# Patient Record
Sex: FEMALE | Race: WHITE | Employment: UNEMPLOYED | URBAN - METROPOLITAN AREA
[De-identification: names, ages, dates, MRNs, and addresses within clinical notes are randomized per-mention and may not be internally consistent; named-entity substitution may affect disease eponyms.]

---

## 2017-04-08 ENCOUNTER — HOSPITAL ENCOUNTER (OUTPATIENT)
Dept: OTHER | Age: 62
Discharge: HOME | End: 2017-04-08
Attending: INTERNAL MEDICINE | Admitting: INTERNAL MEDICINE

## 2017-04-08 LAB
A/G RATIO: 1.3 (ref 1.1–2.2)
ALBUMIN SERPL-MCNC: 3.8 G/DL (ref 3.4–5)
ALP BLD-CCNC: 137 U/L (ref 40–129)
ALT SERPL-CCNC: 24 U/L (ref 10–40)
ANION GAP SERPL CALCULATED.3IONS-SCNC: 14 MMOL/L (ref 3–16)
AST SERPL-CCNC: 19 U/L (ref 15–37)
BASOPHILS ABSOLUTE: 0 K/UL (ref 0–0.2)
BASOPHILS RELATIVE PERCENT: 0.5 %
BILIRUB SERPL-MCNC: 0.3 MG/DL (ref 0–1)
BUN BLDV-MCNC: 18 MG/DL (ref 7–20)
CALCIUM SERPL-MCNC: 9 MG/DL (ref 8.3–10.6)
CHLORIDE BLD-SCNC: 104 MMOL/L (ref 99–110)
CHOLESTEROL, TOTAL: 120 MG/DL (ref 0–199)
CO2: 24 MMOL/L (ref 21–32)
CREAT SERPL-MCNC: 0.6 MG/DL (ref 0.6–1.1)
EOSINOPHILS ABSOLUTE: 0.3 K/UL (ref 0–0.6)
EOSINOPHILS RELATIVE PERCENT: 3.3 %
GFR AFRICAN AMERICAN: >60
GFR NON-AFRICAN AMERICAN: >60
GLOBULIN: 3 G/DL
GLUCOSE BLD-MCNC: 161 MG/DL (ref 70–99)
HCT VFR BLD CALC: 38.1 % (ref 36–48)
HDLC SERPL-MCNC: 46 MG/DL (ref 40–60)
HEMOGLOBIN: 11.8 G/DL (ref 12–16)
LDL CHOLESTEROL CALCULATED: 58 MG/DL
LYMPHOCYTES ABSOLUTE: 1.5 K/UL (ref 1–5.1)
LYMPHOCYTES RELATIVE PERCENT: 19.5 %
MCH RBC QN AUTO: 24.5 PG (ref 26–34)
MCHC RBC AUTO-ENTMCNC: 31 G/DL (ref 31–36)
MCV RBC AUTO: 79.1 FL (ref 80–100)
MONOCYTES ABSOLUTE: 0.5 K/UL (ref 0–1.3)
MONOCYTES RELATIVE PERCENT: 6.7 %
NEUTROPHILS ABSOLUTE: 5.5 K/UL (ref 1.7–7.7)
NEUTROPHILS RELATIVE PERCENT: 70 %
PDW BLD-RTO: 15.2 % (ref 12.4–15.4)
PLATELET # BLD: 242 K/UL (ref 135–450)
PMV BLD AUTO: 8.8 FL (ref 5–10.5)
POTASSIUM SERPL-SCNC: 4.1 MMOL/L (ref 3.5–5.1)
RBC # BLD: 4.82 M/UL (ref 4–5.2)
SODIUM BLD-SCNC: 142 MMOL/L (ref 136–145)
TOTAL PROTEIN: 6.8 G/DL (ref 6.4–8.2)
TRIGL SERPL-MCNC: 80 MG/DL (ref 0–150)
TSH SERPL DL<=0.05 MIU/L-ACNC: 0.81 UIU/ML (ref 0.27–4.2)
VITAMIN D 25-HYDROXY: 40.7 NG/ML
VLDLC SERPL CALC-MCNC: 16 MG/DL
WBC # BLD: 7.9 K/UL (ref 4–11)

## 2017-04-09 LAB
ESTIMATED AVERAGE GLUCOSE: 182.9 MG/DL
HBA1C MFR BLD: 8 %

## 2017-07-26 ENCOUNTER — HOSPITAL ENCOUNTER (OUTPATIENT)
Dept: MAMMOGRAPHY | Age: 62
Discharge: HOME | End: 2017-07-26
Attending: INTERNAL MEDICINE | Admitting: INTERNAL MEDICINE

## 2017-07-26 DIAGNOSIS — Z12.31 VISIT FOR SCREENING MAMMOGRAM: ICD-10-CM

## 2018-07-23 PROBLEM — K56.609 SBO (SMALL BOWEL OBSTRUCTION) (HCC): Status: ACTIVE | Noted: 2018-07-23

## 2018-08-02 ENCOUNTER — OFFICE VISIT (OUTPATIENT)
Dept: SURGERY | Age: 63
End: 2018-08-02

## 2018-08-02 VITALS — BODY MASS INDEX: 44.55 KG/M2 | WEIGHT: 276 LBS | SYSTOLIC BLOOD PRESSURE: 126 MMHG | DIASTOLIC BLOOD PRESSURE: 72 MMHG

## 2018-08-02 DIAGNOSIS — K43.0 INCARCERATED INCISIONAL HERNIA: Primary | ICD-10-CM

## 2018-08-02 PROCEDURE — 99024 POSTOP FOLLOW-UP VISIT: CPT | Performed by: SURGERY

## 2018-08-02 RX ORDER — FLUCONAZOLE 100 MG/1
200 TABLET ORAL DAILY
Qty: 14 TABLET | Refills: 0 | Status: SHIPPED | OUTPATIENT
Start: 2018-08-02 | End: 2018-08-09

## 2018-08-23 ENCOUNTER — OFFICE VISIT (OUTPATIENT)
Dept: SURGERY | Age: 63
End: 2018-08-23

## 2018-08-23 VITALS — DIASTOLIC BLOOD PRESSURE: 88 MMHG | BODY MASS INDEX: 42.61 KG/M2 | WEIGHT: 264 LBS | SYSTOLIC BLOOD PRESSURE: 142 MMHG

## 2018-08-23 DIAGNOSIS — K43.0 INCARCERATED INCISIONAL HERNIA: Primary | ICD-10-CM

## 2018-08-23 PROCEDURE — 99024 POSTOP FOLLOW-UP VISIT: CPT | Performed by: SURGERY

## 2018-09-27 ENCOUNTER — INITIAL CONSULT (OUTPATIENT)
Dept: SURGERY | Age: 63
End: 2018-09-27
Payer: COMMERCIAL

## 2018-09-27 VITALS
BODY MASS INDEX: 43.71 KG/M2 | WEIGHT: 272 LBS | SYSTOLIC BLOOD PRESSURE: 132 MMHG | HEIGHT: 66 IN | DIASTOLIC BLOOD PRESSURE: 80 MMHG

## 2018-09-27 DIAGNOSIS — K57.32 SIGMOID DIVERTICULITIS: Primary | ICD-10-CM

## 2018-09-27 PROCEDURE — 3017F COLORECTAL CA SCREEN DOC REV: CPT | Performed by: SURGERY

## 2018-09-27 PROCEDURE — 1036F TOBACCO NON-USER: CPT | Performed by: SURGERY

## 2018-09-27 PROCEDURE — G8427 DOCREV CUR MEDS BY ELIG CLIN: HCPCS | Performed by: SURGERY

## 2018-09-27 PROCEDURE — 99214 OFFICE O/P EST MOD 30 MIN: CPT | Performed by: SURGERY

## 2018-09-27 PROCEDURE — G8417 CALC BMI ABV UP PARAM F/U: HCPCS | Performed by: SURGERY

## 2018-09-27 ASSESSMENT — ENCOUNTER SYMPTOMS
EYES NEGATIVE: 1
NAUSEA: 1
APNEA: 1
ABDOMINAL PAIN: 1

## 2018-09-27 NOTE — PROGRESS NOTES
ferrous sulfate 324 (65 Fe) MG EC tablet Take 324 mg by mouth daily (with breakfast)   Yes Historical Provider, MD   Cyanocobalamin (VITAMIN B 12 PO) Take by mouth daily   Yes Historical Provider, MD   diphenhydrAMINE (BENADRYL) 25 MG capsule Take 25 mg by mouth 2 times daily as needed for Itching   Yes Historical Provider, MD   vitamin D (CHOLECALCIFEROL) 1000 UNIT TABS tablet Take 1,000 Units by mouth daily   Yes Historical Provider, MD   baclofen (LIORESAL) 10 MG tablet Take 1 tablet by mouth 2 times daily. 10/11/12  Yes Ellis Bradley MD   Lansoprazole (PREVACID PO) Take 30 mg by mouth daily. Yes Historical Provider, MD   metformin (GLUCOPHAGE) 500 MG tablet Take 1,000 mg by mouth See Admin Instructions 2 TABS TWICE DAY    Yes Historical Provider, MD   tramadol (ULTRAM) 50 MG tablet Take 50 mg by mouth 2 times daily. Yes Historical Provider, MD   zonisamide (ZONEGRAN) 25 MG capsule Take 25 mg by mouth 2 times daily. Yes Historical Provider, MD        Allergies:  Amitriptyline hcl; Cephalexin; Ciprofloxacin; Levofloxacin; Penicillins; Sulfa antibiotics; Cephalexin hcl; Elavil [amitriptyline hcl]; Levofloxacin; Methocarbamol; Sulfur; and Tetracyclines & related    Social History:    reports that she has never smoked. She has never used smokeless tobacco. She reports that she does not drink alcohol. Family History:    Family History   Problem Relation Age of Onset    Diabetes Mother     Heart Disease Neg Hx        REVIEW OF SYSTEMS:  Review of Systems   Constitutional: Positive for fatigue and fever. HENT: Negative. Eyes: Negative. Respiratory: Positive for apnea. Cardiovascular: Negative. Gastrointestinal: Positive for abdominal pain and nausea. Endocrine: Negative. Genitourinary: Negative. Musculoskeletal: Negative. Skin: Negative. Allergic/Immunologic: Positive for environmental allergies. Neurological: Negative. Hematological: Negative. Psychiatric/Behavioral: Negative. PHYSICAL EXAM:  VITALS:  /80   Ht 5' 6\" (1.676 m)   Wt 272 lb (123.4 kg)   BMI 43.90 kg/m²   CONSTITUTIONAL:  alert, no apparent distress and morbidly obese  EYES:  sclera clear  ENT:  normocepalic, without obvious abnormality  NECK:  supple, symmetrical, trachea midline  LUNGS:  clear to auscultation  CARDIOVASCULAR:  regular rate and rhythm   ABDOMEN:  scars noted large midline scar - healed, normal bowel sounds, soft, non-distended, tenderness noted in the left lower quadrant, away from prior surgery site, voluntary guarding absent, no masses palpated and hernia absent - prior site healed  MUSCULOSKELETAL:  0+ pitting edema lower extremities  NEUROLOGIC:  Mental Status Exam:  Level of Alertness:   awake  Orientation:   person, place, time  SKIN:  no bruising or bleeding, normal skin color, texture, turgor and no redness, warmth, or swelling        Radiology Review:  CT scan    Interface, Rad Results In - 09/26/2018  6:16 PM EDT  HISTORY:   LLQ pain, suspect diverticulitis llq pain, has h/o recurrent diverticulitis s/p partial colectomy Diverticulitis of large intestine without perforation or abscess without bleeding  COMPARISON:  5/1/2018  TECHNIQUE: Post IV contrast multiplanar CT images of the abdomen and pelvis  NOTE:  If there are questions about the content of this report, please contact [facility] radiology by calling [phone]    FINDINGS:  LOWER CHEST:  Unremarkable  LIVER:  Nodular liver contour suggestive of cirrhosis  GALLBLADDER/BILE DUCTS:  Unremarkable. No opaque gallstones  PANCREAS:  Unremarkable. No mass or duct dilation  SPLEEN:  Unremarkable  ADRENALS:  Unremarkable  KIDNEYS/URETERS:  7 mm nonobstructing stone right renal collecting system. 2 mm nonobstructing stone left renal collecting system  GI TRACT:  Bowel normal in caliber and wall thickness. Diverticulosis left side of colon. Small hiatal hernia  VESSELS:  Unremarkable.   No

## 2018-10-18 ENCOUNTER — OFFICE VISIT (OUTPATIENT)
Dept: SURGERY | Age: 63
End: 2018-10-18
Payer: COMMERCIAL

## 2018-10-18 VITALS — BODY MASS INDEX: 44.22 KG/M2 | SYSTOLIC BLOOD PRESSURE: 146 MMHG | DIASTOLIC BLOOD PRESSURE: 92 MMHG | WEIGHT: 274 LBS

## 2018-10-18 DIAGNOSIS — K57.32 SIGMOID DIVERTICULITIS: Primary | ICD-10-CM

## 2018-10-18 PROCEDURE — 3017F COLORECTAL CA SCREEN DOC REV: CPT | Performed by: SURGERY

## 2018-10-18 PROCEDURE — G8417 CALC BMI ABV UP PARAM F/U: HCPCS | Performed by: SURGERY

## 2018-10-18 PROCEDURE — G8427 DOCREV CUR MEDS BY ELIG CLIN: HCPCS | Performed by: SURGERY

## 2018-10-18 PROCEDURE — 99212 OFFICE O/P EST SF 10 MIN: CPT | Performed by: SURGERY

## 2018-10-18 PROCEDURE — 1036F TOBACCO NON-USER: CPT | Performed by: SURGERY

## 2018-10-18 PROCEDURE — G8484 FLU IMMUNIZE NO ADMIN: HCPCS | Performed by: SURGERY

## 2020-03-08 ENCOUNTER — HOSPITAL ENCOUNTER (INPATIENT)
Age: 65
LOS: 5 days | Discharge: HOME | DRG: 872 | End: 2020-03-14
Attending: STUDENT IN AN ORGANIZED HEALTH CARE EDUCATION/TRAINING PROGRAM | Admitting: INTERNAL MEDICINE
Payer: COMMERCIAL

## 2020-03-08 ENCOUNTER — APPOINTMENT (OUTPATIENT)
Dept: CT IMAGING | Age: 65
DRG: 872 | End: 2020-03-08
Payer: COMMERCIAL

## 2020-03-08 ENCOUNTER — APPOINTMENT (OUTPATIENT)
Dept: GENERAL RADIOLOGY | Age: 65
DRG: 872 | End: 2020-03-08
Payer: COMMERCIAL

## 2020-03-08 PROBLEM — R68.89 FLU-LIKE SYMPTOMS: Status: ACTIVE | Noted: 2020-03-08

## 2020-03-08 LAB
A/G RATIO: 1 (ref 1.1–2.2)
ALBUMIN SERPL-MCNC: 3.6 G/DL (ref 3.4–5)
ALP BLD-CCNC: 123 U/L (ref 40–129)
ALT SERPL-CCNC: 25 U/L (ref 10–40)
ANION GAP SERPL CALCULATED.3IONS-SCNC: 17 MMOL/L (ref 3–16)
AST SERPL-CCNC: 32 U/L (ref 15–37)
BASOPHILS ABSOLUTE: 0 K/UL (ref 0–0.2)
BASOPHILS RELATIVE PERCENT: 0.3 %
BILIRUB SERPL-MCNC: 0.8 MG/DL (ref 0–1)
BILIRUBIN URINE: NEGATIVE
BLOOD, URINE: NEGATIVE
BUN BLDV-MCNC: 19 MG/DL (ref 7–20)
CALCIUM SERPL-MCNC: 9.2 MG/DL (ref 8.3–10.6)
CHLORIDE BLD-SCNC: 100 MMOL/L (ref 99–110)
CLARITY: CLEAR
CO2: 19 MMOL/L (ref 21–32)
COLOR: YELLOW
CREAT SERPL-MCNC: 0.9 MG/DL (ref 0.6–1.2)
D DIMER: 756 NG/ML DDU (ref 0–229)
EOSINOPHILS ABSOLUTE: 0 K/UL (ref 0–0.6)
EOSINOPHILS RELATIVE PERCENT: 0.1 %
EPITHELIAL CELLS, UA: 2 /HPF (ref 0–5)
GFR AFRICAN AMERICAN: >60
GFR NON-AFRICAN AMERICAN: >60
GLOBULIN: 3.6 G/DL
GLUCOSE BLD-MCNC: 175 MG/DL (ref 70–99)
GLUCOSE BLD-MCNC: 191 MG/DL (ref 70–99)
GLUCOSE URINE: NEGATIVE MG/DL
HCT VFR BLD CALC: 45 % (ref 36–48)
HEMOGLOBIN: 15.1 G/DL (ref 12–16)
HYALINE CASTS: 0 /LPF (ref 0–8)
INR BLD: 1.23 (ref 0.86–1.14)
KETONES, URINE: ABNORMAL MG/DL
LACTIC ACID, SEPSIS: 1.1 MMOL/L (ref 0.4–1.9)
LACTIC ACID, SEPSIS: 1.5 MMOL/L (ref 0.4–1.9)
LEUKOCYTE ESTERASE, URINE: ABNORMAL
LYMPHOCYTES ABSOLUTE: 0.8 K/UL (ref 1–5.1)
LYMPHOCYTES RELATIVE PERCENT: 10.6 %
MCH RBC QN AUTO: 28.6 PG (ref 26–34)
MCHC RBC AUTO-ENTMCNC: 33.5 G/DL (ref 31–36)
MCV RBC AUTO: 85.2 FL (ref 80–100)
MICROSCOPIC EXAMINATION: YES
MONOCYTES ABSOLUTE: 0.7 K/UL (ref 0–1.3)
MONOCYTES RELATIVE PERCENT: 9.3 %
NEUTROPHILS ABSOLUTE: 6.1 K/UL (ref 1.7–7.7)
NEUTROPHILS RELATIVE PERCENT: 79.7 %
NITRITE, URINE: POSITIVE
PDW BLD-RTO: 14.1 % (ref 12.4–15.4)
PERFORMED ON: ABNORMAL
PH UA: 6 (ref 5–8)
PLATELET # BLD: 137 K/UL (ref 135–450)
PMV BLD AUTO: 8.6 FL (ref 5–10.5)
POTASSIUM SERPL-SCNC: 3.9 MMOL/L (ref 3.5–5.1)
PROCALCITONIN: 1.07 NG/ML (ref 0–0.15)
PROTEIN UA: NEGATIVE MG/DL
PROTHROMBIN TIME: 14.3 SEC (ref 10–13.2)
RAPID INFLUENZA  B AGN: NEGATIVE
RAPID INFLUENZA A AGN: NEGATIVE
RBC # BLD: 5.28 M/UL (ref 4–5.2)
RBC UA: 1 /HPF (ref 0–4)
SODIUM BLD-SCNC: 136 MMOL/L (ref 136–145)
SPECIFIC GRAVITY UA: 1.02 (ref 1–1.03)
TOTAL PROTEIN: 7.2 G/DL (ref 6.4–8.2)
TROPONIN: <0.01 NG/ML
URINE TYPE: ABNORMAL
UROBILINOGEN, URINE: 0.2 E.U./DL
WBC # BLD: 7.7 K/UL (ref 4–11)
WBC UA: 15 /HPF (ref 0–5)

## 2020-03-08 PROCEDURE — 2580000003 HC RX 258: Performed by: STUDENT IN AN ORGANIZED HEALTH CARE EDUCATION/TRAINING PROGRAM

## 2020-03-08 PROCEDURE — 85610 PROTHROMBIN TIME: CPT

## 2020-03-08 PROCEDURE — 84484 ASSAY OF TROPONIN QUANT: CPT

## 2020-03-08 PROCEDURE — 87150 DNA/RNA AMPLIFIED PROBE: CPT

## 2020-03-08 PROCEDURE — 87804 INFLUENZA ASSAY W/OPTIC: CPT

## 2020-03-08 PROCEDURE — 96365 THER/PROPH/DIAG IV INF INIT: CPT

## 2020-03-08 PROCEDURE — 71275 CT ANGIOGRAPHY CHEST: CPT

## 2020-03-08 PROCEDURE — 85025 COMPLETE CBC W/AUTO DIFF WBC: CPT

## 2020-03-08 PROCEDURE — 87186 SC STD MICRODIL/AGAR DIL: CPT

## 2020-03-08 PROCEDURE — 6360000004 HC RX CONTRAST MEDICATION: Performed by: STUDENT IN AN ORGANIZED HEALTH CARE EDUCATION/TRAINING PROGRAM

## 2020-03-08 PROCEDURE — 84145 PROCALCITONIN (PCT): CPT

## 2020-03-08 PROCEDURE — 83605 ASSAY OF LACTIC ACID: CPT

## 2020-03-08 PROCEDURE — 81001 URINALYSIS AUTO W/SCOPE: CPT

## 2020-03-08 PROCEDURE — 96375 TX/PRO/DX INJ NEW DRUG ADDON: CPT

## 2020-03-08 PROCEDURE — 6370000000 HC RX 637 (ALT 250 FOR IP): Performed by: STUDENT IN AN ORGANIZED HEALTH CARE EDUCATION/TRAINING PROGRAM

## 2020-03-08 PROCEDURE — 71045 X-RAY EXAM CHEST 1 VIEW: CPT

## 2020-03-08 PROCEDURE — 36415 COLL VENOUS BLD VENIPUNCTURE: CPT

## 2020-03-08 PROCEDURE — 96368 THER/DIAG CONCURRENT INF: CPT

## 2020-03-08 PROCEDURE — 80053 COMPREHEN METABOLIC PANEL: CPT

## 2020-03-08 PROCEDURE — 6370000000 HC RX 637 (ALT 250 FOR IP): Performed by: INTERNAL MEDICINE

## 2020-03-08 PROCEDURE — G0378 HOSPITAL OBSERVATION PER HR: HCPCS

## 2020-03-08 PROCEDURE — 93005 ELECTROCARDIOGRAM TRACING: CPT | Performed by: STUDENT IN AN ORGANIZED HEALTH CARE EDUCATION/TRAINING PROGRAM

## 2020-03-08 PROCEDURE — 6360000002 HC RX W HCPCS: Performed by: STUDENT IN AN ORGANIZED HEALTH CARE EDUCATION/TRAINING PROGRAM

## 2020-03-08 PROCEDURE — 87040 BLOOD CULTURE FOR BACTERIA: CPT

## 2020-03-08 PROCEDURE — 96366 THER/PROPH/DIAG IV INF ADDON: CPT

## 2020-03-08 PROCEDURE — 99291 CRITICAL CARE FIRST HOUR: CPT

## 2020-03-08 PROCEDURE — 85379 FIBRIN DEGRADATION QUANT: CPT

## 2020-03-08 RX ORDER — BACLOFEN 10 MG/1
10 TABLET ORAL 2 TIMES DAILY
Status: DISCONTINUED | OUTPATIENT
Start: 2020-03-08 | End: 2020-03-09

## 2020-03-08 RX ORDER — SODIUM CHLORIDE 0.9 % (FLUSH) 0.9 %
10 SYRINGE (ML) INJECTION EVERY 12 HOURS SCHEDULED
Status: DISCONTINUED | OUTPATIENT
Start: 2020-03-08 | End: 2020-03-12

## 2020-03-08 RX ORDER — ACETAMINOPHEN 325 MG/1
650 TABLET ORAL EVERY 6 HOURS PRN
Status: DISCONTINUED | OUTPATIENT
Start: 2020-03-08 | End: 2020-03-14 | Stop reason: HOSPADM

## 2020-03-08 RX ORDER — 0.9 % SODIUM CHLORIDE 0.9 %
30 INTRAVENOUS SOLUTION INTRAVENOUS ONCE
Status: COMPLETED | OUTPATIENT
Start: 2020-03-08 | End: 2020-03-08

## 2020-03-08 RX ORDER — VITAMIN B COMPLEX
1000 TABLET ORAL DAILY
Status: DISCONTINUED | OUTPATIENT
Start: 2020-03-09 | End: 2020-03-14 | Stop reason: HOSPADM

## 2020-03-08 RX ORDER — FERROUS SULFATE TAB EC 324 MG (65 MG FE EQUIVALENT) 324 (65 FE) MG
324 TABLET DELAYED RESPONSE ORAL
Status: DISCONTINUED | OUTPATIENT
Start: 2020-03-09 | End: 2020-03-14 | Stop reason: HOSPADM

## 2020-03-08 RX ORDER — METOPROLOL SUCCINATE 50 MG/1
100 TABLET, EXTENDED RELEASE ORAL DAILY
Status: DISCONTINUED | OUTPATIENT
Start: 2020-03-09 | End: 2020-03-14 | Stop reason: HOSPADM

## 2020-03-08 RX ORDER — ATORVASTATIN CALCIUM 10 MG/1
10 TABLET, FILM COATED ORAL DAILY
Status: DISCONTINUED | OUTPATIENT
Start: 2020-03-09 | End: 2020-03-14 | Stop reason: HOSPADM

## 2020-03-08 RX ORDER — ZONISAMIDE 25 MG/1
25 CAPSULE ORAL 2 TIMES DAILY
Status: DISCONTINUED | OUTPATIENT
Start: 2020-03-09 | End: 2020-03-14 | Stop reason: HOSPADM

## 2020-03-08 RX ORDER — DIPHENHYDRAMINE HCL 25 MG
25 TABLET ORAL 2 TIMES DAILY PRN
Status: DISCONTINUED | OUTPATIENT
Start: 2020-03-08 | End: 2020-03-14 | Stop reason: HOSPADM

## 2020-03-08 RX ORDER — TRAMADOL HYDROCHLORIDE 50 MG/1
50 TABLET ORAL 2 TIMES DAILY
Status: DISCONTINUED | OUTPATIENT
Start: 2020-03-09 | End: 2020-03-14 | Stop reason: HOSPADM

## 2020-03-08 RX ORDER — LISINOPRIL 10 MG/1
10 TABLET ORAL DAILY
Status: DISCONTINUED | OUTPATIENT
Start: 2020-03-09 | End: 2020-03-09

## 2020-03-08 RX ORDER — PROMETHAZINE HYDROCHLORIDE 25 MG/1
12.5 TABLET ORAL EVERY 6 HOURS PRN
Status: DISCONTINUED | OUTPATIENT
Start: 2020-03-08 | End: 2020-03-14 | Stop reason: HOSPADM

## 2020-03-08 RX ORDER — CIPROFLOXACIN 2 MG/ML
400 INJECTION, SOLUTION INTRAVENOUS ONCE
Status: COMPLETED | OUTPATIENT
Start: 2020-03-08 | End: 2020-03-08

## 2020-03-08 RX ORDER — ACETAMINOPHEN 650 MG/1
650 SUPPOSITORY RECTAL EVERY 6 HOURS PRN
Status: DISCONTINUED | OUTPATIENT
Start: 2020-03-08 | End: 2020-03-14 | Stop reason: HOSPADM

## 2020-03-08 RX ORDER — NICOTINE POLACRILEX 4 MG
15 LOZENGE BUCCAL PRN
Status: DISCONTINUED | OUTPATIENT
Start: 2020-03-08 | End: 2020-03-14 | Stop reason: HOSPADM

## 2020-03-08 RX ORDER — ASPIRIN 81 MG/1
324 TABLET, CHEWABLE ORAL ONCE
Status: COMPLETED | OUTPATIENT
Start: 2020-03-08 | End: 2020-03-08

## 2020-03-08 RX ORDER — NITROGLYCERIN 0.4 MG/1
0.4 TABLET SUBLINGUAL ONCE
Status: COMPLETED | OUTPATIENT
Start: 2020-03-08 | End: 2020-03-08

## 2020-03-08 RX ORDER — POLYETHYLENE GLYCOL 3350 17 G/17G
17 POWDER, FOR SOLUTION ORAL DAILY PRN
Status: DISCONTINUED | OUTPATIENT
Start: 2020-03-08 | End: 2020-03-14 | Stop reason: HOSPADM

## 2020-03-08 RX ORDER — DEXTROSE MONOHYDRATE 50 MG/ML
100 INJECTION, SOLUTION INTRAVENOUS PRN
Status: DISCONTINUED | OUTPATIENT
Start: 2020-03-08 | End: 2020-03-14 | Stop reason: HOSPADM

## 2020-03-08 RX ORDER — DEXTROSE MONOHYDRATE 25 G/50ML
12.5 INJECTION, SOLUTION INTRAVENOUS PRN
Status: DISCONTINUED | OUTPATIENT
Start: 2020-03-08 | End: 2020-03-14 | Stop reason: HOSPADM

## 2020-03-08 RX ORDER — ONDANSETRON 2 MG/ML
4 INJECTION INTRAMUSCULAR; INTRAVENOUS EVERY 6 HOURS PRN
Status: DISCONTINUED | OUTPATIENT
Start: 2020-03-08 | End: 2020-03-14 | Stop reason: HOSPADM

## 2020-03-08 RX ORDER — DIPHENHYDRAMINE HYDROCHLORIDE 50 MG/ML
25 INJECTION INTRAMUSCULAR; INTRAVENOUS ONCE
Status: COMPLETED | OUTPATIENT
Start: 2020-03-08 | End: 2020-03-08

## 2020-03-08 RX ORDER — SODIUM CHLORIDE 0.9 % (FLUSH) 0.9 %
10 SYRINGE (ML) INJECTION PRN
Status: DISCONTINUED | OUTPATIENT
Start: 2020-03-08 | End: 2020-03-12

## 2020-03-08 RX ORDER — LANSOPRAZOLE 30 MG/1
30 CAPSULE, DELAYED RELEASE ORAL
Status: DISCONTINUED | OUTPATIENT
Start: 2020-03-09 | End: 2020-03-09

## 2020-03-08 RX ADMIN — VANCOMYCIN HYDROCHLORIDE 1500 MG: 10 INJECTION, POWDER, LYOPHILIZED, FOR SOLUTION INTRAVENOUS at 18:26

## 2020-03-08 RX ADMIN — SODIUM CHLORIDE 3585 ML: 9 INJECTION, SOLUTION INTRAVENOUS at 16:34

## 2020-03-08 RX ADMIN — DIPHENHYDRAMINE HYDROCHLORIDE 25 MG: 50 INJECTION, SOLUTION INTRAMUSCULAR; INTRAVENOUS at 16:40

## 2020-03-08 RX ADMIN — CIPROFLOXACIN 400 MG: 2 INJECTION, SOLUTION INTRAVENOUS at 16:40

## 2020-03-08 RX ADMIN — NITROGLYCERIN 0.4 MG: 0.4 TABLET, ORALLY DISINTEGRATING SUBLINGUAL at 14:31

## 2020-03-08 RX ADMIN — IOPAMIDOL 75 ML: 755 INJECTION, SOLUTION INTRAVENOUS at 15:03

## 2020-03-08 RX ADMIN — ASPIRIN 81 MG 324 MG: 81 TABLET ORAL at 14:30

## 2020-03-08 RX ADMIN — INSULIN LISPRO 1 UNITS: 100 INJECTION, SOLUTION INTRAVENOUS; SUBCUTANEOUS at 22:16

## 2020-03-08 ASSESSMENT — PAIN SCALES - GENERAL
PAINLEVEL_OUTOF10: 0
PAINLEVEL_OUTOF10: 0
PAINLEVEL_OUTOF10: 5
PAINLEVEL_OUTOF10: 0

## 2020-03-08 ASSESSMENT — PAIN DESCRIPTION - ORIENTATION: ORIENTATION: MID

## 2020-03-08 ASSESSMENT — PAIN DESCRIPTION - LOCATION: LOCATION: CHEST

## 2020-03-08 ASSESSMENT — PAIN DESCRIPTION - DESCRIPTORS: DESCRIPTORS: SHARP

## 2020-03-08 ASSESSMENT — PAIN - FUNCTIONAL ASSESSMENT: PAIN_FUNCTIONAL_ASSESSMENT: PREVENTS OR INTERFERES WITH MANY ACTIVE NOT PASSIVE ACTIVITIES

## 2020-03-08 ASSESSMENT — PAIN DESCRIPTION - PAIN TYPE: TYPE: ACUTE PAIN

## 2020-03-08 ASSESSMENT — PAIN DESCRIPTION - PROGRESSION: CLINICAL_PROGRESSION: GRADUALLY WORSENING

## 2020-03-08 ASSESSMENT — PAIN DESCRIPTION - FREQUENCY: FREQUENCY: INTERMITTENT

## 2020-03-08 NOTE — ED NOTES
Pt returning from CT. Pt continues to have chest discomfort-no change. Will continue to monitor awaiting Ct-chest results. Call light in reach.  Will continue to monitor     Troy Berrios RN  80/65/51 [id number]

## 2020-03-08 NOTE — ED PROVIDER NOTES
Physical activity     Days per week: Not on file     Minutes per session: Not on file    Stress: Not on file   Relationships    Social connections     Talks on phone: Not on file     Gets together: Not on file     Attends L[employer] service: Not on file     Active member of club or organization: Not on file     Attends meetings of clubs or organizations: Not on file     Relationship status: Not on file    Intimate partner violence     Fear of current or ex partner: Not on file     Emotionally abused: Not on file     Physically abused: Not on file     Forced sexual activity: Not on file   Other Topics Concern    Not on file   Social History Narrative    Not on file        Review of Systems   10 total systems reviewed and found to be negative unless otherwise noted in HPI     Physical Exam   BP (!) 147/70   Pulse 116   Temp 98.4 °F (36.9 °C) (Oral)   Resp 24   Ht 5' 5\" (1.651 m)   Wt 263 lb 7.2 oz (119.5 kg)   SpO2 97%   BMI 43.84 kg/m²      CONSTITUTIONAL: Ill looking, in distress  HEAD: atraumatic, normocephalic   EYES: PERRL, No injection, discharge or scleral icterus. ENT: Mucous membranes pink and moist.   NECK: Normal ROM, NO LAD   CARDIOVASCULAR: Sinus tach with heart rate in 113  PULMONARY/CHEST: Airway patent. No retractions. Breath sounds clear with good air entry bilaterally. ABDOMEN: Soft, Non-distended and non-tender, without guarding or rebound. SKIN: Acyanotic, warm, dry   MUSCULOSKELETAL: No swelling, tenderness or deformity   NEUROLOGICAL: Awake and oriented x 3. 5/5 strength upper and lower. Pulses intact. Grossly nonfocal   Nursing note and vitals reviewed.      ED Course & Medical Decision Making   Medications   0.9 % sodium chloride IV bolus 3,585 mL (3,585 mLs Intravenous New Bag 3/8/20 1634)   ciprofloxacin (CIPRO) IVPB 400 mg (400 mg Intravenous New Bag 3/8/20 1640)   vancomycin (VANCOCIN) 1500 mg in dextrose 5 % 250 mL IVPB (has no administration in time range)   aspirin chewable tablet 324 mg (324 mg Oral Given 3/8/20 1430)   nitroGLYCERIN (NITROSTAT) SL tablet 0.4 mg (0.4 mg Sublingual Given 3/8/20 1431)   iopamidol (ISOVUE-370) 76 % injection 75 mL (75 mLs Intravenous Given 3/8/20 1503)   diphenhydrAMINE (BENADRYL) injection 25 mg (25 mg Intravenous Given 3/8/20 1640)      Labs Reviewed   CBC WITH AUTO DIFFERENTIAL - Abnormal; Notable for the following components:       Result Value    RBC 5.28 (*)     Lymphocytes Absolute 0.8 (*)     All other components within normal limits    Narrative:     Performed at:  [address] [address]   Phone [phone]   COMPREHENSIVE METABOLIC PANEL - Abnormal; Notable for the following components:    CO2 19 (*)     Anion Gap 17 (*)     Glucose 191 (*)     Albumin/Globulin Ratio 1.0 (*)     All other components within normal limits    Narrative:     Performed at:  [address] [address]   Phone [phone]   PROTIME-INR - Abnormal; Notable for the following components:    Protime 14.3 (*)     INR 1.23 (*)     All other components within normal limits    Narrative:     Performed at:  [address] [address]   Phone [phone]   D-DIMER, QUANTITATIVE - Abnormal; Notable for the following components:    D-Dimer, Quant 756 (*)     All other components within normal limits    Narrative:     Performed at:  [address] [address]   Phone [phone]   RAPID INFLUENZA A/B ANTIGENS    Narrative:     Performed at:  [address] [address]   Phone [phone]   CULTURE, URINE   CULTURE, BLOOD 1   CULTURE, BLOOD 2   TROPONIN    Narrative:     Performed at:  [facility]  Kayleen Savage, was negative. She will be admitted for sepsis pending further evaluation. Dispo:    Hospitalist admit    -Findings and recommendations explained to patient, and . They expressed understanding and agreed with the plan. Clinical Impression:  1. Septicemia ([address]    2. Chest pain, unspecified type         Deangelo Hauser MD   3/8/2020     ___________________________________________________________________________________________________    Amount and/or Complexity of Data Reviewed:  Clinical lab tests: ordered and reviewed   Tests in the radiology section of CPT®: ordered and reviewed   Tests in the medicine section of CPT®: ordered and reviewed   Decide to obtain previous medical records or to obtain history from someone other than the patient: Yes, because patient was in acute pain and toxic  Obtain history from someone other than the patient: Yes  Review and summarize past medical records:Yes  I looked up the patient in our electronic medical record:Yes  Discuss the patient with other providers:Yes  Independent visualization of images, tracings, or specimens:Yes  Risk of Complications, Morbidity, and/or Mortality:Moderate  Presenting problems: moderate Management options: moderate   __________________________________________________________________________________________________    Critical Care Attestation   Total critical care time: 40 minutes minimum   Critical care time does not include separately billable procedures and treating other patients. Critical care was necessary to treat or prevent imminent or life-threatening deterioration of the following conditions: Sepsis of unknown cause. Patient placed on cardiac monitoring, EKG obtained x2 and treated urgently in the ED with antibiotics. Case discussed with consultants.   ________________________________________________________________________________________________  This record is transcribed utilizing voice recognition technology.  There are inherent limitations in this technology. In addition, there may be limitations in editing of this report. If there are any discrepancies, please contact me directly.         Kandi Henderson MD  03/08/20 3509

## 2020-03-09 PROBLEM — N39.0 COMPLICATED UTI (URINARY TRACT INFECTION): Status: ACTIVE | Noted: 2020-03-09

## 2020-03-09 PROBLEM — A41.9 SEPSIS (HCC): Status: ACTIVE | Noted: 2020-03-09

## 2020-03-09 LAB
A/G RATIO: 0.9 (ref 1.1–2.2)
ALBUMIN SERPL-MCNC: 2.9 G/DL (ref 3.4–5)
ALP BLD-CCNC: 96 U/L (ref 40–129)
ALT SERPL-CCNC: 28 U/L (ref 10–40)
ANION GAP SERPL CALCULATED.3IONS-SCNC: 12 MMOL/L (ref 3–16)
AST SERPL-CCNC: 40 U/L (ref 15–37)
BASOPHILS ABSOLUTE: 0 K/UL (ref 0–0.2)
BASOPHILS RELATIVE PERCENT: 0.3 %
BILIRUB SERPL-MCNC: 0.5 MG/DL (ref 0–1)
BUN BLDV-MCNC: 13 MG/DL (ref 7–20)
CALCIUM SERPL-MCNC: 8.2 MG/DL (ref 8.3–10.6)
CHLORIDE BLD-SCNC: 106 MMOL/L (ref 99–110)
CO2: 20 MMOL/L (ref 21–32)
CREAT SERPL-MCNC: 0.7 MG/DL (ref 0.6–1.2)
EKG ATRIAL RATE: 113 BPM
EKG ATRIAL RATE: 121 BPM
EKG DIAGNOSIS: NORMAL
EKG DIAGNOSIS: NORMAL
EKG P AXIS: 16 DEGREES
EKG P AXIS: 30 DEGREES
EKG P-R INTERVAL: 138 MS
EKG P-R INTERVAL: 152 MS
EKG Q-T INTERVAL: 338 MS
EKG Q-T INTERVAL: 344 MS
EKG QRS DURATION: 80 MS
EKG QRS DURATION: 84 MS
EKG QTC CALCULATION (BAZETT): 471 MS
EKG QTC CALCULATION (BAZETT): 479 MS
EKG R AXIS: -31 DEGREES
EKG R AXIS: -33 DEGREES
EKG T AXIS: 55 DEGREES
EKG T AXIS: 58 DEGREES
EKG VENTRICULAR RATE: 113 BPM
EKG VENTRICULAR RATE: 121 BPM
EOSINOPHILS ABSOLUTE: 0 K/UL (ref 0–0.6)
EOSINOPHILS RELATIVE PERCENT: 0.1 %
GFR AFRICAN AMERICAN: >60
GFR NON-AFRICAN AMERICAN: >60
GLOBULIN: 3.1 G/DL
GLUCOSE BLD-MCNC: 161 MG/DL (ref 70–99)
GLUCOSE BLD-MCNC: 164 MG/DL (ref 70–99)
GLUCOSE BLD-MCNC: 188 MG/DL (ref 70–99)
GLUCOSE BLD-MCNC: 205 MG/DL (ref 70–99)
GLUCOSE BLD-MCNC: 222 MG/DL (ref 70–99)
HCT VFR BLD CALC: 39.3 % (ref 36–48)
HEMOGLOBIN: 13 G/DL (ref 12–16)
HIV AG/AB: NORMAL
HIV ANTIGEN: NORMAL
HIV-1 ANTIBODY: NORMAL
HIV-2 AB: NORMAL
LYMPHOCYTES ABSOLUTE: 0.5 K/UL (ref 1–5.1)
LYMPHOCYTES RELATIVE PERCENT: 13.3 %
MCH RBC QN AUTO: 28.2 PG (ref 26–34)
MCHC RBC AUTO-ENTMCNC: 33 G/DL (ref 31–36)
MCV RBC AUTO: 85.4 FL (ref 80–100)
MONOCYTES ABSOLUTE: 0.4 K/UL (ref 0–1.3)
MONOCYTES RELATIVE PERCENT: 10.2 %
NEUTROPHILS ABSOLUTE: 3 K/UL (ref 1.7–7.7)
NEUTROPHILS RELATIVE PERCENT: 76.1 %
PDW BLD-RTO: 14.1 % (ref 12.4–15.4)
PERFORMED ON: ABNORMAL
PLATELET # BLD: 96 K/UL (ref 135–450)
PLATELET SLIDE REVIEW: ABNORMAL
PMV BLD AUTO: 8.9 FL (ref 5–10.5)
POTASSIUM REFLEX MAGNESIUM: 3.6 MMOL/L (ref 3.5–5.1)
RBC # BLD: 4.6 M/UL (ref 4–5.2)
RBC # BLD: NORMAL 10*6/UL
REPORT: NORMAL
SLIDE REVIEW: ABNORMAL
SODIUM BLD-SCNC: 138 MMOL/L (ref 136–145)
TOTAL PROTEIN: 6 G/DL (ref 6.4–8.2)
WBC # BLD: 3.9 K/UL (ref 4–11)

## 2020-03-09 PROCEDURE — 87086 URINE CULTURE/COLONY COUNT: CPT

## 2020-03-09 PROCEDURE — 85025 COMPLETE CBC W/AUTO DIFF WBC: CPT

## 2020-03-09 PROCEDURE — 86701 HIV-1ANTIBODY: CPT

## 2020-03-09 PROCEDURE — 96372 THER/PROPH/DIAG INJ SC/IM: CPT

## 2020-03-09 PROCEDURE — 36415 COLL VENOUS BLD VENIPUNCTURE: CPT

## 2020-03-09 PROCEDURE — 80053 COMPREHEN METABOLIC PANEL: CPT

## 2020-03-09 PROCEDURE — 93010 ELECTROCARDIOGRAM REPORT: CPT | Performed by: INTERNAL MEDICINE

## 2020-03-09 PROCEDURE — 6360000002 HC RX W HCPCS: Performed by: INTERNAL MEDICINE

## 2020-03-09 PROCEDURE — 96366 THER/PROPH/DIAG IV INF ADDON: CPT

## 2020-03-09 PROCEDURE — 86702 HIV-2 ANTIBODY: CPT

## 2020-03-09 PROCEDURE — 99223 1ST HOSP IP/OBS HIGH 75: CPT | Performed by: INTERNAL MEDICINE

## 2020-03-09 PROCEDURE — 6370000000 HC RX 637 (ALT 250 FOR IP): Performed by: INTERNAL MEDICINE

## 2020-03-09 PROCEDURE — 2580000003 HC RX 258: Performed by: INTERNAL MEDICINE

## 2020-03-09 PROCEDURE — 1200000000 HC SEMI PRIVATE

## 2020-03-09 PROCEDURE — 0100U HC RESPIRPTHGN MULT REV TRANS & AMP PRB TECH 21 TRGT: CPT

## 2020-03-09 PROCEDURE — 94761 N-INVAS EAR/PLS OXIMETRY MLT: CPT

## 2020-03-09 PROCEDURE — G0378 HOSPITAL OBSERVATION PER HR: HCPCS

## 2020-03-09 PROCEDURE — 87390 HIV-1 AG IA: CPT

## 2020-03-09 PROCEDURE — 94660 CPAP INITIATION&MGMT: CPT

## 2020-03-09 RX ORDER — SODIUM CHLORIDE 9 MG/ML
INJECTION, SOLUTION INTRAVENOUS CONTINUOUS
Status: DISCONTINUED | OUTPATIENT
Start: 2020-03-09 | End: 2020-03-14 | Stop reason: HOSPADM

## 2020-03-09 RX ORDER — HYDROCODONE BITARTRATE AND ACETAMINOPHEN 5; 325 MG/1; MG/1
2 TABLET ORAL EVERY 8 HOURS PRN
Status: ON HOLD | COMMUNITY
End: 2020-03-14 | Stop reason: HOSPADM

## 2020-03-09 RX ORDER — CIPROFLOXACIN 2 MG/ML
400 INJECTION, SOLUTION INTRAVENOUS EVERY 12 HOURS
Status: DISCONTINUED | OUTPATIENT
Start: 2020-03-09 | End: 2020-03-09

## 2020-03-09 RX ORDER — LANSOPRAZOLE 30 MG/1
30 CAPSULE, DELAYED RELEASE ORAL
Status: DISCONTINUED | OUTPATIENT
Start: 2020-03-09 | End: 2020-03-09

## 2020-03-09 RX ORDER — LISINOPRIL 40 MG/1
40 TABLET ORAL DAILY
Status: DISCONTINUED | OUTPATIENT
Start: 2020-03-09 | End: 2020-03-14 | Stop reason: HOSPADM

## 2020-03-09 RX ORDER — MELOXICAM 15 MG/1
15 TABLET ORAL DAILY
Status: ON HOLD | COMMUNITY
End: 2020-03-14 | Stop reason: HOSPADM

## 2020-03-09 RX ADMIN — SODIUM CHLORIDE: 9 INJECTION, SOLUTION INTRAVENOUS at 05:16

## 2020-03-09 RX ADMIN — INSULIN LISPRO 1 UNITS: 100 INJECTION, SOLUTION INTRAVENOUS; SUBCUTANEOUS at 16:51

## 2020-03-09 RX ADMIN — TRAMADOL HYDROCHLORIDE 50 MG: 50 TABLET, FILM COATED ORAL at 21:07

## 2020-03-09 RX ADMIN — ENOXAPARIN SODIUM 40 MG: 40 INJECTION SUBCUTANEOUS at 08:36

## 2020-03-09 RX ADMIN — FERROUS SULFATE TAB EC 324 MG (65 MG FE EQUIVALENT) 324 MG: 324 (65 FE) TABLET DELAYED RESPONSE at 08:36

## 2020-03-09 RX ADMIN — MEROPENEM 500 MG: 500 INJECTION, POWDER, FOR SOLUTION INTRAVENOUS at 16:51

## 2020-03-09 RX ADMIN — TRAMADOL HYDROCHLORIDE 50 MG: 50 TABLET, FILM COATED ORAL at 08:35

## 2020-03-09 RX ADMIN — LISINOPRIL 40 MG: 40 TABLET ORAL at 08:35

## 2020-03-09 RX ADMIN — CIPROFLOXACIN 400 MG: 2 INJECTION, SOLUTION INTRAVENOUS at 05:16

## 2020-03-09 RX ADMIN — ACETAMINOPHEN 650 MG: 325 TABLET ORAL at 15:36

## 2020-03-09 RX ADMIN — VITAMIN D, TAB 1000IU (100/BT) 1000 UNITS: 25 TAB at 08:35

## 2020-03-09 RX ADMIN — INSULIN LISPRO 1 UNITS: 100 INJECTION, SOLUTION INTRAVENOUS; SUBCUTANEOUS at 08:15

## 2020-03-09 RX ADMIN — INSULIN LISPRO 2 UNITS: 100 INJECTION, SOLUTION INTRAVENOUS; SUBCUTANEOUS at 11:57

## 2020-03-09 RX ADMIN — MEROPENEM 500 MG: 500 INJECTION, POWDER, FOR SOLUTION INTRAVENOUS at 23:42

## 2020-03-09 RX ADMIN — METOPROLOL SUCCINATE 100 MG: 50 TABLET, EXTENDED RELEASE ORAL at 08:36

## 2020-03-09 RX ADMIN — INSULIN LISPRO 1 UNITS: 100 INJECTION, SOLUTION INTRAVENOUS; SUBCUTANEOUS at 21:15

## 2020-03-09 RX ADMIN — DIPHENHYDRAMINE HCL 25 MG: 25 TABLET ORAL at 02:23

## 2020-03-09 RX ADMIN — ACETAMINOPHEN 650 MG: 325 TABLET ORAL at 01:15

## 2020-03-09 RX ADMIN — MEROPENEM 500 MG: 500 INJECTION, POWDER, FOR SOLUTION INTRAVENOUS at 10:19

## 2020-03-09 RX ADMIN — ATORVASTATIN CALCIUM 10 MG: 10 TABLET, FILM COATED ORAL at 08:36

## 2020-03-09 ASSESSMENT — PAIN SCALES - GENERAL
PAINLEVEL_OUTOF10: 3
PAINLEVEL_OUTOF10: 0
PAINLEVEL_OUTOF10: 0
PAINLEVEL_OUTOF10: 2
PAINLEVEL_OUTOF10: 0
PAINLEVEL_OUTOF10: 5

## 2020-03-09 ASSESSMENT — PAIN DESCRIPTION - PROGRESSION
CLINICAL_PROGRESSION: GRADUALLY WORSENING
CLINICAL_PROGRESSION: GRADUALLY WORSENING

## 2020-03-09 ASSESSMENT — PAIN - FUNCTIONAL ASSESSMENT
PAIN_FUNCTIONAL_ASSESSMENT: ACTIVITIES ARE NOT PREVENTED
PAIN_FUNCTIONAL_ASSESSMENT: ACTIVITIES ARE NOT PREVENTED

## 2020-03-09 ASSESSMENT — PAIN DESCRIPTION - ONSET
ONSET: GRADUAL
ONSET: GRADUAL

## 2020-03-09 ASSESSMENT — PAIN DESCRIPTION - LOCATION
LOCATION: HEAD
LOCATION: HEAD

## 2020-03-09 ASSESSMENT — PAIN DESCRIPTION - FREQUENCY
FREQUENCY: INTERMITTENT
FREQUENCY: INTERMITTENT

## 2020-03-09 ASSESSMENT — PAIN DESCRIPTION - DESCRIPTORS
DESCRIPTORS: HEADACHE
DESCRIPTORS: HEADACHE

## 2020-03-09 ASSESSMENT — PAIN DESCRIPTION - ORIENTATION
ORIENTATION: ANTERIOR
ORIENTATION: ANTERIOR

## 2020-03-09 ASSESSMENT — PAIN DESCRIPTION - PAIN TYPE
TYPE: ACUTE PAIN
TYPE: ACUTE PAIN

## 2020-03-09 NOTE — PROGRESS NOTES
Pt states her \"home cpap is leaking\" resp on floor evaluated machine.  Recommends trying our machine orders obtained from [facility] NP.

## 2020-03-09 NOTE — H&P
Hospital Medicine History & Physical      PCP: Brandi Mireles    Date of Admission: 3/8/2020    Date of Service: Pt seen/examined on 3/8/2020 and Placed in Observation. Chief Complaint:  Weakness, fatigue      History Of Present Illness: The patient is a 60 y.o. female with hx anxiety, depression, cirrhosis, HTN, HLD, type 2 DM and JUANITA on CPAP who presents to [facility] with weakness, fatigue, and flu-like symptoms. Patient states that she had fever and malaise for the past 2-3 days and some pleuritic chest pain. Also has some associated shortness of breath. Denies sick contacts, recent travel, abdominal pain, nausea, vomiting, constipation, diarrhea, and dysuria. In the ED, labs were remarkable for a bicarb of 19, D-dimer of 756, rapid flu was negative, U/A showed pyuria. CXR negative. CTA Pulm negative. Past Medical History:        Diagnosis Date    Anxiety     Depression     Diabetes mellitus (HCC)     Diverticulosis, colon     Hyperlipidemia     Hypertension     Liver cirrhosis ([facility]     Obesity     Sleep apnea        Past Surgical History:        Procedure Laterality Date    HYSTERECTOMY      INCISIONAL HERNIA REPAIR  07/25/2018    open, with mesh        Medications Prior to Admission:    Prior to Admission medications    Medication Sig Start Date End Date Taking?  Authorizing Provider   lisinopril (PRINIVIL;ZESTRIL) 10 MG tablet Take 1 tablet by mouth daily 7/31/18   Donato Hernandez MD   ondansetron (ZOFRAN) 4 MG tablet Take 1 tablet by mouth every 8 hours as needed for Nausea or Vomiting 7/30/18   Donato Hernandez MD   atorvastatin (LIPITOR) 10 MG tablet Take 10 mg by mouth daily    Historical Provider, MD   metoprolol succinate (TOPROL XL) 100 MG extended release tablet Take 100 mg by mouth daily    Historical Provider, MD   glipiZIDE (GLUCOTROL) 5 MG tablet Take 5 mg by mouth 2 times daily (before meals)    Historical Provider, MD   SITagliptin (JANUVIA) 100 MG tablet Take 100 mg by mouth daily    Historical Provider, MD   estradiol (ESTRACE) 0.1 MG/GM vaginal cream Place 0.5 g vaginally daily 2weeks then 2-3 times a week    Historical Provider, MD   ferrous sulfate 324 (65 Fe) MG EC tablet Take 324 mg by mouth daily (with breakfast)    Historical Provider, MD   Cyanocobalamin (VITAMIN B 12 PO) Take by mouth daily    Historical Provider, MD   diphenhydrAMINE (BENADRYL) 25 MG capsule Take 25 mg by mouth 2 times daily as needed for Itching    Historical Provider, MD   vitamin D (CHOLECALCIFEROL) 1000 UNIT TABS tablet Take 1,000 Units by mouth daily    Historical Provider, MD   baclofen (LIORESAL) 10 MG tablet Take 1 tablet by mouth 2 times daily. 10/11/12   Malena Evans MD   Lansoprazole (PREVACID PO) Take 30 mg by mouth daily. Historical Provider, MD   metformin (GLUCOPHAGE) 500 MG tablet Take 1,000 mg by mouth See Admin Instructions 2 TABS TWICE DAY     Historical Provider, MD   tramadol (ULTRAM) 50 MG tablet Take 50 mg by mouth 2 times daily. Historical Provider, MD   zonisamide (ZONEGRAN) 25 MG capsule Take 25 mg by mouth 2 times daily. Historical Provider, MD       Allergies:  Amitriptyline hcl; Cephalexin; Ciprofloxacin; Levofloxacin; Penicillins; Sulfa antibiotics; Cephalexin hcl; Elavil [amitriptyline hcl]; Levofloxacin; Methocarbamol; Sulfur; and Tetracyclines & related    Social History:  The patient currently lives at home    TOBACCO:   reports that she has never smoked. She has never used smokeless tobacco.  ETOH:   reports no history of alcohol use. Family History:  Reviewed in detail and negative for DM, Early CAD, Cancer, CVA. Positive as follows:        Problem Relation Age of Onset    Diabetes Mother     Heart Disease Neg Hx        REVIEW OF SYSTEMS:   Positive for and as noted in the HPI.  All other

## 2020-03-09 NOTE — PROGRESS NOTES
Temp 102.2 medicated with Tylenol as ordered. Cool cloth to head ice pack to neck. Will continue to monitor.

## 2020-03-09 NOTE — CONSULTS
pain/diarrhea. Denies dysuria or change in urinary function. Denies joint swelling or pain. No myalgia, arthralgia. No rashes, skin lesions   Denies focal weakness, sensory change or other neurologic symptoms  No lymph node swelling or tenderness.     Fevers, chills, sob+    PHYSICAL EXAM:      Vitals:  T max  102.2     /72   Pulse 92   Temp 97.4 °F (36.3 °C) (Oral)   Resp 18   Ht 5' 5\" (1.651 m)   Wt 263 lb 10.7 oz (119.6 kg)   SpO2 93%   BMI 43.88 kg/m²     General Appearance: alert,in some acute distress, +  pallor, no icterus morbid obesity +  Skin: warm and dry, no rash or erythema  Head: normocephalic and atraumatic  Eyes: pupils equal, round, and reactive to light, conjunctivae normal  ENT: tympanic membrane, external ear and ear canal normal bilaterally, nose without deformity, nasal mucosa and turbinates normal without polyps  Neck: supple and non-tender without mass, no thyromegaly  no cervical lymphadenopathy  Pulmonary/Chest: clear to auscultation bilaterally- no wheezes, rales or rhonchi, normal air movement, no respiratory distress  Cardiovascular: normal rate, regular rhythm, S1 and S2, Systolic Regurgitation Murmur+  murmurs, rubs, clicks, or gallops, no carotid bruits  Abdomen: soft, non-tender, non-distended, normal bowel sounds, no masses or organomegaly  Large abd pannus  Extremities: no cyanosis, clubbing or + edema  Musculoskeletal: normal range of motion, no joint swelling, deformity or tenderness  Neurologic: reflexes normal and symmetric, no cranial nerve deficit  Psych:  Orientation, sensorium, mood normal  Lines:  IV         DATA:    Lab Results   Component Value Date    WBC 3.9 (L) 03/09/2020    HGB 13.0 03/09/2020    HCT 39.3 03/09/2020    MCV 85.4 03/09/2020    PLT 96 (L) 03/09/2020     Lab Results   Component Value Date    CREATININE 0.7 03/09/2020    BUN 13 03/09/2020     03/09/2020    K 3.6 03/09/2020     03/09/2020    CO2 20 (L) 03/09/2020       Hepatic Function Panel:   Lab Results   Component Value Date    ALKPHOS 96 03/09/2020    ALT 28 03/09/2020    AST 40 03/09/2020    PROT 6.0 03/09/2020    PROT 7.7 09/27/2011    BILITOT 0.5 03/09/2020    BILIDIR 0.10 09/27/2011    IBILI 0.2 09/27/2011    LABALBU 2.9 03/09/2020     UA:  Lab Results   Component Value Date    COLORU YELLOW 03/08/2020    CLARITYU Clear 03/08/2020    GLUCOSEU Negative 03/08/2020    GLUCOSEU NEGATIVE 09/27/2011    BILIRUBINUR Negative 03/08/2020    BILIRUBINUR NEGATIVE 09/27/2011    KETUA TRACE 03/08/2020    SPECGRAV 1.025 03/08/2020    BLOODU Negative 03/08/2020    PHUR 6.0 03/08/2020    PROTEINU Negative 03/08/2020    UROBILINOGEN 0.2 03/08/2020    NITRU POSITIVE 03/08/2020    LEUKOCYTESUR SMALL 03/08/2020    LABMICR YES 03/08/2020    URINETYPE NotGiven 03/08/2020      Urine Microscopic:   Lab Results   Component Value Date    BACTERIA 4+ 09/26/2015    HYALCAST 0 03/08/2020    WBCUA 15 03/08/2020    RBCUA 1 03/08/2020    EPIU 2 03/08/2020         Scheduled Meds:   lisinopril  40 mg Oral Daily    meropenem  500 mg Intravenous Q6H    atorvastatin  10 mg Oral Daily    ferrous sulfate  324 mg Oral Daily with breakfast    metoprolol succinate  100 mg Oral Daily    traMADol  50 mg Oral BID    Vitamin D  1,000 Units Oral Daily    zonisamide  25 mg Oral BID    sodium chloride flush  10 mL Intravenous 2 times per day    enoxaparin  40 mg Subcutaneous Daily    insulin lispro  0-6 Units Subcutaneous TID     insulin lispro  0-3 Units Subcutaneous Nightly       Continuous Infusions:   sodium chloride 75 mL/hr at 03/09/20 0516    dextrose         PRN Meds:  diphenhydrAMINE, sodium chloride flush, acetaminophen **OR** acetaminophen, polyethylene glycol, promethazine **OR** ondansetron, glucose, dextrose, glucagon (rDNA), dextrose    MICRO: cultures reviewed and updated by me          Culture, Blood, PCR ID Panel Results Report [[id number]] Collected: 03/08/20 1624   Order Status: Completed Updated: 03/09/20 0851    Report SEE IMAGE   Narrative:     CALL  Tee  [id number] Lakeisha Siegel, [address] [place] [address]  Microbiology results called to and read back by pharmacist Nannette Lennox,  03/09/2020 08:49, by [facility]  Microbiology results called to and read back by Perlita Reagan RN, 03/09/2020  08:48, by [facility]  Referred out by:  [address] [place] [facility] [employer] [facility] [address]   Phone [phone]   Culture, Blood 2 [[id number]] (Abnormal) Collected: 03/08/20 1624   Order Status: Completed Specimen: Blood Updated: 03/09/20 0849    Culture, Blood 2 --Abnormal     Gram stain Aerobic bottle:   Gram negative rods   Information to follow   Gram stain Anaerobic bottle:   Gram negative rods   Information to follow   Abnormal     Organism Escherichia coli DNA DetectedAbnormal     Culture, Blood 2 See additional report for complete BCID panel. Narrative:     ORDER#: [id number]                          ORDERED BY:  SOURCE: Blood                              COLLECTED:  03/08/20 16:24  ANTIBIOTICS AT [facility]. :                      RECEIVED :  03/08/20 16:32  CALL  Tee  [id number] tel. [phone], [phone]  Microbiology results called to and read back by pharmacist Nannette Lennox,  03/09/2020 08:49, by [facility]  Microbiology results called to and read back by Perlita Reagan RN, 03/09/2020  08:48, by [facility]  If child <=2 yrs old please draw pediatric bottle. ~Blood Culture #2  Performed at:  [address] [employer] [address]   Phone [phone]   Culture, Urine [[id number]] Collected: 03/09/20 0224   Order Status: Sent Specimen: Urine, clean catch Updated: 03/09/20 0251   Respiratory Panel, Molecular [[id number]] Collected: 03/09/20 0013   Order Status: Sent Specimen: Nasal from Nasopharyngeal Updated: 03/09/20 0022   Culture, Blood 1 [[id number]] Collected: 03/08/20 1624   Order Status: Sent Specimen: Blood Updated: 03/08/20 1922   Rapid influenza A/B antigens [[id number]] check CT abd/pelvis   5,Repeat blood cx in AM    Discussed with patient/Family and Nursing   Risk of Complications/Morbidity: High      · Illness(es)/ Infection present that pose threat to bodily function. · There is potential for severe exacerbation of infection/side effects of treatment. · Therapy requires intensive monitoring for antimicrobial agent toxicity. Thanks for allowing me to participate in your patient's care please call me with any questions or concerns.     Dr. Tiara Barksdale MD  [address] [place] [address] Physician  Phone: [phone]   Fax : [phone]

## 2020-03-09 NOTE — ED NOTES
RN at bedside pt was able to ambulated to bedside commode without difficulty. No concerns voiced at this time.       Kristi Pickering RN  03/08/20 2020

## 2020-03-09 NOTE — PLAN OF CARE
Problem: Falls - Risk of:  Goal: Will remain free from falls  Description: Will remain free from falls  3/9/2020 1042 by Dann Mcgraw RN  Outcome: Ongoing    Goal: Absence of physical injury  Description: Absence of physical injury  3/9/2020 1042 by Dann Mcgraw RN  Outcome: Ongoing       Problem: Pain:  Goal: Pain level will decrease  Description: Pain level will decrease  3/9/2020 1042 by Dann Mcgraw RN  Outcome: Ongoing    Goal: Control of acute pain  Description: Control of acute pain  3/9/2020 1042 by Dann Mcgraw RN  Outcome: Ongoing    Goal: Control of chronic pain  Description: Control of chronic pain  3/9/2020 1042 by Dann Mcgraw RN  Outcome: Ongoing       Problem: Discharge Planning:  Goal: Discharged to appropriate level of care  Description: Discharged to appropriate level of care  Outcome: Ongoing

## 2020-03-09 NOTE — PLAN OF CARE
Pt unable to identify home medications, will notify pharmacy.  Electronically signed by Teresa Chanel RN on 3/8/2020 at 10:52 PM

## 2020-03-10 ENCOUNTER — APPOINTMENT (OUTPATIENT)
Dept: CT IMAGING | Age: 65
DRG: 872 | End: 2020-03-10
Payer: COMMERCIAL

## 2020-03-10 LAB
A/G RATIO: 0.8 (ref 1.1–2.2)
ALBUMIN SERPL-MCNC: 2.6 G/DL (ref 3.4–5)
ALP BLD-CCNC: 94 U/L (ref 40–129)
ALT SERPL-CCNC: 40 U/L (ref 10–40)
ANION GAP SERPL CALCULATED.3IONS-SCNC: 13 MMOL/L (ref 3–16)
AST SERPL-CCNC: 55 U/L (ref 15–37)
BASOPHILS ABSOLUTE: 0 K/UL (ref 0–0.2)
BASOPHILS RELATIVE PERCENT: 0.5 %
BILIRUB SERPL-MCNC: 0.4 MG/DL (ref 0–1)
BUN BLDV-MCNC: 15 MG/DL (ref 7–20)
CALCIUM SERPL-MCNC: 8.2 MG/DL (ref 8.3–10.6)
CHLORIDE BLD-SCNC: 105 MMOL/L (ref 99–110)
CO2: 19 MMOL/L (ref 21–32)
CREAT SERPL-MCNC: 0.7 MG/DL (ref 0.6–1.2)
EOSINOPHILS ABSOLUTE: 0.1 K/UL (ref 0–0.6)
EOSINOPHILS RELATIVE PERCENT: 2.7 %
GFR AFRICAN AMERICAN: >60
GFR NON-AFRICAN AMERICAN: >60
GLOBULIN: 3.2 G/DL
GLUCOSE BLD-MCNC: 112 MG/DL (ref 70–99)
GLUCOSE BLD-MCNC: 122 MG/DL (ref 70–99)
GLUCOSE BLD-MCNC: 141 MG/DL (ref 70–99)
GLUCOSE BLD-MCNC: 169 MG/DL (ref 70–99)
GLUCOSE BLD-MCNC: 170 MG/DL (ref 70–99)
HCT VFR BLD CALC: 38.3 % (ref 36–48)
HEMOGLOBIN: 12.5 G/DL (ref 12–16)
LYMPHOCYTES ABSOLUTE: 0.8 K/UL (ref 1–5.1)
LYMPHOCYTES RELATIVE PERCENT: 26.2 %
MCH RBC QN AUTO: 27.9 PG (ref 26–34)
MCHC RBC AUTO-ENTMCNC: 32.6 G/DL (ref 31–36)
MCV RBC AUTO: 85.8 FL (ref 80–100)
MONOCYTES ABSOLUTE: 0.5 K/UL (ref 0–1.3)
MONOCYTES RELATIVE PERCENT: 16.5 %
NEUTROPHILS ABSOLUTE: 1.6 K/UL (ref 1.7–7.7)
NEUTROPHILS RELATIVE PERCENT: 54.1 %
PDW BLD-RTO: 14.5 % (ref 12.4–15.4)
PERFORMED ON: ABNORMAL
PLATELET # BLD: 100 K/UL (ref 135–450)
PMV BLD AUTO: 9.3 FL (ref 5–10.5)
POTASSIUM REFLEX MAGNESIUM: 3.7 MMOL/L (ref 3.5–5.1)
PROCALCITONIN: 0.96 NG/ML (ref 0–0.15)
RBC # BLD: 4.46 M/UL (ref 4–5.2)
SODIUM BLD-SCNC: 137 MMOL/L (ref 136–145)
TOTAL PROTEIN: 5.8 G/DL (ref 6.4–8.2)
URINE CULTURE, ROUTINE: NORMAL
WBC # BLD: 2.9 K/UL (ref 4–11)

## 2020-03-10 PROCEDURE — 6370000000 HC RX 637 (ALT 250 FOR IP): Performed by: INTERNAL MEDICINE

## 2020-03-10 PROCEDURE — 99233 SBSQ HOSP IP/OBS HIGH 50: CPT | Performed by: INTERNAL MEDICINE

## 2020-03-10 PROCEDURE — 2580000003 HC RX 258: Performed by: INTERNAL MEDICINE

## 2020-03-10 PROCEDURE — 87040 BLOOD CULTURE FOR BACTERIA: CPT

## 2020-03-10 PROCEDURE — 6360000002 HC RX W HCPCS: Performed by: INTERNAL MEDICINE

## 2020-03-10 PROCEDURE — 85025 COMPLETE CBC W/AUTO DIFF WBC: CPT

## 2020-03-10 PROCEDURE — 1200000000 HC SEMI PRIVATE

## 2020-03-10 PROCEDURE — 74176 CT ABD & PELVIS W/O CONTRAST: CPT

## 2020-03-10 PROCEDURE — 80053 COMPREHEN METABOLIC PANEL: CPT

## 2020-03-10 PROCEDURE — 36415 COLL VENOUS BLD VENIPUNCTURE: CPT

## 2020-03-10 PROCEDURE — 84145 PROCALCITONIN (PCT): CPT

## 2020-03-10 RX ADMIN — METOPROLOL SUCCINATE 100 MG: 50 TABLET, EXTENDED RELEASE ORAL at 08:10

## 2020-03-10 RX ADMIN — ZONISAMIDE 25 MG: 25 CAPSULE ORAL at 03:43

## 2020-03-10 RX ADMIN — MEROPENEM 500 MG: 500 INJECTION, POWDER, FOR SOLUTION INTRAVENOUS at 23:21

## 2020-03-10 RX ADMIN — TRAMADOL HYDROCHLORIDE 50 MG: 50 TABLET, FILM COATED ORAL at 08:11

## 2020-03-10 RX ADMIN — LISINOPRIL 40 MG: 40 TABLET ORAL at 08:10

## 2020-03-10 RX ADMIN — MEROPENEM 500 MG: 500 INJECTION, POWDER, FOR SOLUTION INTRAVENOUS at 10:38

## 2020-03-10 RX ADMIN — FERROUS SULFATE TAB EC 324 MG (65 MG FE EQUIVALENT) 324 MG: 324 (65 FE) TABLET DELAYED RESPONSE at 08:11

## 2020-03-10 RX ADMIN — ATORVASTATIN CALCIUM 10 MG: 10 TABLET, FILM COATED ORAL at 08:11

## 2020-03-10 RX ADMIN — SODIUM CHLORIDE: 9 INJECTION, SOLUTION INTRAVENOUS at 04:00

## 2020-03-10 RX ADMIN — ZONISAMIDE 25 MG: 25 CAPSULE ORAL at 08:10

## 2020-03-10 RX ADMIN — TRAMADOL HYDROCHLORIDE 50 MG: 50 TABLET, FILM COATED ORAL at 20:33

## 2020-03-10 RX ADMIN — ZONISAMIDE 25 MG: 25 CAPSULE ORAL at 21:46

## 2020-03-10 RX ADMIN — INSULIN LISPRO 1 UNITS: 100 INJECTION, SOLUTION INTRAVENOUS; SUBCUTANEOUS at 17:06

## 2020-03-10 RX ADMIN — VITAMIN D, TAB 1000IU (100/BT) 1000 UNITS: 25 TAB at 08:10

## 2020-03-10 RX ADMIN — MEROPENEM 500 MG: 500 INJECTION, POWDER, FOR SOLUTION INTRAVENOUS at 17:06

## 2020-03-10 RX ADMIN — INSULIN LISPRO 1 UNITS: 100 INJECTION, SOLUTION INTRAVENOUS; SUBCUTANEOUS at 20:33

## 2020-03-10 RX ADMIN — ENOXAPARIN SODIUM 40 MG: 40 INJECTION SUBCUTANEOUS at 08:11

## 2020-03-10 RX ADMIN — INSULIN LISPRO 1 UNITS: 100 INJECTION, SOLUTION INTRAVENOUS; SUBCUTANEOUS at 12:11

## 2020-03-10 RX ADMIN — MEROPENEM 500 MG: 500 INJECTION, POWDER, FOR SOLUTION INTRAVENOUS at 04:00

## 2020-03-10 ASSESSMENT — PAIN SCALES - GENERAL
PAINLEVEL_OUTOF10: 0
PAINLEVEL_OUTOF10: 0

## 2020-03-10 NOTE — PROGRESS NOTES
Hospitalist Progress Note      PCP: Jenny Bowie    Date of Admission: 3/8/2020      Subjective: feels better, no fever, chills, nausea or vomiting. daughter at bedside. Nurse at bedside. Medications:  Reviewed    Infusion Medications    sodium chloride 75 mL/hr at 03/10/20 0400    dextrose       Scheduled Medications    lisinopril  40 mg Oral Daily    meropenem  500 mg Intravenous Q6H    atorvastatin  10 mg Oral Daily    ferrous sulfate  324 mg Oral Daily with breakfast    metoprolol succinate  100 mg Oral Daily    traMADol  50 mg Oral BID    Vitamin D  1,000 Units Oral Daily    zonisamide  25 mg Oral BID    sodium chloride flush  10 mL Intravenous 2 times per day    enoxaparin  40 mg Subcutaneous Daily    insulin lispro  0-6 Units Subcutaneous TID WC    insulin lispro  0-3 Units Subcutaneous Nightly     PRN Meds: diphenhydrAMINE, sodium chloride flush, acetaminophen **OR** acetaminophen, polyethylene glycol, promethazine **OR** ondansetron, glucose, dextrose, glucagon (rDNA), dextrose      Intake/Output Summary (Last 24 hours) at 3/10/2020 1214  Last data filed at 3/10/2020 0457  Gross per 24 hour   Intake 840 ml   Output 750 ml   Net 90 ml       Physical Exam Performed:    BP (!) 150/76   Pulse 83   Temp 98 °F (36.7 °C) (Oral)   Resp 14   Ht 5' 5\" (1.651 m)   Wt 266 lb 12.1 oz (121 kg)   SpO2 96%   BMI 44.39 kg/m²     General appearance: No apparent distress  Neck: Supple  Respiratory:  Normal respiratory effort. Clear to auscultation, bilaterally without Rales/Wheezes/Rhonchi. Cardiovascular: Regular rate and rhythm with normal S1/S2 without murmurs, rubs or gallops. Abdomen: Soft, non-tender, non-distended . Musculoskeletal: No clubbing, cyanosis   Skin: Skin color, texture, turgor normal.  No rashes or lesions.   Neurologic:  No focal weakness   Psychiatric: Alert and oriented  Capillary Refill: Brisk,< 3 seconds   Peripheral Pulses: +2 palpable, equal bilaterally flat.       Diet: DIET CARB CONTROL;  Code Status: Full Code        Dispo - ongoing management     Moe Webster MD

## 2020-03-10 NOTE — PLAN OF CARE
Problem: Falls - Risk of:  Goal: Will remain free from falls  Description: Will remain free from falls  Outcome: Ongoing  Goal: Absence of physical injury  Description: Absence of physical injury  Outcome: Ongoing     Problem: Pain:  Goal: Pain level will decrease  Description: Pain level will decrease  Outcome: Ongoing  Goal: Control of acute pain  Description: Control of acute pain  Outcome: Ongoing  Goal: Control of chronic pain  Description: Control of chronic pain  Outcome: Ongoing     Problem: Discharge Planning:  Goal: Discharged to appropriate level of care  Description: Discharged to appropriate level of care  Outcome: Ongoing

## 2020-03-10 NOTE — PROGRESS NOTES
Infectious Disease Follow up Notes  Admit Date: 3/8/2020  Hospital Day: 3    Antibiotics :   IV Meropenem    CHIEF COMPLAINT:     Fevers  E coli bacteremia  Cirrhosis of liver     Subjective interval History :  60 y.o. woman with morbid obesity, poor mobility, cirrhosis of liver , Kidney stones admitted with fatigue and not feeling well and sob+ and was having fevers x 2 days on admit T max 102.6 and UA very abnormal and Blood cx now with E coli and she uses CPAP at night. Urine cx in process and given on going fevers and sepsis we are consulted for IV abx recommendations. History reviewed again  Not much urinary symptoms and she has h/o Cirrhosis suspected from [facility] and had Liver biopsy and she has a h/o ventral hernia repair in 2018 AND she  Has h/o Aortic regurgitation murmur as well. Blood cx in follow up pending. She has h/o colectomy in the past for Diverticulitis as well. Several abx allergies d/w pt she has significant reaction with them/         Past Medical History:    Past Medical History:   Diagnosis Date    Anxiety     Depression     Diabetes mellitus ([address]     Diverticulosis, colon     Hyperlipidemia     Hypertension     Liver cirrhosis [address] [address] [facility]     Obesity     Sleep apnea        Past Surgical History:    Past Surgical History:   Procedure Laterality Date    HYSTERECTOMY      INCISIONAL HERNIA REPAIR  07/25/2018    open, with mesh        Current Medications:    Outpatient Medications Marked as Taking for the 3/8/20 encounter [facility] Encounter)   Medication Sig Dispense Refill    sertraline (ZOLOFT) 50 MG tablet Take 50 mg by mouth daily      HYDROcodone-acetaminophen (NORCO) 5-325 MG per tablet Take 2 tablets by mouth every 8 hours as needed for Pain.       meloxicam (MOBIC) 15 MG tablet Take 15 mg by mouth daily      atorvastatin (LIPITOR) 10 MG tablet Take 10 mg by mouth daily      metoprolol succinate Oral Daily    zonisamide  25 mg Oral BID    sodium chloride flush  10 mL Intravenous 2 times per day    enoxaparin  40 mg Subcutaneous Daily    insulin lispro  0-6 Units Subcutaneous TID WC    insulin lispro  0-3 Units Subcutaneous Nightly       Continuous Infusions:   sodium chloride 75 mL/hr at 03/10/20 0400    dextrose         PRN Meds:  diphenhydrAMINE, sodium chloride flush, acetaminophen **OR** acetaminophen, polyethylene glycol, promethazine **OR** ondansetron, glucose, dextrose, glucagon (rDNA), dextrose      Assessment:     Patient Active Problem List   Diagnosis    Diverticulitis of colon    Acute renal failure (HCC)    Type II or unspecified type diabetes mellitus without mention of complication, not stated as uncontrolled    Hypertension    Obesity    Anxiety    Sleep apnea    Fatty liver    SOB (shortness of breath)    DM (diabetes mellitus) ([address]    [facility]BO [facility] obstruction) [facility] [address]    Incarcerated incisional hernia    Flu-like symptoms    Sepsis (HCC)    Complicated UTI (urinary tract infection)     Sepsis  Fevers, chills  Sob likely from increased work of breathing from sepsis  Cirrhosis of liver  E coli bacteremia  Urine cx in process  H/o Kidney stones  Obesity   Multiple antibiotic allergies complicating medical care    h/o Colectomy for Diverticulitis   H/o Aortic murmur  H/o Ventral Hernia Repair in 2018 at [facility] -     E coli bacteremia seems to be from  in origin await urine cx but urine cx now negative this makes it less likely and repeat Blood cx in process  She has CIrrhosis of liver and puts her at risk for GI infections and will arrange for CT abd/pelvis    Per her  she gets low grade fevers intermittently     Out side records reviewed in detail    Labs, Microbiology, Radiology and all the pertinent results from current hospitalization and  care every where were reviewed  by me as a part of the evaluation   Plan:   1.  Cont IV Meropenem x 500 mg Q 6

## 2020-03-11 LAB
A/G RATIO: 0.9 (ref 1.1–2.2)
ALBUMIN SERPL-MCNC: 2.7 G/DL (ref 3.4–5)
ALP BLD-CCNC: 103 U/L (ref 40–129)
ALT SERPL-CCNC: 42 U/L (ref 10–40)
ANION GAP SERPL CALCULATED.3IONS-SCNC: 11 MMOL/L (ref 3–16)
AST SERPL-CCNC: 48 U/L (ref 15–37)
BASOPHILS ABSOLUTE: 0 K/UL (ref 0–0.2)
BASOPHILS RELATIVE PERCENT: 0.6 %
BILIRUB SERPL-MCNC: 0.4 MG/DL (ref 0–1)
BUN BLDV-MCNC: 16 MG/DL (ref 7–20)
CALCIUM SERPL-MCNC: 8.6 MG/DL (ref 8.3–10.6)
CHLORIDE BLD-SCNC: 107 MMOL/L (ref 99–110)
CO2: 22 MMOL/L (ref 21–32)
CREAT SERPL-MCNC: 0.6 MG/DL (ref 0.6–1.2)
CULTURE, BLOOD 2: ABNORMAL
CULTURE, BLOOD 2: ABNORMAL
EOSINOPHILS ABSOLUTE: 0.2 K/UL (ref 0–0.6)
EOSINOPHILS RELATIVE PERCENT: 4.8 %
GFR AFRICAN AMERICAN: >60
GFR NON-AFRICAN AMERICAN: >60
GLOBULIN: 3 G/DL
GLUCOSE BLD-MCNC: 126 MG/DL (ref 70–99)
GLUCOSE BLD-MCNC: 127 MG/DL (ref 70–99)
GLUCOSE BLD-MCNC: 135 MG/DL (ref 70–99)
GLUCOSE BLD-MCNC: 219 MG/DL (ref 70–99)
HCT VFR BLD CALC: 37.7 % (ref 36–48)
HEMOGLOBIN: 12.5 G/DL (ref 12–16)
LYMPHOCYTES ABSOLUTE: 1.1 K/UL (ref 1–5.1)
LYMPHOCYTES RELATIVE PERCENT: 29.4 %
MCH RBC QN AUTO: 28.6 PG (ref 26–34)
MCHC RBC AUTO-ENTMCNC: 33.3 G/DL (ref 31–36)
MCV RBC AUTO: 85.9 FL (ref 80–100)
MONOCYTES ABSOLUTE: 0.6 K/UL (ref 0–1.3)
MONOCYTES RELATIVE PERCENT: 15 %
NEUTROPHILS ABSOLUTE: 1.9 K/UL (ref 1.7–7.7)
NEUTROPHILS RELATIVE PERCENT: 50.2 %
ORGANISM: ABNORMAL
ORGANISM: ABNORMAL
PDW BLD-RTO: 14.1 % (ref 12.4–15.4)
PERFORMED ON: ABNORMAL
PLATELET # BLD: 121 K/UL (ref 135–450)
PMV BLD AUTO: 9.8 FL (ref 5–10.5)
POTASSIUM REFLEX MAGNESIUM: 4 MMOL/L (ref 3.5–5.1)
RBC # BLD: 4.39 M/UL (ref 4–5.2)
SODIUM BLD-SCNC: 140 MMOL/L (ref 136–145)
TOTAL PROTEIN: 5.7 G/DL (ref 6.4–8.2)
WBC # BLD: 3.9 K/UL (ref 4–11)

## 2020-03-11 PROCEDURE — 6360000002 HC RX W HCPCS: Performed by: INTERNAL MEDICINE

## 2020-03-11 PROCEDURE — 1200000000 HC SEMI PRIVATE

## 2020-03-11 PROCEDURE — 2580000003 HC RX 258: Performed by: INTERNAL MEDICINE

## 2020-03-11 PROCEDURE — 99233 SBSQ HOSP IP/OBS HIGH 50: CPT | Performed by: INTERNAL MEDICINE

## 2020-03-11 PROCEDURE — 6370000000 HC RX 637 (ALT 250 FOR IP): Performed by: INTERNAL MEDICINE

## 2020-03-11 PROCEDURE — 80053 COMPREHEN METABOLIC PANEL: CPT

## 2020-03-11 PROCEDURE — 94660 CPAP INITIATION&MGMT: CPT

## 2020-03-11 PROCEDURE — 94760 N-INVAS EAR/PLS OXIMETRY 1: CPT

## 2020-03-11 PROCEDURE — 36415 COLL VENOUS BLD VENIPUNCTURE: CPT

## 2020-03-11 PROCEDURE — 85025 COMPLETE CBC W/AUTO DIFF WBC: CPT

## 2020-03-11 RX ADMIN — TRAMADOL HYDROCHLORIDE 50 MG: 50 TABLET, FILM COATED ORAL at 21:43

## 2020-03-11 RX ADMIN — ZONISAMIDE 25 MG: 25 CAPSULE ORAL at 08:02

## 2020-03-11 RX ADMIN — INSULIN LISPRO 1 UNITS: 100 INJECTION, SOLUTION INTRAVENOUS; SUBCUTANEOUS at 21:44

## 2020-03-11 RX ADMIN — MEROPENEM 500 MG: 500 INJECTION, POWDER, FOR SOLUTION INTRAVENOUS at 10:40

## 2020-03-11 RX ADMIN — FERROUS SULFATE TAB EC 324 MG (65 MG FE EQUIVALENT) 324 MG: 324 (65 FE) TABLET DELAYED RESPONSE at 08:02

## 2020-03-11 RX ADMIN — ENOXAPARIN SODIUM 40 MG: 40 INJECTION SUBCUTANEOUS at 08:02

## 2020-03-11 RX ADMIN — ENOXAPARIN SODIUM 40 MG: 40 INJECTION SUBCUTANEOUS at 21:44

## 2020-03-11 RX ADMIN — MEROPENEM 500 MG: 500 INJECTION, POWDER, FOR SOLUTION INTRAVENOUS at 04:26

## 2020-03-11 RX ADMIN — MEROPENEM 500 MG: 500 INJECTION, POWDER, FOR SOLUTION INTRAVENOUS at 17:07

## 2020-03-11 RX ADMIN — TRAMADOL HYDROCHLORIDE 50 MG: 50 TABLET, FILM COATED ORAL at 08:02

## 2020-03-11 RX ADMIN — ATORVASTATIN CALCIUM 10 MG: 10 TABLET, FILM COATED ORAL at 08:02

## 2020-03-11 RX ADMIN — MEROPENEM 500 MG: 500 INJECTION, POWDER, FOR SOLUTION INTRAVENOUS at 23:13

## 2020-03-11 RX ADMIN — LISINOPRIL 40 MG: 40 TABLET ORAL at 08:02

## 2020-03-11 RX ADMIN — SODIUM CHLORIDE, PRESERVATIVE FREE 10 ML: 5 INJECTION INTRAVENOUS at 08:03

## 2020-03-11 RX ADMIN — METOPROLOL SUCCINATE 100 MG: 50 TABLET, EXTENDED RELEASE ORAL at 08:02

## 2020-03-11 RX ADMIN — INSULIN LISPRO 2 UNITS: 100 INJECTION, SOLUTION INTRAVENOUS; SUBCUTANEOUS at 12:35

## 2020-03-11 RX ADMIN — VITAMIN D, TAB 1000IU (100/BT) 1000 UNITS: 25 TAB at 08:02

## 2020-03-11 RX ADMIN — ZONISAMIDE 25 MG: 25 CAPSULE ORAL at 21:44

## 2020-03-11 ASSESSMENT — PAIN DESCRIPTION - DESCRIPTORS: DESCRIPTORS: ACHING

## 2020-03-11 ASSESSMENT — PAIN DESCRIPTION - LOCATION: LOCATION: GENERALIZED

## 2020-03-11 ASSESSMENT — PAIN SCALES - GENERAL
PAINLEVEL_OUTOF10: 2
PAINLEVEL_OUTOF10: 2
PAINLEVEL_OUTOF10: 0

## 2020-03-11 ASSESSMENT — PAIN DESCRIPTION - PAIN TYPE: TYPE: ACUTE PAIN

## 2020-03-11 NOTE — PROGRESS NOTES
Hospitalist Progress Note      PCP: Yue Munguia    Date of Admission: 3/8/2020        Subjective: up to chair, feels ok, no fever ro chills, no nausea or vomiting, no abdominal pain. Medications:  Reviewed    Infusion Medications    sodium chloride 30 mL/hr at 03/10/20 1307    dextrose       Scheduled Medications    enoxaparin  40 mg Subcutaneous BID    lisinopril  40 mg Oral Daily    meropenem  500 mg Intravenous Q6H    atorvastatin  10 mg Oral Daily    ferrous sulfate  324 mg Oral Daily with breakfast    metoprolol succinate  100 mg Oral Daily    traMADol  50 mg Oral BID    Vitamin D  1,000 Units Oral Daily    zonisamide  25 mg Oral BID    sodium chloride flush  10 mL Intravenous 2 times per day    insulin lispro  0-6 Units Subcutaneous TID WC    insulin lispro  0-3 Units Subcutaneous Nightly     PRN Meds: diphenhydrAMINE, sodium chloride flush, acetaminophen **OR** acetaminophen, polyethylene glycol, promethazine **OR** ondansetron, glucose, dextrose, glucagon (rDNA), dextrose      Intake/Output Summary (Last 24 hours) at 3/11/2020 1205  Last data filed at 3/11/2020 1000  Gross per 24 hour   Intake 1200 ml   Output 200 ml   Net 1000 ml       Physical Exam Performed:    /79   Pulse 85   Temp 97.9 °F (36.6 °C) (Oral)   Resp 16   Ht 5' 5\" (1.651 m)   Wt 266 lb 12.1 oz (121 kg)   SpO2 94%   BMI 44.39 kg/m²     General appearance: No apparent distress  Neck: Supple  Respiratory:  Normal respiratory effort. Clear to auscultation, bilaterally without Rales/Wheezes/Rhonchi. Cardiovascular: Regular rate and rhythm with normal S1/S2 without murmurs, rubs or gallops. Abdomen: Soft, non-tender, non-distended, obese  Musculoskeletal: No clubbing  Skin: Skin color, texture, turgor normal.  No rashes or lesions.   Neurologic:  No focal weakness   Psychiatric: Alert and oriented  Capillary Refill: Brisk,< 3 seconds   Peripheral Pulses: +2 palpable, equal bilaterally       Labs: meropenem, sensitivity noted, blood culture repeated and negative so far. 2. UTI, complicated, e coli with bacteremia, but urine culture negative. 3. Abdominal wall collection, cold be infected, iv antibiotics, consulted GS. 4. Essential hypertension, home meds. 5. Hyperlipidemia, continue home statin  6. Cirrhosis, appears to be compensated  7. Type 2 DM, -SSI  8. Anxiety/depression, continue home meds  9. Obstructive sleep apnea on CPAP, continue nightly CPAP  10. Thrombocytopenia, likely sepsis related, improved. 11. Dyspnea on admission, improved.   12. Morbid obesity, counseled for weight loss       Diet: DIET CARB CONTROL;  Code Status: Full Code      Waldemar Sevilla MD

## 2020-03-11 NOTE — PROGRESS NOTES
tablets by mouth every 8 hours as needed for Pain.  meloxicam (MOBIC) 15 MG tablet Take 15 mg by mouth daily      atorvastatin (LIPITOR) 10 MG tablet Take 10 mg by mouth daily      metoprolol succinate (TOPROL XL) 100 MG extended release tablet Take 100 mg by mouth daily      glipiZIDE (GLUCOTROL) 5 MG tablet Take 5 mg by mouth 2 times daily (before meals)      ferrous sulfate 324 (65 Fe) MG EC tablet Take 324 mg by mouth daily (with breakfast)      Cyanocobalamin (VITAMIN B 12 PO) Take by mouth daily      diphenhydrAMINE (BENADRYL) 25 MG capsule Take 25 mg by mouth 2 times daily as needed for Itching      vitamin D (CHOLECALCIFEROL) 1000 UNIT TABS tablet Take 1,000 Units by mouth daily      Lansoprazole (PREVACID PO) Take 30 mg by mouth daily.  metformin (GLUCOPHAGE) 500 MG tablet Take 1,000 mg by mouth See Admin Instructions 2 TABS TWICE DAY       tramadol (ULTRAM) 50 MG tablet Take 50 mg by mouth 2 times daily.  zonisamide (ZONEGRAN) 25 MG capsule Take 25 mg by mouth 2 times daily. Allergies:  Amitriptyline hcl; Cephalexin; Ciprofloxacin; Levofloxacin; Penicillins; Sulfa antibiotics; Cephalexin hcl; Elavil [amitriptyline hcl];  Levofloxacin; Methocarbamol; Sulfur; and Tetracyclines & related    Immunizations :   Immunization History   Administered Date(s) Administered    Influenza Vaccine, unspecified formulation 08/29/2011, 09/18/2012, 09/10/2013, 09/08/2014, 10/07/2015, 10/17/2016, 11/27/2017    Influenza Virus Vaccine 09/26/2018, 09/16/2019    Pneumococcal Polysaccharide ([id number]) 06/13/2011    Tdap (Boostrix, Adacel) 06/13/2011       Social History:    Social History     Tobacco Use    Smoking status: Never Smoker    Smokeless tobacco: Never Used   Substance Use Topics    Alcohol use: No    Drug use: Not on file     Social History     Tobacco Use   Smoking Status Never Smoker   Smokeless Tobacco Never Used      Family History   Problem Relation Age of Onset    pulmonic valvular regurgitation      MICRO: cultures reviewed and updated by me     [id number]       Order Status: Completed Specimen: Blood Updated: 03/10/20 0953    Culture, Blood 2 --Abnormal     Gram stain Aerobic bottle:   Gram negative rods   Information to follow   Gram stain Anaerobic bottle:   Gram negative rods   Information to follow   Abnormal     Organism Escherichia coli DNA DetectedAbnormal     Culture, Blood 2 See additional report for complete BCID panel. Organism Escherichia coliAbnormal     Culture, Blood 2 --    POSITIVE for   Sensitivity to follow    Narrative:     ORDER#: [id number]                          ORDERED BY: Roderick Amaya  SOURCE: Blood                              COLLECTED:  03/08/20 16:24  ANTIBIOTICS AT [facility]. :                      RECEIVED :  03/08/20 16:32  CALL  Tee  [id number] tel. [phone], [phone]  Microbiology results called to and read back by shalom Stewart,  03/09/2020 08:49, by [facility]  Microbiology results called to and read back by Delmy Morris RN, 03/09/2020  08:48, by [facility]  If child <=2 yrs old please draw pediatric bottle. ~Blood Culture #2  Performed at:  [facility]  [address]   Phone [phone]   Culture, Urine [[id number]] Collected: 03/09/20 0224   Order Status: Completed Specimen: Urine, clean catch Updated: 03/10/20 0843    Urine Culture, Routine No growth at 18-36 hours   Narrative:     ORDER#: [id number]                          ORDERED BY: Roderick Amaya  SOURCE: Urine Clean Catch                  COLLECTED:  03/09/20 02:24  ANTIBIOTICS AT [facility]. :                      RECEIVED :  03/09/20 02:51  Performed at:  [facility]  [address]   Phone [phone]   Culture, Blood 1 [[id number]]    Order Status: Sent Specimen: Blood    Culture, Blood 1 [[id number]] Collected: 03/08/20 1624   Order Status: Completed Specimen: Blood Updated: 03/09/20 1915    Blood Culture, Routine No Growth to date.  Any change in status will be called. Narrative:     ORDER#: [id number]                          ORDERED BY:  SOURCE: Blood                              COLLECTED:  03/08/20 16:24  ANTIBIOTICS AT [facility]. :                      RECEIVED :  03/08/20 16:32  If child <=2 yrs old please draw pediatric bottle. ~Blood Culture #1  Performed at:  [facility]  [address]   Phone [phone]   Culture, Blood, PCR ID Panel Results Report [[id number]] Collected: 03/08/20 1624   Order Status: Completed Updated: 03/09/20 0851    Report SEE IMAGE   Narrative:     [place] [facility]  [id number] tel. [phone], [address]  Microbiology results called to and read back by shalom Summers,  03/09/2020 08:49, by [facility]  Microbiology results called to and read back by Roseann Rojas RN, 03/09/2020  08:48, by [facility]  Referred out by:  [facility]  [address]   Phone [phone]   Respiratory Panel, Molecular [[id number]] Collected: 03/09/20 0013   Order Status: Sent Specimen: Nasal from Nasopharyngeal Updated: 03/09/20 0022   Rapid influenza A/B antigens [[id number]] Collected: 03/08/20 1553   Order Status: Completed Specimen: Nasopharyngeal Updated: 03/08/20 1619    Rapid Influenza A Ag Negative    Rapid Influenza B Ag Negative   Narrative:     Performed at:  [place] [address]   Phone [phone]     Escherichia coli (2)     Antibiotic Interpretation ALTAF Status    ampicillin Sensitive <=2 mcg/mL     ceFAZolin Sensitive <=4 mcg/mL     cefepime Sensitive <=0.12 mcg/mL     cefTRIAXone Sensitive <=0.25 mcg/mL     ciprofloxacin Sensitive <=0.25 mcg/mL     ertapenem Sensitive <=0.12 mcg/mL     gentamicin Sensitive <=1 mcg/mL     levofloxacin Sensitive <=0.12 mcg/mL     piperacillin-tazobactam Sensitive <=4 mcg/mL     trimethoprim-sulfamethoxazole Sensitive <=20 mcg/mL           IMAGING:    CT ABDOMEN PELVIS WO CONTRAST Additional Contrast? None   Final Result   1. Cirrhosis with splenomegaly. 2. Status post anterior abdominal wall hernia repair. In the subcutaneous   fat, superficial to the lower anterior abdominal wall, there is a 10.4 x 6.2   x 2.9 cm fluid collection. Differential includes abscess and postoperative   fluid. By imaging, it is not known whether the fluid is infected. 3. Sigmoid diverticulosis. No evidence of diverticulitis. 4. Bilateral nonobstructing nephrolithiasis. CTA PULMONARY W CONTRAST   Final Result   No evidence of pulmonary embolism, aortic dissection or acute pulmonary   abnormality. XR CHEST PORTABLE   Final Result   No acute pulmonary disease.          CT ABSCESS DRAINAGE SOFT TISSUE    (Results Pending)         All the pertinent images and reports for the current Hospitalization were reviewed by me     Scheduled Meds:   enoxaparin  40 mg Subcutaneous BID    lisinopril  40 mg Oral Daily    meropenem  500 mg Intravenous Q6H    atorvastatin  10 mg Oral Daily    ferrous sulfate  324 mg Oral Daily with breakfast    metoprolol succinate  100 mg Oral Daily    traMADol  50 mg Oral BID    Vitamin D  1,000 Units Oral Daily    zonisamide  25 mg Oral BID    sodium chloride flush  10 mL Intravenous 2 times per day    insulin lispro  0-6 Units Subcutaneous TID WC    insulin lispro  0-3 Units Subcutaneous Nightly       Continuous Infusions:   sodium chloride 30 mL/hr at 03/10/20 1307    dextrose         PRN Meds:  diphenhydrAMINE, sodium chloride flush, acetaminophen **OR** acetaminophen, polyethylene glycol, promethazine **OR** ondansetron, glucose, dextrose, glucagon (rDNA), dextrose      Assessment:     Patient Active Problem List   Diagnosis    Diverticulitis of colon    Acute renal failure (HCC)    Type II or unspecified type diabetes mellitus without mention of complication, not stated as uncontrolled    Hypertension    Obesity    Anxiety    Sleep apnea    Fatty liver    SOB (shortness of breath)    DM (diabetes mellitus) ([address]    SBO (small bowel obstruction) ([address]    Incarcerated incisional hernia    Flu-like symptoms    Sepsis ([address]    Complicated UTI (urinary tract infection)     Sepsis  Fevers, chills  Sob likely from increased work of breathing from sepsis  Cirrhosis of liver  E coli bacteremia  Urine cx in process  H/o Kidney stones  Obesity   Multiple antibiotic allergies complicating medical care    h/o Colectomy for Diverticulitis   H/o Aortic murmur  H/o Ventral Hernia Repair in 2018 at [facility] -     E coli bacteremia seems to be from  in origin await urine cx but urine cx now negative this makes it less likely and repeat Blood cx in process  She has CIrrhosis of liver and puts her at risk for GI infections and will arrange for CT abd/pelvis    Per her  she gets low grade fevers intermittently     Out side records reviewed in detail    Labs, Microbiology, Radiology and all the pertinent results from current hospitalization and  care every where were reviewed  by me as a part of the evaluation   Plan:   1. Cont IV Meropenem x 500 mg Q 6 HRS  2. Multiple abx allergies noted and limits choices for oral antibiotics and may have to complete therapy with IV abx d/w pt   3. Urine cx negative and up on further review this seems to be less likely the source for bacteremia   4. Check CT abd/pelvis non contrast fluid noted at the surgery site Mesh repair of Hernia and going for IR aspiration to send fluid for cx   5. TTE from out side facility reviewed  6. Leukpenia and thrombocytopenia from Cirrhosis of liver       Discussed with patient/Family and Nursing staff       Risk of Complications/Morbidity: High      · Illness(es)/ Infection present that pose threat to bodily function.    · There is potential for severe

## 2020-03-12 ENCOUNTER — APPOINTMENT (OUTPATIENT)
Dept: CT IMAGING | Age: 65
DRG: 872 | End: 2020-03-12
Payer: COMMERCIAL

## 2020-03-12 LAB
A/G RATIO: 0.9 (ref 1.1–2.2)
ALBUMIN SERPL-MCNC: 2.9 G/DL (ref 3.4–5)
ALP BLD-CCNC: 124 U/L (ref 40–129)
ALT SERPL-CCNC: 44 U/L (ref 10–40)
ANION GAP SERPL CALCULATED.3IONS-SCNC: 13 MMOL/L (ref 3–16)
AST SERPL-CCNC: 40 U/L (ref 15–37)
BASOPHILS ABSOLUTE: 0 K/UL (ref 0–0.2)
BASOPHILS RELATIVE PERCENT: 0.6 %
BILIRUB SERPL-MCNC: 0.5 MG/DL (ref 0–1)
BLOOD CULTURE, ROUTINE: NORMAL
BUN BLDV-MCNC: 13 MG/DL (ref 7–20)
CALCIUM SERPL-MCNC: 8.8 MG/DL (ref 8.3–10.6)
CHLORIDE BLD-SCNC: 107 MMOL/L (ref 99–110)
CO2: 22 MMOL/L (ref 21–32)
CREAT SERPL-MCNC: 0.6 MG/DL (ref 0.6–1.2)
EOSINOPHILS ABSOLUTE: 0.2 K/UL (ref 0–0.6)
EOSINOPHILS RELATIVE PERCENT: 4.4 %
GFR AFRICAN AMERICAN: >60
GFR NON-AFRICAN AMERICAN: >60
GLOBULIN: 3.3 G/DL
GLUCOSE BLD-MCNC: 111 MG/DL (ref 70–99)
GLUCOSE BLD-MCNC: 117 MG/DL (ref 70–99)
GLUCOSE BLD-MCNC: 128 MG/DL (ref 70–99)
GLUCOSE BLD-MCNC: 142 MG/DL (ref 70–99)
GLUCOSE BLD-MCNC: 144 MG/DL (ref 70–99)
GLUCOSE BLD-MCNC: 153 MG/DL (ref 70–99)
HCT VFR BLD CALC: 39.3 % (ref 36–48)
HEMOGLOBIN: 13 G/DL (ref 12–16)
INR BLD: 1.1 (ref 0.86–1.14)
LYMPHOCYTES ABSOLUTE: 1.3 K/UL (ref 1–5.1)
LYMPHOCYTES RELATIVE PERCENT: 28.1 %
MCH RBC QN AUTO: 28.1 PG (ref 26–34)
MCHC RBC AUTO-ENTMCNC: 33 G/DL (ref 31–36)
MCV RBC AUTO: 85.1 FL (ref 80–100)
MONOCYTES ABSOLUTE: 0.4 K/UL (ref 0–1.3)
MONOCYTES RELATIVE PERCENT: 9.8 %
NEUTROPHILS ABSOLUTE: 2.6 K/UL (ref 1.7–7.7)
NEUTROPHILS RELATIVE PERCENT: 57.1 %
PDW BLD-RTO: 14.3 % (ref 12.4–15.4)
PERFORMED ON: ABNORMAL
PLATELET # BLD: 140 K/UL (ref 135–450)
PMV BLD AUTO: 9 FL (ref 5–10.5)
POTASSIUM REFLEX MAGNESIUM: 3.7 MMOL/L (ref 3.5–5.1)
PROTHROMBIN TIME: 12.8 SEC (ref 10–13.2)
RBC # BLD: 4.61 M/UL (ref 4–5.2)
SODIUM BLD-SCNC: 142 MMOL/L (ref 136–145)
TOTAL PROTEIN: 6.2 G/DL (ref 6.4–8.2)
WBC # BLD: 4.5 K/UL (ref 4–11)

## 2020-03-12 PROCEDURE — 87075 CULTR BACTERIA EXCEPT BLOOD: CPT

## 2020-03-12 PROCEDURE — 2580000003 HC RX 258: Performed by: INTERNAL MEDICINE

## 2020-03-12 PROCEDURE — 85610 PROTHROMBIN TIME: CPT

## 2020-03-12 PROCEDURE — 10030 IMG GID FLU COLL DRG SFT TIS: CPT

## 2020-03-12 PROCEDURE — 80053 COMPREHEN METABOLIC PANEL: CPT

## 2020-03-12 PROCEDURE — 0W9F3ZZ DRAINAGE OF ABDOMINAL WALL, PERCUTANEOUS APPROACH: ICD-10-PCS | Performed by: RADIOLOGY

## 2020-03-12 PROCEDURE — 6360000002 HC RX W HCPCS: Performed by: INTERNAL MEDICINE

## 2020-03-12 PROCEDURE — APPNB30 APP NON BILLABLE TIME 0-30 MINS: Performed by: NURSE PRACTITIONER

## 2020-03-12 PROCEDURE — 6370000000 HC RX 637 (ALT 250 FOR IP): Performed by: INTERNAL MEDICINE

## 2020-03-12 PROCEDURE — 94760 N-INVAS EAR/PLS OXIMETRY 1: CPT

## 2020-03-12 PROCEDURE — 1200000000 HC SEMI PRIVATE

## 2020-03-12 PROCEDURE — 6360000002 HC RX W HCPCS: Performed by: RADIOLOGY

## 2020-03-12 PROCEDURE — 87205 SMEAR GRAM STAIN: CPT

## 2020-03-12 PROCEDURE — 87102 FUNGUS ISOLATION CULTURE: CPT

## 2020-03-12 PROCEDURE — 36415 COLL VENOUS BLD VENIPUNCTURE: CPT

## 2020-03-12 PROCEDURE — 85025 COMPLETE CBC W/AUTO DIFF WBC: CPT

## 2020-03-12 PROCEDURE — 77012 CT SCAN FOR NEEDLE BIOPSY: CPT

## 2020-03-12 PROCEDURE — 94660 CPAP INITIATION&MGMT: CPT

## 2020-03-12 PROCEDURE — 99233 SBSQ HOSP IP/OBS HIGH 50: CPT | Performed by: INTERNAL MEDICINE

## 2020-03-12 PROCEDURE — 87070 CULTURE OTHR SPECIMN AEROBIC: CPT

## 2020-03-12 RX ORDER — MIDAZOLAM HYDROCHLORIDE 1 MG/ML
INJECTION INTRAMUSCULAR; INTRAVENOUS DAILY PRN
Status: COMPLETED | OUTPATIENT
Start: 2020-03-12 | End: 2020-03-12

## 2020-03-12 RX ORDER — SODIUM CHLORIDE 0.9 % (FLUSH) 0.9 %
10 SYRINGE (ML) INJECTION EVERY 12 HOURS SCHEDULED
Status: DISCONTINUED | OUTPATIENT
Start: 2020-03-12 | End: 2020-03-14 | Stop reason: HOSPADM

## 2020-03-12 RX ORDER — FENTANYL CITRATE 50 UG/ML
INJECTION, SOLUTION INTRAMUSCULAR; INTRAVENOUS DAILY PRN
Status: COMPLETED | OUTPATIENT
Start: 2020-03-12 | End: 2020-03-12

## 2020-03-12 RX ORDER — LIDOCAINE HYDROCHLORIDE 10 MG/ML
5 INJECTION, SOLUTION EPIDURAL; INFILTRATION; INTRACAUDAL; PERINEURAL ONCE
Status: DISCONTINUED | OUTPATIENT
Start: 2020-03-12 | End: 2020-03-14 | Stop reason: HOSPADM

## 2020-03-12 RX ORDER — SODIUM CHLORIDE 0.9 % (FLUSH) 0.9 %
10 SYRINGE (ML) INJECTION PRN
Status: DISCONTINUED | OUTPATIENT
Start: 2020-03-12 | End: 2020-03-14 | Stop reason: HOSPADM

## 2020-03-12 RX ADMIN — TRAMADOL HYDROCHLORIDE 50 MG: 50 TABLET, FILM COATED ORAL at 21:44

## 2020-03-12 RX ADMIN — MEROPENEM 500 MG: 500 INJECTION, POWDER, FOR SOLUTION INTRAVENOUS at 23:32

## 2020-03-12 RX ADMIN — MIDAZOLAM 1 MG: 1 INJECTION INTRAMUSCULAR; INTRAVENOUS at 14:50

## 2020-03-12 RX ADMIN — METOPROLOL SUCCINATE 100 MG: 50 TABLET, EXTENDED RELEASE ORAL at 08:47

## 2020-03-12 RX ADMIN — SODIUM CHLORIDE: 9 INJECTION, SOLUTION INTRAVENOUS at 05:23

## 2020-03-12 RX ADMIN — MEROPENEM 500 MG: 500 INJECTION, POWDER, FOR SOLUTION INTRAVENOUS at 18:05

## 2020-03-12 RX ADMIN — FERROUS SULFATE TAB EC 324 MG (65 MG FE EQUIVALENT) 324 MG: 324 (65 FE) TABLET DELAYED RESPONSE at 08:47

## 2020-03-12 RX ADMIN — VITAMIN D, TAB 1000IU (100/BT) 1000 UNITS: 25 TAB at 08:47

## 2020-03-12 RX ADMIN — TRAMADOL HYDROCHLORIDE 50 MG: 50 TABLET, FILM COATED ORAL at 08:47

## 2020-03-12 RX ADMIN — ATORVASTATIN CALCIUM 10 MG: 10 TABLET, FILM COATED ORAL at 08:47

## 2020-03-12 RX ADMIN — FENTANYL CITRATE 50 MCG: 50 INJECTION INTRAMUSCULAR; INTRAVENOUS at 14:50

## 2020-03-12 RX ADMIN — MIDAZOLAM 1 MG: 1 INJECTION INTRAMUSCULAR; INTRAVENOUS at 14:55

## 2020-03-12 RX ADMIN — MEROPENEM 500 MG: 500 INJECTION, POWDER, FOR SOLUTION INTRAVENOUS at 13:05

## 2020-03-12 RX ADMIN — FENTANYL CITRATE 50 MCG: 50 INJECTION INTRAMUSCULAR; INTRAVENOUS at 14:55

## 2020-03-12 RX ADMIN — MEROPENEM 500 MG: 500 INJECTION, POWDER, FOR SOLUTION INTRAVENOUS at 05:22

## 2020-03-12 RX ADMIN — ENOXAPARIN SODIUM 40 MG: 40 INJECTION SUBCUTANEOUS at 21:44

## 2020-03-12 RX ADMIN — LISINOPRIL 40 MG: 40 TABLET ORAL at 08:47

## 2020-03-12 RX ADMIN — ZONISAMIDE 25 MG: 25 CAPSULE ORAL at 21:44

## 2020-03-12 RX ADMIN — ZONISAMIDE 25 MG: 25 CAPSULE ORAL at 08:47

## 2020-03-12 RX ADMIN — INSULIN LISPRO 1 UNITS: 100 INJECTION, SOLUTION INTRAVENOUS; SUBCUTANEOUS at 21:49

## 2020-03-12 ASSESSMENT — PAIN SCALES - GENERAL
PAINLEVEL_OUTOF10: 0

## 2020-03-12 NOTE — PROGRESS NOTES
Infectious Disease Follow up Notes  Admit Date: 3/8/2020  Hospital Day: 5    Antibiotics :   IV Meropenem    CHIEF COMPLAINT:     Fevers  E coli bacteremia  Cirrhosis of liver     Subjective interval History :  60 y.o. woman with morbid obesity, poor mobility, cirrhosis of liver , Kidney stones admitted with fatigue and not feeling well and sob+ and was having fevers x 2 days on admit T max 102.6 and UA very abnormal and Blood cx now with E coli and she uses CPAP at night. Urine cx in process and given on going fevers and sepsis we are consulted for IV abx recommendations. History reviewed again  Not much urinary symptoms and she has h/o Cirrhosis suspected from CARRERA and had Liver biopsy and she has a h/o ventral hernia repair in 2018 AND she  Has h/o Aortic regurgitation murmur as well. Blood cx in follow up pending. She has h/o colectomy in the past for Diverticulitis as well. Several abx allergies d/w pt she has significant reaction with them/     S/p IR aspiration of fluid around the previous hernia repair site and OPAT d/w  And PICC line placement, Home IV abx /w pt     Past Medical History:    Past Medical History:   Diagnosis Date    Anxiety     Depression     Diabetes mellitus ([facility] [address]     Diverticulosis, colon     Hyperlipidemia     Hypertension     Liver cirrhosis ([facility] [address]     Obesity     Sleep apnea        Past Surgical History:    Past Surgical History:   Procedure Laterality Date    HYSTERECTOMY      INCISIONAL HERNIA REPAIR  07/25/2018    open, with mesh        Current Medications:    Outpatient Medications Marked as Taking for the 3/8/20 encounter [facility] Encounter)   Medication Sig Dispense Refill    sertraline (ZOLOFT) 50 MG tablet Take 50 mg by mouth daily      HYDROcodone-acetaminophen (NORCO) 5-325 MG per tablet Take 2 tablets by mouth every 8 hours as needed for Pain.       meloxicam (MOBIC) 15 MG tablet Take 15 mg by mouth daily      atorvastatin (LIPITOR) 10 MG tablet Take 10 mg by mouth daily      metoprolol succinate (TOPROL XL) 100 MG extended release tablet Take 100 mg by mouth daily      glipiZIDE (GLUCOTROL) 5 MG tablet Take 5 mg by mouth 2 times daily (before meals)      ferrous sulfate 324 (65 Fe) MG EC tablet Take 324 mg by mouth daily (with breakfast)      Cyanocobalamin (VITAMIN B 12 PO) Take by mouth daily      diphenhydrAMINE (BENADRYL) 25 MG capsule Take 25 mg by mouth 2 times daily as needed for Itching      vitamin D (CHOLECALCIFEROL) 1000 UNIT TABS tablet Take 1,000 Units by mouth daily      Lansoprazole (PREVACID PO) Take 30 mg by mouth daily.  metformin (GLUCOPHAGE) 500 MG tablet Take 1,000 mg by mouth See Admin Instructions 2 TABS TWICE DAY       tramadol (ULTRAM) 50 MG tablet Take 50 mg by mouth 2 times daily.  zonisamide (ZONEGRAN) 25 MG capsule Take 25 mg by mouth 2 times daily. Allergies:  Amitriptyline hcl; Cephalexin; Ciprofloxacin; Levofloxacin; Penicillins; Sulfa antibiotics; Cephalexin hcl; Elavil [amitriptyline hcl];  Levofloxacin; Methocarbamol; Sulfur; and Tetracyclines & related    Immunizations :   Immunization History   Administered Date(s) Administered    Influenza Vaccine, unspecified formulation 08/29/2011, 09/18/2012, 09/10/2013, 09/08/2014, 10/07/2015, 10/17/2016, 11/27/2017    Influenza Virus Vaccine 09/26/2018, 09/16/2019    Pneumococcal Polysaccharide ([id number]) 06/13/2011    Tdap (Boostrix, Adacel) 06/13/2011       Social History:    Social History     Tobacco Use    Smoking status: Never Smoker    Smokeless tobacco: Never Used   Substance Use Topics    Alcohol use: No    Drug use: Not on file     Social History     Tobacco Use   Smoking Status Never Smoker   Smokeless Tobacco Never Used      Family History   Problem Relation Age of Onset    Diabetes Mother     Heart Disease Neg Hx          REVIEW OF SYSTEMS:    No fever / Order Status: Completed Specimen: Blood Updated: 03/10/20 0953    Culture, Blood 2 --Abnormal     Gram stain Aerobic bottle:   Gram negative rods   Information to follow   Gram stain Anaerobic bottle:   Gram negative rods   Information to follow   Abnormal     Organism Escherichia coli DNA DetectedAbnormal     Culture, Blood 2 See additional report for complete BCID panel. Organism Escherichia coliAbnormal     Culture, Blood 2 --    POSITIVE for   Sensitivity to follow    Narrative:     ORDER#: [id number]                          ORDERED BY: Cordelia Stevens  SOURCE: Blood                              COLLECTED:  03/08/20 16:24  ANTIBIOTICS AT [facility]. :                      RECEIVED :  03/08/20 16:32  CALL  Tee  [id number] tel. [phone], [phone]  Microbiology results called to and read back by pharmacist Audrey Huertas,  03/09/2020 08:49, by [facility]  Microbiology results called to and read back by Robi Barfield RN, 03/09/2020  08:48, by [facility]  If child <=2 yrs old please draw pediatric bottle. ~Blood Culture #2  Performed at:  [facility]  [address]   Phone [phone]   Culture, Urine [[id number]] Collected: 03/09/20 0224   Order Status: Completed Specimen: Urine, clean catch Updated: 03/10/20 0843    Urine Culture, Routine No growth at 18-36 hours   Narrative:     ORDER#: [id number]                          ORDERED BY: Cordelia Stevens  SOURCE: Urine Clean Catch                  COLLECTED:  03/09/20 02:24  ANTIBIOTICS AT [facility]. :                      RECEIVED :  03/09/20 02:51  Performed at:  [facility]  [address] [facility] [employer]   Phone [phone]   Culture, Blood 1 [[id number]]    Order Status: Sent Specimen: Blood    Culture, Blood 1 [[id number]] Collected: 03/08/20 1624   Order Status: Completed Specimen: Blood Updated: 03/09/20 1915    Blood Culture, Routine No Growth to date.  Any change in status will be called. Narrative:     ORDER#: [id number]                          ORDERED BY:  SOURCE: Blood                              COLLECTED:  03/08/20 16:24  ANTIBIOTICS AT [facility]. :                      RECEIVED :  03/08/20 16:32  If child <=2 yrs old please draw pediatric bottle. ~Blood Culture #1  Performed at:  [facility]  [address]   Phone [phone]   Culture, Blood, PCR ID Panel Results Report [[id number]] Collected: 03/08/20 1624   Order Status: Completed Updated: 03/09/20 0851    Report SEE IMAGE   Narrative:     Feliberto Luevano  [id number] tel. [phone], [address]  Microbiology results called to and read back by pharmacist Jelly Aguilar,  03/09/2020 08:49, by [facility]  Microbiology results called to and read back by Jes Yap RN, 03/09/2020  08:48, by [facility]  Referred out by:  [facility]  [address] [facility] [employer] [address]   Phone [phone]   Respiratory Panel, Molecular [[id number]] Collected: 03/09/20 0013   Order Status: Sent Specimen: Nasal from Nasopharyngeal Updated: 03/09/20 0022   Rapid influenza A/B antigens [[id number]] Collected: 03/08/20 1553   Order Status: Completed Specimen: Nasopharyngeal Updated: 03/08/20 1619    Rapid Influenza A Ag Negative    Rapid Influenza B Ag Negative   Narrative:     Performed at:  [address] [place] [address]   Phone [phone]     Escherichia coli (2)     Antibiotic Interpretation ALTAF Status    ampicillin Sensitive <=2 mcg/mL     ceFAZolin Sensitive <=4 mcg/mL     cefepime Sensitive <=0.12 mcg/mL     cefTRIAXone Sensitive <=0.25 mcg/mL     ciprofloxacin Sensitive <=0.25 mcg/mL     ertapenem Sensitive <=0.12 mcg/mL     gentamicin Sensitive <=1 mcg/mL     levofloxacin Sensitive <=0.12 mcg/mL     piperacillin-tazobactam Sensitive <=4 mcg/mL     trimethoprim-sulfamethoxazole Sensitive <=20 mcg/mL           IMAGING:    CT ABDOMEN PELVIS WO CONTRAST Additional Contrast? None   Final Result   1. Cirrhosis with splenomegaly. 2. Status post anterior abdominal wall hernia repair. In the subcutaneous   fat, superficial to the lower anterior abdominal wall, there is a 10.4 x 6.2   x 2.9 cm fluid collection. Differential includes abscess and postoperative   fluid. By imaging, it is not known whether the fluid is infected. 3. Sigmoid diverticulosis. No evidence of diverticulitis. 4. Bilateral nonobstructing nephrolithiasis. CTA PULMONARY W CONTRAST   Final Result   No evidence of pulmonary embolism, aortic dissection or acute pulmonary   abnormality. XR CHEST PORTABLE   Final Result   No acute pulmonary disease.          CT ABSCESS DRAINAGE SOFT TISSUE    (Results Pending)         All the pertinent images and reports for the current Hospitalization were reviewed by me     Scheduled Meds:   meropenem  500 mg Intravenous Q6H    enoxaparin  40 mg Subcutaneous BID    lisinopril  40 mg Oral Daily    atorvastatin  10 mg Oral Daily    ferrous sulfate  324 mg Oral Daily with breakfast    metoprolol succinate  100 mg Oral Daily    traMADol  50 mg Oral BID    Vitamin D  1,000 Units Oral Daily    zonisamide  25 mg Oral BID    sodium chloride flush  10 mL Intravenous 2 times per day    insulin lispro  0-6 Units Subcutaneous TID WC    insulin lispro  0-3 Units Subcutaneous Nightly       Continuous Infusions:   sodium chloride 30 mL/hr at 03/12/20 0523    dextrose         PRN Meds:  diphenhydrAMINE, sodium chloride flush, acetaminophen **OR** acetaminophen, polyethylene glycol, promethazine **OR** ondansetron, glucose, dextrose, glucagon (rDNA), dextrose      Assessment:     Patient Active Problem List   Diagnosis    Diverticulitis of colon    Acute renal failure (HCC)    Type II or unspecified type diabetes mellitus without mention of complication, not stated as uncontrolled    Hypertension    Obesity    Anxiety    Sleep apnea    Fatty liver    SOB (shortness of breath)    DM (diabetes mellitus) (HCC)    SBO (small bowel obstruction) ([address]    Incarcerated incisional hernia    Flu-like symptoms    Sepsis (HCC)    Complicated UTI (urinary tract infection)     Sepsis  Fevers, chills  Sob likely from increased work of breathing from sepsis  Cirrhosis of liver  E coli bacteremia  Urine cx in process  H/o Kidney stones  Obesity   Multiple antibiotic allergies complicating medical care    h/o Colectomy for Diverticulitis   H/o Aortic murmur  H/o Ventral Hernia Repair in 2018 at [facility] -     E coli bacteremia seems to be from  in origin await urine cx but urine cx now negative this makes it less likely and repeat Blood cx in process  She has CIrrhosis of liver and puts her at risk for GI infections and will arrange for CT abd/pelvis    Per her  she gets low grade fevers intermittently     Out side records reviewed in detail    Labs, Microbiology, Radiology and all the pertinent results from current hospitalization and  care every where were reviewed  by me as a part of the evaluation   Plan:   1. Cont IV Meropenem x 500 mg Q 6 HRS will change to IV Ertapenem x 1 gm X q24 hr  AT D/C stop 4/1  2. Multiple abx allergies noted and limits choices for oral antibiotics and may have to complete therapy with IV abx d/w pt   3. Urine cx negative and up on further review this seems to be less likely the source for bacteremia   4. Check CT abd/pelvis non contrast fluid noted at the surgery site Mesh repair of Hernia and going for IR aspiration to send fluid for cx   5. S/P IR aspiration of fluid and cx in process  6. PICC line and EMELY COMPLETED        Discussed with patient/Family and Nursing staff       Risk of Complications/Morbidity: High      · Illness(es)/ Infection present that pose threat to bodily function. · There is potential for severe exacerbation of infection/side effects of treatment.   · Therapy

## 2020-03-12 NOTE — DISCHARGE INSTR - COC
F41.9    Sleep apnea G47.30    Fatty liver K76.0    SOB (shortness of breath) R06.02    DM (diabetes mellitus) (HCC) E11.9    SBO (small bowel obstruction) ([facility] [address] K56.609    Incarcerated incisional hernia K43.0    Flu-like symptoms R68.89    Septicemia (HCC) L98.9    Complicated UTI (urinary tract infection) N39.0       Isolation/Infection:   Isolation          No Isolation        Patient Infection Status     None to display          Nurse Assessment:  Last Vital Signs: BP (!) 150/72   Pulse 73   Temp 98.2 °F (36.8 °C) (Oral)   Resp 18   Ht 5' 5\" (1.651 m)   Wt 266 lb 12.1 oz (121 kg)   SpO2 98%   BMI 44.39 kg/m²     Last documented pain score (0-10 scale): Pain Level: 0  Last Weight:   Wt Readings from Last 1 Encounters:   03/11/20 266 lb 12.1 oz (121 kg)     Mental Status:  oriented, alert, thought processes intact and able to concentrate and follow conversation    IV Access:  - None  - PICC - site  R Basilic, insertion date: 03/13/2020    Nursing Mobility/ADLs:  Walking   Independent  Transfer  Independent  Bathing  Independent  Dressing  Independent  Toileting  Independent  Feeding  Independent  Med Admin  Independent  Med Delivery   whole    Wound Care Documentation and Therapy:  Incision 07/25/18 Abdomen (Active)   Number of days: 595        Elimination:  Continence:   · Bowel: Yes  · Bladder: Yes  Urinary Catheter: None   Colostomy/Ileostomy/Ileal Conduit: No       Date of Last BM: 03/13/2020    Intake/Output Summary (Last 24 hours) at 3/12/2020 1531  Last data filed at 3/12/2020 0557  Gross per 24 hour   Intake 914.5 ml   Output --   Net 914.5 ml     I/O last 3 completed shifts:   In: 914.5 [P.O.:480; I.V.:234.5; IV Piggyback:200]  Out: -     Safety Concerns:     None    Impairments/Disabilities:      None    Nutrition Therapy:  Current Nutrition Therapy:   - Oral Diet:  Carb Control 5 carbs/meal (2000kcals/day)    Routes of Feeding: Oral  Liquids: No Liquids  Daily Fluid Restriction: no  Last Modified Barium Swallow with Video (Video Swallowing Test): not done    Treatments at the Time of Hospital Discharge:   Respiratory Treatments: uses CPAP at night   Oxygen Therapy:  is not on home oxygen therapy. Ventilator:    - No ventilator support    Rehab Therapies: {THERAPEUTIC INTERVENTION:[id number]}  Weight Bearing Status/Restrictions: No weight bearing restirctions  Other Medical Equipment (for information only, NOT a DME order):  walker  Other Treatments: ***    Patient's personal belongings (please select all that are sent with patient):  Glasses    RN SIGNATURE:  Electronically signed by Brain Snellen, RN on 3/14/20 at 3:36 PM EDT    CASE MANAGEMENT/SOCIAL WORK SECTION    Inpatient Status Date: 3/9/2020    Readmission Risk Assessment Score:  Readmission Risk              Risk of Unplanned Readmission:        9         Discharging to Facility/ Agency   · Name:  [facility]    · Address: [address] [place] [address], [address] [place] [address]., [place] [address]  · Phone: [phone]  · Fax: [phone] INFUSION PHARMACY:  · Name:     [facility]  · Address: [facility].  [facility]  · Phone:    [phone]  · Fax:        [phone]    / signature: Electronically signed by ZENY Dixon on 3/14/20 at 12:45 PM EDT          PHYSICIAN SECTION    Prognosis: Good    Condition at Discharge: Stable    Rehab Potential (if transferring to Rehab): Good    Recommended Labs or Other Treatments After Discharge:       FOLLOW UP PCP IN 1 WEEK  FOLLOW UP WITH CARDIOLOGY IN 2 WEEKS  FOLLOW UP WITH GENERAL SURGERY IN 1 WEEK    IV Ertapenem x 1 gm x Q 24 X STOP Date  4/1  CBC with diff, CMP,ESR, CRP weekly  Fax results to [phone]  No Follow up necessary     [address] [facility]  Phone: [phone]   Fax : [phone]      Physician Certification: I certify the above information and transfer of Ricardo Briceno is necessary for the continuing treatment of the diagnosis listed and that she requires Home Care for greater 30 days.      Update Admission H&P: No change in H&P    PHYSICIAN SIGNATURE:  Electronically signed by Elizabeth Simmons MD on 3/14/20 at 12:03 PM EDT

## 2020-03-12 NOTE — PRE SEDATION
Sedation Pre-Procedure Note    Patient Name: Enrique Nickerson   YOB: 1955  Room/Bed: Y4L-0186/4123-01  Medical Record Number: [id number]  Date: 3/12/2020   Time: 2:45 PM       Indication:  Fluid aspiration/drainage    Consent: I have discussed with the patient and/or the patient representative the indication, alternatives, and the possible risks and/or complications of the planned procedure and the anesthesia methods. The patient and/or patient representative appear to understand and agree to proceed. Vital Signs:   Vitals:    03/12/20 0845   BP: (!) 156/85   Pulse: 78   Resp: 18   Temp: 98.2 °F (36.8 °C)   SpO2: 95%       Past Medical History:   has a past medical history of Anxiety, Depression, Diabetes mellitus ([address] Diverticulosis, colon, Hyperlipidemia, Hypertension, Liver cirrhosis ([facility] [address] Obesity, and Sleep apnea. Past Surgical History:   has a past surgical history that includes Hysterectomy and Incisional hernia repair (07/25/2018). Medications:   Scheduled Meds:    meropenem  500 mg Intravenous Q6H    enoxaparin  40 mg Subcutaneous BID    lisinopril  40 mg Oral Daily    atorvastatin  10 mg Oral Daily    ferrous sulfate  324 mg Oral Daily with breakfast    metoprolol succinate  100 mg Oral Daily    traMADol  50 mg Oral BID    Vitamin D  1,000 Units Oral Daily    zonisamide  25 mg Oral BID    sodium chloride flush  10 mL Intravenous 2 times per day    insulin lispro  0-6 Units Subcutaneous TID WC    insulin lispro  0-3 Units Subcutaneous Nightly     Continuous Infusions:    sodium chloride 30 mL/hr at 03/12/20 0523    dextrose       PRN Meds: diphenhydrAMINE, sodium chloride flush, acetaminophen **OR** acetaminophen, polyethylene glycol, promethazine **OR** ondansetron, glucose, dextrose, glucagon (rDNA), dextrose  Home Meds:   Prior to Admission medications    Medication Sig Start Date End Date Taking?  Authorizing Provider   sertraline (ZOLOFT) 50 MG tablet Take 50 mg by mouth daily   Yes Historical Provider, MD   HYDROcodone-acetaminophen (NORCO) 5-325 MG per tablet Take 2 tablets by mouth every 8 hours as needed for Pain. Yes Historical Provider, MD   meloxicam (MOBIC) 15 MG tablet Take 15 mg by mouth daily   Yes Historical Provider, MD   atorvastatin (LIPITOR) 10 MG tablet Take 10 mg by mouth daily   Yes Historical Provider, MD   metoprolol succinate (TOPROL XL) 100 MG extended release tablet Take 100 mg by mouth daily   Yes Historical Provider, MD   glipiZIDE (GLUCOTROL) 5 MG tablet Take 5 mg by mouth 2 times daily (before meals)   Yes Historical Provider, MD   ferrous sulfate 324 (65 Fe) MG EC tablet Take 324 mg by mouth daily (with breakfast)   Yes Historical Provider, MD   Cyanocobalamin (VITAMIN B 12 PO) Take by mouth daily   Yes Historical Provider, MD   diphenhydrAMINE (BENADRYL) 25 MG capsule Take 25 mg by mouth 2 times daily as needed for Itching   Yes Historical Provider, MD   vitamin D (CHOLECALCIFEROL) 1000 UNIT TABS tablet Take 1,000 Units by mouth daily   Yes Historical Provider, MD   Lansoprazole (PREVACID PO) Take 30 mg by mouth daily. Yes Historical Provider, MD   metformin (GLUCOPHAGE) 500 MG tablet Take 1,000 mg by mouth See Admin Instructions 2 TABS TWICE DAY    Yes Historical Provider, MD   tramadol (ULTRAM) 50 MG tablet Take 50 mg by mouth 2 times daily. Yes Historical Provider, MD   zonisamide (ZONEGRAN) 25 MG capsule Take 25 mg by mouth 2 times daily. Yes Historical Provider, MD   lisinopril (PRINIVIL;ZESTRIL) 10 MG tablet Take 1 tablet by mouth daily  Patient taking differently: Take 40 mg by mouth daily  7/31/18   Robbin Velasquez MD     Coumadin Use Last 7 Days:  no  Antiplatelet drug therapy use last 7 days: no  Other anticoagulant use last 7 days: no  Additional Medication Information:        Pre-Sedation Documentation and Exam:   I have reviewed the patient's history and review of systems.     Mallampati Airway Assessment:  Mallampati Class II - (soft palate, fauces & uvula are visible)    Prior History of Anesthesia Complications:   none    ASA Classification:  Class 2 - A normal healthy patient with mild systemic disease    Sedation/ Anesthesia Plan:   intravenous sedation    Medications Planned:   midazolam (Versed) intravenously and fentanyl intravenously    Patient is an appropriate candidate for plan of sedation: yes    Electronically signed by Jillian Eckert MD on 3/12/2020 at 2:45 PM

## 2020-03-12 NOTE — PLAN OF CARE
Problem: Falls - Risk of:  Goal: Will remain free from falls  Description: Will remain free from falls  Outcome: Ongoing   Patient uses call light appropriately to express needs. Bed to lowest position with door open and call light in reach. All fall precautions implemented at this time. Siderails up x2. Non skid footwear in place. Patient has had no falls this shift. Will continue to monitor. Problem: Pain:  Goal: Pain level will decrease  Description: Pain level will decrease  Outcome: Ongoing   Pain/discomfort being managed with PRN analgesics per MD orders. Pt able to express presence and absence of pain and rate pain appropriately using numerical scale. Problem: Discharge Planning:  Goal: Discharged to appropriate level of care  Description: Discharged to appropriate level of care  Outcome: Ongoing     Problem: Cardiovascular  Goal: No DVT, peripheral vascular complications  Outcome: Ongoing   Lovenox as ordered.

## 2020-03-12 NOTE — CONSULTS
[address] [facility]  [phone]        Surgery History and Physical      Pt Name: Sandra Johnson  MRN: [id number]  YOB: 1955  Date of evaluation: 3/12/2020  Primary Care Physician: Yue Munguia  Patient evaluated at the request of  Dr. Reese Lesches    Chief Complaint   Patient presents with    Chest Pain     pt reports midsternal chest pain, fever, and shortness of breath worsening since yesterday. no cardiac hx.  to ED for eval    Shortness of Breath         Assessment/Plan   Chronic abdominal wall fluid collection  -present since ventral hernia repair 2018 and decreasing in size radiographically, but with cirrhosis, e coli bacteremia, intermittent low grade fevers will ask IR to aspirate +/- drain placement and send cultures. Discussed with Jose Carlisle 3/11. Plan for procedure 3/12  -NPO at midnight 3/12  -Will follow along            SUBJECTIVE:   History of Chief Complaint:    Sandra Johnson is a 60 y.o. female being seen in general surgery consultation for abdominal wall soft tissue fluid collection. Ventral hernia repair with mesh July 25 2018 due to small bowel incarceration, drain left postoperatively and removed 8/2/2018 15.4 x 6.9 fluid collection anterior abdominal wall at site of prior ventral hernia repair, deemed to be normal d/t post op with her size and SBO repair. Decreasing in size radiographically but with cirrhosis. Past Medical History   has a past medical history of Anxiety, Depression, Diabetes mellitus ([address] Diverticulosis, colon, Hyperlipidemia, Hypertension, Liver cirrhosis ([address] Obesity, and Sleep apnea. Past Surgical History   has a past surgical history that includes Hysterectomy and Incisional hernia repair (07/25/2018). Medications  Prior to Admission medications    Medication Sig Start Date End Date Taking?  Authorizing Provider   sertraline (ZOLOFT) 50 MG tablet Take 50 mg by mouth daily   Yes Historical Provider, MD HYDROcodone-acetaminophen (NORCO) 5-325 MG per tablet Take 2 tablets by mouth every 8 hours as needed for Pain. Yes Historical Provider, MD   meloxicam (MOBIC) 15 MG tablet Take 15 mg by mouth daily   Yes Historical Provider, MD   atorvastatin (LIPITOR) 10 MG tablet Take 10 mg by mouth daily   Yes Historical Provider, MD   metoprolol succinate (TOPROL XL) 100 MG extended release tablet Take 100 mg by mouth daily   Yes Historical Provider, MD   glipiZIDE (GLUCOTROL) 5 MG tablet Take 5 mg by mouth 2 times daily (before meals)   Yes Historical Provider, MD   ferrous sulfate 324 (65 Fe) MG EC tablet Take 324 mg by mouth daily (with breakfast)   Yes Historical Provider, MD   Cyanocobalamin (VITAMIN B 12 PO) Take by mouth daily   Yes Historical Provider, MD   diphenhydrAMINE (BENADRYL) 25 MG capsule Take 25 mg by mouth 2 times daily as needed for Itching   Yes Historical Provider, MD   vitamin D (CHOLECALCIFEROL) 1000 UNIT TABS tablet Take 1,000 Units by mouth daily   Yes Historical Provider, MD   Lansoprazole (PREVACID PO) Take 30 mg by mouth daily. Yes Historical Provider, MD   metformin (GLUCOPHAGE) 500 MG tablet Take 1,000 mg by mouth See Admin Instructions 2 TABS TWICE DAY    Yes Historical Provider, MD   tramadol (ULTRAM) 50 MG tablet Take 50 mg by mouth 2 times daily. Yes Historical Provider, MD   zonisamide (ZONEGRAN) 25 MG capsule Take 25 mg by mouth 2 times daily.      Yes Historical Provider, MD   lisinopril (PRINIVIL;ZESTRIL) 10 MG tablet Take 1 tablet by mouth daily  Patient taking differently: Take 40 mg by mouth daily  7/31/18   Remedios Marcial MD    Scheduled Meds:   enoxaparin  40 mg Subcutaneous BID    lisinopril  40 mg Oral Daily    meropenem  500 mg Intravenous Q6H    atorvastatin  10 mg Oral Daily    ferrous sulfate  324 mg Oral Daily with breakfast    metoprolol succinate  100 mg Oral Daily    traMADol  50 mg Oral BID    Vitamin D  1,000 Units Oral Daily    zonisamide  25 mg Oral BID  sodium chloride flush  10 mL Intravenous 2 times per day    insulin lispro  0-6 Units Subcutaneous TID WC    insulin lispro  0-3 Units Subcutaneous Nightly     Continuous Infusions:   sodium chloride 30 mL/hr at 03/12/20 0523    dextrose       PRN Meds:.diphenhydrAMINE, sodium chloride flush, acetaminophen **OR** acetaminophen, polyethylene glycol, promethazine **OR** ondansetron, glucose, dextrose, glucagon (rDNA), dextrose    Allergies  is allergic to amitriptyline hcl; cephalexin; ciprofloxacin; levofloxacin; penicillins; sulfa antibiotics; cephalexin hcl; elavil [amitriptyline hcl]; levofloxacin; methocarbamol; sulfur; and tetracyclines & related. Family History  Reviewed, non contribtory  family history includes Diabetes in her mother. Social History  Reviewed, non contributory   reports that she has never smoked. She has never used smokeless tobacco. She reports that she does not drink alcohol. Review of Systems:  General Int low grade fevers  HEENT Denies any diplopia, tinnitus or vertigo  Resp Denies any shortness of breath, cough or wheezing  Cardiac Denies any chest pain, palpitations, claudication or edema  GI Denies any melena, hematochezia, hematemesis or pyrosis   Denies any frequency, urgency, hesitancy or incontinence  Heme Denies bruising or bleeding easily  Endocrine Denies any history of diabetes or thyroid disease  Neuro Denies any focal motor or sensory deficits    OBJECTIVE:   VITALS:  height is 5' 5\" (1.651 m) and weight is 266 lb 12.1 oz (121 kg). Her oral temperature is 98.5 °F (36.9 °C). Her blood pressure is 130/88 and her pulse is 73. Her respiration is 16 and oxygen saturation is 96%. CONSTITUTIONAL: Alert and oriented times 3, nervous about plan. Morbidly obese. SKIN: Skin color, texture, turgor normal. No rashes or lesions. LYMPH: no cervical nodes, no inguinal nodes  HEENT: Head is normocephalic, atraumatic. EOMI, PERRLA.   NECK: Supple, symmetrical, trachea midline, no adenopathy, thyroid symmetric, not enlarged and no tenderness, skin normal.  CHEST/LUNGS: chest symmetric with normal A/P diameter, normal respiratory rate and rhythm, lungs clear to auscultation without wheezes, rales or rhonchi. No accessory muscle use. CARDIOVASCULAR: Murmur, regular rate. ABDOMEN: Soft, obese, non tender. RECTAL: deferred, not clinically indicated  NEUROLOGIC: There are no focalizing motor or sensory deficits. CN II-XII are grossly intact. Pauly Beagle EXTREMITIES: no cyanosis, no clubbing and no edema. LABS:     Recent Labs     03/10/20  0628 03/11/20  0728 03/12/20  0639   WBC 2.9* 3.9* 4.5   HGB 12.5 12.5 13.0   HCT 38.3 37.7 39.3   * 121* 140    140  --    K 3.7 4.0  --     107  --    CO2 19* 22  --    BUN 15 16  --    CREATININE 0.7 0.6  --    CALCIUM 8.2* 8.6  --    INR  --   --  1.10   AST 55* 48*  --    ALT 40 42*  --    BILITOT 0.4 0.4  --      Recent Labs     03/11/20  0728   ALKPHOS 103   ALT 42*   AST 48*   BILITOT 0.4   LABALBU 2.7*         RADIOLOGY:   I have personally reviewed the following films:  CT ABDOMEN PELVIS WO CONTRAST Additional Contrast? None   Final Result   1. Cirrhosis with splenomegaly. 2. Status post anterior abdominal wall hernia repair. In the subcutaneous   fat, superficial to the lower anterior abdominal wall, there is a 10.4 x 6.2   x 2.9 cm fluid collection. Differential includes abscess and postoperative   fluid. By imaging, it is not known whether the fluid is infected. 3. Sigmoid diverticulosis. No evidence of diverticulitis. 4. Bilateral nonobstructing nephrolithiasis. CTA PULMONARY W CONTRAST   Final Result   No evidence of pulmonary embolism, aortic dissection or acute pulmonary   abnormality. XR CHEST PORTABLE   Final Result   No acute pulmonary disease. CT ABSCESS DRAINAGE SOFT TISSUE    (Results Pending)        Thank you for the interesting evaluation.  Further recommendations to follow.       Electronically signed by CK Vu CNP on 3/12/2020 at 7:17 AM    EDUCATION  Patient educated about the following as pertinent to medical/surgical problems: Disease Process, Medications, Smoking Cessation, Oxygenation, Incentive Spirometry and Deep Breath and Cough, Diabetes, Hyperlipidemia, Smoking Cessation, Nutrition, Exercise and Hypertension

## 2020-03-12 NOTE — PLAN OF CARE
Problem: Falls - Risk of:  Goal: Will remain free from falls  Description: Will remain free from falls  Outcome: Met This Shift  Goal: Absence of physical injury  Description: Absence of physical injury  Outcome: Met This Shift     Problem: Pain:  Description: Pain management should include both nonpharmacologic and pharmacologic interventions. Goal: Control of acute pain  Description: Control of acute pain  Outcome: Met This Shift  Goal: Control of chronic pain  Description: Control of chronic pain  Outcome: Met This Shift     Problem: Pain:  Description: Pain management should include both nonpharmacologic and pharmacologic interventions.   Goal: Pain level will decrease  Description: Pain level will decrease  Outcome: Ongoing     Problem: Discharge Planning:  Goal: Discharged to appropriate level of care  Description: Discharged to appropriate level of care  Outcome: Ongoing

## 2020-03-12 NOTE — PROGRESS NOTES
General Surgery    Feeling better  For drainage of abdominal wall collection by IR today    Will follow    Electronically signed by Douglas Singh MD on 3/12/2020 at 12:17 PM

## 2020-03-13 LAB
A/G RATIO: 1 (ref 1.1–2.2)
ALBUMIN SERPL-MCNC: 3.1 G/DL (ref 3.4–5)
ALP BLD-CCNC: 128 U/L (ref 40–129)
ALT SERPL-CCNC: 38 U/L (ref 10–40)
ANION GAP SERPL CALCULATED.3IONS-SCNC: 12 MMOL/L (ref 3–16)
AST SERPL-CCNC: 33 U/L (ref 15–37)
BASOPHILS ABSOLUTE: 0 K/UL (ref 0–0.2)
BASOPHILS RELATIVE PERCENT: 0.2 %
BILIRUB SERPL-MCNC: 0.5 MG/DL (ref 0–1)
BUN BLDV-MCNC: 12 MG/DL (ref 7–20)
CALCIUM SERPL-MCNC: 8.7 MG/DL (ref 8.3–10.6)
CHLORIDE BLD-SCNC: 107 MMOL/L (ref 99–110)
CO2: 24 MMOL/L (ref 21–32)
CREAT SERPL-MCNC: 0.6 MG/DL (ref 0.6–1.2)
EOSINOPHILS ABSOLUTE: 0.2 K/UL (ref 0–0.6)
EOSINOPHILS RELATIVE PERCENT: 5.6 %
GFR AFRICAN AMERICAN: >60
GFR NON-AFRICAN AMERICAN: >60
GLOBULIN: 3.1 G/DL
GLUCOSE BLD-MCNC: 120 MG/DL (ref 70–99)
GLUCOSE BLD-MCNC: 132 MG/DL (ref 70–99)
GLUCOSE BLD-MCNC: 132 MG/DL (ref 70–99)
GLUCOSE BLD-MCNC: 143 MG/DL (ref 70–99)
GLUCOSE BLD-MCNC: 195 MG/DL (ref 70–99)
HCT VFR BLD CALC: 37.6 % (ref 36–48)
HEMOGLOBIN: 12.1 G/DL (ref 12–16)
LYMPHOCYTES ABSOLUTE: 1.1 K/UL (ref 1–5.1)
LYMPHOCYTES RELATIVE PERCENT: 24.8 %
MAGNESIUM: 1.9 MG/DL (ref 1.8–2.4)
MCH RBC QN AUTO: 27.9 PG (ref 26–34)
MCHC RBC AUTO-ENTMCNC: 32.3 G/DL (ref 31–36)
MCV RBC AUTO: 86.2 FL (ref 80–100)
MONOCYTES ABSOLUTE: 0.4 K/UL (ref 0–1.3)
MONOCYTES RELATIVE PERCENT: 9.4 %
NEUTROPHILS ABSOLUTE: 2.7 K/UL (ref 1.7–7.7)
NEUTROPHILS RELATIVE PERCENT: 60 %
PDW BLD-RTO: 14.4 % (ref 12.4–15.4)
PERFORMED ON: ABNORMAL
PLATELET # BLD: 137 K/UL (ref 135–450)
PMV BLD AUTO: 9 FL (ref 5–10.5)
POTASSIUM REFLEX MAGNESIUM: 3.7 MMOL/L (ref 3.5–5.1)
RBC # BLD: 4.36 M/UL (ref 4–5.2)
SODIUM BLD-SCNC: 143 MMOL/L (ref 136–145)
TOTAL PROTEIN: 6.2 G/DL (ref 6.4–8.2)
WBC # BLD: 4.4 K/UL (ref 4–11)

## 2020-03-13 PROCEDURE — 2580000003 HC RX 258: Performed by: INTERNAL MEDICINE

## 2020-03-13 PROCEDURE — 6370000000 HC RX 637 (ALT 250 FOR IP): Performed by: INTERNAL MEDICINE

## 2020-03-13 PROCEDURE — 94660 CPAP INITIATION&MGMT: CPT

## 2020-03-13 PROCEDURE — 6360000002 HC RX W HCPCS: Performed by: INTERNAL MEDICINE

## 2020-03-13 PROCEDURE — 99232 SBSQ HOSP IP/OBS MODERATE 35: CPT | Performed by: INTERNAL MEDICINE

## 2020-03-13 PROCEDURE — 36415 COLL VENOUS BLD VENIPUNCTURE: CPT

## 2020-03-13 PROCEDURE — C1751 CATH, INF, PER/CENT/MIDLINE: HCPCS

## 2020-03-13 PROCEDURE — 80053 COMPREHEN METABOLIC PANEL: CPT

## 2020-03-13 PROCEDURE — 85025 COMPLETE CBC W/AUTO DIFF WBC: CPT

## 2020-03-13 PROCEDURE — 1200000000 HC SEMI PRIVATE

## 2020-03-13 PROCEDURE — 02HV33Z INSERTION OF INFUSION DEVICE INTO SUPERIOR VENA CAVA, PERCUTANEOUS APPROACH: ICD-10-PCS | Performed by: INTERNAL MEDICINE

## 2020-03-13 PROCEDURE — 83735 ASSAY OF MAGNESIUM: CPT

## 2020-03-13 PROCEDURE — 36569 INSJ PICC 5 YR+ W/O IMAGING: CPT

## 2020-03-13 RX ADMIN — ZONISAMIDE 25 MG: 25 CAPSULE ORAL at 09:41

## 2020-03-13 RX ADMIN — TRAMADOL HYDROCHLORIDE 50 MG: 50 TABLET, FILM COATED ORAL at 21:11

## 2020-03-13 RX ADMIN — VITAMIN D, TAB 1000IU (100/BT) 1000 UNITS: 25 TAB at 09:41

## 2020-03-13 RX ADMIN — ENOXAPARIN SODIUM 40 MG: 40 INJECTION SUBCUTANEOUS at 21:12

## 2020-03-13 RX ADMIN — INSULIN LISPRO 1 UNITS: 100 INJECTION, SOLUTION INTRAVENOUS; SUBCUTANEOUS at 13:47

## 2020-03-13 RX ADMIN — TRAMADOL HYDROCHLORIDE 50 MG: 50 TABLET, FILM COATED ORAL at 09:41

## 2020-03-13 RX ADMIN — SODIUM CHLORIDE: 9 INJECTION, SOLUTION INTRAVENOUS at 05:28

## 2020-03-13 RX ADMIN — ATORVASTATIN CALCIUM 10 MG: 10 TABLET, FILM COATED ORAL at 09:41

## 2020-03-13 RX ADMIN — METOPROLOL SUCCINATE 100 MG: 50 TABLET, EXTENDED RELEASE ORAL at 09:40

## 2020-03-13 RX ADMIN — ZONISAMIDE 25 MG: 25 CAPSULE ORAL at 21:12

## 2020-03-13 RX ADMIN — ERTAPENEM SODIUM 1000 MG: 1 INJECTION, POWDER, LYOPHILIZED, FOR SOLUTION INTRAMUSCULAR; INTRAVENOUS at 17:01

## 2020-03-13 RX ADMIN — ENOXAPARIN SODIUM 40 MG: 40 INJECTION SUBCUTANEOUS at 09:42

## 2020-03-13 RX ADMIN — MEROPENEM 500 MG: 500 INJECTION, POWDER, FOR SOLUTION INTRAVENOUS at 11:36

## 2020-03-13 RX ADMIN — MEROPENEM 500 MG: 500 INJECTION, POWDER, FOR SOLUTION INTRAVENOUS at 05:28

## 2020-03-13 RX ADMIN — LISINOPRIL 40 MG: 40 TABLET ORAL at 09:41

## 2020-03-13 RX ADMIN — FERROUS SULFATE TAB EC 324 MG (65 MG FE EQUIVALENT) 324 MG: 324 (65 FE) TABLET DELAYED RESPONSE at 09:41

## 2020-03-13 RX ADMIN — SODIUM CHLORIDE, PRESERVATIVE FREE 10 ML: 5 INJECTION INTRAVENOUS at 09:51

## 2020-03-13 ASSESSMENT — PAIN SCALES - GENERAL
PAINLEVEL_OUTOF10: 3
PAINLEVEL_OUTOF10: 3
PAINLEVEL_OUTOF10: 4

## 2020-03-13 ASSESSMENT — PAIN DESCRIPTION - LOCATION: LOCATION: HEAD;BACK;NECK

## 2020-03-13 ASSESSMENT — PAIN DESCRIPTION - PAIN TYPE: TYPE: ACUTE PAIN

## 2020-03-13 ASSESSMENT — PAIN DESCRIPTION - DESCRIPTORS: DESCRIPTORS: ACHING

## 2020-03-13 ASSESSMENT — PAIN DESCRIPTION - ONSET: ONSET: ON-GOING

## 2020-03-13 ASSESSMENT — PAIN DESCRIPTION - PROGRESSION: CLINICAL_PROGRESSION: NOT CHANGED

## 2020-03-13 ASSESSMENT — PAIN - FUNCTIONAL ASSESSMENT: PAIN_FUNCTIONAL_ASSESSMENT: ACTIVITIES ARE NOT PREVENTED

## 2020-03-13 ASSESSMENT — PAIN DESCRIPTION - FREQUENCY: FREQUENCY: INTERMITTENT

## 2020-03-13 NOTE — PROGRESS NOTES
about Disease Process, Medications, Smoking Cessation, Oxygenation, Incentive Spirometry and Deep Breath and Cough, Diabetes, Hyperlipidemia, Smoking Cessation, Nutrition, Exercise and Hypertension    Electronically signed by CK Howard CNP on 3/13/2020 at 8:53 AM      [facility]   [phone] Office  [phone] Cell    As above  Feels better  No pain    Drain placed with cloudy serous fluid   Gram stain negative   No growth to date    Continue drain for now  Low probability this is the source but OK to leave drain  If otherwise stable she can be discharged and we will remove the drain in office next week    Electronically signed by Carol Ramos MD on 3/13/2020 at 2:34 PM

## 2020-03-13 NOTE — PROGRESS NOTES
Hospitalist Progress Note      PCP: Nina Valero    Date of Admission: 3/8/2020      Subjective: feels ok, no fever or chills, no abdominal pain. Medications:  Reviewed    Infusion Medications    sodium chloride 30 mL/hr at 03/13/20 0528    dextrose       Scheduled Medications    meropenem  500 mg Intravenous Q6H    lidocaine 1 % injection  5 mL Intradermal Once    sodium chloride flush  10 mL Intravenous 2 times per day    enoxaparin  40 mg Subcutaneous BID    lisinopril  40 mg Oral Daily    atorvastatin  10 mg Oral Daily    ferrous sulfate  324 mg Oral Daily with breakfast    metoprolol succinate  100 mg Oral Daily    traMADol  50 mg Oral BID    Vitamin D  1,000 Units Oral Daily    zonisamide  25 mg Oral BID    insulin lispro  0-6 Units Subcutaneous TID WC    insulin lispro  0-3 Units Subcutaneous Nightly     PRN Meds: sodium chloride flush, diphenhydrAMINE, acetaminophen **OR** acetaminophen, polyethylene glycol, promethazine **OR** ondansetron, glucose, dextrose, glucagon (rDNA), dextrose      Intake/Output Summary (Last 24 hours) at 3/13/2020 0837  Last data filed at 3/13/2020 0558  Gross per 24 hour   Intake 1523 ml   Output 65 ml   Net 1458 ml       Physical Exam Performed:    BP (!) 148/71   Pulse 74   Temp 97.9 °F (36.6 °C) (Oral)   Resp 20   Ht 5' 5\" (1.651 m)   Wt 266 lb 12.1 oz (121 kg)   SpO2 96%   BMI 44.39 kg/m²     General appearance: No apparent distress  Neck: Supple  Respiratory:  Normal respiratory effort. Clear to auscultation, bilaterally without Rales/Wheezes/Rhonchi. Cardiovascular: Regular rate and rhythm with normal S1/S2 without murmurs, rubs or gallops. Abdomen: Soft, non-tender. Musculoskeletal: No clubbing, cyanosis   Skin: Skin color, texture, turgor normal.  No rashes or lesions.   Neurologic:  No focal weakness   Psychiatric: Alert and oriented  Capillary Refill: Brisk,< 3 seconds   Peripheral Pulses: +2 palpable, equal bilaterally Labs:   Recent Labs     03/11/20  0728 03/12/20  0639 03/13/20  0744   WBC 3.9* 4.5 4.4   HGB 12.5 13.0 12.1   HCT 37.7 39.3 37.6   * 140 137     Recent Labs     03/11/20  0728 03/12/20  0639 03/13/20  0744    142 143   K 4.0 3.7 3.7    107 107   CO2 22 22 24   BUN 16 13 12   CREATININE 0.6 0.6 0.6   CALCIUM 8.6 8.8 8.7     Recent Labs     03/11/20  0728 03/12/20  0639 03/13/20  0744   AST 48* 40* 33   ALT 42* 44* 38   BILITOT 0.4 0.5 0.5   ALKPHOS 103 124 128     Recent Labs     03/12/20  0639   INR 1.10     No results for input(s): CKTOTAL, TROPONINI in the last 72 hours. Urinalysis:      Lab Results   Component Value Date    NITRU POSITIVE 03/08/2020    WBCUA 15 03/08/2020    BACTERIA 4+ 09/26/2015    RBCUA 1 03/08/2020    BLOODU Negative 03/08/2020    SPECGRAV 1.025 03/08/2020    GLUCOSEU Negative 03/08/2020    GLUCOSEU NEGATIVE 09/27/2011       Radiology:  CT ABSCESS DRAINAGE SOFT TISSUE   Final Result   Successful CT guided placement of anterior abdominal wall abscess drainage   catheter. CT GUIDED NEEDLE PLACEMENT   Final Result      CT ABDOMEN PELVIS WO CONTRAST Additional Contrast? None   Final Result   1. Cirrhosis with splenomegaly. 2. Status post anterior abdominal wall hernia repair. In the subcutaneous   fat, superficial to the lower anterior abdominal wall, there is a 10.4 x 6.2   x 2.9 cm fluid collection. Differential includes abscess and postoperative   fluid. By imaging, it is not known whether the fluid is infected. 3. Sigmoid diverticulosis. No evidence of diverticulitis. 4. Bilateral nonobstructing nephrolithiasis. CTA PULMONARY W CONTRAST   Final Result   No evidence of pulmonary embolism, aortic dissection or acute pulmonary   abnormality. XR CHEST PORTABLE   Final Result   No acute pulmonary disease.                  Assessment/Plan:    Active Hospital Problems    Diagnosis    Septicemia ([facility] [address] [G75.3]    Complicated UTI (urinary

## 2020-03-13 NOTE — PROGRESS NOTES
15 MG tablet Take 15 mg by mouth daily      atorvastatin (LIPITOR) 10 MG tablet Take 10 mg by mouth daily      metoprolol succinate (TOPROL XL) 100 MG extended release tablet Take 100 mg by mouth daily      glipiZIDE (GLUCOTROL) 5 MG tablet Take 5 mg by mouth 2 times daily (before meals)      ferrous sulfate 324 (65 Fe) MG EC tablet Take 324 mg by mouth daily (with breakfast)      Cyanocobalamin (VITAMIN B 12 PO) Take by mouth daily      diphenhydrAMINE (BENADRYL) 25 MG capsule Take 25 mg by mouth 2 times daily as needed for Itching      vitamin D (CHOLECALCIFEROL) 1000 UNIT TABS tablet Take 1,000 Units by mouth daily      Lansoprazole (PREVACID PO) Take 30 mg by mouth daily.  metformin (GLUCOPHAGE) 500 MG tablet Take 1,000 mg by mouth See Admin Instructions 2 TABS TWICE DAY       tramadol (ULTRAM) 50 MG tablet Take 50 mg by mouth 2 times daily.  zonisamide (ZONEGRAN) 25 MG capsule Take 25 mg by mouth 2 times daily. Allergies:  Amitriptyline hcl; Cephalexin; Ciprofloxacin; Levofloxacin; Penicillins; Sulfa antibiotics; Cephalexin hcl; Elavil [amitriptyline hcl];  Levofloxacin; Methocarbamol; Sulfur; and Tetracyclines & related    Immunizations :   Immunization History   Administered Date(s) Administered    Influenza Vaccine, unspecified formulation 08/29/2011, 09/18/2012, 09/10/2013, 09/08/2014, 10/07/2015, 10/17/2016, 11/27/2017    Influenza Virus Vaccine 09/26/2018, 09/16/2019    Pneumococcal Polysaccharide ([id number]) 06/13/2011    Tdap (Boostrix, Adacel) 06/13/2011       Social History:    Social History     Tobacco Use    Smoking status: Never Smoker    Smokeless tobacco: Never Used   Substance Use Topics    Alcohol use: No    Drug use: Not on file     Social History     Tobacco Use   Smoking Status Never Smoker   Smokeless Tobacco Never Used      Family History   Problem Relation Age of Onset    Diabetes Mother     Heart Disease Neg Hx          REVIEW OF SYSTEMS: No fever / chills / sweats. No weight loss. No visual change, eye pain, eye discharge. No oral lesion, sore throat, dysphagia. Denies cough / sputum/Sob   Denies chest pain, palpitations/ dizziness  Denies nausea/ vomiting/abdominal pain/diarrhea. Denies dysuria or change in urinary function. Denies joint swelling or pain. No myalgia, arthralgia. No rashes, skin lesions   Denies focal weakness, sensory change or other neurologic symptoms  No lymph node swelling or tenderness. Fevers, chills, sob+       PHYSICAL EXAM:      Vitals:   T .2   BP (!) 151/70   Pulse 73   Temp 97 °F (36.1 °C) (Oral)   Resp 15   Ht 5' 5\" (1.651 m)   Wt 266 lb 12.1 oz (121 kg)   SpO2 95%   BMI 44.39 kg/m²     General Appearance: alert,in some acute distress, +  pallor, no icterus morbid obesity +  Skin: warm and dry, no rash or erythema  Head: normocephalic and atraumatic  Eyes: pupils equal, round, and reactive to light, conjunctivae normal  ENT: tympanic membrane, external ear and ear canal normal bilaterally, nose without deformity, nasal mucosa and turbinates normal without polyps  Neck: supple and non-tender without mass, no thyromegaly  no cervical lymphadenopathy  Pulmonary/Chest: clear to auscultation bilaterally- no wheezes, rales or rhonchi, normal air movement, no respiratory distress  Cardiovascular: normal rate, regular rhythm, normal S1 and S2, LOUD Aortic regurgitation+  murmurs, rubs, clicks, or gallops, no carotid bruits  Abdomen: soft, non-tender, non-distended, normal bowel sounds, no masses or organomegaly  Large abd pannus ,NIKOLAY drain +   Extremities: no cyanosis, clubbing or + edema  Musculoskeletal: normal range of motion, no joint swelling, deformity or tenderness  Neurologic: reflexes normal and symmetric, no cranial nerve deficit  Psych:  Orientation, sensorium, mood normal  Lines:  PICC               Data Review:    Lab Results   Component Value Date    WBC 4.4 03/13/2020    HGB 12.1 updated by me     20 1624       Order Status: Completed Specimen: Blood Updated: 03/10/20 0953    Culture, Blood 2 --Abnormal     Gram stain Aerobic bottle:   Gram negative rods   Information to follow   Gram stain Anaerobic bottle:   Gram negative rods   Information to follow   Abnormal     Organism Escherichia coli DNA DetectedAbnormal     Culture, Blood 2 See additional report for complete BCID panel. Organism Escherichia coliAbnormal     Culture, Blood 2 --    POSITIVE for   Sensitivity to follow    Narrative:     ORDER#: [id number]                          ORDERED BY: Polly Sanford  SOURCE: Blood                              COLLECTED:  03/08/20 16:24  ANTIBIOTICS AT [facility]. :                      RECEIVED :  03/08/20 16:32  CALL  Tee  [facility] tel. [phone], [phone]  Microbiology results called to and read back by pharmacist Ammie Schlatter,  03/09/2020 08:49, by [facility]  Microbiology results called to and read back by Eduardo Buitrago RN, 03/09/2020  08:48, by [facility]  If child <=2 yrs old please draw pediatric bottle. ~Blood Culture #2  Performed at:  [facility]  [address]   Phone [phone]   Culture, Urine [[id number]] Collected: 03/09/20 0224   Order Status: Completed Specimen: Urine, clean catch Updated: 03/10/20 0843    Urine Culture, Routine No growth at 18-36 hours   Narrative:     ORDER#: [id number]                          ORDERED BY: Polly Sanford  SOURCE: Urine Clean Catch                  COLLECTED:  03/09/20 02:24  ANTIBIOTICS AT [facility]. :                      RECEIVED :  03/09/20 02:51  Performed at:  [facility]  [address]   Phone [phone]   Culture, Blood 1 [[id number]]    Order Status: Sent Specimen: Blood    Culture, Blood 1 [[id number]] Collected: 03/08/20 1624   Order Status: Completed Specimen: Blood Updated: 03/09/20 1915    Blood Culture, Routine No Growth to date. Bruno Mosher change in status will be called. Narrative:     ORDER#: [id number]                          ORDERED BY:  SOURCE: Blood                              COLLECTED:  03/08/20 16:24  ANTIBIOTICS AT [facility]. :                      RECEIVED :  03/08/20 16:32  If child <=2 yrs old please draw pediatric bottle. ~Blood Culture #1  Performed at:  [facility]  [address]  [facility] [address] [address]   Phone [phone]   Culture, Blood, PCR ID Panel Results Report [[id number]] Collected: 03/08/20 1624   Order Status: Completed Updated: 03/09/20 0851    Report SEE IMAGE   Narrative:     Vania Harkins  [facility] tel. [phone], [address]  Microbiology results called to and read back by pharmacist Mony Orozco,  03/09/2020 08:49, by [facility]  Microbiology results called to and read back by Juliette Morales RN, 03/09/2020  08:48, by [facility]  Referred out by:  [facility]  [address] [address]   Phone [phone]   Respiratory Panel, Molecular [[id number]] Collected: 03/09/20 0013   Order Status: Sent Specimen: Nasal from Nasopharyngeal Updated: 03/09/20 0022   Rapid influenza A/B antigens [[id number]] Collected: 03/08/20 1553   Order Status: Completed Specimen: Nasopharyngeal Updated: 03/08/20 1619    Rapid Influenza A Ag Negative    Rapid Influenza B Ag Negative   Narrative:     Performed at:  [place] [address]   Phone [phone]     Escherichia coli (2)     Antibiotic Interpretation ALTAF Status    ampicillin Sensitive <=2 mcg/mL     ceFAZolin Sensitive <=4 mcg/mL     cefepime Sensitive <=0.12 mcg/mL     cefTRIAXone Sensitive <=0.25 mcg/mL     ciprofloxacin Sensitive <=0.25 mcg/mL     ertapenem Sensitive <=0.12 mcg/mL     gentamicin Sensitive <=1 mcg/mL     levofloxacin Sensitive <=0.12 mcg/mL     piperacillin-tazobactam Sensitive <=4 mcg/mL     trimethoprim-sulfamethoxazole Sensitive <=20 mcg/mL IMAGING:    CT ABSCESS DRAINAGE SOFT TISSUE   Final Result   Successful CT guided placement of anterior abdominal wall abscess drainage   catheter. CT GUIDED NEEDLE PLACEMENT   Final Result      CT ABDOMEN PELVIS WO CONTRAST Additional Contrast? None   Final Result   1. Cirrhosis with splenomegaly. 2. Status post anterior abdominal wall hernia repair. In the subcutaneous   fat, superficial to the lower anterior abdominal wall, there is a 10.4 x 6.2   x 2.9 cm fluid collection. Differential includes abscess and postoperative   fluid. By imaging, it is not known whether the fluid is infected. 3. Sigmoid diverticulosis. No evidence of diverticulitis. 4. Bilateral nonobstructing nephrolithiasis. CTA PULMONARY W CONTRAST   Final Result   No evidence of pulmonary embolism, aortic dissection or acute pulmonary   abnormality. XR CHEST PORTABLE   Final Result   No acute pulmonary disease.                All the pertinent images and reports for the current Hospitalization were reviewed by me     Scheduled Meds:   meropenem  500 mg Intravenous Q6H    lidocaine 1 % injection  5 mL Intradermal Once    sodium chloride flush  10 mL Intravenous 2 times per day    enoxaparin  40 mg Subcutaneous BID    lisinopril  40 mg Oral Daily    atorvastatin  10 mg Oral Daily    ferrous sulfate  324 mg Oral Daily with breakfast    metoprolol succinate  100 mg Oral Daily    traMADol  50 mg Oral BID    Vitamin D  1,000 Units Oral Daily    zonisamide  25 mg Oral BID    insulin lispro  0-6 Units Subcutaneous TID WC    insulin lispro  0-3 Units Subcutaneous Nightly       Continuous Infusions:   sodium chloride 30 mL/hr at 03/13/20 0528    dextrose         PRN Meds:  sodium chloride flush, diphenhydrAMINE, acetaminophen **OR** acetaminophen, polyethylene glycol, promethazine **OR** ondansetron, glucose, dextrose, glucagon (rDNA), dextrose      Assessment:     Patient Active Problem List Diagnosis    Diverticulitis of colon    Acute renal failure (HCC)    Type II or unspecified type diabetes mellitus without mention of complication, not stated as uncontrolled    Hypertension    Obesity    Anxiety    Sleep apnea    Fatty liver    SOB (shortness of breath)    [facility] mellitus[facility] [address] [facility]    SBO (small bowel obstruction) [facility] [address] [facility]    Incarcerated incisional hernia    Flu-like symptoms    Septicemia ([facility] [address]    Complicated UTI (urinary tract infection)     Sepsis  Fevers, chills  Sob likely from increased work of breathing from sepsis  Cirrhosis of liver  E coli bacteremia  Urine cx in process  H/o Kidney stones  Obesity   Multiple antibiotic allergies complicating medical care    h/o Colectomy for Diverticulitis   H/o Aortic murmur  H/o Ventral Hernia Repair in 2018 at [facility] -     E coli bacteremia seems to be from  in origin await urine cx but urine cx now negative this makes it less likely and repeat Blood cx in process  She has CIrrhosis of liver and puts her at risk for GI infections and will arrange for CT abd/pelvis    Per her  she gets low grade fevers intermittently     Out side records reviewed in detail    S/p IR NIKOLAY drain placement and fluid sent for cx in process and s/p PICC placement       Labs, Microbiology, Radiology and all the pertinent results from current hospitalization and  care every where were reviewed  by me as a part of the evaluation   Plan:   1. Cont IV Meropenem x 500 mg Q 6 HRS will change to IV Ertapenem x 1 gm X q24 hr  AT D/C stop 4/1  2. Multiple abx allergies noted and limits choices for oral antibiotics and may have to complete therapy with IV abx d/w pt   3. Urine cx negative and up on further review this seems to be less likely the source for bacteremia   4. Check CT abd/pelvis non contrast fluid noted at the surgery site Mesh repair of Hernia and going for IR aspiration to send fluid for cx   5.  S/P IR aspiration of fluid and cx in process  6. PICC line and EMELY COMPLETED    7. Ronda Ervin for d/c planning per primary     Discussed with patient/Family and Nursing staff       Thanks for allowing me to participate in your patient's care and please call me with any questions or concerns.     Taiwo Patrick MD  Infectious Disease  [facility]) Physician  Phone: [phone]   Fax : [phone]

## 2020-03-13 NOTE — PLAN OF CARE
Problem: Falls - Risk of:  Goal: Will remain free from falls  Description: Will remain free from falls  Outcome: Ongoing  Goal: Absence of physical injury  Description: Absence of physical injury  Outcome: Ongoing     Problem: Pain:  Description: Pain management should include both nonpharmacologic and pharmacologic interventions.   Goal: Pain level will decrease  Description: Pain level will decrease  Outcome: Ongoing  Goal: Control of acute pain  Description: Control of acute pain  Outcome: Ongoing  Goal: Control of chronic pain  Description: Control of chronic pain  Outcome: Ongoing     Problem: Discharge Planning:  Goal: Discharged to appropriate level of care  Description: Discharged to appropriate level of care  Outcome: Ongoing     Problem: Cardiovascular  Goal: No DVT, peripheral vascular complications  Outcome: Ongoing

## 2020-03-14 ENCOUNTER — APPOINTMENT (OUTPATIENT)
Dept: CT IMAGING | Age: 65
DRG: 872 | End: 2020-03-14
Payer: COMMERCIAL

## 2020-03-14 VITALS
SYSTOLIC BLOOD PRESSURE: 162 MMHG | HEIGHT: 65 IN | WEIGHT: 266.76 LBS | HEART RATE: 82 BPM | RESPIRATION RATE: 18 BRPM | OXYGEN SATURATION: 94 % | DIASTOLIC BLOOD PRESSURE: 93 MMHG | BODY MASS INDEX: 44.44 KG/M2 | TEMPERATURE: 98.3 F

## 2020-03-14 LAB
A/G RATIO: 0.9 (ref 1.1–2.2)
ALBUMIN SERPL-MCNC: 2.8 G/DL (ref 3.4–5)
ALP BLD-CCNC: 122 U/L (ref 40–129)
ALT SERPL-CCNC: 33 U/L (ref 10–40)
ANION GAP SERPL CALCULATED.3IONS-SCNC: 13 MMOL/L (ref 3–16)
AST SERPL-CCNC: 29 U/L (ref 15–37)
BASOPHILS ABSOLUTE: 0 K/UL (ref 0–0.2)
BASOPHILS RELATIVE PERCENT: 0.4 %
BILIRUB SERPL-MCNC: 0.6 MG/DL (ref 0–1)
BLOOD CULTURE, ROUTINE: NORMAL
BUN BLDV-MCNC: 8 MG/DL (ref 7–20)
CALCIUM SERPL-MCNC: 8.8 MG/DL (ref 8.3–10.6)
CHLORIDE BLD-SCNC: 106 MMOL/L (ref 99–110)
CO2: 23 MMOL/L (ref 21–32)
CREAT SERPL-MCNC: 0.5 MG/DL (ref 0.6–1.2)
EOSINOPHILS ABSOLUTE: 0.3 K/UL (ref 0–0.6)
EOSINOPHILS RELATIVE PERCENT: 6.4 %
GFR AFRICAN AMERICAN: >60
GFR NON-AFRICAN AMERICAN: >60
GLOBULIN: 3.2 G/DL
GLUCOSE BLD-MCNC: 126 MG/DL (ref 70–99)
GLUCOSE BLD-MCNC: 128 MG/DL (ref 70–99)
GLUCOSE BLD-MCNC: 186 MG/DL (ref 70–99)
HCT VFR BLD CALC: 38.5 % (ref 36–48)
HEMOGLOBIN: 12.6 G/DL (ref 12–16)
LYMPHOCYTES ABSOLUTE: 1.3 K/UL (ref 1–5.1)
LYMPHOCYTES RELATIVE PERCENT: 27.2 %
MAGNESIUM: 1.9 MG/DL (ref 1.8–2.4)
MCH RBC QN AUTO: 28.1 PG (ref 26–34)
MCHC RBC AUTO-ENTMCNC: 32.8 G/DL (ref 31–36)
MCV RBC AUTO: 85.6 FL (ref 80–100)
MONOCYTES ABSOLUTE: 0.4 K/UL (ref 0–1.3)
MONOCYTES RELATIVE PERCENT: 9.2 %
NEUTROPHILS ABSOLUTE: 2.8 K/UL (ref 1.7–7.7)
NEUTROPHILS RELATIVE PERCENT: 56.8 %
PDW BLD-RTO: 13.9 % (ref 12.4–15.4)
PERFORMED ON: ABNORMAL
PERFORMED ON: ABNORMAL
PLATELET # BLD: 147 K/UL (ref 135–450)
PMV BLD AUTO: 8.9 FL (ref 5–10.5)
POTASSIUM REFLEX MAGNESIUM: 3.5 MMOL/L (ref 3.5–5.1)
RBC # BLD: 4.5 M/UL (ref 4–5.2)
REPORT: NORMAL
RESPIRATORY PANEL PCR: NORMAL
SODIUM BLD-SCNC: 142 MMOL/L (ref 136–145)
TOTAL PROTEIN: 6 G/DL (ref 6.4–8.2)
WBC # BLD: 4.9 K/UL (ref 4–11)

## 2020-03-14 PROCEDURE — 74177 CT ABD & PELVIS W/CONTRAST: CPT

## 2020-03-14 PROCEDURE — 85025 COMPLETE CBC W/AUTO DIFF WBC: CPT

## 2020-03-14 PROCEDURE — 6360000002 HC RX W HCPCS: Performed by: INTERNAL MEDICINE

## 2020-03-14 PROCEDURE — 36415 COLL VENOUS BLD VENIPUNCTURE: CPT

## 2020-03-14 PROCEDURE — 6370000000 HC RX 637 (ALT 250 FOR IP): Performed by: INTERNAL MEDICINE

## 2020-03-14 PROCEDURE — 2580000003 HC RX 258: Performed by: INTERNAL MEDICINE

## 2020-03-14 PROCEDURE — 80053 COMPREHEN METABOLIC PANEL: CPT

## 2020-03-14 PROCEDURE — 6360000004 HC RX CONTRAST MEDICATION: Performed by: SURGERY

## 2020-03-14 PROCEDURE — 94760 N-INVAS EAR/PLS OXIMETRY 1: CPT

## 2020-03-14 PROCEDURE — 99232 SBSQ HOSP IP/OBS MODERATE 35: CPT | Performed by: SURGERY

## 2020-03-14 PROCEDURE — 83735 ASSAY OF MAGNESIUM: CPT

## 2020-03-14 RX ORDER — POTASSIUM CHLORIDE 750 MG/1
10 TABLET, FILM COATED, EXTENDED RELEASE ORAL DAILY
Status: DISCONTINUED | OUTPATIENT
Start: 2020-03-14 | End: 2020-03-14 | Stop reason: HOSPADM

## 2020-03-14 RX ORDER — POTASSIUM CHLORIDE 750 MG/1
10 TABLET, FILM COATED, EXTENDED RELEASE ORAL DAILY
Qty: 60 TABLET | Refills: 0 | Status: SHIPPED | OUTPATIENT
Start: 2020-03-14

## 2020-03-14 RX ADMIN — INSULIN LISPRO 1 UNITS: 100 INJECTION, SOLUTION INTRAVENOUS; SUBCUTANEOUS at 13:07

## 2020-03-14 RX ADMIN — ERTAPENEM SODIUM 1000 MG: 1 INJECTION, POWDER, LYOPHILIZED, FOR SOLUTION INTRAMUSCULAR; INTRAVENOUS at 14:02

## 2020-03-14 RX ADMIN — METOPROLOL SUCCINATE 100 MG: 50 TABLET, EXTENDED RELEASE ORAL at 09:29

## 2020-03-14 RX ADMIN — VITAMIN D, TAB 1000IU (100/BT) 1000 UNITS: 25 TAB at 09:29

## 2020-03-14 RX ADMIN — POTASSIUM CHLORIDE 10 MEQ: 750 TABLET, FILM COATED, EXTENDED RELEASE ORAL at 13:04

## 2020-03-14 RX ADMIN — TRAMADOL HYDROCHLORIDE 50 MG: 50 TABLET, FILM COATED ORAL at 09:29

## 2020-03-14 RX ADMIN — FERROUS SULFATE TAB EC 324 MG (65 MG FE EQUIVALENT) 324 MG: 324 (65 FE) TABLET DELAYED RESPONSE at 09:29

## 2020-03-14 RX ADMIN — ATORVASTATIN CALCIUM 10 MG: 10 TABLET, FILM COATED ORAL at 09:29

## 2020-03-14 RX ADMIN — IOPAMIDOL 75 ML: 755 INJECTION, SOLUTION INTRAVENOUS at 13:19

## 2020-03-14 RX ADMIN — LISINOPRIL 40 MG: 40 TABLET ORAL at 09:29

## 2020-03-14 RX ADMIN — ZONISAMIDE 25 MG: 25 CAPSULE ORAL at 09:29

## 2020-03-14 RX ADMIN — ENOXAPARIN SODIUM 40 MG: 40 INJECTION SUBCUTANEOUS at 09:29

## 2020-03-14 ASSESSMENT — PAIN SCALES - GENERAL
PAINLEVEL_OUTOF10: 0

## 2020-03-14 NOTE — PROGRESS NOTES
Pt alert and oriented x4. VSS. Pt denies any pain and any other complains. Pt lung sounds are clear, respirations unlabored on room air. Pt's scheduled meds given to the pt as ordered. Pt sitting up in the chair. Pt denies other needs at this time. Pt UAL. Call light in reach.  Electronically signed by Corinne Martinez RN on 3/14/2020 at 3:21 PM

## 2020-03-14 NOTE — PROGRESS NOTES
MG  --    < > 1.90   CALCIUM 8.8   < > 8.8   INR 1.10  --   --    AST 40*   < > 29   ALT 44*   < > 33   BILITOT 0.5   < > 0.6    < > = values in this interval not displayed.        EDUCATION  Patient educated about Disease Process, Medications, Smoking Cessation, Oxygenation, Incentive Spirometry and Deep Breath and Cough, Diabetes, Hyperlipidemia, Smoking Cessation, Nutrition, Exercise and Hypertension    Electronically signed by Gold Puentes MD on 3/14/2020 at 9:57 AM      [facility]   [phone] Office  [phone] Cell    As above  Feels better  No pain    Drain placed with cloudy serous fluid   Gram stain negative   No growth to date    Continue drain for now  Low probability this is the source but OK to leave drain  If otherwise stable she can be discharged and we will remove the drain in office next week    Electronically signed by Gold Puentes MD on 3/14/2020 at 9:57 AM

## 2020-03-14 NOTE — PROGRESS NOTES
DM, -SSI  8. Anxiety/depression, continue home meds  9. Obstructive sleep apnea on CPAP, continue nightly CPAP  10. Thrombocytopenia, likely sepsis related, improved. 11. Dyspnea on admission, improved. 12. Morbid obesity, counseled for weight loss again today  13. 5 beats of NSVT yesterday, no fuerther events, asymptomatic, on metopolol, added potassium supplement, follow up with cardiology. Physical Exam Performed:     BP (!) 162/93   Pulse 82   Temp 98.3 °F (36.8 °C) (Oral)   Resp 18   Ht 5' 5\" (1.651 m)   Wt 266 lb 12.1 oz (121 kg)   SpO2 93%   BMI 44.39 kg/m²       General appearance:  No apparent distress  HEENT:  Normal cephalic  Neck: Supple  Respiratory:  Normal respiratory effort. Clear to auscultation, bilaterally without Rales/Wheezes/Rhonchi. Cardiovascular:  Regular rate and rhythm with normal S1/S2 without murmurs, rubs or gallops. Abdomen: Soft, non-tender, non-distended, obese. Musculoskeletal:  No clubbing, cyanosis  Skin: Skin color, texture, turgor normal.  No rashes or lesions. Neurologic:  No focal weakness   Psychiatric:  Alert and oriented  Capillary Refill: Brisk,< 3 seconds   Peripheral Pulses: +2 palpable, equal bilaterally       Labs: For convenience and continuity at follow-up the following most recent labs are provided:      CBC:    Lab Results   Component Value Date    WBC 4.9 03/14/2020    HGB 12.6 03/14/2020    HCT 38.5 03/14/2020     03/14/2020       Renal:    Lab Results   Component Value Date     03/14/2020    K 3.5 03/14/2020     03/14/2020    CO2 23 03/14/2020    BUN 8 03/14/2020    CREATININE 0.5 03/14/2020    CALCIUM 8.8 03/14/2020    PHOS 3.8 07/30/2018         Significant Diagnostic Studies    Radiology:   CT ABSCESS DRAINAGE SOFT TISSUE   Final Result   Successful CT guided placement of anterior abdominal wall abscess drainage   catheter.          CT GUIDED NEEDLE PLACEMENT   Final Result      CT ABDOMEN PELVIS WO CONTRAST Additional Contrast? None   Final Result   1. Cirrhosis with splenomegaly. 2. Status post anterior abdominal wall hernia repair. In the subcutaneous   fat, superficial to the lower anterior abdominal wall, there is a 10.4 x 6.2   x 2.9 cm fluid collection. Differential includes abscess and postoperative   fluid. By imaging, it is not known whether the fluid is infected. 3. Sigmoid diverticulosis. No evidence of diverticulitis. 4. Bilateral nonobstructing nephrolithiasis. CTA PULMONARY W CONTRAST   Final Result   No evidence of pulmonary embolism, aortic dissection or acute pulmonary   abnormality. XR CHEST PORTABLE   Final Result   No acute pulmonary disease.          CT ABDOMEN PELVIS W IV CONTRAST    (Results Pending)          Consults:     IP CONSULT TO HOSPITALIST  IP CONSULT TO INFECTIOUS DISEASES  IP CONSULT TO GENERAL SURGERY  IP CONSULT TO PHARMACY    Disposition:  home     Condition at Discharge: Stable    Discharge Instructions/Follow-up:  PCP, GS, Cardiology     Code Status:  Full Code \    Activity: activity as tolerated    Diet: diabetic diet      Discharge Medications:     Current Discharge Medication List           Details   potassium chloride (KLOR-CON) 10 MEQ extended release tablet Take 1 tablet by mouth daily  Qty: 60 tablet, Refills: 0              Details   sertraline (ZOLOFT) 50 MG tablet Take 50 mg by mouth daily      atorvastatin (LIPITOR) 10 MG tablet Take 10 mg by mouth daily      metoprolol succinate (TOPROL XL) 100 MG extended release tablet Take 100 mg by mouth daily      glipiZIDE (GLUCOTROL) 5 MG tablet Take 5 mg by mouth 2 times daily (before meals)      ferrous sulfate 324 (65 Fe) MG EC tablet Take 324 mg by mouth daily (with breakfast)      Cyanocobalamin (VITAMIN B 12 PO) Take by mouth daily      diphenhydrAMINE (BENADRYL) 25 MG capsule Take 25 mg by mouth 2 times daily as needed for Itching      vitamin D (CHOLECALCIFEROL) 1000 UNIT TABS tablet Take 1,000 Units by mouth daily      Lansoprazole (PREVACID PO) Take 30 mg by mouth daily. metformin (GLUCOPHAGE) 500 MG tablet Take 1,000 mg by mouth See Admin Instructions 2 TABS TWICE DAY       tramadol (ULTRAM) 50 MG tablet Take 50 mg by mouth 2 times daily. zonisamide (ZONEGRAN) 25 MG capsule Take 25 mg by mouth 2 times daily. lisinopril (PRINIVIL;ZESTRIL) 10 MG tablet Take 1 tablet by mouth daily  Qty: 30 tablet, Refills: 3             Time Spent on discharge is more than 1 hour in the examination, evaluation, counseling and review of medications and discharge plan. Signed:    Juliette Samaniego MD   3/14/2020      Thank you Chao Guadalupe for the opportunity to be involved in this patient's care. If you have any questions or concerns please feel free to contact me at [phone].

## 2020-03-14 NOTE — DISCHARGE SUMMARY
compensated  7. Type 2 DM, -SSI  8. Anxiety/depression, continue home meds  9. Obstructive sleep apnea on CPAP, continue nightly CPAP  10. Thrombocytopenia, likely sepsis related, improved. 11. Dyspnea on admission, improved. 12. Morbid obesity, counseled for weight loss again today  13. 5 beats of NSVT yesterday, no fuerther events, asymptomatic, on metopolol, added potassium supplement, follow up with cardiology.            Physical Exam Performed:      BP (!) 162/93   Pulse 82   Temp 98.3 °F (36.8 °C) (Oral)   Resp 18   Ht 5' 5\" (1.651 m)   Wt 266 lb 12.1 oz (121 kg)   SpO2 93%   BMI 44.39 kg/m²         General appearance:  No apparent distress  HEENT:  Normal cephalic  Neck: Supple  Respiratory:  Normal respiratory effort. Clear to auscultation, bilaterally without Rales/Wheezes/Rhonchi. Cardiovascular:  Regular rate and rhythm with normal S1/S2 without murmurs, rubs or gallops. Abdomen: Soft, non-tender, non-distended, obese. Musculoskeletal:  No clubbing, cyanosis  Skin: Skin color, texture, turgor normal.  No rashes or lesions. Neurologic:  No focal weakness   Psychiatric:  Alert and oriented  Capillary Refill: Brisk,< 3 seconds   Peripheral Pulses: +2 palpable, equal bilaterally         Labs:  For convenience and continuity at follow-up the following most recent labs are provided:        CBC:          Lab Results   Component Value Date     WBC 4.9 03/14/2020     HGB 12.6 03/14/2020     HCT 38.5 03/14/2020      03/14/2020         Renal:          Lab Results   Component Value Date      03/14/2020     K 3.5 03/14/2020      03/14/2020     CO2 23 03/14/2020     BUN 8 03/14/2020     CREATININE 0.5 03/14/2020     CALCIUM 8.8 03/14/2020     PHOS 3.8 07/30/2018            Significant Diagnostic Studies     Radiology:   CT ABSCESS DRAINAGE SOFT TISSUE   Final Result   Successful CT guided placement of anterior abdominal wall abscess drainage   catheter.           CT GUIDED NEEDLE

## 2020-03-14 NOTE — CARE COORDINATION
ATIF met with patient to discuss dc planned for today. She would like [facility] for home care services. She is aware of co-pay cost for IV abx. Spoke with RN and patient will get dose here at 5pm and then dc home. Call to [facility]. They are able to accept. Will pull orders from Epic. Call to [employer] ([phone]) to inform of discharge. They will page staff to return call to ATIF. The Plan for Transition of Care is related to the following treatment goals: to go home    The Patient was provided with a choice of provider and agrees  with the discharge plan. [x] Yes [] No    Freedom of choice list was provided with basic dialogue that supports the patient's individualized plan of care/goals, treatment preferences and shares the quality data associated with the providers. [x] Yes [] No      Electronically signed by Darrelyn Krabbe, MSW on 3/14/2020 at 12:49 PM      Spoke with Teola Romberg at [facility]. They will start care tomorrow.  [facility]  Fax: [phone]    Electronically signed by Darrelyn Krabbe, MSW on 3/14/2020 at 1:07 PM

## 2020-03-14 NOTE — PLAN OF CARE
Problem: Falls - Risk of:  Goal: Will remain free from falls  Description: Will remain free from falls  Outcome: Ongoing     Problem: Pain:  Goal: Pain level will decrease  Description: Pain level will decrease  Outcome: Ongoing     Problem: Cardiovascular  Goal: No DVT, peripheral vascular complications  Outcome: Ongoing

## 2020-03-17 ENCOUNTER — OFFICE VISIT (OUTPATIENT)
Dept: SURGERY | Age: 65
End: 2020-03-17

## 2020-03-17 LAB
ANAEROBIC CULTURE: NORMAL
GRAM STAIN RESULT: NORMAL
WOUND/ABSCESS: NORMAL

## 2020-03-17 PROCEDURE — 99024 POSTOP FOLLOW-UP VISIT: CPT | Performed by: SURGERY

## 2020-03-23 ENCOUNTER — HOSPITAL ENCOUNTER (OUTPATIENT)
Age: 65
Discharge: HOME | End: 2020-03-23
Payer: COMMERCIAL

## 2020-03-23 LAB
A/G RATIO: 1.6 (ref 1.1–2.2)
ALBUMIN SERPL-MCNC: 3.5 G/DL (ref 3.4–5)
ALP BLD-CCNC: 137 U/L (ref 40–129)
ALT SERPL-CCNC: 30 U/L (ref 10–40)
ANION GAP SERPL CALCULATED.3IONS-SCNC: 11 MMOL/L (ref 3–16)
AST SERPL-CCNC: 31 U/L (ref 15–37)
BASOPHILS ABSOLUTE: 0 K/UL (ref 0–0.2)
BASOPHILS RELATIVE PERCENT: 0.6 %
BILIRUB SERPL-MCNC: <0.2 MG/DL (ref 0–1)
BUN BLDV-MCNC: 16 MG/DL (ref 7–20)
CALCIUM SERPL-MCNC: 9.2 MG/DL (ref 8.3–10.6)
CHLORIDE BLD-SCNC: 105 MMOL/L (ref 99–110)
CO2: 26 MMOL/L (ref 21–32)
CREAT SERPL-MCNC: 0.6 MG/DL (ref 0.6–1.2)
EOSINOPHILS ABSOLUTE: 0.2 K/UL (ref 0–0.6)
EOSINOPHILS RELATIVE PERCENT: 3.8 %
GFR AFRICAN AMERICAN: >60
GFR NON-AFRICAN AMERICAN: >60
GLOBULIN: 2.2 G/DL
GLUCOSE BLD-MCNC: 199 MG/DL (ref 70–99)
HCT VFR BLD CALC: 41.7 % (ref 36–48)
HEMOGLOBIN: 13.6 G/DL (ref 12–16)
LYMPHOCYTES ABSOLUTE: 1.7 K/UL (ref 1–5.1)
LYMPHOCYTES RELATIVE PERCENT: 25.3 %
MCH RBC QN AUTO: 28 PG (ref 26–34)
MCHC RBC AUTO-ENTMCNC: 32.6 G/DL (ref 31–36)
MCV RBC AUTO: 85.8 FL (ref 80–100)
MONOCYTES ABSOLUTE: 0.5 K/UL (ref 0–1.3)
MONOCYTES RELATIVE PERCENT: 7.7 %
NEUTROPHILS ABSOLUTE: 4.1 K/UL (ref 1.7–7.7)
NEUTROPHILS RELATIVE PERCENT: 62.6 %
PDW BLD-RTO: 14.1 % (ref 12.4–15.4)
PLATELET # BLD: 207 K/UL (ref 135–450)
PMV BLD AUTO: 9.1 FL (ref 5–10.5)
POTASSIUM SERPL-SCNC: 4.2 MMOL/L (ref 3.5–5.1)
RBC # BLD: 4.86 M/UL (ref 4–5.2)
SEDIMENTATION RATE, ERYTHROCYTE: 28 MM/HR (ref 0–30)
SODIUM BLD-SCNC: 142 MMOL/L (ref 136–145)
TOTAL PROTEIN: 5.7 G/DL (ref 6.4–8.2)
WBC # BLD: 6.5 K/UL (ref 4–11)

## 2020-03-23 PROCEDURE — 85025 COMPLETE CBC W/AUTO DIFF WBC: CPT

## 2020-03-23 PROCEDURE — 36415 COLL VENOUS BLD VENIPUNCTURE: CPT

## 2020-03-23 PROCEDURE — 80053 COMPREHEN METABOLIC PANEL: CPT

## 2020-03-23 PROCEDURE — 86140 C-REACTIVE PROTEIN: CPT

## 2020-03-23 PROCEDURE — 85652 RBC SED RATE AUTOMATED: CPT

## 2020-03-23 NOTE — PROGRESS NOTES
Subjective:      Patient ID: Paul Domínguez is a 60 y.o. female. HPI  Had drain placed by IR for chronic seroma. No sign of infection. Drainage has stopped and drain removed today. Follow up with me as needed    Review of Systems    Objective:   Physical Exam    Assessment:       Diagnosis Orders   1.  Abdominal wall seroma, sequela             Plan:      Follow up with me as needed          Ernesto Fajardo MD

## 2020-03-24 LAB — C-REACTIVE PROTEIN: 14.8 MG/L (ref 0–5.1)

## 2020-03-30 LAB
ALBUMIN SERPL-MCNC: 2.9 G/DL (ref 3.5–5)
ALP BLD-CCNC: 118 IU/L (ref 35–135)
ALT SERPL-CCNC: 37 IU/L (ref 10–60)
ANION GAP SERPL CALCULATED.3IONS-SCNC: 7 MMOL/L (ref 6–18)
AST SERPL-CCNC: 39 IU/L (ref 10–40)
BASOPHILS ABSOLUTE: 0 %
BASOPHILS ABSOLUTE: 0 THOU/MCL (ref 0–0.2)
BILIRUB SERPL-MCNC: 0.3 MG/DL (ref 0–1.2)
BUN BLDV-MCNC: 13 MG/DL (ref 8–26)
C-REACTIVE PROTEIN WIDE RANGE: 14 MG/L
CALCIUM SERPL-MCNC: 8.6 MG/DL (ref 8.5–10.5)
CHLORIDE BLD-SCNC: 107 MEQ/L (ref 101–111)
CO2: 24 MMOL/L (ref 24–36)
CREAT SERPL-MCNC: 0.72 MG/DL (ref 0.44–1.03)
EOSINOPHILS ABSOLUTE: 0.3 THOU/MCL (ref 0.03–0.45)
EOSINOPHILS RELATIVE PERCENT: 4 %
GFR AFRICAN AMERICAN: 102 ML/MIN/1.73 M2
GFR NON-AFRICAN AMERICAN: 88 ML/MIN/1.73 M2
GLUCOSE BLD-MCNC: 277 MG/DL (ref 70–99)
HCT VFR BLD CALC: 40.9 % (ref 36–46)
HEMOGLOBIN: 13.6 G/DL (ref 12–15.2)
LYMPHOCYTES ABSOLUTE: 1.6 THOU/MCL (ref 1–4)
LYMPHOCYTES RELATIVE PERCENT: 25 %
MCH RBC QN AUTO: 28.2 PG (ref 27–33)
MCHC RBC AUTO-ENTMCNC: 33.2 G/DL (ref 32–36)
MCV RBC AUTO: 85.1 FL (ref 82–97)
MONOCYTES # BLD: 7 %
MONOCYTES ABSOLUTE: 0.5 THOU/MCL (ref 0.2–0.9)
NEUTROPHILS ABSOLUTE: 4.3 THOU/MCL (ref 1.8–7.7)
PDW BLD-RTO: 14.2 % (ref 12.3–17)
PLATELET # BLD: 178 THOU/MCL (ref 140–375)
PMV BLD AUTO: 9.4 FL (ref 7.4–11.5)
POTASSIUM SERPL-SCNC: 3.8 MEQ/L (ref 3.6–5.1)
RBC # BLD: 4.8 MIL/MCL (ref 3.8–5.2)
SEDIMENTATION RATE, ERYTHROCYTE: 34 MM/HR (ref 0–30)
SEG NEUTROPHILS: 64 %
SODIUM BLD-SCNC: 138 MEQ/L (ref 135–145)
TOTAL PROTEIN: 5.8 G/DL (ref 6–8)
WBC # BLD: 6.7 THOU/MCL (ref 3.6–10.5)

## 2020-04-13 LAB
FUNGUS (MYCOLOGY) CULTURE: NORMAL
FUNGUS STAIN: NORMAL

## 2021-09-08 NOTE — PROGRESS NOTES
Hospitalist Progress Note      PCP: Nina Valero    Date of Admission: 3/8/2020      Subjective: feels tired, no fever or chills now, no nausea or vomiting.  at bedside. Medications:  Reviewed    Infusion Medications    sodium chloride 75 mL/hr at 03/09/20 0516    dextrose       Scheduled Medications    lisinopril  40 mg Oral Daily    meropenem  500 mg Intravenous Q6H    atorvastatin  10 mg Oral Daily    ferrous sulfate  324 mg Oral Daily with breakfast    metoprolol succinate  100 mg Oral Daily    traMADol  50 mg Oral BID    Vitamin D  1,000 Units Oral Daily    zonisamide  25 mg Oral BID    sodium chloride flush  10 mL Intravenous 2 times per day    enoxaparin  40 mg Subcutaneous Daily    insulin lispro  0-6 Units Subcutaneous TID WC    insulin lispro  0-3 Units Subcutaneous Nightly     PRN Meds: diphenhydrAMINE, sodium chloride flush, acetaminophen **OR** acetaminophen, polyethylene glycol, promethazine **OR** ondansetron, glucose, dextrose, glucagon (rDNA), dextrose      Intake/Output Summary (Last 24 hours) at 3/9/2020 0957  Last data filed at 3/9/2020 5746  Gross per 24 hour   Intake 315 ml   Output 1400 ml   Net -1085 ml       Physical Exam Performed:    /78   Pulse 112   Temp 98 °F (36.7 °C) (Oral)   Resp 18   Ht 5' 5\" (1.651 m)   Wt 263 lb 10.7 oz (119.6 kg)   SpO2 96%   BMI 43.88 kg/m²     General appearance: No apparent distress  Neck: Supple  Respiratory:  Normal respiratory effort. Clear to auscultation, bilaterally without Rales/Wheezes/Rhonchi. Cardiovascular: Regular rate and rhythm with normal S1/S2 without murmurs, rubs or gallops. Abdomen: Soft, non-tender  Musculoskeletal: No clubbing, cyanosis   Skin: Skin color, texture, turgor normal.  No rashes or lesions.   Neurologic:  No focal weakness   Psychiatric: Alert and oriented  Capillary Refill: Brisk,< 3 seconds   Peripheral Pulses: +2 palpable, equal bilaterally       Labs:   Recent Labs 03/08/20  1417 03/09/20  0712   WBC 7.7 3.9*   HGB 15.1 13.0   HCT 45.0 39.3    96*     Recent Labs     03/08/20  1417 03/09/20  0712    138   K 3.9 3.6    106   CO2 19* 20*   BUN 19 13   CREATININE 0.9 0.7   CALCIUM 9.2 8.2*     Recent Labs     03/08/20  1417 03/09/20  0712   AST 32 40*   ALT 25 28   BILITOT 0.8 0.5   ALKPHOS 123 96     Recent Labs     03/08/20  1417   INR 1.23*     Recent Labs     03/08/20  1417   TROPONINI <0.01       Urinalysis:      Lab Results   Component Value Date    NITRU POSITIVE 03/08/2020    WBCUA 15 03/08/2020    BACTERIA 4+ 09/26/2015    RBCUA 1 03/08/2020    BLOODU Negative 03/08/2020    SPECGRAV 1.025 03/08/2020    GLUCOSEU Negative 03/08/2020    GLUCOSEU NEGATIVE 09/27/2011       Radiology:  CTA PULMONARY W CONTRAST   Final Result   No evidence of pulmonary embolism, aortic dissection or acute pulmonary   abnormality. XR CHEST PORTABLE   Final Result   No acute pulmonary disease. Assessment/Plan:    Active Hospital Problems    Diagnosis    Flu-like symptoms [R68.89]     1. Sepsis due to UTI with E coli bacteremia, antibiotics changed to meropenem, will repeat blood cultures in am, will ocnsult ID for further recommendations, will repeat CBC, CMP in am. Neutropenic today. 2. UTI, complicated, e coli with bacteremia, awaiting sensitivity,  as above  3. Dyspnea on admission, improved. 4. Essential hypertension, home meds. 5. Hyperlipidemia, continue home statin  6. Cirrhosis, appears to be compensated  7. Type 2 DM, -SSI  8. Anxiety/depression, continue home meds  9. Obstructive sleep apnea on CPAP, continue nightly CPAP  10.  Thrombocytopenia, likely sepsis related, will repeat CBC in am.       Diet: DIET CLEAR LIQUID;  Code Status: Full Code        Dispo - ongoing management     Alejandra Mckeon MD 08-Sep-2021 00:53

## 2024-04-12 ENCOUNTER — APPOINTMENT (OUTPATIENT)
Dept: CT IMAGING | Age: 69
DRG: 690 | End: 2024-04-12
Payer: MEDICARE

## 2024-04-12 ENCOUNTER — HOSPITAL ENCOUNTER (INPATIENT)
Age: 69
LOS: 2 days | Discharge: HOME | DRG: 690 | End: 2024-04-14
Attending: STUDENT IN AN ORGANIZED HEALTH CARE EDUCATION/TRAINING PROGRAM | Admitting: STUDENT IN AN ORGANIZED HEALTH CARE EDUCATION/TRAINING PROGRAM
Payer: MEDICARE

## 2024-04-12 DIAGNOSIS — N30.00 ACUTE CYSTITIS WITHOUT HEMATURIA: ICD-10-CM

## 2024-04-12 DIAGNOSIS — N20.0 NEPHROLITHIASIS: ICD-10-CM

## 2024-04-12 DIAGNOSIS — R65.20 SEVERE SEPSIS (HCC): ICD-10-CM

## 2024-04-12 DIAGNOSIS — R10.9 FLANK PAIN: Primary | ICD-10-CM

## 2024-04-12 DIAGNOSIS — A41.9 SEVERE SEPSIS (HCC): ICD-10-CM

## 2024-04-12 PROBLEM — N39.0 UTI (URINARY TRACT INFECTION): Status: ACTIVE | Noted: 2024-04-12

## 2024-04-12 LAB
ALBUMIN SERPL-MCNC: 3.6 G/DL (ref 3.4–5)
ALBUMIN/GLOB SERPL: 1 {RATIO} (ref 1.1–2.2)
ALP SERPL-CCNC: 159 U/L (ref 40–129)
ALT SERPL-CCNC: 17 U/L (ref 10–40)
ANION GAP SERPL CALCULATED.3IONS-SCNC: 17 MMOL/L (ref 3–16)
ANION GAP SERPL CALCULATED.3IONS-SCNC: 18 MMOL/L (ref 3–16)
AST SERPL-CCNC: 26 U/L (ref 15–37)
BACTERIA URNS QL MICRO: ABNORMAL /HPF
BASOPHILS # BLD: 0 K/UL (ref 0–0.2)
BASOPHILS # BLD: 0.1 K/UL (ref 0–0.2)
BASOPHILS NFR BLD: 0.2 %
BASOPHILS NFR BLD: 0.6 %
BILIRUB SERPL-MCNC: 0.5 MG/DL (ref 0–1)
BILIRUB UR QL STRIP.AUTO: NEGATIVE
BUN SERPL-MCNC: 22 MG/DL (ref 7–20)
BUN SERPL-MCNC: 22 MG/DL (ref 7–20)
CALCIUM SERPL-MCNC: 10.1 MG/DL (ref 8.3–10.6)
CALCIUM SERPL-MCNC: 9.3 MG/DL (ref 8.3–10.6)
CHLORIDE SERPL-SCNC: 100 MMOL/L (ref 99–110)
CHLORIDE SERPL-SCNC: 103 MMOL/L (ref 99–110)
CLARITY UR: ABNORMAL
CO2 SERPL-SCNC: 18 MMOL/L (ref 21–32)
CO2 SERPL-SCNC: 23 MMOL/L (ref 21–32)
COLOR UR: YELLOW
CREAT SERPL-MCNC: 1.1 MG/DL (ref 0.6–1.2)
CREAT SERPL-MCNC: 1.1 MG/DL (ref 0.6–1.2)
DEPRECATED RDW RBC AUTO: 14.6 % (ref 12.4–15.4)
DEPRECATED RDW RBC AUTO: 15 % (ref 12.4–15.4)
EOSINOPHIL # BLD: 0.1 K/UL (ref 0–0.6)
EOSINOPHIL # BLD: 0.4 K/UL (ref 0–0.6)
EOSINOPHIL NFR BLD: 1.4 %
EOSINOPHIL NFR BLD: 4 %
EPI CELLS #/AREA URNS AUTO: 2 /HPF (ref 0–5)
GFR SERPLBLD CREATININE-BSD FMLA CKD-EPI: 55 ML/MIN/{1.73_M2}
GFR SERPLBLD CREATININE-BSD FMLA CKD-EPI: 55 ML/MIN/{1.73_M2}
GLUCOSE BLD-MCNC: 162 MG/DL (ref 70–99)
GLUCOSE BLD-MCNC: 162 MG/DL (ref 70–99)
GLUCOSE BLD-MCNC: 203 MG/DL (ref 70–99)
GLUCOSE BLD-MCNC: 98 MG/DL (ref 70–99)
GLUCOSE SERPL-MCNC: 194 MG/DL (ref 70–99)
GLUCOSE SERPL-MCNC: 249 MG/DL (ref 70–99)
GLUCOSE UR STRIP.AUTO-MCNC: NEGATIVE MG/DL
HCT VFR BLD AUTO: 37.1 % (ref 36–48)
HCT VFR BLD AUTO: 40.7 % (ref 36–48)
HGB BLD-MCNC: 12.5 G/DL (ref 12–16)
HGB BLD-MCNC: 13.1 G/DL (ref 12–16)
HGB UR QL STRIP.AUTO: ABNORMAL
HYALINE CASTS #/AREA URNS AUTO: 1 /LPF (ref 0–8)
KETONES UR STRIP.AUTO-MCNC: ABNORMAL MG/DL
LACTATE BLDV-SCNC: 2.4 MMOL/L (ref 0.4–2)
LACTATE BLDV-SCNC: 2.4 MMOL/L (ref 0.4–2)
LACTATE BLDV-SCNC: 4.3 MMOL/L (ref 0.4–2)
LACTATE BLDV-SCNC: 5.2 MMOL/L (ref 0.4–2)
LEUKOCYTE ESTERASE UR QL STRIP.AUTO: ABNORMAL
LIPASE SERPL-CCNC: 67 U/L (ref 13–60)
LYMPHOCYTES # BLD: 1 K/UL (ref 1–5.1)
LYMPHOCYTES # BLD: 1.9 K/UL (ref 1–5.1)
LYMPHOCYTES NFR BLD: 19 %
LYMPHOCYTES NFR BLD: 9.9 %
MCH RBC QN AUTO: 27.8 PG (ref 26–34)
MCH RBC QN AUTO: 28.8 PG (ref 26–34)
MCHC RBC AUTO-ENTMCNC: 32 G/DL (ref 31–36)
MCHC RBC AUTO-ENTMCNC: 33.6 G/DL (ref 31–36)
MCV RBC AUTO: 85.7 FL (ref 80–100)
MCV RBC AUTO: 86.7 FL (ref 80–100)
MONOCYTES # BLD: 0.8 K/UL (ref 0–1.3)
MONOCYTES # BLD: 0.8 K/UL (ref 0–1.3)
MONOCYTES NFR BLD: 8.1 %
MONOCYTES NFR BLD: 8.1 %
NEUTROPHILS # BLD: 6.9 K/UL (ref 1.7–7.7)
NEUTROPHILS # BLD: 8 K/UL (ref 1.7–7.7)
NEUTROPHILS NFR BLD: 68.3 %
NEUTROPHILS NFR BLD: 80.4 %
NITRITE UR QL STRIP.AUTO: POSITIVE
PERFORMED ON: ABNORMAL
PERFORMED ON: NORMAL
PH UR STRIP.AUTO: 5 [PH] (ref 5–8)
PLATELET # BLD AUTO: 172 K/UL (ref 135–450)
PLATELET # BLD AUTO: 200 K/UL (ref 135–450)
PMV BLD AUTO: 8.7 FL (ref 5–10.5)
PMV BLD AUTO: 8.8 FL (ref 5–10.5)
POTASSIUM SERPL-SCNC: 4.5 MMOL/L (ref 3.5–5.1)
POTASSIUM SERPL-SCNC: 4.5 MMOL/L (ref 3.5–5.1)
PROT SERPL-MCNC: 7.2 G/DL (ref 6.4–8.2)
PROT UR STRIP.AUTO-MCNC: NEGATIVE MG/DL
RBC # BLD AUTO: 4.33 M/UL (ref 4–5.2)
RBC # BLD AUTO: 4.7 M/UL (ref 4–5.2)
RBC CLUMPS #/AREA URNS AUTO: 4 /HPF (ref 0–4)
SODIUM SERPL-SCNC: 139 MMOL/L (ref 136–145)
SODIUM SERPL-SCNC: 140 MMOL/L (ref 136–145)
SP GR UR STRIP.AUTO: >=1.03 (ref 1–1.03)
UA COMPLETE W REFLEX CULTURE PNL UR: YES
UA DIPSTICK W REFLEX MICRO PNL UR: YES
URN SPEC COLLECT METH UR: ABNORMAL
UROBILINOGEN UR STRIP-ACNC: 1 E.U./DL
WBC # BLD AUTO: 10 K/UL (ref 4–11)
WBC # BLD AUTO: 10.1 K/UL (ref 4–11)
WBC #/AREA URNS AUTO: 246 /HPF (ref 0–5)

## 2024-04-12 PROCEDURE — 83690 ASSAY OF LIPASE: CPT

## 2024-04-12 PROCEDURE — 87040 BLOOD CULTURE FOR BACTERIA: CPT

## 2024-04-12 PROCEDURE — 6370000000 HC RX 637 (ALT 250 FOR IP): Performed by: STUDENT IN AN ORGANIZED HEALTH CARE EDUCATION/TRAINING PROGRAM

## 2024-04-12 PROCEDURE — 6370000000 HC RX 637 (ALT 250 FOR IP): Performed by: PHYSICIAN ASSISTANT

## 2024-04-12 PROCEDURE — 1200000000 HC SEMI PRIVATE

## 2024-04-12 PROCEDURE — 81001 URINALYSIS AUTO W/SCOPE: CPT

## 2024-04-12 PROCEDURE — 96374 THER/PROPH/DIAG INJ IV PUSH: CPT

## 2024-04-12 PROCEDURE — 87086 URINE CULTURE/COLONY COUNT: CPT

## 2024-04-12 PROCEDURE — 87077 CULTURE AEROBIC IDENTIFY: CPT

## 2024-04-12 PROCEDURE — 2580000003 HC RX 258: Performed by: PHYSICIAN ASSISTANT

## 2024-04-12 PROCEDURE — 6360000002 HC RX W HCPCS: Performed by: INTERNAL MEDICINE

## 2024-04-12 PROCEDURE — 6360000002 HC RX W HCPCS: Performed by: PHYSICIAN ASSISTANT

## 2024-04-12 PROCEDURE — 36415 COLL VENOUS BLD VENIPUNCTURE: CPT

## 2024-04-12 PROCEDURE — 74177 CT ABD & PELVIS W/CONTRAST: CPT

## 2024-04-12 PROCEDURE — 80053 COMPREHEN METABOLIC PANEL: CPT

## 2024-04-12 PROCEDURE — 99285 EMERGENCY DEPT VISIT HI MDM: CPT

## 2024-04-12 PROCEDURE — 2580000003 HC RX 258: Performed by: STUDENT IN AN ORGANIZED HEALTH CARE EDUCATION/TRAINING PROGRAM

## 2024-04-12 PROCEDURE — 6360000004 HC RX CONTRAST MEDICATION: Performed by: PHYSICIAN ASSISTANT

## 2024-04-12 PROCEDURE — 83605 ASSAY OF LACTIC ACID: CPT

## 2024-04-12 PROCEDURE — 6360000002 HC RX W HCPCS: Performed by: STUDENT IN AN ORGANIZED HEALTH CARE EDUCATION/TRAINING PROGRAM

## 2024-04-12 PROCEDURE — 6370000000 HC RX 637 (ALT 250 FOR IP): Performed by: NURSE PRACTITIONER

## 2024-04-12 PROCEDURE — 85025 COMPLETE CBC W/AUTO DIFF WBC: CPT

## 2024-04-12 PROCEDURE — 87186 SC STD MICRODIL/AGAR DIL: CPT

## 2024-04-12 PROCEDURE — 83036 HEMOGLOBIN GLYCOSYLATED A1C: CPT

## 2024-04-12 PROCEDURE — 96375 TX/PRO/DX INJ NEW DRUG ADDON: CPT

## 2024-04-12 RX ORDER — GLUCAGON 1 MG/ML
1 KIT INJECTION PRN
Status: DISCONTINUED | OUTPATIENT
Start: 2024-04-12 | End: 2024-04-14 | Stop reason: HOSPADM

## 2024-04-12 RX ORDER — POTASSIUM CHLORIDE 7.45 MG/ML
10 INJECTION INTRAVENOUS PRN
Status: DISCONTINUED | OUTPATIENT
Start: 2024-04-12 | End: 2024-04-14 | Stop reason: HOSPADM

## 2024-04-12 RX ORDER — 0.9 % SODIUM CHLORIDE 0.9 %
1000 INTRAVENOUS SOLUTION INTRAVENOUS ONCE
Status: COMPLETED | OUTPATIENT
Start: 2024-04-12 | End: 2024-04-12

## 2024-04-12 RX ORDER — HYDROMORPHONE HYDROCHLORIDE 1 MG/ML
1 INJECTION, SOLUTION INTRAMUSCULAR; INTRAVENOUS; SUBCUTANEOUS EVERY 4 HOURS PRN
Status: DISCONTINUED | OUTPATIENT
Start: 2024-04-12 | End: 2024-04-14 | Stop reason: HOSPADM

## 2024-04-12 RX ORDER — INSULIN GLARGINE 100 [IU]/ML
20 INJECTION, SOLUTION SUBCUTANEOUS NIGHTLY
Status: DISCONTINUED | OUTPATIENT
Start: 2024-04-12 | End: 2024-04-14 | Stop reason: HOSPADM

## 2024-04-12 RX ORDER — ONDANSETRON 2 MG/ML
4 INJECTION INTRAMUSCULAR; INTRAVENOUS ONCE
Status: COMPLETED | OUTPATIENT
Start: 2024-04-12 | End: 2024-04-12

## 2024-04-12 RX ORDER — POTASSIUM CHLORIDE 20 MEQ/1
40 TABLET, EXTENDED RELEASE ORAL PRN
Status: DISCONTINUED | OUTPATIENT
Start: 2024-04-12 | End: 2024-04-14 | Stop reason: HOSPADM

## 2024-04-12 RX ORDER — ATORVASTATIN CALCIUM 10 MG/1
10 TABLET, FILM COATED ORAL DAILY
Status: DISCONTINUED | OUTPATIENT
Start: 2024-04-12 | End: 2024-04-14 | Stop reason: HOSPADM

## 2024-04-12 RX ORDER — ACETAMINOPHEN 325 MG/1
650 TABLET ORAL EVERY 6 HOURS PRN
Status: DISCONTINUED | OUTPATIENT
Start: 2024-04-12 | End: 2024-04-14 | Stop reason: HOSPADM

## 2024-04-12 RX ORDER — INSULIN LISPRO 100 [IU]/ML
0-8 INJECTION, SOLUTION INTRAVENOUS; SUBCUTANEOUS
Status: DISCONTINUED | OUTPATIENT
Start: 2024-04-12 | End: 2024-04-14 | Stop reason: HOSPADM

## 2024-04-12 RX ORDER — OXYCODONE HYDROCHLORIDE AND ACETAMINOPHEN 5; 325 MG/1; MG/1
1 TABLET ORAL EVERY 6 HOURS PRN
Status: DISCONTINUED | OUTPATIENT
Start: 2024-04-12 | End: 2024-04-14 | Stop reason: HOSPADM

## 2024-04-12 RX ORDER — SODIUM CHLORIDE, SODIUM LACTATE, POTASSIUM CHLORIDE, CALCIUM CHLORIDE 600; 310; 30; 20 MG/100ML; MG/100ML; MG/100ML; MG/100ML
INJECTION, SOLUTION INTRAVENOUS CONTINUOUS
Status: DISCONTINUED | OUTPATIENT
Start: 2024-04-12 | End: 2024-04-13

## 2024-04-12 RX ORDER — DEXTROSE MONOHYDRATE 100 MG/ML
INJECTION, SOLUTION INTRAVENOUS CONTINUOUS PRN
Status: DISCONTINUED | OUTPATIENT
Start: 2024-04-12 | End: 2024-04-14 | Stop reason: HOSPADM

## 2024-04-12 RX ORDER — METOPROLOL SUCCINATE 50 MG/1
100 TABLET, EXTENDED RELEASE ORAL DAILY
Status: DISCONTINUED | OUTPATIENT
Start: 2024-04-12 | End: 2024-04-14 | Stop reason: HOSPADM

## 2024-04-12 RX ORDER — INSULIN LISPRO 100 [IU]/ML
0-8 INJECTION, SOLUTION INTRAVENOUS; SUBCUTANEOUS
Status: DISCONTINUED | OUTPATIENT
Start: 2024-04-12 | End: 2024-04-12

## 2024-04-12 RX ORDER — SODIUM CHLORIDE 9 MG/ML
INJECTION, SOLUTION INTRAVENOUS PRN
Status: DISCONTINUED | OUTPATIENT
Start: 2024-04-12 | End: 2024-04-14 | Stop reason: HOSPADM

## 2024-04-12 RX ORDER — POLYETHYLENE GLYCOL 3350 17 G/17G
17 POWDER, FOR SOLUTION ORAL DAILY PRN
Status: DISCONTINUED | OUTPATIENT
Start: 2024-04-12 | End: 2024-04-14 | Stop reason: HOSPADM

## 2024-04-12 RX ORDER — ONDANSETRON 4 MG/1
4 TABLET, ORALLY DISINTEGRATING ORAL EVERY 8 HOURS PRN
Status: DISCONTINUED | OUTPATIENT
Start: 2024-04-12 | End: 2024-04-14 | Stop reason: HOSPADM

## 2024-04-12 RX ORDER — SODIUM CHLORIDE 0.9 % (FLUSH) 0.9 %
5-40 SYRINGE (ML) INJECTION PRN
Status: DISCONTINUED | OUTPATIENT
Start: 2024-04-12 | End: 2024-04-14 | Stop reason: HOSPADM

## 2024-04-12 RX ORDER — HYDROCODONE BITARTRATE AND ACETAMINOPHEN 5; 325 MG/1; MG/1
1 TABLET ORAL ONCE
Status: COMPLETED | OUTPATIENT
Start: 2024-04-12 | End: 2024-04-12

## 2024-04-12 RX ORDER — HEPARIN SODIUM 5000 [USP'U]/ML
5000 INJECTION, SOLUTION INTRAVENOUS; SUBCUTANEOUS EVERY 8 HOURS SCHEDULED
Status: DISCONTINUED | OUTPATIENT
Start: 2024-04-12 | End: 2024-04-12 | Stop reason: SDUPTHER

## 2024-04-12 RX ORDER — SODIUM CHLORIDE 0.9 % (FLUSH) 0.9 %
5-40 SYRINGE (ML) INJECTION EVERY 12 HOURS SCHEDULED
Status: DISCONTINUED | OUTPATIENT
Start: 2024-04-12 | End: 2024-04-14 | Stop reason: HOSPADM

## 2024-04-12 RX ORDER — 0.9 % SODIUM CHLORIDE 0.9 %
710 INTRAVENOUS SOLUTION INTRAVENOUS ONCE
Status: COMPLETED | OUTPATIENT
Start: 2024-04-12 | End: 2024-04-12

## 2024-04-12 RX ORDER — ENOXAPARIN SODIUM 100 MG/ML
30 INJECTION SUBCUTANEOUS 2 TIMES DAILY
Status: DISCONTINUED | OUTPATIENT
Start: 2024-04-12 | End: 2024-04-14 | Stop reason: HOSPADM

## 2024-04-12 RX ORDER — SODIUM CHLORIDE 9 MG/ML
INJECTION, SOLUTION INTRAVENOUS CONTINUOUS
Status: DISCONTINUED | OUTPATIENT
Start: 2024-04-12 | End: 2024-04-12

## 2024-04-12 RX ORDER — ONDANSETRON 4 MG/1
4 TABLET, ORALLY DISINTEGRATING ORAL ONCE
Status: COMPLETED | OUTPATIENT
Start: 2024-04-12 | End: 2024-04-12

## 2024-04-12 RX ORDER — GLUCAGON 1 MG/ML
1 KIT INJECTION PRN
Status: DISCONTINUED | OUTPATIENT
Start: 2024-04-12 | End: 2024-04-12 | Stop reason: SDUPTHER

## 2024-04-12 RX ORDER — INSULIN LISPRO 100 [IU]/ML
0-4 INJECTION, SOLUTION INTRAVENOUS; SUBCUTANEOUS NIGHTLY
Status: DISCONTINUED | OUTPATIENT
Start: 2024-04-12 | End: 2024-04-12

## 2024-04-12 RX ORDER — MAGNESIUM SULFATE IN WATER 40 MG/ML
2000 INJECTION, SOLUTION INTRAVENOUS PRN
Status: DISCONTINUED | OUTPATIENT
Start: 2024-04-12 | End: 2024-04-14 | Stop reason: HOSPADM

## 2024-04-12 RX ORDER — DIPHENHYDRAMINE HYDROCHLORIDE 50 MG/ML
25 INJECTION INTRAMUSCULAR; INTRAVENOUS PRN
Status: DISCONTINUED | OUTPATIENT
Start: 2024-04-12 | End: 2024-04-14 | Stop reason: HOSPADM

## 2024-04-12 RX ORDER — DEXTROSE MONOHYDRATE 100 MG/ML
INJECTION, SOLUTION INTRAVENOUS CONTINUOUS PRN
Status: DISCONTINUED | OUTPATIENT
Start: 2024-04-12 | End: 2024-04-12 | Stop reason: SDUPTHER

## 2024-04-12 RX ORDER — ONDANSETRON 2 MG/ML
4 INJECTION INTRAMUSCULAR; INTRAVENOUS EVERY 6 HOURS PRN
Status: DISCONTINUED | OUTPATIENT
Start: 2024-04-12 | End: 2024-04-14 | Stop reason: HOSPADM

## 2024-04-12 RX ORDER — MORPHINE SULFATE 4 MG/ML
4 INJECTION, SOLUTION INTRAMUSCULAR; INTRAVENOUS ONCE
Status: COMPLETED | OUTPATIENT
Start: 2024-04-12 | End: 2024-04-12

## 2024-04-12 RX ORDER — ACETAMINOPHEN 650 MG/1
650 SUPPOSITORY RECTAL EVERY 6 HOURS PRN
Status: DISCONTINUED | OUTPATIENT
Start: 2024-04-12 | End: 2024-04-14 | Stop reason: HOSPADM

## 2024-04-12 RX ADMIN — SODIUM CHLORIDE 1000 ML: 9 INJECTION, SOLUTION INTRAVENOUS at 02:39

## 2024-04-12 RX ADMIN — MORPHINE SULFATE 4 MG: 4 INJECTION, SOLUTION INTRAMUSCULAR; INTRAVENOUS at 02:36

## 2024-04-12 RX ADMIN — ONDANSETRON 4 MG: 2 INJECTION INTRAMUSCULAR; INTRAVENOUS at 02:36

## 2024-04-12 RX ADMIN — INSULIN GLARGINE 20 UNITS: 100 INJECTION, SOLUTION SUBCUTANEOUS at 21:31

## 2024-04-12 RX ADMIN — SODIUM CHLORIDE, POTASSIUM CHLORIDE, SODIUM LACTATE AND CALCIUM CHLORIDE: 600; 310; 30; 20 INJECTION, SOLUTION INTRAVENOUS at 06:20

## 2024-04-12 RX ADMIN — MEROPENEM 1000 MG: 1 INJECTION, POWDER, FOR SOLUTION INTRAVENOUS at 03:44

## 2024-04-12 RX ADMIN — SODIUM CHLORIDE: 9 INJECTION, SOLUTION INTRAVENOUS at 05:38

## 2024-04-12 RX ADMIN — ENOXAPARIN SODIUM 30 MG: 100 INJECTION SUBCUTANEOUS at 12:41

## 2024-04-12 RX ADMIN — OXYCODONE AND ACETAMINOPHEN 1 TABLET: 5; 325 TABLET ORAL at 12:41

## 2024-04-12 RX ADMIN — HYDROCODONE BITARTRATE AND ACETAMINOPHEN 1 TABLET: 5; 325 TABLET ORAL at 01:14

## 2024-04-12 RX ADMIN — IOPAMIDOL 75 ML: 755 INJECTION, SOLUTION INTRAVENOUS at 02:18

## 2024-04-12 RX ADMIN — ATORVASTATIN CALCIUM 10 MG: 10 TABLET, FILM COATED ORAL at 09:24

## 2024-04-12 RX ADMIN — HEPARIN SODIUM 5000 UNITS: 5000 INJECTION INTRAVENOUS; SUBCUTANEOUS at 05:41

## 2024-04-12 RX ADMIN — SODIUM CHLORIDE 710 ML: 9 INJECTION, SOLUTION INTRAVENOUS at 03:45

## 2024-04-12 RX ADMIN — ONDANSETRON 4 MG: 4 TABLET, ORALLY DISINTEGRATING ORAL at 01:14

## 2024-04-12 RX ADMIN — ENOXAPARIN SODIUM 30 MG: 100 INJECTION SUBCUTANEOUS at 21:31

## 2024-04-12 RX ADMIN — CEFEPIME 2000 MG: 2 INJECTION, POWDER, FOR SOLUTION INTRAVENOUS at 17:47

## 2024-04-12 RX ADMIN — OXYCODONE AND ACETAMINOPHEN 1 TABLET: 5; 325 TABLET ORAL at 21:32

## 2024-04-12 RX ADMIN — CEFEPIME 2000 MG: 2 INJECTION, POWDER, FOR SOLUTION INTRAVENOUS at 05:41

## 2024-04-12 ASSESSMENT — PAIN SCALES - GENERAL
PAINLEVEL_OUTOF10: 6
PAINLEVEL_OUTOF10: 8
PAINLEVEL_OUTOF10: 7

## 2024-04-12 ASSESSMENT — LIFESTYLE VARIABLES
HOW MANY STANDARD DRINKS CONTAINING ALCOHOL DO YOU HAVE ON A TYPICAL DAY: PATIENT DOES NOT DRINK
HOW OFTEN DO YOU HAVE A DRINK CONTAINING ALCOHOL: NEVER

## 2024-04-12 ASSESSMENT — PAIN DESCRIPTION - ORIENTATION
ORIENTATION: RIGHT
ORIENTATION: RIGHT

## 2024-04-12 ASSESSMENT — PAIN DESCRIPTION - DESCRIPTORS: DESCRIPTORS: THROBBING

## 2024-04-12 ASSESSMENT — PAIN DESCRIPTION - LOCATION
LOCATION: FLANK
LOCATION: FLANK

## 2024-04-12 ASSESSMENT — PAIN - FUNCTIONAL ASSESSMENT: PAIN_FUNCTIONAL_ASSESSMENT: 0-10

## 2024-04-12 NOTE — H&P
BILIRUBINUR NEGATIVE 09/27/2011 06:00 PM    BLOODU MODERATE 04/12/2024 02:44 AM    GLUCOSEU Negative 04/12/2024 02:44 AM    GLUCOSEU NEGATIVE 09/27/2011 06:00 PM    KETUA TRACE 04/12/2024 02:44 AM     Urine Cultures:   Lab Results   Component Value Date/Time    LABURIN No growth at 18-36 hours 03/09/2020 02:24 AM     Blood Cultures:   Lab Results   Component Value Date/Time    BC No Growth after 4 days of incubation. 03/10/2020 01:04 PM     Lab Results   Component Value Date/Time    BLOODCULT2 See additional report for complete BCID panel. 03/08/2020 04:24 PM    BLOODCULT2 POSITIVE for  Isolated one of two sets   03/08/2020 04:24 PM     Organism:   Lab Results   Component Value Date/Time    ORG Escherichia coli DNA Detected 03/08/2020 04:24 PM    ORG Escherichia coli 03/08/2020 04:24 PM       Imaging/Diagnostics Last 24 Hours   CT ABDOMEN PELVIS W IV CONTRAST Additional Contrast? None    Result Date: 4/12/2024  1. Bilateral nephrolithiasis the largest measuring up to 8 mm in the right kidney. 2. Cirrhosis with stigmata of portal hypertension. 3. 7.9 x 2.3 cm thick walled fluid collection in the anterior abdominal wall just deep to the midline incision in the mid to lower abdomen.  This was previously drained and could reflect a chronic seroma or hematoma.  Recommend correlation with any clinical signs of acute infection.         This note was likely completed using voice recognition technology and may contain unintended errors.     Electronically signed by Chris Mcdermott MD on 4/12/2024 at 3:33 AM

## 2024-04-12 NOTE — PLAN OF CARE
Problem: Discharge Planning  Goal: Discharge to home or other facility with appropriate resources  4/12/2024 0933 by Eloisa Gallo RN  Outcome: Progressing

## 2024-04-12 NOTE — CONSULTS
Urology Consult Note  [facility]     Patient: Yoseph Small MRN: [id number]  Room/Bed: 4N-4484/4484-01   YOB: 1955  Age/Sex: 68 y.o.female  Admission Date: 4/12/2024     Date of Service:  4/12/2024    Consulting Provider: Charlie Rebolledo PA-C  Admitting/Requesting Physician: Chris Mcdermott MD  Primary Care Physician: Sariah Teresa    Reason for Consult: UTI, Nephrolithiasis    ASSESSMENT/PLAN     67 yo female admitted with UTI. Imaging in the ER shows bilateral  nephrolithiasis, largest 8 mm on the right. There is no obstructive uropathy or hydronephrosis.  She does have a hx of stones that she has passed on her own. She denies flank pain. She only reports abdominal pain, this pain is not similar to her prior kidney stone episodes.     Cr and wbc wnl. She is afebrile.      Recommendations:  No emergent surgical intervention at this time. Stones are non obstructing and there is no hydronephrosis on imaging. She can eat today from my standpoint.   Plan to continue abx and follow cultures. Bladder scan to ensure voiding okay and consider durand for maximum drainage if not completely emptying bladder.   Urology will continue to follow. I left my information so she can see us in our office, she is not a good ESWL candidate.     All the patients questions were answered. She understands the plan as listed above.    HISTORY     Chief Complaint:   Chief Complaint   Patient presents with    Flank Pain     Pt came in from home, pt reports  right side pain and nausea that has been going on for a couple days that has gotten worse, pt denies diarrhea. Pt denies injury to the area       History of Present Illness: Yoseph Small is a 68 y.o. female with UTI. Onset of symptoms was days ago with improving course since that time. Symptoms are aggravated by UTI. Symptoms improved with abx. Associated symptoms include abdominal pain. Patient also reports no flank pain. Hx of stones, reports 
None

## 2024-04-12 NOTE — ED NOTES
ED TO INPATIENT SBAR HANDOFF    Patient Name: Yoseph Small   :  1955  68 y.o.   MRN:  [id number]  Preferred Name  Yoseph  ED Room #:  ED-0006/06  Family/Caregiver Present yes   Restraints no   Sitter no   Sepsis Risk Score Sepsis Risk Score: 0.94    Situation  Code Status: Prior No additional code details.    Allergies: Amitriptyline hcl, Cephalexin, Ciprofloxacin, Levofloxacin, Penicillins, Sulfa antibiotics, Cephalexin hcl, Elavil [amitriptyline hcl], Elemental sulfur, Levofloxacin, Methocarbamol, and Tetracyclines & related  Weight: Patient Vitals for the past 96 hrs (Last 3 readings):   Weight   24 0152 124.7 kg (275 lb)     Arrived from: home  Chief Complaint:   Chief Complaint   Patient presents with    Flank Pain     Pt came in from home, pt reports  right side pain and nausea that has been going on for a couple days that has gotten worse, pt denies diarrhea. Pt denies injury to the area     Hospital Problem/Diagnosis:  Principal Problem:    UTI (urinary tract infection)  Resolved Problems:    * No resolved hospital problems. *    Imaging:   CT ABDOMEN PELVIS W IV CONTRAST Additional Contrast? None   Preliminary Result   1. Bilateral nephrolithiasis the largest measuring up to 8 mm in the right   kidney.   2. Cirrhosis with stigmata of portal hypertension.   3. 7.9 x 2.3 cm thick walled fluid collection in the anterior abdominal wall   just deep to the midline incision in the mid to lower abdomen.  This was   previously drained and could reflect a chronic seroma or hematoma.  Recommend   correlation with any clinical signs of acute infection.           Abnormal labs:   Abnormal Labs Reviewed   COMPREHENSIVE METABOLIC PANEL W/ REFLEX TO MG FOR LOW K - Abnormal; Notable for the following components:       Result Value    Anion Gap 17 (*)     Glucose 194 (*)     BUN 22 (*)     Est, Glom Filt Rate 55 (*)     Albumin/Globulin Ratio 1.0 (*)     Alkaline Phosphatase 159 (*)     All other components

## 2024-04-12 NOTE — ED PROVIDER NOTES
[facility] EMERGENCY DEPARTMENT  EMERGENCY DEPARTMENT ENCOUNTER        Pt Name: Yoseph Small  MRN: [id number]  Birthdate 1955  Date of evaluation: 4/12/2024  Provider: WING Ramos  PCP: Sariah Teresa  Note Started: 3:34 AM EDT 4/12/24      GYPSY. I have evaluated this patient.        CHIEF COMPLAINT       Chief Complaint   Patient presents with    Flank Pain     Pt came in from home, pt reports  right side pain and nausea that has been going on for a couple days that has gotten worse, pt denies diarrhea. Pt denies injury to the area       HISTORY OF PRESENT ILLNESS: 1 or more Elements     History From: Patient  Limitations to history : None    Yoseph Small is a 68 y.o. female with past medical history of diabetes, hypertension, obesity, sleep apnea who presents ED with complaint of right flank pain.  Patient complaining of right lateral abdominal pain/flank pain that she reports been ongoing for the past couple days but worsened over the past several hours.  She reports in the past she has had urinary tract infections and also kidney stones.  She reports nausea but denies any vomiting.  She denies chest pain or shortness of breath.  Denies fever or chills.  Denies rashes or lesions.  Denies any injury or trauma.  Denies dysuria, frequency, urgency or hematuria.  Denies changes in bowel movements.  Rates pain as an 8/10.  Denies taking any over-the-counter medication for symptom control.  Became concerned and came to the ED for further evaluation and treatment.    Nursing Notes were all reviewed and agreed with or any disagreements were addressed in the HPI.    REVIEW OF SYSTEMS :      Review of Systems   Constitutional:  Negative for activity change, appetite change, chills, diaphoresis, fatigue and fever.   Respiratory: Negative.  Negative for cough, chest tightness and shortness of breath.    Cardiovascular: Negative.  Negative for chest pain, palpitations and leg

## 2024-04-12 NOTE — CARE COORDINATION
Discharge Planning Note:    Chart reviewed and it appears that patient has minimal needs for discharge at this time. Discussed with patient’s nurse and requested that case management be notified if discharge needs are identified.     - Current discharge plan is for the patient to return home.    - PCP: Sariah Teresa    - Patient uses a walker at baseline    Case management will continue to follow progress and update discharge plan as needed.      Risk of Readmission Score: 8%    CATHY Calero RN    [facility]  Phone: [phone]

## 2024-04-13 LAB
ANION GAP SERPL CALCULATED.3IONS-SCNC: 11 MMOL/L (ref 3–16)
BASOPHILS # BLD: 0 K/UL (ref 0–0.2)
BASOPHILS NFR BLD: 0.6 %
BUN SERPL-MCNC: 18 MG/DL (ref 7–20)
CALCIUM SERPL-MCNC: 9.4 MG/DL (ref 8.3–10.6)
CHLORIDE SERPL-SCNC: 105 MMOL/L (ref 99–110)
CO2 SERPL-SCNC: 24 MMOL/L (ref 21–32)
CREAT SERPL-MCNC: 1.1 MG/DL (ref 0.6–1.2)
DEPRECATED RDW RBC AUTO: 14.8 % (ref 12.4–15.4)
EOSINOPHIL # BLD: 0.3 K/UL (ref 0–0.6)
EOSINOPHIL NFR BLD: 5 %
EST. AVERAGE GLUCOSE BLD GHB EST-MCNC: 165.7 MG/DL
GFR SERPLBLD CREATININE-BSD FMLA CKD-EPI: 55 ML/MIN/{1.73_M2}
GLUCOSE BLD-MCNC: 145 MG/DL (ref 70–99)
GLUCOSE BLD-MCNC: 165 MG/DL (ref 70–99)
GLUCOSE BLD-MCNC: 175 MG/DL (ref 70–99)
GLUCOSE BLD-MCNC: 249 MG/DL (ref 70–99)
GLUCOSE SERPL-MCNC: 165 MG/DL (ref 70–99)
HBA1C MFR BLD: 7.4 %
HCT VFR BLD AUTO: 34.7 % (ref 36–48)
HGB BLD-MCNC: 11.8 G/DL (ref 12–16)
LYMPHOCYTES # BLD: 1.5 K/UL (ref 1–5.1)
LYMPHOCYTES NFR BLD: 28.2 %
MCH RBC QN AUTO: 29.1 PG (ref 26–34)
MCHC RBC AUTO-ENTMCNC: 34 G/DL (ref 31–36)
MCV RBC AUTO: 85.6 FL (ref 80–100)
MONOCYTES # BLD: 0.5 K/UL (ref 0–1.3)
MONOCYTES NFR BLD: 9 %
NEUTROPHILS # BLD: 3 K/UL (ref 1.7–7.7)
NEUTROPHILS NFR BLD: 57.2 %
PERFORMED ON: ABNORMAL
PLATELET # BLD AUTO: 138 K/UL (ref 135–450)
PMV BLD AUTO: 8.3 FL (ref 5–10.5)
POTASSIUM SERPL-SCNC: 4.1 MMOL/L (ref 3.5–5.1)
RBC # BLD AUTO: 4.06 M/UL (ref 4–5.2)
SODIUM SERPL-SCNC: 140 MMOL/L (ref 136–145)
WBC # BLD AUTO: 5.2 K/UL (ref 4–11)

## 2024-04-13 PROCEDURE — 6360000002 HC RX W HCPCS: Performed by: INTERNAL MEDICINE

## 2024-04-13 PROCEDURE — 2580000003 HC RX 258: Performed by: NURSE PRACTITIONER

## 2024-04-13 PROCEDURE — 80048 BASIC METABOLIC PNL TOTAL CA: CPT

## 2024-04-13 PROCEDURE — 85025 COMPLETE CBC W/AUTO DIFF WBC: CPT

## 2024-04-13 PROCEDURE — 6370000000 HC RX 637 (ALT 250 FOR IP): Performed by: STUDENT IN AN ORGANIZED HEALTH CARE EDUCATION/TRAINING PROGRAM

## 2024-04-13 PROCEDURE — 2580000003 HC RX 258: Performed by: STUDENT IN AN ORGANIZED HEALTH CARE EDUCATION/TRAINING PROGRAM

## 2024-04-13 PROCEDURE — 6360000002 HC RX W HCPCS: Performed by: STUDENT IN AN ORGANIZED HEALTH CARE EDUCATION/TRAINING PROGRAM

## 2024-04-13 PROCEDURE — 6370000000 HC RX 637 (ALT 250 FOR IP): Performed by: NURSE PRACTITIONER

## 2024-04-13 PROCEDURE — 1200000000 HC SEMI PRIVATE

## 2024-04-13 RX ADMIN — CEFEPIME 2000 MG: 2 INJECTION, POWDER, FOR SOLUTION INTRAVENOUS at 17:46

## 2024-04-13 RX ADMIN — CEFEPIME 2000 MG: 2 INJECTION, POWDER, FOR SOLUTION INTRAVENOUS at 06:40

## 2024-04-13 RX ADMIN — ENOXAPARIN SODIUM 30 MG: 100 INJECTION SUBCUTANEOUS at 20:28

## 2024-04-13 RX ADMIN — INSULIN GLARGINE 20 UNITS: 100 INJECTION, SOLUTION SUBCUTANEOUS at 20:28

## 2024-04-13 RX ADMIN — OXYCODONE AND ACETAMINOPHEN 1 TABLET: 5; 325 TABLET ORAL at 20:28

## 2024-04-13 RX ADMIN — SODIUM CHLORIDE, POTASSIUM CHLORIDE, SODIUM LACTATE AND CALCIUM CHLORIDE: 600; 310; 30; 20 INJECTION, SOLUTION INTRAVENOUS at 01:37

## 2024-04-13 RX ADMIN — METOPROLOL SUCCINATE 100 MG: 50 TABLET, EXTENDED RELEASE ORAL at 09:16

## 2024-04-13 RX ADMIN — ATORVASTATIN CALCIUM 10 MG: 10 TABLET, FILM COATED ORAL at 09:16

## 2024-04-13 RX ADMIN — ENOXAPARIN SODIUM 30 MG: 100 INJECTION SUBCUTANEOUS at 09:16

## 2024-04-13 RX ADMIN — OXYCODONE AND ACETAMINOPHEN 1 TABLET: 5; 325 TABLET ORAL at 06:11

## 2024-04-13 ASSESSMENT — PAIN DESCRIPTION - DESCRIPTORS
DESCRIPTORS: ACHING;DISCOMFORT
DESCRIPTORS: DISCOMFORT;ACHING

## 2024-04-13 ASSESSMENT — PAIN DESCRIPTION - LOCATION
LOCATION: FLANK
LOCATION: FLANK

## 2024-04-13 ASSESSMENT — PAIN DESCRIPTION - ORIENTATION
ORIENTATION: RIGHT
ORIENTATION: RIGHT

## 2024-04-13 ASSESSMENT — PAIN SCALES - GENERAL: PAINLEVEL_OUTOF10: 8

## 2024-04-13 NOTE — PLAN OF CARE
Problem: Discharge Planning  Goal: Discharge to home or other facility with appropriate resources  4/13/2024 0941 by Eloisa Gallo, RN  Outcome: Progressing     Problem: Pain  Goal: Verbalizes/displays adequate comfort level or baseline comfort level  4/13/2024 0941 by Eloisa Gallo, RN  Outcome: Progressing

## 2024-04-13 NOTE — PLAN OF CARE
Shift assessment complete. VSS. Medications administered per order. Pt utilizing CPAP appropriately. Call light in reach.    The care plan and education has been reviewed and mutually agreed upon with the patient.   Patient remains free from falls.  All fall precautions in place.  SAFE sign on door.  Bed and chair alarms being used.  Bed in lowest position. Will monitor.     Problem: Discharge Planning  Goal: Discharge to home or other facility with appropriate resources  Outcome: Progressing     Problem: Pain  Goal: Verbalizes/displays adequate comfort level or baseline comfort level  Outcome: Progressing

## 2024-04-14 VITALS
SYSTOLIC BLOOD PRESSURE: 124 MMHG | OXYGEN SATURATION: 93 % | HEIGHT: 65 IN | WEIGHT: 275 LBS | TEMPERATURE: 98.2 F | DIASTOLIC BLOOD PRESSURE: 74 MMHG | RESPIRATION RATE: 18 BRPM | BODY MASS INDEX: 45.82 KG/M2 | HEART RATE: 89 BPM

## 2024-04-14 LAB
ANION GAP SERPL CALCULATED.3IONS-SCNC: 12 MMOL/L (ref 3–16)
BACTERIA UR CULT: ABNORMAL
BACTERIA UR CULT: ABNORMAL
BASOPHILS # BLD: 0 K/UL (ref 0–0.2)
BASOPHILS NFR BLD: 0.7 %
BUN SERPL-MCNC: 14 MG/DL (ref 7–20)
CALCIUM SERPL-MCNC: 9.4 MG/DL (ref 8.3–10.6)
CHLORIDE SERPL-SCNC: 106 MMOL/L (ref 99–110)
CO2 SERPL-SCNC: 23 MMOL/L (ref 21–32)
CREAT SERPL-MCNC: 0.9 MG/DL (ref 0.6–1.2)
DEPRECATED RDW RBC AUTO: 14.6 % (ref 12.4–15.4)
EOSINOPHIL # BLD: 0.3 K/UL (ref 0–0.6)
EOSINOPHIL NFR BLD: 5.6 %
GFR SERPLBLD CREATININE-BSD FMLA CKD-EPI: 70 ML/MIN/{1.73_M2}
GLUCOSE BLD-MCNC: 168 MG/DL (ref 70–99)
GLUCOSE BLD-MCNC: 218 MG/DL (ref 70–99)
GLUCOSE SERPL-MCNC: 152 MG/DL (ref 70–99)
HCT VFR BLD AUTO: 34.2 % (ref 36–48)
HGB BLD-MCNC: 11.5 G/DL (ref 12–16)
LYMPHOCYTES # BLD: 1.3 K/UL (ref 1–5.1)
LYMPHOCYTES NFR BLD: 25.7 %
MCH RBC QN AUTO: 28.7 PG (ref 26–34)
MCHC RBC AUTO-ENTMCNC: 33.5 G/DL (ref 31–36)
MCV RBC AUTO: 85.6 FL (ref 80–100)
MONOCYTES # BLD: 0.5 K/UL (ref 0–1.3)
MONOCYTES NFR BLD: 10.2 %
NEUTROPHILS # BLD: 2.8 K/UL (ref 1.7–7.7)
NEUTROPHILS NFR BLD: 57.8 %
ORGANISM: ABNORMAL
PERFORMED ON: ABNORMAL
PERFORMED ON: ABNORMAL
PLATELET # BLD AUTO: 135 K/UL (ref 135–450)
PMV BLD AUTO: 8.1 FL (ref 5–10.5)
POTASSIUM SERPL-SCNC: 4 MMOL/L (ref 3.5–5.1)
RBC # BLD AUTO: 4 M/UL (ref 4–5.2)
SODIUM SERPL-SCNC: 141 MMOL/L (ref 136–145)
WBC # BLD AUTO: 4.9 K/UL (ref 4–11)

## 2024-04-14 PROCEDURE — 6370000000 HC RX 637 (ALT 250 FOR IP): Performed by: STUDENT IN AN ORGANIZED HEALTH CARE EDUCATION/TRAINING PROGRAM

## 2024-04-14 PROCEDURE — 36415 COLL VENOUS BLD VENIPUNCTURE: CPT

## 2024-04-14 PROCEDURE — 80048 BASIC METABOLIC PNL TOTAL CA: CPT

## 2024-04-14 PROCEDURE — 6360000002 HC RX W HCPCS: Performed by: INTERNAL MEDICINE

## 2024-04-14 PROCEDURE — 85025 COMPLETE CBC W/AUTO DIFF WBC: CPT

## 2024-04-14 PROCEDURE — 2580000003 HC RX 258: Performed by: STUDENT IN AN ORGANIZED HEALTH CARE EDUCATION/TRAINING PROGRAM

## 2024-04-14 PROCEDURE — 6360000002 HC RX W HCPCS: Performed by: STUDENT IN AN ORGANIZED HEALTH CARE EDUCATION/TRAINING PROGRAM

## 2024-04-14 PROCEDURE — 6370000000 HC RX 637 (ALT 250 FOR IP): Performed by: NURSE PRACTITIONER

## 2024-04-14 RX ORDER — LISINOPRIL 10 MG/1
10 TABLET ORAL DAILY
Qty: 30 TABLET | Refills: 3 | Status: SHIPPED | OUTPATIENT
Start: 2024-04-14

## 2024-04-14 RX ORDER — CEFDINIR 300 MG/1
300 CAPSULE ORAL 2 TIMES DAILY
Qty: 10 CAPSULE | Refills: 0 | Status: SHIPPED | OUTPATIENT
Start: 2024-04-14 | End: 2024-04-19

## 2024-04-14 RX ADMIN — METOPROLOL SUCCINATE 100 MG: 50 TABLET, EXTENDED RELEASE ORAL at 08:29

## 2024-04-14 RX ADMIN — ENOXAPARIN SODIUM 30 MG: 100 INJECTION SUBCUTANEOUS at 08:29

## 2024-04-14 RX ADMIN — CEFEPIME 2000 MG: 2 INJECTION, POWDER, FOR SOLUTION INTRAVENOUS at 06:45

## 2024-04-14 RX ADMIN — OXYCODONE AND ACETAMINOPHEN 1 TABLET: 5; 325 TABLET ORAL at 08:29

## 2024-04-14 RX ADMIN — ATORVASTATIN CALCIUM 10 MG: 10 TABLET, FILM COATED ORAL at 10:50

## 2024-04-14 ASSESSMENT — PAIN SCALES - GENERAL: PAINLEVEL_OUTOF10: 5

## 2024-04-14 ASSESSMENT — PAIN DESCRIPTION - DESCRIPTORS: DESCRIPTORS: ACHING

## 2024-04-14 ASSESSMENT — PAIN DESCRIPTION - ORIENTATION: ORIENTATION: RIGHT

## 2024-04-14 ASSESSMENT — PAIN DESCRIPTION - LOCATION: LOCATION: FLANK

## 2024-04-14 NOTE — PLAN OF CARE
Problem: Discharge Planning  Goal: Discharge to home or other facility with appropriate resources  4/14/2024 1126 by Eloisa Gallo, RN  Outcome: Progressing     Problem: Pain  Goal: Verbalizes/displays adequate comfort level or baseline comfort level  4/14/2024 1126 by Eloisa Gallo, RN  Outcome: Progressing

## 2024-04-14 NOTE — PLAN OF CARE
Shift assessment complte. VSS. Medications administered per order. Pt requested PRN for flank pain. Preferred to sleep in recliner overnight. Minimal ambulation in room. Compliant with CPAP overnight.    The care plan and education has been reviewed and mutually agreed upon with the patient. SAFE sign on door.  Bed and chair alarms being used.  Bed in lowest position. Will monitor.      Problem: Discharge Planning  Goal: Discharge to home or other facility with appropriate resources  4/14/2024 0514 by Ritu Graves RN  Outcome: Progressing  4/14/2024 0513 by Ritu Graves RN  Outcome: Progressing  4/14/2024 0513 by Ritu Graves RN  Outcome: Progressing  4/14/2024 0512 by Ritu Graves RN  Outcome: Progressing     Problem: Pain  Goal: Verbalizes/displays adequate comfort level or baseline comfort level  4/14/2024 0514 by Ritu Graves RN  Outcome: Progressing  4/14/2024 0513 by Ritu Graves RN  Outcome: Progressing  4/14/2024 0513 by Ritu Graves RN  Outcome: Progressing  4/14/2024 0512 by Ritu Graves RN  Outcome: Progressing

## 2024-04-14 NOTE — DISCHARGE SUMMARY
[facility]    [facility] Demographics    Patient. Yoseph Small  Date of Birth. 1955  MRN. [id number]     Primary care provider. Sariah Teresa  (Tel: [phone])    Admit date: 4/12/2024    Discharge date 4/14/2024  Note Date: 4/14/2024     Reason for Hospitalization.   Chief Complaint   Patient presents with    Flank Pain     Pt came in from home, pt reports  right side pain and nausea that has been going on for a couple days that has gotten worse, pt denies diarrhea. Pt denies injury to the area         Significant Findings.   Principal Problem:    UTI (urinary tract infection)  Resolved Problems:    * No resolved hospital problems. *       Problems and results from this hospitalization that need follow up.  Follow up with urology for stones and to discuss Vag E for UTI prevention    Work on diet and increased activity     Her BP was in range on BB only therapy.  Will check BP at home.  May need to resume ACE if BP creeps up.     Allergies noted.  Pt tolerated cefepmine during her hospital stay     Significant test results and incidental findings.  AIC 7.4%       CT abd pelvis:  IMPRESSION:  1. Bilateral nephrolithiasis the largest measuring up to 8 mm in the right  kidney.  2. Cirrhosis with stigmata of portal hypertension.  3. 7.9 x 2.3 cm thick walled fluid collection in the anterior abdominal wall  just deep to the midline incision in the mid to lower abdomen.  This was  previously drained and could reflect a chronic seroma or hematoma.  Recommend  correlation with any clinical signs of acute infection.              Susceptibility    Escherichia coli (1)    Antibiotic Interpretation Microscan  Method Status    ampicillin Resistant >=32 mcg/mL BACTERIAL SUSCEPTIBILITY PANEL BY ALTAF     ampicillin-sulbactam Intermediate 16 mcg/mL BACTERIAL SUSCEPTIBILITY PANEL BY ALATF

## 2024-04-14 NOTE — PROGRESS NOTES
[facility]    4/12/2024 8:10 AM        Name: Yoseph Small .              Admitted: 4/12/2024  Primary Care Provider: Sariah Teresa (Tel: [phone])      Subjective:  .    Admitted with UTI and nephrolithiasis   Seen this am with  at bedside  She is hungry and reports some right sided lower abd pain.     Labs and CT findings reviewed in great detail.        Reviewed interval ancillary notes    Current Medications  atorvastatin (LIPITOR) tablet 10 mg, Daily  metoprolol succinate (TOPROL XL) extended release tablet 100 mg, Daily  sodium chloride flush 0.9 % injection 5-40 mL, 2 times per day  sodium chloride flush 0.9 % injection 5-40 mL, PRN  0.9 % sodium chloride infusion, PRN  potassium chloride (KLOR-CON M) extended release tablet 40 mEq, PRN   Or  potassium bicarb-citric acid (EFFER-K) effervescent tablet 40 mEq, PRN   Or  potassium chloride 10 mEq/100 mL IVPB (Peripheral Line), PRN  magnesium sulfate 2000 mg in 50 mL IVPB premix, PRN  ondansetron (ZOFRAN-ODT) disintegrating tablet 4 mg, Q8H PRN   Or  ondansetron (ZOFRAN) injection 4 mg, Q6H PRN  polyethylene glycol (GLYCOLAX) packet 17 g, Daily PRN  acetaminophen (TYLENOL) tablet 650 mg, Q6H PRN   Or  acetaminophen (TYLENOL) suppository 650 mg, Q6H PRN  heparin (porcine) injection 5,000 Units, 3 times per day  diphenhydrAMINE (BENADRYL) injection 25 mg, PRN  famotidine (PEPCID) 20 mg in sodium chloride (PF) 0.9 % 10 mL injection, PRN  ceFEPIme (MAXIPIME) 2,000 mg in sodium chloride 0.9 % 100 mL IVPB (mini-bag), Q12H  insulin lispro (HUMALOG) injection vial 0-8 Units, TID WC  insulin lispro (HUMALOG) injection vial 0-4 Units, Nightly  dextrose bolus 10% 125 mL, PRN   Or  dextrose bolus 10% 250 mL, PRN  glucagon injection 1 mg, PRN  dextrose 10 % infusion, Continuous PRN  lactated ringers IV soln infusion, Continuous        Objective:  /67   Pulse 92   Temp 98 °F (36.7 °C) (Oral)   Resp 18   Ht 
0418: Admission assessment; Pt came in from home, pt reports  right side pain and nausea that has been going on for a couple days that has gotten worse, pt denies diarrhea. Pt denies injury to the area. UTI and kidney stones. Bedside table and call light within reach. The care plan and education has been reviewed and mutually agreed upon with the patient.     Benita Wilson RN  
Discharge instructions and medications gone over with patient. Patient verbalizes understanding discharge instructions and medications. All questions answered.   
Urology Progress Note  [facility]    Provider: Yaa Freeman MD  Patient ID:  Admission Date: 2024 Name: Yoseph Small  OR Date: 2024 MRN: [id number]   Patient Location: 4N-4484/4484-01 : 1955  Attending: Sheela Schwarz MD Date of Service: 2024  PCP: Sariah Teresa     Diagnoses:  Nephrolithiasis  Recurrent UTI  GSM    Assessment/Plan:  Okay for dc  Needs 7 total days culture specific abx for complicated UTI  Needs outpatient followup for stones, GSM  Discussed vag E for UTI prevention w pt, will set up as outpatient    Subjective:   No complaints. Ready to go home. UCX shows levaquin/cipro resistant Ecoli, intermediate to unasyn.     Objective:   Vitals:  Vitals:    24 0815   BP: 124/74   Pulse: 89   Resp: 18   Temp: 98.2 °F (36.8 °C)   SpO2: 93%     No intake or output data in the 24 hours ending 24 1025    Physical Exam:  Pleasant  Conversant  Unlabored  Focally intact    Labs:  Lab Results   Component Value Date    WBC 4.9 2024    HGB 11.5 (L) 2024    HCT 34.2 (L) 2024    MCV 85.6 2024     2024     Lab Results   Component Value Date    CREATININE 0.9 2024    BUN 14 2024     2024    K 4.0 2024     2024    CO2 23 2024       Yaa Freeman MD   2024      
Urology Progress Note  [facility]    Provider: Yaa Freeman MD  Patient ID:  Admission Date: 2024 Name: Yoseph Small  OR Date: 2024 MRN: [id number]   Patient Location: [id number]-[id number]/4484-01 : 1955  Attending: Sheela Schwarz MD Date of Service: 2024  PCP: Sariah Teresa     Diagnoses:  Nephrolithiasis  UTI    Assessment/Plan:  Improving  Followup culture  Will need outpatient followup for nonobstructive stones    The patient had a chance to ask questions which were answered. She understands the above plan.    Subjective:   No complaints. UCX not back yet at time of visit.     Objective:   Vitals:  Vitals:    24 1141   BP: 109/70   Pulse: 85   Resp: 18   Temp: 98.1 °F (36.7 °C)   SpO2: 91%     No intake or output data in the 24 hours ending 24    Physical Exam:  Pleasant conversant lady  Ncat  unlabored    Labs:  Lab Results   Component Value Date    WBC 5.2 2024    HGB 11.8 (L) 2024    HCT 34.7 (L) 2024    MCV 85.6 2024     2024     Lab Results   Component Value Date    CREATININE 1.1 2024    BUN 18 2024     2024    K 4.1 2024     2024    CO2 24 2024       Yaa Freeman MD   2024      
Cirrhosis with stigmata of portal hypertension.  3. 7.9 x 2.3 cm thick walled fluid collection in the anterior abdominal wall  just deep to the midline incision in the mid to lower abdomen.  This was  previously drained and could reflect a chronic seroma or hematoma.  Recommend  correlation with any clinical signs of acute infection.     This result has not been signed. Information might be incomplete.      Problem List  Principal Problem:    UTI (urinary tract infection)  Resolved Problems:    * No resolved hospital problems. *       Assessment & Plan:   UTI:  on Cefepmine,   urine and BC pending , currently with E coli,  awaiting sensitivities   Nephrolithiasis:  urology is following,  no obstruction noted   Abd wall fluid collection/ ? Chronic seroma vs hematoma:   confirms this is not a new finding , no pain today   T2DM:  will add lantus 20 units at hs, and humalog correction with meals.  Will follow closely.  AIC 7.4   JUANITA: compliant with CPAP  Obesity: she understands the need for weight loss.  Ambulation in the room encouraged.  Anticipate d/c home in the am pending urine cultures       Diet: ADULT DIET; Regular; 4 carb choices (60 gm/meal)  Code:Full Code  DVT PPX thomas Mcdonald, CK - CNP   4/13/2024 8:09 AM

## 2024-04-14 NOTE — PLAN OF CARE
Problem: Discharge Planning  Goal: Discharge to home or other facility with appropriate resources  4/14/2024 1146 by Eloisa Gallo, RN  Outcome: Completed     Problem: Pain  Goal: Verbalizes/displays adequate comfort level or baseline comfort level  4/14/2024 1146 by Eloisa Gallo, RN  Outcome: Completed

## 2024-04-16 LAB
BACTERIA BLD CULT ORG #2: NORMAL
BACTERIA BLD CULT: NORMAL

## 2024-04-29 ENCOUNTER — HOSPITAL ENCOUNTER (OUTPATIENT)
Age: 69
Discharge: HOME | End: 2024-04-29
Payer: MEDICARE

## 2024-04-29 ENCOUNTER — HOSPITAL ENCOUNTER (OUTPATIENT)
Dept: GENERAL RADIOLOGY | Age: 69
Discharge: HOME | End: 2024-04-29
Payer: MEDICARE

## 2024-04-29 DIAGNOSIS — N20.0 STONE IN KIDNEY: ICD-10-CM

## 2024-04-29 PROCEDURE — 74018 RADEX ABDOMEN 1 VIEW: CPT

## 2024-05-12 PROBLEM — N39.0 UTI (URINARY TRACT INFECTION): Status: RESOLVED | Noted: 2024-04-12 | Resolved: 2024-05-12

## 2024-05-28 ENCOUNTER — HOSPITAL ENCOUNTER (OUTPATIENT)
Dept: GENERAL RADIOLOGY | Age: 69
Discharge: HOME | End: 2024-05-28
Payer: MEDICARE

## 2024-05-28 ENCOUNTER — HOSPITAL ENCOUNTER (OUTPATIENT)
Age: 69
Discharge: HOME | End: 2024-05-28
Payer: MEDICARE

## 2024-05-28 DIAGNOSIS — N20.0 CALCULUS OF KIDNEY: ICD-10-CM

## 2024-05-28 PROCEDURE — 74018 RADEX ABDOMEN 1 VIEW: CPT

## 2024-05-31 ENCOUNTER — HOSPITAL ENCOUNTER (OUTPATIENT)
Age: 69
Discharge: HOME | End: 2024-05-31
Payer: MEDICARE

## 2024-05-31 PROCEDURE — 87086 URINE CULTURE/COLONY COUNT: CPT

## 2024-05-31 PROCEDURE — 87186 SC STD MICRODIL/AGAR DIL: CPT

## 2024-05-31 PROCEDURE — 87077 CULTURE AEROBIC IDENTIFY: CPT

## 2024-06-02 LAB
BACTERIA UR CULT: ABNORMAL
ORGANISM: ABNORMAL

## 2024-06-20 ENCOUNTER — APPOINTMENT (OUTPATIENT)
Dept: GENERAL RADIOLOGY | Age: 69
DRG: 480 | End: 2024-06-20
Payer: MEDICARE

## 2024-06-20 ENCOUNTER — APPOINTMENT (OUTPATIENT)
Dept: CT IMAGING | Age: 69
DRG: 480 | End: 2024-06-20
Payer: MEDICARE

## 2024-06-20 ENCOUNTER — HOSPITAL ENCOUNTER (INPATIENT)
Age: 69
LOS: 24 days | Discharge: NURSING, REHAB OR LONG-TERM CARE | DRG: 480 | End: 2024-07-15
Attending: STUDENT IN AN ORGANIZED HEALTH CARE EDUCATION/TRAINING PROGRAM | Admitting: STUDENT IN AN ORGANIZED HEALTH CARE EDUCATION/TRAINING PROGRAM
Payer: MEDICARE

## 2024-06-20 DIAGNOSIS — S72.401A CLOSED FRACTURE OF DISTAL END OF RIGHT FEMUR, UNSPECIFIED FRACTURE MORPHOLOGY, INITIAL ENCOUNTER (HCC): Primary | ICD-10-CM

## 2024-06-20 DIAGNOSIS — R52 INTRACTABLE PAIN: ICD-10-CM

## 2024-06-20 DIAGNOSIS — I48.0 PAROXYSMAL ATRIAL FIBRILLATION (HCC): ICD-10-CM
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
## 2024-06-20 DIAGNOSIS — N17.9 ACUTE RENAL FAILURE, UNSPECIFIED ACUTE RENAL FAILURE TYPE (HCC): ICD-10-CM

## 2024-06-20 DIAGNOSIS — S72.21XA FRACTURE, SUBTROCHANTERIC, RIGHT FEMUR, CLOSED, INITIAL ENCOUNTER (HCC): ICD-10-CM

## 2024-06-20 DIAGNOSIS — E87.5 HYPERKALEMIA: ICD-10-CM

## 2024-06-20 LAB
BASOPHILS # BLD: 0 K/UL (ref 0–0.2)
BASOPHILS NFR BLD: 0.2 %
DEPRECATED RDW RBC AUTO: 15.9 % (ref 12.4–15.4)
EOSINOPHIL # BLD: 0.1 K/UL (ref 0–0.6)
EOSINOPHIL NFR BLD: 0.5 %
HCT VFR BLD AUTO: 32 % (ref 36–48)
HGB BLD-MCNC: 10.7 G/DL (ref 12–16)
LYMPHOCYTES # BLD: 0.9 K/UL (ref 1–5.1)
LYMPHOCYTES NFR BLD: 5.4 %
MCH RBC QN AUTO: 28.7 PG (ref 26–34)
MCHC RBC AUTO-ENTMCNC: 33.3 G/DL (ref 31–36)
MCV RBC AUTO: 86.2 FL (ref 80–100)
MONOCYTES # BLD: 1.1 K/UL (ref 0–1.3)
MONOCYTES NFR BLD: 6.1 %
NEUTROPHILS # BLD: 15.3 K/UL (ref 1.7–7.7)
NEUTROPHILS NFR BLD: 87.8 %
PLATELET # BLD AUTO: 108 K/UL (ref 135–450)
PMV BLD AUTO: 9.2 FL (ref 5–10.5)
RBC # BLD AUTO: 3.71 M/UL (ref 4–5.2)
WBC # BLD AUTO: 17.4 K/UL (ref 4–11)

## 2024-06-20 PROCEDURE — 99285 EMERGENCY DEPT VISIT HI MDM: CPT

## 2024-06-20 PROCEDURE — 96374 THER/PROPH/DIAG INJ IV PUSH: CPT

## 2024-06-20 PROCEDURE — 85025 COMPLETE CBC W/AUTO DIFF WBC: CPT

## 2024-06-20 PROCEDURE — 70450 CT HEAD/BRAIN W/O DYE: CPT

## 2024-06-20 PROCEDURE — 80048 BASIC METABOLIC PNL TOTAL CA: CPT

## 2024-06-20 PROCEDURE — 72128 CT CHEST SPINE W/O DYE: CPT

## 2024-06-20 PROCEDURE — 72125 CT NECK SPINE W/O DYE: CPT

## 2024-06-20 PROCEDURE — 73700 CT LOWER EXTREMITY W/O DYE: CPT

## 2024-06-20 PROCEDURE — 6360000002 HC RX W HCPCS: Performed by: NURSE PRACTITIONER

## 2024-06-20 PROCEDURE — 29505 APPLICATION LONG LEG SPLINT: CPT

## 2024-06-20 PROCEDURE — 73562 X-RAY EXAM OF KNEE 3: CPT

## 2024-06-20 PROCEDURE — 72131 CT LUMBAR SPINE W/O DYE: CPT

## 2024-06-20 PROCEDURE — 96376 TX/PRO/DX INJ SAME DRUG ADON: CPT

## 2024-06-20 PROCEDURE — 96375 TX/PRO/DX INJ NEW DRUG ADDON: CPT

## 2024-06-20 RX ORDER — HYDROMORPHONE HYDROCHLORIDE 1 MG/ML
1 INJECTION, SOLUTION INTRAMUSCULAR; INTRAVENOUS; SUBCUTANEOUS ONCE
Status: COMPLETED | OUTPATIENT
Start: 2024-06-20 | End: 2024-06-20

## 2024-06-20 RX ORDER — DROPERIDOL 2.5 MG/ML
1.25 INJECTION, SOLUTION INTRAMUSCULAR; INTRAVENOUS ONCE
Status: COMPLETED | OUTPATIENT
Start: 2024-06-20 | End: 2024-06-20

## 2024-06-20 RX ORDER — HYDROMORPHONE HYDROCHLORIDE 1 MG/ML
1 INJECTION, SOLUTION INTRAMUSCULAR; INTRAVENOUS; SUBCUTANEOUS
Status: DISCONTINUED | OUTPATIENT
Start: 2024-06-20 | End: 2024-06-21 | Stop reason: ALTCHOICE

## 2024-06-20 RX ORDER — MORPHINE SULFATE 4 MG/ML
4 INJECTION, SOLUTION INTRAMUSCULAR; INTRAVENOUS ONCE
Status: COMPLETED | OUTPATIENT
Start: 2024-06-20 | End: 2024-06-20

## 2024-06-20 RX ADMIN — HYDROMORPHONE HYDROCHLORIDE 1 MG: 1 INJECTION, SOLUTION INTRAMUSCULAR; INTRAVENOUS; SUBCUTANEOUS at 22:25

## 2024-06-20 RX ADMIN — DROPERIDOL 1.25 MG: 2.5 INJECTION, SOLUTION INTRAMUSCULAR; INTRAVENOUS at 22:48

## 2024-06-20 RX ADMIN — MORPHINE SULFATE 4 MG: 4 INJECTION, SOLUTION INTRAMUSCULAR; INTRAVENOUS at 21:21

## 2024-06-20 ASSESSMENT — PAIN SCALES - GENERAL
PAINLEVEL_OUTOF10: 9
PAINLEVEL_OUTOF10: 10

## 2024-06-20 ASSESSMENT — PAIN DESCRIPTION - DESCRIPTORS: DESCRIPTORS: SHARP

## 2024-06-20 ASSESSMENT — PAIN DESCRIPTION - LOCATION
LOCATION: KNEE
LOCATION: KNEE

## 2024-06-20 ASSESSMENT — PAIN DESCRIPTION - ORIENTATION
ORIENTATION: RIGHT
ORIENTATION: RIGHT

## 2024-06-20 ASSESSMENT — PAIN DESCRIPTION - PAIN TYPE: TYPE: ACUTE PAIN

## 2024-06-20 ASSESSMENT — PAIN - FUNCTIONAL ASSESSMENT: PAIN_FUNCTIONAL_ASSESSMENT: 0-10

## 2024-06-21 ENCOUNTER — APPOINTMENT (OUTPATIENT)
Dept: GENERAL RADIOLOGY | Age: 69
DRG: 480 | End: 2024-06-21
Payer: MEDICARE

## 2024-06-21 ENCOUNTER — ANESTHESIA EVENT (OUTPATIENT)
Dept: OPERATING ROOM | Age: 69
End: 2024-06-21
Payer: MEDICARE

## 2024-06-21 ENCOUNTER — ANESTHESIA (OUTPATIENT)
Dept: OPERATING ROOM | Age: 69
End: 2024-06-21
Payer: MEDICARE

## 2024-06-21 PROBLEM — S72.21XA FRACTURE, SUBTROCHANTERIC, RIGHT FEMUR, CLOSED, INITIAL ENCOUNTER (HCC): Status: ACTIVE | Noted: 2024-06-21

## 2024-06-21 LAB
ABO + RH BLD: NORMAL
ANION GAP SERPL CALCULATED.3IONS-SCNC: 15 MMOL/L (ref 3–16)
ANION GAP SERPL CALCULATED.3IONS-SCNC: 18 MMOL/L (ref 3–16)
BLD GP AB SCN SERPL QL: NORMAL
BUN SERPL-MCNC: 71 MG/DL (ref 7–20)
BUN SERPL-MCNC: 73 MG/DL (ref 7–20)
BUN SERPL-MCNC: 74 MG/DL (ref 7–20)
BUN SERPL-MCNC: 76 MG/DL (ref 7–20)
CALCIUM SERPL-MCNC: 7.6 MG/DL (ref 8.3–10.6)
CALCIUM SERPL-MCNC: 7.7 MG/DL (ref 8.3–10.6)
CALCIUM SERPL-MCNC: 7.9 MG/DL (ref 8.3–10.6)
CALCIUM SERPL-MCNC: 8.4 MG/DL (ref 8.3–10.6)
CHLORIDE SERPL-SCNC: 101 MMOL/L (ref 99–110)
CHLORIDE SERPL-SCNC: 103 MMOL/L (ref 99–110)
CK SERPL-CCNC: 35 U/L (ref 26–192)
CO2 SERPL-SCNC: 15 MMOL/L (ref 21–32)
CO2 SERPL-SCNC: 17 MMOL/L (ref 21–32)
CO2 SERPL-SCNC: 18 MMOL/L (ref 21–32)
CO2 SERPL-SCNC: 20 MMOL/L (ref 21–32)
CREAT SERPL-MCNC: 5.2 MG/DL (ref 0.6–1.2)
CREAT SERPL-MCNC: 5.3 MG/DL (ref 0.6–1.2)
CREAT SERPL-MCNC: 5.6 MG/DL (ref 0.6–1.2)
CREAT SERPL-MCNC: 5.6 MG/DL (ref 0.6–1.2)
EKG ATRIAL RATE: 100 BPM
EKG DIAGNOSIS: NORMAL
EKG P AXIS: 26 DEGREES
EKG P-R INTERVAL: 148 MS
EKG Q-T INTERVAL: 350 MS
EKG QRS DURATION: 94 MS
EKG QTC CALCULATION (BAZETT): 451 MS
EKG R AXIS: -15 DEGREES
EKG T AXIS: 78 DEGREES
EKG VENTRICULAR RATE: 100 BPM
GFR SERPLBLD CREATININE-BSD FMLA CKD-EPI: 8 ML/MIN/{1.73_M2}
GFR SERPLBLD CREATININE-BSD FMLA CKD-EPI: 9 ML/MIN/{1.73_M2}
GLUCOSE BLD-MCNC: 115 MG/DL (ref 70–99)
GLUCOSE BLD-MCNC: 117 MG/DL (ref 70–99)
GLUCOSE BLD-MCNC: 191 MG/DL (ref 70–99)
GLUCOSE BLD-MCNC: 69 MG/DL (ref 70–99)
GLUCOSE BLD-MCNC: 81 MG/DL (ref 70–99)
GLUCOSE BLD-MCNC: 85 MG/DL (ref 70–99)
GLUCOSE SERPL-MCNC: 74 MG/DL (ref 70–99)
GLUCOSE SERPL-MCNC: 75 MG/DL (ref 70–99)
GLUCOSE SERPL-MCNC: 83 MG/DL (ref 70–99)
GLUCOSE SERPL-MCNC: 96 MG/DL (ref 70–99)
INR PPP: 1.17 (ref 0.85–1.15)
LACTATE BLDV-SCNC: 0.9 MMOL/L (ref 0.4–1.9)
LACTATE BLDV-SCNC: 1.5 MMOL/L (ref 0.4–1.9)
PERFORMED ON: ABNORMAL
PERFORMED ON: NORMAL
PERFORMED ON: NORMAL
POTASSIUM SERPL-SCNC: 5 MMOL/L (ref 3.5–5.1)
POTASSIUM SERPL-SCNC: 5.6 MMOL/L (ref 3.5–5.1)
PROTHROMBIN TIME: 15.1 SEC (ref 11.9–14.9)
SODIUM SERPL-SCNC: 135 MMOL/L (ref 136–145)
SODIUM SERPL-SCNC: 136 MMOL/L (ref 136–145)

## 2024-06-21 PROCEDURE — 6370000000 HC RX 637 (ALT 250 FOR IP): Performed by: NURSE PRACTITIONER

## 2024-06-21 PROCEDURE — 2580000003 HC RX 258: Performed by: NURSE PRACTITIONER

## 2024-06-21 PROCEDURE — 85610 PROTHROMBIN TIME: CPT

## 2024-06-21 PROCEDURE — 51798 US URINE CAPACITY MEASURE: CPT

## 2024-06-21 PROCEDURE — 3600000014 HC SURGERY LEVEL 4 ADDTL 15MIN: Performed by: UROLOGY

## 2024-06-21 PROCEDURE — 2500000003 HC RX 250 WO HCPCS

## 2024-06-21 PROCEDURE — 6370000000 HC RX 637 (ALT 250 FOR IP): Performed by: UROLOGY

## 2024-06-21 PROCEDURE — 94761 N-INVAS EAR/PLS OXIMETRY MLT: CPT

## 2024-06-21 PROCEDURE — 7100000001 HC PACU RECOVERY - ADDTL 15 MIN: Performed by: UROLOGY

## 2024-06-21 PROCEDURE — 7100000000 HC PACU RECOVERY - FIRST 15 MIN: Performed by: UROLOGY

## 2024-06-21 PROCEDURE — 84300 ASSAY OF URINE SODIUM: CPT

## 2024-06-21 PROCEDURE — 0TP98DZ REMOVAL OF INTRALUMINAL DEVICE FROM URETER, VIA NATURAL OR ARTIFICIAL OPENING ENDOSCOPIC: ICD-10-PCS | Performed by: UROLOGY

## 2024-06-21 PROCEDURE — 93010 ELECTROCARDIOGRAM REPORT: CPT | Performed by: INTERNAL MEDICINE

## 2024-06-21 PROCEDURE — 83605 ASSAY OF LACTIC ACID: CPT

## 2024-06-21 PROCEDURE — 2580000003 HC RX 258: Performed by: STUDENT IN AN ORGANIZED HEALTH CARE EDUCATION/TRAINING PROGRAM

## 2024-06-21 PROCEDURE — C1758 CATHETER, URETERAL: HCPCS | Performed by: UROLOGY

## 2024-06-21 PROCEDURE — 82550 ASSAY OF CK (CPK): CPT

## 2024-06-21 PROCEDURE — 2500000003 HC RX 250 WO HCPCS: Performed by: NURSE ANESTHETIST, CERTIFIED REGISTERED

## 2024-06-21 PROCEDURE — 1200000000 HC SEMI PRIVATE

## 2024-06-21 PROCEDURE — 2580000003 HC RX 258: Performed by: UROLOGY

## 2024-06-21 PROCEDURE — 93005 ELECTROCARDIOGRAM TRACING: CPT | Performed by: NURSE PRACTITIONER

## 2024-06-21 PROCEDURE — 2500000003 HC RX 250 WO HCPCS: Performed by: UROLOGY

## 2024-06-21 PROCEDURE — C2617 STENT, NON-COR, TEM W/O DEL: HCPCS | Performed by: UROLOGY

## 2024-06-21 PROCEDURE — 74018 RADEX ABDOMEN 1 VIEW: CPT

## 2024-06-21 PROCEDURE — 6360000002 HC RX W HCPCS: Performed by: NURSE PRACTITIONER

## 2024-06-21 PROCEDURE — 6360000002 HC RX W HCPCS: Performed by: STUDENT IN AN ORGANIZED HEALTH CARE EDUCATION/TRAINING PROGRAM

## 2024-06-21 PROCEDURE — 5A09557 ASSISTANCE WITH RESPIRATORY VENTILATION, GREATER THAN 96 CONSECUTIVE HOURS, CONTINUOUS POSITIVE AIRWAY PRESSURE: ICD-10-PCS | Performed by: STUDENT IN AN ORGANIZED HEALTH CARE EDUCATION/TRAINING PROGRAM

## 2024-06-21 PROCEDURE — 86901 BLOOD TYPING SEROLOGIC RH(D): CPT

## 2024-06-21 PROCEDURE — 3700000000 HC ANESTHESIA ATTENDED CARE: Performed by: UROLOGY

## 2024-06-21 PROCEDURE — APPNB45 APP NON BILLABLE 31-45 MINUTES: Performed by: NURSE PRACTITIONER

## 2024-06-21 PROCEDURE — 2709999900 HC NON-CHARGEABLE SUPPLY: Performed by: UROLOGY

## 2024-06-21 PROCEDURE — 2700000000 HC OXYGEN THERAPY PER DAY

## 2024-06-21 PROCEDURE — 2580000003 HC RX 258: Performed by: NURSE ANESTHETIST, CERTIFIED REGISTERED

## 2024-06-21 PROCEDURE — C1894 INTRO/SHEATH, NON-LASER: HCPCS | Performed by: UROLOGY

## 2024-06-21 PROCEDURE — 82570 ASSAY OF URINE CREATININE: CPT

## 2024-06-21 PROCEDURE — 3700000001 HC ADD 15 MINUTES (ANESTHESIA): Performed by: UROLOGY

## 2024-06-21 PROCEDURE — 94660 CPAP INITIATION&MGMT: CPT

## 2024-06-21 PROCEDURE — 86850 RBC ANTIBODY SCREEN: CPT

## 2024-06-21 PROCEDURE — 87040 BLOOD CULTURE FOR BACTERIA: CPT

## 2024-06-21 PROCEDURE — 86900 BLOOD TYPING SEROLOGIC ABO: CPT

## 2024-06-21 PROCEDURE — 80048 BASIC METABOLIC PNL TOTAL CA: CPT

## 2024-06-21 PROCEDURE — 81001 URINALYSIS AUTO W/SCOPE: CPT

## 2024-06-21 PROCEDURE — 0T768DZ DILATION OF RIGHT URETER WITH INTRALUMINAL DEVICE, VIA NATURAL OR ARTIFICIAL OPENING ENDOSCOPIC: ICD-10-PCS | Performed by: UROLOGY

## 2024-06-21 PROCEDURE — 3600000004 HC SURGERY LEVEL 4 BASE: Performed by: UROLOGY

## 2024-06-21 PROCEDURE — 6360000002 HC RX W HCPCS: Performed by: NURSE ANESTHETIST, CERTIFIED REGISTERED

## 2024-06-21 PROCEDURE — 6360000004 HC RX CONTRAST MEDICATION: Performed by: UROLOGY

## 2024-06-21 PROCEDURE — 36415 COLL VENOUS BLD VENIPUNCTURE: CPT

## 2024-06-21 PROCEDURE — C1769 GUIDE WIRE: HCPCS | Performed by: UROLOGY

## 2024-06-21 PROCEDURE — 6360000002 HC RX W HCPCS: Performed by: UROLOGY

## 2024-06-21 PROCEDURE — 74420 UROGRAPHY RTRGR +-KUB: CPT

## 2024-06-21 DEVICE — URETERAL STENT
Type: IMPLANTABLE DEVICE | Site: URETER | Status: FUNCTIONAL
Brand: CONTOUR™

## 2024-06-21 RX ORDER — LIDOCAINE HYDROCHLORIDE 10 MG/ML
1 INJECTION, SOLUTION EPIDURAL; INFILTRATION; INTRACAUDAL; PERINEURAL
Status: DISCONTINUED | OUTPATIENT
Start: 2024-06-21 | End: 2024-06-21 | Stop reason: HOSPADM

## 2024-06-21 RX ORDER — SODIUM CHLORIDE 0.9 % (FLUSH) 0.9 %
5-40 SYRINGE (ML) INJECTION PRN
Status: DISCONTINUED | OUTPATIENT
Start: 2024-06-21 | End: 2024-06-25

## 2024-06-21 RX ORDER — ACETAMINOPHEN 325 MG/1
650 TABLET ORAL EVERY 6 HOURS PRN
Status: DISCONTINUED | OUTPATIENT
Start: 2024-06-21 | End: 2024-07-15 | Stop reason: HOSPADM

## 2024-06-21 RX ORDER — HEPARIN SODIUM 5000 [USP'U]/ML
5000 INJECTION, SOLUTION INTRAVENOUS; SUBCUTANEOUS EVERY 8 HOURS SCHEDULED
Status: DISCONTINUED | OUTPATIENT
Start: 2024-06-21 | End: 2024-06-24

## 2024-06-21 RX ORDER — DEXTROSE MONOHYDRATE 25 G/50ML
12.5 INJECTION, SOLUTION INTRAVENOUS ONCE
Status: COMPLETED | OUTPATIENT
Start: 2024-06-21 | End: 2024-06-21

## 2024-06-21 RX ORDER — ACETAMINOPHEN 650 MG/1
650 SUPPOSITORY RECTAL EVERY 6 HOURS PRN
Status: DISCONTINUED | OUTPATIENT
Start: 2024-06-21 | End: 2024-07-15 | Stop reason: HOSPADM

## 2024-06-21 RX ORDER — TIZANIDINE 4 MG/1
4 TABLET ORAL EVERY 6 HOURS PRN
Status: DISCONTINUED | OUTPATIENT
Start: 2024-06-21 | End: 2024-07-15 | Stop reason: HOSPADM

## 2024-06-21 RX ORDER — ONDANSETRON 4 MG/1
4 TABLET, ORALLY DISINTEGRATING ORAL EVERY 8 HOURS PRN
Status: DISCONTINUED | OUTPATIENT
Start: 2024-06-21 | End: 2024-07-15 | Stop reason: HOSPADM

## 2024-06-21 RX ORDER — SODIUM CHLORIDE 9 MG/ML
INJECTION, SOLUTION INTRAVENOUS PRN
Status: DISCONTINUED | OUTPATIENT
Start: 2024-06-21 | End: 2024-06-21 | Stop reason: HOSPADM

## 2024-06-21 RX ORDER — HYDROMORPHONE HYDROCHLORIDE 2 MG/ML
0.5 INJECTION, SOLUTION INTRAMUSCULAR; INTRAVENOUS; SUBCUTANEOUS EVERY 5 MIN PRN
Status: DISCONTINUED | OUTPATIENT
Start: 2024-06-21 | End: 2024-06-21 | Stop reason: HOSPADM

## 2024-06-21 RX ORDER — POLYETHYLENE GLYCOL 3350 17 G/17G
17 POWDER, FOR SOLUTION ORAL DAILY PRN
Status: DISCONTINUED | OUTPATIENT
Start: 2024-06-21 | End: 2024-07-15 | Stop reason: HOSPADM

## 2024-06-21 RX ORDER — DEXTROSE MONOHYDRATE 100 MG/ML
INJECTION, SOLUTION INTRAVENOUS CONTINUOUS PRN
Status: DISCONTINUED | OUTPATIENT
Start: 2024-06-21 | End: 2024-07-15 | Stop reason: HOSPADM

## 2024-06-21 RX ORDER — SODIUM CHLORIDE 9 MG/ML
INJECTION, SOLUTION INTRAVENOUS CONTINUOUS
Status: ACTIVE | OUTPATIENT
Start: 2024-06-21 | End: 2024-06-21

## 2024-06-21 RX ORDER — LABETALOL HYDROCHLORIDE 5 MG/ML
5 INJECTION, SOLUTION INTRAVENOUS
Status: DISCONTINUED | OUTPATIENT
Start: 2024-06-21 | End: 2024-06-21 | Stop reason: HOSPADM

## 2024-06-21 RX ORDER — HYDROMORPHONE HYDROCHLORIDE 2 MG/ML
0.25 INJECTION, SOLUTION INTRAMUSCULAR; INTRAVENOUS; SUBCUTANEOUS EVERY 5 MIN PRN
Status: DISCONTINUED | OUTPATIENT
Start: 2024-06-21 | End: 2024-06-21 | Stop reason: HOSPADM

## 2024-06-21 RX ORDER — HYDROCODONE BITARTRATE AND ACETAMINOPHEN 5; 325 MG/1; MG/1
1 TABLET ORAL
Status: ACTIVE | OUTPATIENT
Start: 2024-06-21 | End: 2024-06-22

## 2024-06-21 RX ORDER — ONDANSETRON 2 MG/ML
4 INJECTION INTRAMUSCULAR; INTRAVENOUS EVERY 6 HOURS PRN
Status: DISCONTINUED | OUTPATIENT
Start: 2024-06-21 | End: 2024-07-15 | Stop reason: HOSPADM

## 2024-06-21 RX ORDER — ACETAMINOPHEN 500 MG
1000 TABLET ORAL 3 TIMES DAILY
Status: DISCONTINUED | OUTPATIENT
Start: 2024-06-21 | End: 2024-06-22 | Stop reason: ALTCHOICE

## 2024-06-21 RX ORDER — DEXTROSE MONOHYDRATE 25 G/50ML
INJECTION, SOLUTION INTRAVENOUS
Status: COMPLETED
Start: 2024-06-21 | End: 2024-06-21

## 2024-06-21 RX ORDER — GLUCAGON 1 MG/ML
1 KIT INJECTION PRN
Status: DISCONTINUED | OUTPATIENT
Start: 2024-06-21 | End: 2024-07-15 | Stop reason: HOSPADM

## 2024-06-21 RX ORDER — SODIUM CHLORIDE 9 MG/ML
INJECTION, SOLUTION INTRAVENOUS CONTINUOUS
Status: DISCONTINUED | OUTPATIENT
Start: 2024-06-21 | End: 2024-06-21 | Stop reason: HOSPADM

## 2024-06-21 RX ORDER — SODIUM CHLORIDE 0.9 % (FLUSH) 0.9 %
5-40 SYRINGE (ML) INJECTION PRN
Status: DISCONTINUED | OUTPATIENT
Start: 2024-06-21 | End: 2024-06-21 | Stop reason: HOSPADM

## 2024-06-21 RX ORDER — MIDAZOLAM HYDROCHLORIDE 1 MG/ML
INJECTION INTRAMUSCULAR; INTRAVENOUS PRN
Status: DISCONTINUED | OUTPATIENT
Start: 2024-06-21 | End: 2024-06-21 | Stop reason: SDUPTHER

## 2024-06-21 RX ORDER — DEXMEDETOMIDINE HYDROCHLORIDE 100 UG/ML
INJECTION, SOLUTION INTRAVENOUS PRN
Status: DISCONTINUED | OUTPATIENT
Start: 2024-06-21 | End: 2024-06-21 | Stop reason: SDUPTHER

## 2024-06-21 RX ORDER — HYDROMORPHONE HYDROCHLORIDE 1 MG/ML
0.5 INJECTION, SOLUTION INTRAMUSCULAR; INTRAVENOUS; SUBCUTANEOUS EVERY 4 HOURS PRN
Status: DISCONTINUED | OUTPATIENT
Start: 2024-06-21 | End: 2024-06-22 | Stop reason: ALTCHOICE

## 2024-06-21 RX ORDER — 0.9 % SODIUM CHLORIDE 0.9 %
1000 INTRAVENOUS SOLUTION INTRAVENOUS ONCE
Status: COMPLETED | OUTPATIENT
Start: 2024-06-21 | End: 2024-06-21

## 2024-06-21 RX ORDER — VITAMIN B COMPLEX
1000 TABLET ORAL DAILY
Status: DISCONTINUED | OUTPATIENT
Start: 2024-06-21 | End: 2024-07-15 | Stop reason: HOSPADM

## 2024-06-21 RX ORDER — KETAMINE HCL IN NACL, ISO-OSM 100MG/10ML
SYRINGE (ML) INJECTION PRN
Status: DISCONTINUED | OUTPATIENT
Start: 2024-06-21 | End: 2024-06-21 | Stop reason: SDUPTHER

## 2024-06-21 RX ORDER — SODIUM CHLORIDE 9 MG/ML
INJECTION, SOLUTION INTRAVENOUS PRN
Status: DISCONTINUED | OUTPATIENT
Start: 2024-06-21 | End: 2024-07-02

## 2024-06-21 RX ORDER — METOPROLOL SUCCINATE 50 MG/1
100 TABLET, EXTENDED RELEASE ORAL DAILY
Status: DISCONTINUED | OUTPATIENT
Start: 2024-06-21 | End: 2024-06-22

## 2024-06-21 RX ORDER — SODIUM CHLORIDE 9 MG/ML
INJECTION, SOLUTION INTRAVENOUS PRN
Status: DISCONTINUED | OUTPATIENT
Start: 2024-06-21 | End: 2024-07-15 | Stop reason: HOSPADM

## 2024-06-21 RX ORDER — SODIUM CHLORIDE 0.9 % (FLUSH) 0.9 %
5-40 SYRINGE (ML) INJECTION EVERY 12 HOURS SCHEDULED
Status: DISCONTINUED | OUTPATIENT
Start: 2024-06-21 | End: 2024-06-25

## 2024-06-21 RX ORDER — INSULIN LISPRO 100 [IU]/ML
0-4 INJECTION, SOLUTION INTRAVENOUS; SUBCUTANEOUS NIGHTLY
Status: DISCONTINUED | OUTPATIENT
Start: 2024-06-21 | End: 2024-07-15 | Stop reason: HOSPADM

## 2024-06-21 RX ORDER — HYDROMORPHONE HYDROCHLORIDE 1 MG/ML
1 INJECTION, SOLUTION INTRAMUSCULAR; INTRAVENOUS; SUBCUTANEOUS
Status: DISCONTINUED | OUTPATIENT
Start: 2024-06-21 | End: 2024-06-22 | Stop reason: ALTCHOICE

## 2024-06-21 RX ORDER — OXYCODONE HYDROCHLORIDE 5 MG/1
10 TABLET ORAL EVERY 4 HOURS PRN
Status: DISCONTINUED | OUTPATIENT
Start: 2024-06-21 | End: 2024-06-22 | Stop reason: ALTCHOICE

## 2024-06-21 RX ORDER — OXYCODONE HYDROCHLORIDE 5 MG/1
5 TABLET ORAL EVERY 4 HOURS PRN
Status: DISCONTINUED | OUTPATIENT
Start: 2024-06-21 | End: 2024-06-22 | Stop reason: ALTCHOICE

## 2024-06-21 RX ORDER — ONDANSETRON 2 MG/ML
4 INJECTION INTRAMUSCULAR; INTRAVENOUS
Status: DISCONTINUED | OUTPATIENT
Start: 2024-06-21 | End: 2024-06-21 | Stop reason: HOSPADM

## 2024-06-21 RX ORDER — PANTOPRAZOLE SODIUM 40 MG/1
40 TABLET, DELAYED RELEASE ORAL
Status: DISCONTINUED | OUTPATIENT
Start: 2024-06-21 | End: 2024-07-15 | Stop reason: HOSPADM

## 2024-06-21 RX ORDER — NALOXONE HYDROCHLORIDE 0.4 MG/ML
INJECTION, SOLUTION INTRAMUSCULAR; INTRAVENOUS; SUBCUTANEOUS PRN
Status: DISCONTINUED | OUTPATIENT
Start: 2024-06-21 | End: 2024-06-21 | Stop reason: HOSPADM

## 2024-06-21 RX ORDER — SODIUM CHLORIDE 0.9 % (FLUSH) 0.9 %
5-40 SYRINGE (ML) INJECTION EVERY 12 HOURS SCHEDULED
Status: DISCONTINUED | OUTPATIENT
Start: 2024-06-21 | End: 2024-06-21 | Stop reason: HOSPADM

## 2024-06-21 RX ORDER — HYDRALAZINE HYDROCHLORIDE 20 MG/ML
10 INJECTION INTRAMUSCULAR; INTRAVENOUS EVERY 6 HOURS PRN
Status: DISCONTINUED | OUTPATIENT
Start: 2024-06-21 | End: 2024-07-15 | Stop reason: HOSPADM

## 2024-06-21 RX ORDER — 0.9 % SODIUM CHLORIDE 0.9 %
500 INTRAVENOUS SOLUTION INTRAVENOUS ONCE
Status: COMPLETED | OUTPATIENT
Start: 2024-06-21 | End: 2024-06-21

## 2024-06-21 RX ORDER — LISINOPRIL 10 MG/1
10 TABLET ORAL DAILY
Status: CANCELLED | OUTPATIENT
Start: 2024-06-21

## 2024-06-21 RX ORDER — ATORVASTATIN CALCIUM 10 MG/1
10 TABLET, FILM COATED ORAL DAILY
Status: DISCONTINUED | OUTPATIENT
Start: 2024-06-21 | End: 2024-07-15 | Stop reason: HOSPADM

## 2024-06-21 RX ORDER — INSULIN LISPRO 100 [IU]/ML
0-8 INJECTION, SOLUTION INTRAVENOUS; SUBCUTANEOUS
Status: DISCONTINUED | OUTPATIENT
Start: 2024-06-21 | End: 2024-07-15 | Stop reason: HOSPADM

## 2024-06-21 RX ORDER — SODIUM CHLORIDE 9 MG/ML
INJECTION, SOLUTION INTRAVENOUS CONTINUOUS PRN
Status: DISCONTINUED | OUTPATIENT
Start: 2024-06-21 | End: 2024-06-21 | Stop reason: SDUPTHER

## 2024-06-21 RX ORDER — FERROUS SULFATE 325(65) MG
324 TABLET ORAL
Status: DISCONTINUED | OUTPATIENT
Start: 2024-06-21 | End: 2024-07-15 | Stop reason: HOSPADM

## 2024-06-21 RX ORDER — HYDROMORPHONE HYDROCHLORIDE 1 MG/ML
1 INJECTION, SOLUTION INTRAMUSCULAR; INTRAVENOUS; SUBCUTANEOUS EVERY 4 HOURS PRN
Status: DISCONTINUED | OUTPATIENT
Start: 2024-06-21 | End: 2024-06-21

## 2024-06-21 RX ADMIN — PANTOPRAZOLE SODIUM 40 MG: 40 TABLET, DELAYED RELEASE ORAL at 06:47

## 2024-06-21 RX ADMIN — DEXMEDETOMIDINE HYDROCHLORIDE 2 MCG: 100 INJECTION, SOLUTION INTRAVENOUS at 17:43

## 2024-06-21 RX ADMIN — SODIUM BICARBONATE: 84 INJECTION, SOLUTION INTRAVENOUS at 19:30

## 2024-06-21 RX ADMIN — SODIUM ZIRCONIUM CYCLOSILICATE 5 G: 5 POWDER, FOR SUSPENSION ORAL at 02:07

## 2024-06-21 RX ADMIN — DEXMEDETOMIDINE HYDROCHLORIDE 2 MCG: 100 INJECTION, SOLUTION INTRAVENOUS at 17:54

## 2024-06-21 RX ADMIN — SODIUM CHLORIDE 1000 ML: 9 INJECTION, SOLUTION INTRAVENOUS at 15:27

## 2024-06-21 RX ADMIN — MIDAZOLAM 0.5 MG: 1 INJECTION INTRAMUSCULAR; INTRAVENOUS at 17:56

## 2024-06-21 RX ADMIN — FERROUS SULFATE TAB 325 MG (65 MG ELEMENTAL FE) 324 MG: 325 (65 FE) TAB at 06:47

## 2024-06-21 RX ADMIN — SODIUM CHLORIDE 500 ML: 9 INJECTION, SOLUTION INTRAVENOUS at 13:16

## 2024-06-21 RX ADMIN — SODIUM CHLORIDE: 9 INJECTION, SOLUTION INTRAVENOUS at 02:24

## 2024-06-21 RX ADMIN — ACETAMINOPHEN 1000 MG: 500 TABLET ORAL at 20:50

## 2024-06-21 RX ADMIN — HYDROMORPHONE HYDROCHLORIDE 1 MG: 1 INJECTION, SOLUTION INTRAMUSCULAR; INTRAVENOUS; SUBCUTANEOUS at 03:58

## 2024-06-21 RX ADMIN — SODIUM CHLORIDE: 9 INJECTION, SOLUTION INTRAVENOUS at 17:44

## 2024-06-21 RX ADMIN — SODIUM ZIRCONIUM CYCLOSILICATE 10 G: 10 POWDER, FOR SUSPENSION ORAL at 00:59

## 2024-06-21 RX ADMIN — SODIUM CHLORIDE, PRESERVATIVE FREE 10 ML: 5 INJECTION INTRAVENOUS at 20:50

## 2024-06-21 RX ADMIN — TIZANIDINE 4 MG: 4 TABLET ORAL at 20:50

## 2024-06-21 RX ADMIN — ATORVASTATIN CALCIUM 10 MG: 10 TABLET, FILM COATED ORAL at 08:33

## 2024-06-21 RX ADMIN — SODIUM CHLORIDE 1000 ML: 9 INJECTION, SOLUTION INTRAVENOUS at 00:46

## 2024-06-21 RX ADMIN — Medication 30 MG: at 17:56

## 2024-06-21 RX ADMIN — HYDROMORPHONE HYDROCHLORIDE 1 MG: 1 INJECTION, SOLUTION INTRAMUSCULAR; INTRAVENOUS; SUBCUTANEOUS at 00:00

## 2024-06-21 RX ADMIN — WATER 1000 MG: 1 INJECTION INTRAMUSCULAR; INTRAVENOUS; SUBCUTANEOUS at 15:25

## 2024-06-21 RX ADMIN — HYDROMORPHONE HYDROCHLORIDE 1 MG: 1 INJECTION, SOLUTION INTRAMUSCULAR; INTRAVENOUS; SUBCUTANEOUS at 20:50

## 2024-06-21 RX ADMIN — CHOLECALCIFEROL TAB 25 MCG (1000 UNIT) 1000 UNITS: 25 TAB at 08:33

## 2024-06-21 RX ADMIN — DEXTROSE MONOHYDRATE 12.5 G: 25 INJECTION, SOLUTION INTRAVENOUS at 16:22

## 2024-06-21 RX ADMIN — SODIUM ZIRCONIUM CYCLOSILICATE 5 G: 5 POWDER, FOR SUSPENSION ORAL at 08:30

## 2024-06-21 RX ADMIN — ACETAMINOPHEN 1000 MG: 500 TABLET ORAL at 09:31

## 2024-06-21 RX ADMIN — OXYCODONE HYDROCHLORIDE 10 MG: 5 TABLET ORAL at 09:59

## 2024-06-21 RX ADMIN — MIDAZOLAM 0.5 MG: 1 INJECTION INTRAMUSCULAR; INTRAVENOUS at 17:43

## 2024-06-21 RX ADMIN — HYDROMORPHONE HYDROCHLORIDE 1 MG: 1 INJECTION, SOLUTION INTRAMUSCULAR; INTRAVENOUS; SUBCUTANEOUS at 08:26

## 2024-06-21 RX ADMIN — HYDROMORPHONE HYDROCHLORIDE 1 MG: 1 INJECTION, SOLUTION INTRAMUSCULAR; INTRAVENOUS; SUBCUTANEOUS at 13:45

## 2024-06-21 ASSESSMENT — PAIN DESCRIPTION - LOCATION
LOCATION: HIP
LOCATION: LEG;NECK
LOCATION: HIP;LEG
LOCATION: HIP;KNEE
LOCATION: HIP;LEG

## 2024-06-21 ASSESSMENT — PAIN DESCRIPTION - DESCRIPTORS
DESCRIPTORS: SHARP;THROBBING
DESCRIPTORS: SHARP;STABBING
DESCRIPTORS: SHARP;SHOOTING;STABBING;THROBBING
DESCRIPTORS: SHARP;THROBBING

## 2024-06-21 ASSESSMENT — PAIN SCALES - GENERAL
PAINLEVEL_OUTOF10: 6
PAINLEVEL_OUTOF10: 5
PAINLEVEL_OUTOF10: 0
PAINLEVEL_OUTOF10: 5
PAINLEVEL_OUTOF10: 8
PAINLEVEL_OUTOF10: 10
PAINLEVEL_OUTOF10: 8
PAINLEVEL_OUTOF10: 10
PAINLEVEL_OUTOF10: 10
PAINLEVEL_OUTOF10: 7
PAINLEVEL_OUTOF10: 7
PAINLEVEL_OUTOF10: 10

## 2024-06-21 ASSESSMENT — PAIN DESCRIPTION - ORIENTATION
ORIENTATION: RIGHT

## 2024-06-21 ASSESSMENT — PAIN DESCRIPTION - ONSET
ONSET: GRADUAL
ONSET: ON-GOING
ONSET: GRADUAL

## 2024-06-21 ASSESSMENT — PAIN DESCRIPTION - PAIN TYPE
TYPE: ACUTE PAIN

## 2024-06-21 ASSESSMENT — PAIN DESCRIPTION - FREQUENCY
FREQUENCY: INTERMITTENT
FREQUENCY: CONTINUOUS
FREQUENCY: CONTINUOUS

## 2024-06-21 ASSESSMENT — PAIN - FUNCTIONAL ASSESSMENT
PAIN_FUNCTIONAL_ASSESSMENT: INTOLERABLE, UNABLE TO DO ANY ACTIVE OR PASSIVE ACTIVITIES

## 2024-06-21 NOTE — ED PROVIDER NOTES
EKG interpretation by me in absence of a face-to-face evaluation by me as follows:    The 12 lead EKG was interpreted by me independent of a cardiologist as follows:  Rate: normal with a rate of 100  Rhythm: sinus  Axis: normal  Intervals: incomplete rbbb pattern  ST segments: no ST elevations or depressions  T waves: no abnormal inversions, no peaked T's  Non-specific T wave changes: not present  Prior EKG comparison: EKG dated 7/25/18 is not significantly different        Neal Box MD  06/21/24 0051

## 2024-06-21 NOTE — ANESTHESIA PRE PROCEDURE
Department of Anesthesiology  Preprocedure Note       Name:  Yoseph Small   Age:  68 y.o.  :  1955                                          MRN:  [id number]         Date:  2024      Surgeon: Surgeon(s):  Juan Manuel Cavazos MD    Procedure: Procedure(s):  CYSTOSCOPY, RIGHT RETROGRADE PYELOGRAM, EXCHANGE OF RIGHT URETERAL STENT    Medications prior to admission:   Prior to Admission medications    Medication Sig Start Date End Date Taking? Authorizing Provider   lisinopril (PRINIVIL;ZESTRIL) 10 MG tablet Take 1 tablet by mouth daily HOLD this medication until you follow up with PCP... you have NOT required this during your hospital stay 24   Carisa Mcdonald, APRN - CNP   sertraline (ZOLOFT) 50 MG tablet Take 50 mg by mouth daily  Patient not taking: Reported on 2024    Jose M Upton MD   atorvastatin (LIPITOR) 10 MG tablet Take 1 tablet by mouth daily    Jose M Upton MD   metoprolol succinate (TOPROL XL) 100 MG extended release tablet Take 1 tablet by mouth daily    Jose M Upton MD   glipiZIDE (GLUCOTROL) 5 MG tablet Take 1 tablet by mouth 2 times daily (before meals)    Jose M Upton MD   ferrous sulfate 324 (65 Fe) MG EC tablet Take 1 tablet by mouth daily (with breakfast)    Jose M Upton MD   Cyanocobalamin (VITAMIN B 12 PO) Take by mouth daily    Jose M Upton MD   diphenhydrAMINE (BENADRYL) 25 MG capsule Take 1 capsule by mouth 2 times daily as needed for Itching    Jose M Upton MD   vitamin D (CHOLECALCIFEROL) 1000 UNIT TABS tablet Take 1 tablet by mouth daily    Jose M Upton MD   Lansoprazole (PREVACID PO) Take 30 mg by mouth daily.      Jose M Upton MD   metformin (GLUCOPHAGE) 500 MG tablet Take 2 tablets by mouth See Admin Instructions 2 TABS TWICE DAY    Jose M Upton MD   tramadol (ULTRAM) 50 MG tablet Take 1 tablet by mouth in the morning and 1 tablet in the evening.  .    Alexsandra

## 2024-06-21 NOTE — ANESTHESIA POSTPROCEDURE EVALUATION
Department of Anesthesiology  Postprocedure Note    Patient: Yoseph Small  MRN: [id number]  YOB: 1955  Date of evaluation: 6/21/2024    Procedure Summary       Date: 06/21/24 Room / Location: [address]    Anesthesia Start: 1746 Anesthesia Stop: 1835    Procedure: CYSTOSCOPY, RIGHT RETROGRADE PYELOGRAM, EXCHANGE OF RIGHT URETERAL STENT (Right: Ureter) Diagnosis:       Ureteral stent retained      (Ureteral stent retained [Z96.0])    Surgeons: Juan Manuel Cavazos MD Responsible Provider: Loulou Daniels MD    Anesthesia Type: MAC ASA Status: 4 - Emergent            Anesthesia Type: No value filed.    Charmaine Phase I: Charmaine Score: 7    Charmaine Phase II:      Anesthesia Post Evaluation    Patient location during evaluation: PACU  Airway patency: patent  Nausea & Vomiting: no vomiting  Cardiovascular status: hemodynamically stable  Respiratory status: acceptable and BIPAP  Hydration status: euvolemic  Multimodal analgesia pain management approach    No notable events documented.

## 2024-06-21 NOTE — ED NOTES
ED TO INPATIENT SBAR HANDOFF    Patient Name: Yoseph Small   :  1955  68 y.o.   MRN:  [id number]  Preferred Name  Yoseph  ED Room #:  ED-0015/15  Family/Caregiver Present yes   Restraints no   Sitter no   Sepsis Risk Score      Situation  Code Status: Prior No additional code details.    Allergies: Amitriptyline hcl, Cephalexin, Ciprofloxacin, Levofloxacin, Penicillins, Sulfa antibiotics, Cephalexin hcl, Elavil [amitriptyline hcl], Elemental sulfur, Levofloxacin, Methocarbamol, and Tetracyclines & related  Weight: Patient Vitals for the past 96 hrs (Last 3 readings):   Weight   24 2033 124.7 kg (275 lb)     Arrived from: home  Chief Complaint:   Chief Complaint   Patient presents with    Fall     Came by [facility] from home with c/o falling while going to the bathroom. C/o 10/10 pain to right knee. Unsure if hit head when falling. 50mcg Fentanyl given en route     Hospital Problem/Diagnosis:  Principal Problem:    Fracture, subtrochanteric, right femur, closed, initial encounter (HCC)  Resolved Problems:    * No resolved hospital problems. *    Imaging:   CT FEMUR RIGHT WO CONTRAST   Final Result   1. Acute moderately comminuted and displaced oblique fracture extending from   the distal femoral diaphysis into the trochlear groove and intercondylar   notch.   2. Small right knee lipohemarthrosis with intra-articular gas in the   suprapatellar recess.   3. Partially visualized right ureteral stent with the distal pigtail formed   either within or distal to the urethra.         CT LUMBAR SPINE WO CONTRAST   Final Result   Moderate degenerative disc changes throughout with no acute abnormality seen.      Moderate central disc bulges at L2-3 and L4-5.      Moderate osteoarthritic changes facets throughout causing diffuse mild spinal   stenosis         CT THORACIC SPINE WO CONTRAST   Preliminary Result   1. No acute osseous abnormality of the thoracic spine.   2. Chronic kyphosis of the upper thoracic

## 2024-06-21 NOTE — CARE COORDINATION
Discharge Planning Note:    Chart reviewed and it appears that patient has minimal needs for discharge at this time. Risk Score 19 %     Primary Care Physician is Sariah Teresa   Primary insurance is Medicare    Ortho consult place, will need PT/OT evals when appropriate, anticipate ARU consult.    Please notify case management if any discharge needs are identified.      Case management will continue to follow progress and update discharge plan as needed.

## 2024-06-21 NOTE — H&P
V2.0  History and Physical      Name:  Yoseph Small /Age/Sex: 1955  (68 y.o. female)   MRN & CSN:  [id number] & [id number] Encounter Date/Time: 2024 12:40 AM EDT   Location:  [place] [address] PCP: Sariah Teresa Charmaine       [facility] Day: 2    Assessment and Plan:   Yoseph Small is a 68 y.o. female with a pmh of recent ureteral stent placement on  8, DM 2, hypertension, obstructive sleep apnea on CPAP, depression, anxiety, liver cirrhosis and hyperlipidemia.  She presents for evaluation of right lower extremity pain after slipping and falling at home landing on her right knee.  She is found to have a distal right femur fracture.  Also noted to have BARBARA and with leukocytosis.  She has been admitted for Ortho and nephrology evaluation.   Hospital Problems             Last Modified POA    * (Principal) Fracture, subtrochanteric, right femur, closed, initial encounter ([facility]) 2024 Yes       Plan:  # Fall, initial encounter.  # Acute distal right femur fracture due to fall.  -CT lumbar, thoracic and cervical spine nonacute.  -Admit to MedSur telemetry.  -Consult Ortho.  -Pain control.  -Fall precautions.  -Keep n.p.o. for Ortho evaluation.  -Right lower extremity splint applied in the ED.  Right lower extremity with good circulation and sensation.    # Acute kidney injury.  Creatinine of 5.2.  Was 0.9 on 2024.  # Ureteral stent placement on  8.  # Hyperkalemia.  -Check bladder scan.  -Given bolus fluids in the ED.  Continue with maintenance fluids.  -Repeat BMP in AM.  -Nephrology consulted.  -Hold lisinopril.  -Lokelma    # Leukocytosis.  WBC of 17.  No respiratory symptoms.  Urinalysis pending.  Could likely be due to above versus UTI though she denies urinary symptoms.  Holding off antibiotics pending urinalysis and repeat labs.    # Chronic anemia.-Stable.    # Type 2 diabetes.  Not on long-term insulin.  Hold home oral hypoglycemics.  SSI.  Carb controlled diet.  Hypoglycemic protocol as

## 2024-06-21 NOTE — ED NOTES
How does patient ambulate?   []Low Fall Risk (ambulates by themselves without support)  []Stand by assist   []Contact Guard   []Front wheel walker  []Wheelchair   []Steady  [x]Bed bound  []History of Lower Extremity Amputation  []Unknown, did not assess in the emergency department   How does patient take pills?  [x]Whole with Water  []Crushed in applesauce  []Crushed in pudding  []Other  []Unknown no oral medications were given in the ED  Is patient alert?   [x]Alert  []Drowsy but responds to voice  []Doesn't respond to voice but responds to painful stimuli  []Unresponsive  Is patient oriented?   [x]To person  [x]To place  [x]To time  [x]To situation  []Confused  []Agitated  []Follows commands  If patient is disoriented or from a Skill Nursing Facility has family been notified of admission?   []Yes   [x]No  Patient belongings?   []Cell phone  []Wallet   []Dentures  [x]Clothing  Any specific patient or family belongings/needs/dynamics?   none  Miscellaneous comments/pending orders?  Long -leg splint applied     If there are any additional questions please reach out to the Emergency Department.

## 2024-06-21 NOTE — ED PROVIDER NOTES
[facility]UR[facility] NURSING  EMERGENCY DEPARTMENT ENCOUNTER        Pt Name: Yoseph Small  MRN: [id number]  Birthdate 1955  Date of evaluation: 6/20/2024  Provider: CK Adkins CNP  PCP: Sariah Teresa  Note Started: 9:30 PM EDT 6/20/24      GYPSY. I have evaluated this patient.        CHIEF COMPLAINT       Chief Complaint   Patient presents with    Fall     Came by [facility] from home with c/o falling while going to the bathroom. C/o 10/10 pain to right knee. Unsure if hit head when falling. 50mcg Fentanyl given en route       HISTORY OF PRESENT ILLNESS: 1 or more Elements     History from : Patient and Family     Limitations to history : None    Yoseph Small is a 68 y.o. female who presents with complaint of right knee pain.  The patient reports that she was trying to get to the bathroom when she fell.  Uncertain if she hit her head.  Complaining of some back pain.  EMS did give the patient 50 mcg of fentanyl and route, the patient does continue to complain of 10 of 10 pain to the right knee.    Patient's  reports that she has not been eating or drinking well, recently had a urinary stent placed for kidney stone.    Denies any headache, fever, lightheadedness, dizziness, visual disturbances.  No chest pain or pressure.  No shortness of breath, cough, or congestion.  No abdominal pain, nausea, vomiting, diarrhea, constipation, or dysuria.  No rash.    Nursing Notes were all reviewed and agreed with or any disagreements were addressed in the HPI.    REVIEW OF SYSTEMS :      Review of Systems   Constitutional:  Negative for activity change, chills and fever.   Respiratory:  Negative for chest tightness and shortness of breath.    Cardiovascular:  Negative for chest pain.   Gastrointestinal:  Negative for abdominal pain, diarrhea, nausea and vomiting.   Genitourinary:  Negative for dysuria.   Musculoskeletal:         Right knee pain   All other systems reviewed

## 2024-06-21 NOTE — CARE COORDINATION
Patient is scheduled for surgery tomorrow, anticipate consult to ARU pending PT/OT evals.  Patient reports current 4WW has broken hand brakes and needs a new 4WW      Case Management Assessment  Initial Evaluation    Date/Time of Evaluation: 6/21/2024 3:55 PM  Assessment Completed by: Isabel Garay RN    If patient is discharged prior to next notation, then this note serves as note for discharge by case management.    Patient Name: Yoseph Small                   YOB: 1955  Diagnosis: Hyperkalemia [E87.5]  Intractable pain [R52]  Fracture, subtrochanteric, right femur, closed, initial encounter ([facility]) [S72.21XA]  Fall from standing, initial encounter [W19.XXXA]  Acute renal failure, unspecified acute renal failure type ([facility]) [N17.9]  Closed fracture of distal end of right femur, unspecified fracture morphology, initial encounter ([facility]) [S72.401A]                   Date / Time: 6/20/2024  8:27 PM    Patient Admission Status: Inpatient   Readmission Risk (Low < 19, Mod (19-27), High > 27): Readmission Risk Score: 19.3    Current PCP: Sariah Teresa  PCP verified by CM? (P) Yes    Chart Reviewed: Yes      History Provided by: (P) Patient, Significant Other  Patient Orientation: (P) Alert and Oriented, Person, Place, Situation    Patient Cognition: (P) Alert    Hospitalization in the last 30 days (Readmission):  No    If yes, Readmission Assessment in  Navigator will be completed.    Advance Directives:      Code Status: Full Code   Patient's Primary Decision Maker is: (P) Legal Next of Kin      Discharge Planning:    Patient lives with: (P) Spouse/Significant Other Type of Home: (P) House (bi level house, 3 step entry)  Primary Care Giver: (P) Self (supportive spouse)  Patient Support Systems include: (P) Spouse/Significant Other, Children, Family Members   Current Financial resources: (P) Medicare  Current community resources: (P) None  Current services prior to admission: (P) C-pap, Durable

## 2024-06-22 ENCOUNTER — APPOINTMENT (OUTPATIENT)
Dept: ULTRASOUND IMAGING | Age: 69
DRG: 480 | End: 2024-06-22
Payer: MEDICARE

## 2024-06-22 ENCOUNTER — APPOINTMENT (OUTPATIENT)
Dept: GENERAL RADIOLOGY | Age: 69
DRG: 480 | End: 2024-06-22
Payer: MEDICARE

## 2024-06-22 ENCOUNTER — ANESTHESIA EVENT (OUTPATIENT)
Dept: OPERATING ROOM | Age: 69
End: 2024-06-22
Payer: MEDICARE

## 2024-06-22 ENCOUNTER — ANESTHESIA (OUTPATIENT)
Dept: OPERATING ROOM | Age: 69
End: 2024-06-22
Payer: MEDICARE

## 2024-06-22 PROBLEM — S72.401A CLOSED FRACTURE OF RIGHT DISTAL FEMUR (HCC): Status: ACTIVE | Noted: 2024-06-22

## 2024-06-22 LAB
ALBUMIN SERPL-MCNC: 2.6 G/DL (ref 3.4–5)
AMORPH SED URNS QL MICRO: ABNORMAL /HPF
ANION GAP SERPL CALCULATED.3IONS-SCNC: 15 MMOL/L (ref 3–16)
ANION GAP SERPL CALCULATED.3IONS-SCNC: 16 MMOL/L (ref 3–16)
ANION GAP SERPL CALCULATED.3IONS-SCNC: 18 MMOL/L (ref 3–16)
ANISOCYTOSIS BLD QL SMEAR: ABNORMAL
BACTERIA URNS QL MICRO: ABNORMAL /HPF
BASOPHILS # BLD: 0 K/UL (ref 0–0.2)
BASOPHILS # BLD: 0 K/UL (ref 0–0.2)
BASOPHILS NFR BLD: 0.2 %
BASOPHILS NFR BLD: 0.2 %
BILIRUB UR QL STRIP.AUTO: NEGATIVE
BUN SERPL-MCNC: 75 MG/DL (ref 7–20)
BUN SERPL-MCNC: 78 MG/DL (ref 7–20)
BUN SERPL-MCNC: 80 MG/DL (ref 7–20)
CALCIUM SERPL-MCNC: 7.3 MG/DL (ref 8.3–10.6)
CALCIUM SERPL-MCNC: 7.3 MG/DL (ref 8.3–10.6)
CALCIUM SERPL-MCNC: 7.4 MG/DL (ref 8.3–10.6)
CHLORIDE SERPL-SCNC: 100 MMOL/L (ref 99–110)
CHLORIDE SERPL-SCNC: 101 MMOL/L (ref 99–110)
CHLORIDE SERPL-SCNC: 99 MMOL/L (ref 99–110)
CLARITY UR: ABNORMAL
CO2 SERPL-SCNC: 15 MMOL/L (ref 21–32)
CO2 SERPL-SCNC: 18 MMOL/L (ref 21–32)
CO2 SERPL-SCNC: 19 MMOL/L (ref 21–32)
COLOR UR: YELLOW
CREAT SERPL-MCNC: 5.9 MG/DL (ref 0.6–1.2)
CREAT SERPL-MCNC: 6.1 MG/DL (ref 0.6–1.2)
CREAT SERPL-MCNC: 6.8 MG/DL (ref 0.6–1.2)
CREAT UR-MCNC: 5.5 MG/DL (ref 28–259)
DEPRECATED RDW RBC AUTO: 15.7 % (ref 12.4–15.4)
DEPRECATED RDW RBC AUTO: 15.9 % (ref 12.4–15.4)
EOSINOPHIL # BLD: 0.1 K/UL (ref 0–0.6)
EOSINOPHIL # BLD: 0.2 K/UL (ref 0–0.6)
EOSINOPHIL NFR BLD: 1.4 %
EOSINOPHIL NFR BLD: 3.7 %
FERRITIN SERPL IA-MCNC: 158.1 NG/ML (ref 15–150)
GFR SERPLBLD CREATININE-BSD FMLA CKD-EPI: 6 ML/MIN/{1.73_M2}
GFR SERPLBLD CREATININE-BSD FMLA CKD-EPI: 7 ML/MIN/{1.73_M2}
GFR SERPLBLD CREATININE-BSD FMLA CKD-EPI: 7 ML/MIN/{1.73_M2}
GLUCOSE BLD-MCNC: 184 MG/DL (ref 70–99)
GLUCOSE BLD-MCNC: 211 MG/DL (ref 70–99)
GLUCOSE BLD-MCNC: 211 MG/DL (ref 70–99)
GLUCOSE BLD-MCNC: 254 MG/DL (ref 70–99)
GLUCOSE BLD-MCNC: 265 MG/DL (ref 70–99)
GLUCOSE SERPL-MCNC: 190 MG/DL (ref 70–99)
GLUCOSE SERPL-MCNC: 235 MG/DL (ref 70–99)
GLUCOSE SERPL-MCNC: 235 MG/DL (ref 70–99)
GLUCOSE UR STRIP.AUTO-MCNC: NEGATIVE MG/DL
HCT VFR BLD AUTO: 26.2 % (ref 36–48)
HCT VFR BLD AUTO: 29.6 % (ref 36–48)
HGB BLD-MCNC: 8.7 G/DL (ref 12–16)
HGB BLD-MCNC: 9.5 G/DL (ref 12–16)
HGB UR QL STRIP.AUTO: ABNORMAL
IRON SATN MFR SERPL: 10 % (ref 15–50)
IRON SERPL-MCNC: 16 UG/DL (ref 37–145)
KETONES UR STRIP.AUTO-MCNC: NEGATIVE MG/DL
LEUKOCYTE ESTERASE UR QL STRIP.AUTO: ABNORMAL
LYMPHOCYTES # BLD: 0.4 K/UL (ref 1–5.1)
LYMPHOCYTES # BLD: 0.6 K/UL (ref 1–5.1)
LYMPHOCYTES NFR BLD: 5.7 %
LYMPHOCYTES NFR BLD: 9.4 %
MAGNESIUM SERPL-MCNC: 1.5 MG/DL (ref 1.8–2.4)
MCH RBC QN AUTO: 27.8 PG (ref 26–34)
MCH RBC QN AUTO: 28.7 PG (ref 26–34)
MCHC RBC AUTO-ENTMCNC: 32.2 G/DL (ref 31–36)
MCHC RBC AUTO-ENTMCNC: 33.2 G/DL (ref 31–36)
MCV RBC AUTO: 86.3 FL (ref 80–100)
MCV RBC AUTO: 86.4 FL (ref 80–100)
MONOCYTES # BLD: 0.3 K/UL (ref 0–1.3)
MONOCYTES # BLD: 0.6 K/UL (ref 0–1.3)
MONOCYTES NFR BLD: 10 %
MONOCYTES NFR BLD: 4.2 %
NEUTROPHILS # BLD: 5 K/UL (ref 1.7–7.7)
NEUTROPHILS # BLD: 6.8 K/UL (ref 1.7–7.7)
NEUTROPHILS NFR BLD: 76.7 %
NEUTROPHILS NFR BLD: 88.5 %
NITRITE UR QL STRIP.AUTO: NEGATIVE
PERFORMED ON: ABNORMAL
PH UR STRIP.AUTO: 5.5 [PH] (ref 5–8)
PHOSPHATE SERPL-MCNC: 5.3 MG/DL (ref 2.5–4.9)
PLATELET # BLD AUTO: 89 K/UL (ref 135–450)
PLATELET # BLD AUTO: 98 K/UL (ref 135–450)
PLATELET BLD QL SMEAR: ABNORMAL
PMV BLD AUTO: 8.9 FL (ref 5–10.5)
PMV BLD AUTO: 9.1 FL (ref 5–10.5)
POTASSIUM SERPL-SCNC: 4.8 MMOL/L (ref 3.5–5.1)
POTASSIUM SERPL-SCNC: 5.1 MMOL/L (ref 3.5–5.1)
POTASSIUM SERPL-SCNC: 5.7 MMOL/L (ref 3.5–5.1)
PROCALCITONIN SERPL IA-MCNC: >100 NG/ML (ref 0–0.15)
PROT UR STRIP.AUTO-MCNC: ABNORMAL MG/DL
RBC # BLD AUTO: 3.03 M/UL (ref 4–5.2)
RBC # BLD AUTO: 3.43 M/UL (ref 4–5.2)
RBC #/AREA URNS HPF: ABNORMAL /HPF (ref 0–4)
SLIDE REVIEW: ABNORMAL
SODIUM SERPL-SCNC: 133 MMOL/L (ref 136–145)
SODIUM SERPL-SCNC: 134 MMOL/L (ref 136–145)
SODIUM SERPL-SCNC: 134 MMOL/L (ref 136–145)
SODIUM UR-SCNC: <20 MMOL/L
SP GR UR STRIP.AUTO: <=1.005 (ref 1–1.03)
TIBC SERPL-MCNC: 157 UG/DL (ref 260–445)
UA COMPLETE W REFLEX CULTURE PNL UR: YES
UA DIPSTICK W REFLEX MICRO PNL UR: YES
URN SPEC COLLECT METH UR: ABNORMAL
UROBILINOGEN UR STRIP-ACNC: 0.2 E.U./DL
WBC # BLD AUTO: 6.5 K/UL (ref 4–11)
WBC # BLD AUTO: 7.6 K/UL (ref 4–11)
WBC #/AREA URNS HPF: ABNORMAL /HPF (ref 0–5)

## 2024-06-22 PROCEDURE — 80069 RENAL FUNCTION PANEL: CPT

## 2024-06-22 PROCEDURE — 7100000000 HC PACU RECOVERY - FIRST 15 MIN: Performed by: ORTHOPAEDIC SURGERY

## 2024-06-22 PROCEDURE — 85025 COMPLETE CBC W/AUTO DIFF WBC: CPT

## 2024-06-22 PROCEDURE — 6360000002 HC RX W HCPCS: Performed by: ORTHOPAEDIC SURGERY

## 2024-06-22 PROCEDURE — 83550 IRON BINDING TEST: CPT

## 2024-06-22 PROCEDURE — 6370000000 HC RX 637 (ALT 250 FOR IP): Performed by: UROLOGY

## 2024-06-22 PROCEDURE — 6360000002 HC RX W HCPCS

## 2024-06-22 PROCEDURE — 83735 ASSAY OF MAGNESIUM: CPT

## 2024-06-22 PROCEDURE — 1200000000 HC SEMI PRIVATE

## 2024-06-22 PROCEDURE — 94660 CPAP INITIATION&MGMT: CPT

## 2024-06-22 PROCEDURE — 2580000003 HC RX 258: Performed by: UROLOGY

## 2024-06-22 PROCEDURE — 3700000001 HC ADD 15 MINUTES (ANESTHESIA): Performed by: ORTHOPAEDIC SURGERY

## 2024-06-22 PROCEDURE — C1713 ANCHOR/SCREW BN/BN,TIS/BN: HCPCS | Performed by: ORTHOPAEDIC SURGERY

## 2024-06-22 PROCEDURE — 3700000000 HC ANESTHESIA ATTENDED CARE: Performed by: ORTHOPAEDIC SURGERY

## 2024-06-22 PROCEDURE — 2580000003 HC RX 258: Performed by: ANESTHESIOLOGY

## 2024-06-22 PROCEDURE — 2709999900 HC NON-CHARGEABLE SUPPLY: Performed by: ORTHOPAEDIC SURGERY

## 2024-06-22 PROCEDURE — 73552 X-RAY EXAM OF FEMUR 2/>: CPT

## 2024-06-22 PROCEDURE — 6370000000 HC RX 637 (ALT 250 FOR IP)

## 2024-06-22 PROCEDURE — 3600000004 HC SURGERY LEVEL 4 BASE: Performed by: ORTHOPAEDIC SURGERY

## 2024-06-22 PROCEDURE — 82728 ASSAY OF FERRITIN: CPT

## 2024-06-22 PROCEDURE — 2500000003 HC RX 250 WO HCPCS: Performed by: ANESTHESIOLOGY

## 2024-06-22 PROCEDURE — 6360000002 HC RX W HCPCS: Performed by: UROLOGY

## 2024-06-22 PROCEDURE — 76770 US EXAM ABDO BACK WALL COMP: CPT

## 2024-06-22 PROCEDURE — 83540 ASSAY OF IRON: CPT

## 2024-06-22 PROCEDURE — 2580000003 HC RX 258

## 2024-06-22 PROCEDURE — 84145 PROCALCITONIN (PCT): CPT

## 2024-06-22 PROCEDURE — 6360000002 HC RX W HCPCS: Performed by: ANESTHESIOLOGY

## 2024-06-22 PROCEDURE — 36415 COLL VENOUS BLD VENIPUNCTURE: CPT

## 2024-06-22 PROCEDURE — 2580000003 HC RX 258: Performed by: NURSE PRACTITIONER

## 2024-06-22 PROCEDURE — 7100000001 HC PACU RECOVERY - ADDTL 15 MIN: Performed by: ORTHOPAEDIC SURGERY

## 2024-06-22 PROCEDURE — 2580000003 HC RX 258: Performed by: ORTHOPAEDIC SURGERY

## 2024-06-22 PROCEDURE — 87086 URINE CULTURE/COLONY COUNT: CPT

## 2024-06-22 PROCEDURE — 99223 1ST HOSP IP/OBS HIGH 75: CPT | Performed by: ORTHOPAEDIC SURGERY

## 2024-06-22 PROCEDURE — 3600000014 HC SURGERY LEVEL 4 ADDTL 15MIN: Performed by: ORTHOPAEDIC SURGERY

## 2024-06-22 PROCEDURE — 87186 SC STD MICRODIL/AGAR DIL: CPT

## 2024-06-22 PROCEDURE — C1776 JOINT DEVICE (IMPLANTABLE): HCPCS | Performed by: ORTHOPAEDIC SURGERY

## 2024-06-22 PROCEDURE — 0QSB04Z REPOSITION RIGHT LOWER FEMUR WITH INTERNAL FIXATION DEVICE, OPEN APPROACH: ICD-10-PCS | Performed by: ORTHOPAEDIC SURGERY

## 2024-06-22 PROCEDURE — A4217 STERILE WATER/SALINE, 500 ML: HCPCS | Performed by: ORTHOPAEDIC SURGERY

## 2024-06-22 PROCEDURE — 2500000003 HC RX 250 WO HCPCS: Performed by: UROLOGY

## 2024-06-22 PROCEDURE — 2720000010 HC SURG SUPPLY STERILE: Performed by: ORTHOPAEDIC SURGERY

## 2024-06-22 PROCEDURE — C1769 GUIDE WIRE: HCPCS | Performed by: ORTHOPAEDIC SURGERY

## 2024-06-22 PROCEDURE — 87077 CULTURE AEROBIC IDENTIFY: CPT

## 2024-06-22 PROCEDURE — 6370000000 HC RX 637 (ALT 250 FOR IP): Performed by: STUDENT IN AN ORGANIZED HEALTH CARE EDUCATION/TRAINING PROGRAM

## 2024-06-22 DEVICE — SCREW VA LCK OPTILINK SLF TP 5.0X36MM: Type: IMPLANTABLE DEVICE | Site: FEMUR | Status: FUNCTIONAL

## 2024-06-22 DEVICE — SCREW VA LCK OPTILINK SLF TP 5.0X75MM: Type: IMPLANTABLE DEVICE | Site: FEMUR | Status: FUNCTIONAL

## 2024-06-22 DEVICE — SYSTEM FIXATION SCREOPTILINK 5.0 VAL SLF TPNG SD 80: Type: IMPLANTABLE DEVICE | Site: FEMUR | Status: FUNCTIONAL

## 2024-06-22 DEVICE — SCREW BNE L30MM DIA4.5MM PROX CORT TIB S STL ST LOK FULL: Type: IMPLANTABLE DEVICE | Site: FEMUR | Status: FUNCTIONAL

## 2024-06-22 DEVICE — SCREW VA LCK SCREOPTILINK 5.0X34MM: Type: IMPLANTABLE DEVICE | Site: FEMUR | Status: FUNCTIONAL

## 2024-06-22 DEVICE — SCREW BNE L32MM DIA4.5MM PROX CORT TIB S STL ST LOK FULL: Type: IMPLANTABLE DEVICE | Site: FEMUR | Status: FUNCTIONAL

## 2024-06-22 DEVICE — SCREW FIX 5.0 VAL SCREOPTILINK(TM): Type: IMPLANTABLE DEVICE | Site: FEMUR | Status: FUNCTIONAL

## 2024-06-22 DEVICE — PLATE BNE L230MM 10 H ST R CNDYL S STL CRV LOK COMPR VAR: Type: IMPLANTABLE DEVICE | Site: FEMUR | Status: FUNCTIONAL

## 2024-06-22 RX ORDER — KETOROLAC TROMETHAMINE 30 MG/ML
INJECTION, SOLUTION INTRAMUSCULAR; INTRAVENOUS PRN
Status: DISCONTINUED | OUTPATIENT
Start: 2024-06-22 | End: 2024-06-22 | Stop reason: SDUPTHER

## 2024-06-22 RX ORDER — SENNA AND DOCUSATE SODIUM 50; 8.6 MG/1; MG/1
1 TABLET, FILM COATED ORAL 2 TIMES DAILY
Status: DISCONTINUED | OUTPATIENT
Start: 2024-06-22 | End: 2024-07-12

## 2024-06-22 RX ORDER — DEXAMETHASONE SODIUM PHOSPHATE 4 MG/ML
INJECTION, SOLUTION INTRA-ARTICULAR; INTRALESIONAL; INTRAMUSCULAR; INTRAVENOUS; SOFT TISSUE PRN
Status: DISCONTINUED | OUTPATIENT
Start: 2024-06-22 | End: 2024-06-22 | Stop reason: SDUPTHER

## 2024-06-22 RX ORDER — MEPERIDINE HYDROCHLORIDE 25 MG/ML
12.5 INJECTION INTRAMUSCULAR; INTRAVENOUS; SUBCUTANEOUS EVERY 5 MIN PRN
Status: DISCONTINUED | OUTPATIENT
Start: 2024-06-22 | End: 2024-06-22 | Stop reason: HOSPADM

## 2024-06-22 RX ORDER — MIDODRINE HYDROCHLORIDE 5 MG/1
5 TABLET ORAL
Status: DISCONTINUED | OUTPATIENT
Start: 2024-06-22 | End: 2024-06-23

## 2024-06-22 RX ORDER — HYDROMORPHONE HYDROCHLORIDE 2 MG/ML
0.5 INJECTION, SOLUTION INTRAMUSCULAR; INTRAVENOUS; SUBCUTANEOUS EVERY 5 MIN PRN
Status: DISCONTINUED | OUTPATIENT
Start: 2024-06-22 | End: 2024-06-22 | Stop reason: HOSPADM

## 2024-06-22 RX ORDER — SODIUM CHLORIDE 9 MG/ML
INJECTION, SOLUTION INTRAVENOUS PRN
Status: DISCONTINUED | OUTPATIENT
Start: 2024-06-22 | End: 2024-06-22 | Stop reason: HOSPADM

## 2024-06-22 RX ORDER — ONDANSETRON 2 MG/ML
4 INJECTION INTRAMUSCULAR; INTRAVENOUS EVERY 6 HOURS PRN
Status: DISCONTINUED | OUTPATIENT
Start: 2024-06-22 | End: 2024-06-22 | Stop reason: SDUPTHER

## 2024-06-22 RX ORDER — ACETAMINOPHEN 325 MG/1
650 TABLET ORAL EVERY 6 HOURS
Status: DISCONTINUED | OUTPATIENT
Start: 2024-06-22 | End: 2024-06-29

## 2024-06-22 RX ORDER — SODIUM CHLORIDE 0.9 % (FLUSH) 0.9 %
5-40 SYRINGE (ML) INJECTION PRN
Status: DISCONTINUED | OUTPATIENT
Start: 2024-06-22 | End: 2024-06-25

## 2024-06-22 RX ORDER — SODIUM CHLORIDE 9 MG/ML
INJECTION, SOLUTION INTRAVENOUS CONTINUOUS
Status: DISCONTINUED | OUTPATIENT
Start: 2024-06-22 | End: 2024-06-23

## 2024-06-22 RX ORDER — CLINDAMYCIN PHOSPHATE 900 MG/50ML
900 INJECTION, SOLUTION INTRAVENOUS ONCE
Status: COMPLETED | OUTPATIENT
Start: 2024-06-22 | End: 2024-06-22

## 2024-06-22 RX ORDER — OXYCODONE HYDROCHLORIDE 5 MG/1
10 TABLET ORAL EVERY 4 HOURS PRN
Status: DISCONTINUED | OUTPATIENT
Start: 2024-06-22 | End: 2024-06-25

## 2024-06-22 RX ORDER — BUPIVACAINE HYDROCHLORIDE 5 MG/ML
INJECTION, SOLUTION EPIDURAL; INTRACAUDAL
Status: COMPLETED | OUTPATIENT
Start: 2024-06-22 | End: 2024-06-22

## 2024-06-22 RX ORDER — LIDOCAINE HYDROCHLORIDE 20 MG/ML
INJECTION, SOLUTION EPIDURAL; INFILTRATION; INTRACAUDAL; PERINEURAL PRN
Status: DISCONTINUED | OUTPATIENT
Start: 2024-06-22 | End: 2024-06-22 | Stop reason: SDUPTHER

## 2024-06-22 RX ORDER — NALOXONE HYDROCHLORIDE 0.4 MG/ML
INJECTION, SOLUTION INTRAMUSCULAR; INTRAVENOUS; SUBCUTANEOUS PRN
Status: DISCONTINUED | OUTPATIENT
Start: 2024-06-22 | End: 2024-06-22 | Stop reason: HOSPADM

## 2024-06-22 RX ORDER — MORPHINE SULFATE 2 MG/ML
2 INJECTION, SOLUTION INTRAMUSCULAR; INTRAVENOUS
Status: DISCONTINUED | OUTPATIENT
Start: 2024-06-22 | End: 2024-06-25

## 2024-06-22 RX ORDER — 0.9 % SODIUM CHLORIDE 0.9 %
250 INTRAVENOUS SOLUTION INTRAVENOUS ONCE
Status: COMPLETED | OUTPATIENT
Start: 2024-06-22 | End: 2024-06-22

## 2024-06-22 RX ORDER — SODIUM CHLORIDE 0.9 % (FLUSH) 0.9 %
5-40 SYRINGE (ML) INJECTION EVERY 12 HOURS SCHEDULED
Status: DISCONTINUED | OUTPATIENT
Start: 2024-06-22 | End: 2024-06-22 | Stop reason: HOSPADM

## 2024-06-22 RX ORDER — OXYCODONE HYDROCHLORIDE 5 MG/1
5 TABLET ORAL EVERY 4 HOURS PRN
Status: DISCONTINUED | OUTPATIENT
Start: 2024-06-22 | End: 2024-07-15 | Stop reason: HOSPADM

## 2024-06-22 RX ORDER — PROPOFOL 10 MG/ML
INJECTION, EMULSION INTRAVENOUS PRN
Status: DISCONTINUED | OUTPATIENT
Start: 2024-06-22 | End: 2024-06-22 | Stop reason: SDUPTHER

## 2024-06-22 RX ORDER — SODIUM BICARBONATE 650 MG/1
650 TABLET ORAL 4 TIMES DAILY
Status: DISCONTINUED | OUTPATIENT
Start: 2024-06-22 | End: 2024-07-04

## 2024-06-22 RX ORDER — SODIUM CHLORIDE, SODIUM LACTATE, POTASSIUM CHLORIDE, CALCIUM CHLORIDE 600; 310; 30; 20 MG/100ML; MG/100ML; MG/100ML; MG/100ML
INJECTION, SOLUTION INTRAVENOUS CONTINUOUS PRN
Status: DISCONTINUED | OUTPATIENT
Start: 2024-06-22 | End: 2024-06-22 | Stop reason: SDUPTHER

## 2024-06-22 RX ORDER — CLINDAMYCIN PHOSPHATE 900 MG/50ML
INJECTION, SOLUTION INTRAVENOUS
Status: COMPLETED
Start: 2024-06-22 | End: 2024-06-22

## 2024-06-22 RX ORDER — CLINDAMYCIN PHOSPHATE 900 MG/50ML
900 INJECTION, SOLUTION INTRAVENOUS
Status: DISCONTINUED | OUTPATIENT
Start: 2024-06-22 | End: 2024-06-22 | Stop reason: HOSPADM

## 2024-06-22 RX ORDER — FENTANYL CITRATE 50 UG/ML
INJECTION, SOLUTION INTRAMUSCULAR; INTRAVENOUS PRN
Status: DISCONTINUED | OUTPATIENT
Start: 2024-06-22 | End: 2024-06-22 | Stop reason: SDUPTHER

## 2024-06-22 RX ORDER — KETAMINE HCL IN NACL, ISO-OSM 100MG/10ML
SYRINGE (ML) INJECTION PRN
Status: DISCONTINUED | OUTPATIENT
Start: 2024-06-22 | End: 2024-06-22 | Stop reason: SDUPTHER

## 2024-06-22 RX ORDER — ONDANSETRON 4 MG/1
4 TABLET, ORALLY DISINTEGRATING ORAL EVERY 8 HOURS PRN
Status: DISCONTINUED | OUTPATIENT
Start: 2024-06-22 | End: 2024-06-22 | Stop reason: SDUPTHER

## 2024-06-22 RX ORDER — SODIUM CHLORIDE 9 MG/ML
INJECTION, SOLUTION INTRAVENOUS PRN
Status: DISCONTINUED | OUTPATIENT
Start: 2024-06-22 | End: 2024-07-02

## 2024-06-22 RX ORDER — MAGNESIUM HYDROXIDE 1200 MG/15ML
LIQUID ORAL CONTINUOUS PRN
Status: COMPLETED | OUTPATIENT
Start: 2024-06-22 | End: 2024-06-22

## 2024-06-22 RX ORDER — SODIUM CHLORIDE 0.9 % (FLUSH) 0.9 %
5-40 SYRINGE (ML) INJECTION PRN
Status: DISCONTINUED | OUTPATIENT
Start: 2024-06-22 | End: 2024-06-22 | Stop reason: HOSPADM

## 2024-06-22 RX ORDER — HYDROMORPHONE HYDROCHLORIDE 2 MG/ML
0.25 INJECTION, SOLUTION INTRAMUSCULAR; INTRAVENOUS; SUBCUTANEOUS EVERY 5 MIN PRN
Status: DISCONTINUED | OUTPATIENT
Start: 2024-06-22 | End: 2024-06-22 | Stop reason: HOSPADM

## 2024-06-22 RX ORDER — ONDANSETRON 2 MG/ML
4 INJECTION INTRAMUSCULAR; INTRAVENOUS
Status: DISCONTINUED | OUTPATIENT
Start: 2024-06-22 | End: 2024-06-22 | Stop reason: HOSPADM

## 2024-06-22 RX ORDER — MORPHINE SULFATE 4 MG/ML
4 INJECTION, SOLUTION INTRAMUSCULAR; INTRAVENOUS
Status: DISCONTINUED | OUTPATIENT
Start: 2024-06-22 | End: 2024-06-25

## 2024-06-22 RX ORDER — ONDANSETRON 2 MG/ML
INJECTION INTRAMUSCULAR; INTRAVENOUS PRN
Status: DISCONTINUED | OUTPATIENT
Start: 2024-06-22 | End: 2024-06-22 | Stop reason: SDUPTHER

## 2024-06-22 RX ORDER — DIPHENHYDRAMINE HYDROCHLORIDE 50 MG/ML
12.5 INJECTION INTRAMUSCULAR; INTRAVENOUS
Status: DISCONTINUED | OUTPATIENT
Start: 2024-06-22 | End: 2024-06-22 | Stop reason: HOSPADM

## 2024-06-22 RX ORDER — SODIUM CHLORIDE 0.9 % (FLUSH) 0.9 %
5-40 SYRINGE (ML) INJECTION EVERY 12 HOURS SCHEDULED
Status: DISCONTINUED | OUTPATIENT
Start: 2024-06-22 | End: 2024-06-25

## 2024-06-22 RX ADMIN — ONDANSETRON 4 MG: 2 INJECTION INTRAMUSCULAR; INTRAVENOUS at 16:38

## 2024-06-22 RX ADMIN — SODIUM CHLORIDE: 9 INJECTION, SOLUTION INTRAVENOUS at 20:24

## 2024-06-22 RX ADMIN — SODIUM BICARBONATE: 84 INJECTION, SOLUTION INTRAVENOUS at 09:16

## 2024-06-22 RX ADMIN — KETOROLAC TROMETHAMINE 30 MG: 60 INJECTION, SOLUTION INTRAMUSCULAR at 18:48

## 2024-06-22 RX ADMIN — DEXAMETHASONE SODIUM PHOSPHATE 12 MG: 4 INJECTION, SOLUTION INTRAMUSCULAR; INTRAVENOUS at 16:38

## 2024-06-22 RX ADMIN — PROPOFOL 80 MG: 10 INJECTION, EMULSION INTRAVENOUS at 16:24

## 2024-06-22 RX ADMIN — ACETAMINOPHEN 650 MG: 325 TABLET ORAL at 05:06

## 2024-06-22 RX ADMIN — SENNOSIDES AND DOCUSATE SODIUM 1 TABLET: 50; 8.6 TABLET ORAL at 21:10

## 2024-06-22 RX ADMIN — LIDOCAINE HYDROCHLORIDE 50 MG: 20 INJECTION, SOLUTION EPIDURAL; INFILTRATION; INTRACAUDAL; PERINEURAL at 16:39

## 2024-06-22 RX ADMIN — FENTANYL CITRATE 50 MCG: 50 INJECTION, SOLUTION INTRAMUSCULAR; INTRAVENOUS at 16:23

## 2024-06-22 RX ADMIN — Medication 25 MG: at 16:39

## 2024-06-22 RX ADMIN — SODIUM CHLORIDE, PRESERVATIVE FREE 10 ML: 5 INJECTION INTRAVENOUS at 20:22

## 2024-06-22 RX ADMIN — CLINDAMYCIN PHOSPHATE 900 MG: 900 INJECTION, SOLUTION INTRAVENOUS at 16:10

## 2024-06-22 RX ADMIN — SODIUM CHLORIDE 250 ML: 9 INJECTION, SOLUTION INTRAVENOUS at 00:23

## 2024-06-22 RX ADMIN — FENTANYL CITRATE 50 MCG: 50 INJECTION, SOLUTION INTRAMUSCULAR; INTRAVENOUS at 18:08

## 2024-06-22 RX ADMIN — SODIUM CHLORIDE, POTASSIUM CHLORIDE, SODIUM LACTATE AND CALCIUM CHLORIDE: 600; 310; 30; 20 INJECTION, SOLUTION INTRAVENOUS at 16:16

## 2024-06-22 RX ADMIN — FENTANYL CITRATE 50 MCG: 50 INJECTION, SOLUTION INTRAMUSCULAR; INTRAVENOUS at 16:54

## 2024-06-22 RX ADMIN — ACETAMINOPHEN 650 MG: 325 TABLET ORAL at 21:09

## 2024-06-22 RX ADMIN — Medication 25 MG: at 16:26

## 2024-06-22 RX ADMIN — WATER 1000 MG: 1 INJECTION INTRAMUSCULAR; INTRAVENOUS; SUBCUTANEOUS at 12:42

## 2024-06-22 RX ADMIN — FENTANYL CITRATE 50 MCG: 50 INJECTION, SOLUTION INTRAMUSCULAR; INTRAVENOUS at 16:41

## 2024-06-22 RX ADMIN — SODIUM BICARBONATE 650 MG: 650 TABLET ORAL at 09:30

## 2024-06-22 RX ADMIN — SODIUM BICARBONATE 650 MG: 650 TABLET ORAL at 21:10

## 2024-06-22 ASSESSMENT — ENCOUNTER SYMPTOMS: SHORTNESS OF BREATH: 1

## 2024-06-22 ASSESSMENT — PAIN SCALES - GENERAL
PAINLEVEL_OUTOF10: 8
PAINLEVEL_OUTOF10: 0
PAINLEVEL_OUTOF10: 0

## 2024-06-22 NOTE — DISCHARGE INSTRUCTIONS
Dr. Carline You MD [facility]  Hip Preservation & Sports Medicine Surgeon   [facility]          POST-OPERATIVE INSTRUCTIONS:     Apply ice (over your dressing) for 4-6 weeks after surgery to help reduce swelling.    This can be applied to the affected area 20 minutes out of every hour while you are awake.     Leave your water proof bandage in place for 7 days. After 7 days you may change to a new, waterproof bandage    Please take all of your post-operative medications as prescribed.  Please do not alter how you take these medications without contacting the physician.  If you have any questions and/or concerns about your post-operative medications, please call our office.    Please do not take any additional Tylenol while you are taking the Norco.  This medication already contains Tylenol and taking additional Tylenol may cause liver damage.    It is okay to use Tylenol once you are no longer taking the Norco.    It is common to feel drowsy while taking pain medication.  Do not drink alcohol or drive while taking pain medication.    Nausea is also a common side effect of using pain medication.  If this occurs, try taking your medication with food.  Also drink plenty of water while taking the pain medication. You may use an over the counter anti-nausea as needed.  If nausea becomes severe, please contact the office and a prescription for Zofran may be prescribed.    To optimize your pain control, you can take your pain medication as prescribed for 2 days, then gradually taper off over the next day as tolerable.  If you feel your pain is not well controlled or begins to worsen following your first post-operative visit, please contact our office.   You will also need take a daily aspirin.  This will help prevent blood clots.       Please keep your dressings/wounds dry at all times.  Do not swim in pools, lakes, etc. until instructed to do so by your physician that it is safe to do so.        You may

## 2024-06-22 NOTE — OP NOTE
Urology Operative Note  [facility]    Patient: Yoseph Small  YOB: 1955   MRN: [id number]   Date of Procedure: 6/21/2024    Surgeon: Juan Manuel Cavazos MD, WAGNER    Preoperative Diagnosis: Calculus of kidney and dislodged ureteral stent with acute kidney injury and urinary tract infection - RIGHT SIDE    Postoperative Diagnosis: same    Procedure: Cystourethroscopy with retrograde pyelogram and ureteral stent exchange - RIGHT SIDE    Anesthesia: MAC    Findings:   Dislodged right ureteral stent replaced without complication, cystourethroscopy and retrograde pyelogram otherwise unremarkable     Infection Present At Time Of Surgery (PATOS): No infection present    Estimated Blood Loss: minimal     Complications: none apparent    Specimens: None    Implants: 6 Fr x 24 cm indwelling ureteral stent (double-J type) with no string attached - RIGHT SIDE     Drains: 16 Malay Alaniz catheter 10 cc sterile water in balloon                 Indications: This patient presents for the above named surgery for an obstructing ureteral stone. History and physical examination and other relevant patient data were reviewed and are detailed in the preoperative history/consult/progress note. Informed consent was obtained and the risks, benefits, and details of the procedure were explained to the patient who elected to proceed.    Details of Procedure:  Informed consent was obtained. The patient was brought to the operating room and placed in the supine position on the operating room table. SCDs were placed on the lower extremities. The patient was positioned in a supine position and all pressure points were padded. Anesthesia was induced without complication.  The patient was then carefully prepped with her right lower extremity down given her recent fall and fracture of the femur, and her left lower extremity placed in lithotomy position.  The genitals were prepped and draped in the usual 
staples.  I left a medium sized hemovac drain which can be removed in 48 hours if output is less than 30cc per 24 hours.     A nonadherent bulky sterile  dressing was then applied along the skin and the knee was placed in an immobilizer.  Leg Compartments were soft and pulses are present after the procedure.  Blood loss was approximately 150-200 cc.    Scrub and surgical instrument counts were verified and were correct x2.    Patient was then awoken and taken to recovery room in stable condition.  Surgery was well tolerated  Postoperative plan is to be 20 lb touch bearing for 6 to 9 weeks until clinical and radiographic union.  We will start early range of motion at 2 weeks once her  wound shows appropriate healing while being protected in the immobilizer, to then transition to a hinged knee brace.     Sincerely,    Carline You MD [facility]  Orthopaedic Surgeon - Hip Preservation & Sports Medicine   [facility]   [address] [place] [address], [address], [address]  Email: [email or web address]  Office: [phone]    06/22/24  6:51 PM        Electronically signed by Carline You MD on 6/22/2024 at 6:50 PM

## 2024-06-22 NOTE — ANESTHESIA PRE PROCEDURE
Department of Anesthesiology  Preprocedure Note       Name:  Yoseph Small   Age:  68 y.o.  :  1955                                          MRN:  [id number]         Date:  2024      Surgeon: Surgeon(s):  Carline You MD    Procedure: Procedure(s):  RIGHT DISTAL FEMUR OPEN REDUCTION INTERNAL FIXATION - SYNTHES    Medications prior to admission:   Prior to Admission medications    Medication Sig Start Date End Date Taking? Authorizing Provider   lisinopril (PRINIVIL;ZESTRIL) 10 MG tablet Take 1 tablet by mouth daily HOLD this medication until you follow up with PCP... you have NOT required this during your hospital stay 24   Carisa Mcdonald, APRN - CNP   sertraline (ZOLOFT) 50 MG tablet Take 50 mg by mouth daily  Patient not taking: Reported on 2024    Jose M Upton MD   atorvastatin (LIPITOR) 10 MG tablet Take 1 tablet by mouth daily    Jose M Upton MD   metoprolol succinate (TOPROL XL) 100 MG extended release tablet Take 1 tablet by mouth daily    Jose M Upton MD   glipiZIDE (GLUCOTROL) 5 MG tablet Take 1 tablet by mouth 2 times daily (before meals)    Jose M Upton MD   ferrous sulfate 324 (65 Fe) MG EC tablet Take 1 tablet by mouth daily (with breakfast)    Jose M Upton MD   Cyanocobalamin (VITAMIN B 12 PO) Take by mouth daily    Jose M Upton MD   diphenhydrAMINE (BENADRYL) 25 MG capsule Take 1 capsule by mouth 2 times daily as needed for Itching    Jose M Upton MD   vitamin D (CHOLECALCIFEROL) 1000 UNIT TABS tablet Take 1 tablet by mouth daily    Jose M Upton MD   Lansoprazole (PREVACID PO) Take 30 mg by mouth daily.      Jose M Upton MD   metformin (GLUCOPHAGE) 500 MG tablet Take 2 tablets by mouth See Admin Instructions 2 TABS TWICE DAY    Jose M Upton MD   tramadol (ULTRAM) 50 MG tablet Take 1 tablet by mouth in the morning and 1 tablet in the evening.  .    Jose M Upton MD 
5-325 MG per tablet 1 tablet  1 tablet Oral Once PRN Loulou Daniels MD           Allergies:    Allergies   Allergen Reactions    Amitriptyline Hcl Shortness Of Breath    Cephalexin Shortness Of Breath    Ciprofloxacin Shortness Of Breath     Sob  nausea    Levofloxacin Shortness Of Breath    Penicillins Shortness Of Breath and Swelling    Sulfa Antibiotics Shortness Of Breath     dizziness    Cephalexin Hcl     Elavil [Amitriptyline Hcl]     Elemental Sulfur     Levofloxacin     Methocarbamol Itching    Tetracyclines & Related Swelling     Throat swelling       Problem List:    Patient Active Problem List   Diagnosis Code    Diverticulitis of colon K57.32    Acute renal failure (HCC) N17.9    Type II or unspecified type diabetes mellitus without mention of complication, not stated as uncontrolled E11.9    Hypertension I10    Obesity E66.9    Anxiety F41.9    Sleep apnea G47.30    Fatty liver K76.0    SOB (shortness of breath) R06.02    DM (diabetes mellitus) ([facility]) E11.9    SBO (small bowel obstruction) ([facility]) K56.609    Incarcerated incisional hernia K43.0    Flu-like symptoms R68.89    Septicemia ([facility]) A41.9    Complicated UTI (urinary tract infection) N39.0    Fracture, subtrochanteric, right femur, closed, initial encounter ([facility]) S72.21XA       Past Medical History:        Diagnosis Date    Anxiety     Depression     Diabetes mellitus (HCC)     Diverticulosis, colon     Hyperlipidemia     Hypertension     Liver cirrhosis (HCC)     Obesity     Sleep apnea        Past Surgical History:        Procedure Laterality Date    HYSTERECTOMY (CERVIX STATUS UNKNOWN)      INCISIONAL HERNIA REPAIR  07/25/2018    open, with mesh        Social History:    Social History     Tobacco Use    Smoking status: Never    Smokeless tobacco: Never   Substance Use Topics    Alcohol use: No                                Counseling given: Not Answered      Vital Signs (Current):   Vitals:    06/22/24 0653 06/22/24 0900 06/22/24 1045

## 2024-06-22 NOTE — ANESTHESIA POSTPROCEDURE EVALUATION
Department of Anesthesiology  Postprocedure Note    Patient: Yoseph Small  MRN: [id number]  YOB: 1955  Date of evaluation: 6/22/2024    Procedure Summary       Date: 06/22/24 Room / Location: [address]    Anesthesia Start: 1616 Anesthesia Stop: 1852    Procedure: RIGHT DISTAL FEMUR OPEN REDUCTION INTERNAL FIXATION - SYNTHES (Right: Leg Upper) Diagnosis:       Closed fracture of distal end of right femur, unspecified fracture morphology, initial encounter ([facility])      (Closed fracture of distal end of right femur, unspecified fracture morphology, initial encounter ([facility]) [S72.401A])    Surgeons: Carline You MD Responsible Provider: Ramon Tobias MD    Anesthesia Type: general ASA Status: 2            Anesthesia Type: No value filed.    Charmaine Phase I: Charmaine Score: 8    Charmaine Phase II:      Anesthesia Post Evaluation    Patient location during evaluation: PACU  Patient participation: complete - patient participated  Level of consciousness: awake and alert  Pain score: 2  Airway patency: patent  Nausea & Vomiting: no vomiting  Cardiovascular status: blood pressure returned to baseline  Respiratory status: acceptable  Hydration status: euvolemic  Multimodal analgesia pain management approach  Pain management: adequate    No notable events documented.

## 2024-06-23 LAB
ANION GAP SERPL CALCULATED.3IONS-SCNC: 17 MMOL/L (ref 3–16)
BACTERIA URNS QL MICRO: ABNORMAL /HPF
BASOPHILS # BLD: 0 K/UL (ref 0–0.2)
BASOPHILS NFR BLD: 0.1 %
BILIRUB UR QL STRIP.AUTO: NEGATIVE
BUN SERPL-MCNC: 89 MG/DL (ref 7–20)
CALCIUM SERPL-MCNC: 7.3 MG/DL (ref 8.3–10.6)
CHARACTER UR: ABNORMAL
CHLORIDE SERPL-SCNC: 100 MMOL/L (ref 99–110)
CLARITY UR: ABNORMAL
CO2 SERPL-SCNC: 18 MMOL/L (ref 21–32)
COARSE GRAN CASTS #/AREA URNS LPF: ABNORMAL /LPF (ref 0–2)
COLOR UR: YELLOW
CREAT SERPL-MCNC: 6.9 MG/DL (ref 0.6–1.2)
DEPRECATED RDW RBC AUTO: 15.3 % (ref 12.4–15.4)
EOSINOPHIL # BLD: 0 K/UL (ref 0–0.6)
EOSINOPHIL NFR BLD: 0 %
EPI CELLS #/AREA URNS AUTO: 12 /HPF (ref 0–5)
GFR SERPLBLD CREATININE-BSD FMLA CKD-EPI: 6 ML/MIN/{1.73_M2}
GLUCOSE BLD-MCNC: 279 MG/DL (ref 70–99)
GLUCOSE BLD-MCNC: 281 MG/DL (ref 70–99)
GLUCOSE BLD-MCNC: 294 MG/DL (ref 70–99)
GLUCOSE BLD-MCNC: 304 MG/DL (ref 70–99)
GLUCOSE SERPL-MCNC: 300 MG/DL (ref 70–99)
GLUCOSE UR STRIP.AUTO-MCNC: NEGATIVE MG/DL
HCT VFR BLD AUTO: 28.7 % (ref 36–48)
HGB BLD-MCNC: 9.3 G/DL (ref 12–16)
HGB UR QL STRIP.AUTO: ABNORMAL
HYALINE CASTS #/AREA URNS AUTO: 26 /LPF (ref 0–8)
KETONES UR STRIP.AUTO-MCNC: NEGATIVE MG/DL
LEUKOCYTE ESTERASE UR QL STRIP.AUTO: ABNORMAL
LYMPHOCYTES # BLD: 0.5 K/UL (ref 1–5.1)
LYMPHOCYTES NFR BLD: 7.7 %
MCH RBC QN AUTO: 27.7 PG (ref 26–34)
MCHC RBC AUTO-ENTMCNC: 32.3 G/DL (ref 31–36)
MCV RBC AUTO: 85.6 FL (ref 80–100)
MONOCYTES # BLD: 0.5 K/UL (ref 0–1.3)
MONOCYTES NFR BLD: 8.3 %
NEUTROPHILS # BLD: 5.3 K/UL (ref 1.7–7.7)
NEUTROPHILS NFR BLD: 83.9 %
NITRITE UR QL STRIP.AUTO: NEGATIVE
PERFORMED ON: ABNORMAL
PH UR STRIP.AUTO: 5 [PH] (ref 5–8)
PLATELET # BLD AUTO: 118 K/UL (ref 135–450)
PMV BLD AUTO: 9 FL (ref 5–10.5)
POTASSIUM SERPL-SCNC: 4.8 MMOL/L (ref 3.5–5.1)
PROT UR STRIP.AUTO-MCNC: 30 MG/DL
RBC # BLD AUTO: 3.35 M/UL (ref 4–5.2)
RBC CLUMPS #/AREA URNS AUTO: 104 /HPF (ref 0–4)
SODIUM SERPL-SCNC: 135 MMOL/L (ref 136–145)
SP GR UR STRIP.AUTO: 1.01 (ref 1–1.03)
UA COMPLETE W REFLEX CULTURE PNL UR: YES
UA DIPSTICK W REFLEX MICRO PNL UR: YES
URN SPEC COLLECT METH UR: ABNORMAL
UROBILINOGEN UR STRIP-ACNC: 0.2 E.U./DL
WBC # BLD AUTO: 6.4 K/UL (ref 4–11)
WBC #/AREA URNS AUTO: 204 /HPF (ref 0–5)

## 2024-06-23 PROCEDURE — 6360000002 HC RX W HCPCS

## 2024-06-23 PROCEDURE — 2580000003 HC RX 258: Performed by: NURSE PRACTITIONER

## 2024-06-23 PROCEDURE — 80048 BASIC METABOLIC PNL TOTAL CA: CPT

## 2024-06-23 PROCEDURE — 87086 URINE CULTURE/COLONY COUNT: CPT

## 2024-06-23 PROCEDURE — P9045 ALBUMIN (HUMAN), 5%, 250 ML: HCPCS | Performed by: INTERNAL MEDICINE

## 2024-06-23 PROCEDURE — 6370000000 HC RX 637 (ALT 250 FOR IP)

## 2024-06-23 PROCEDURE — 6370000000 HC RX 637 (ALT 250 FOR IP): Performed by: STUDENT IN AN ORGANIZED HEALTH CARE EDUCATION/TRAINING PROGRAM

## 2024-06-23 PROCEDURE — 6360000002 HC RX W HCPCS: Performed by: NURSE PRACTITIONER

## 2024-06-23 PROCEDURE — 6360000002 HC RX W HCPCS: Performed by: UROLOGY

## 2024-06-23 PROCEDURE — 2580000003 HC RX 258: Performed by: INTERNAL MEDICINE

## 2024-06-23 PROCEDURE — 6370000000 HC RX 637 (ALT 250 FOR IP): Performed by: UROLOGY

## 2024-06-23 PROCEDURE — 94660 CPAP INITIATION&MGMT: CPT

## 2024-06-23 PROCEDURE — 81001 URINALYSIS AUTO W/SCOPE: CPT

## 2024-06-23 PROCEDURE — 2580000003 HC RX 258: Performed by: UROLOGY

## 2024-06-23 PROCEDURE — 87077 CULTURE AEROBIC IDENTIFY: CPT

## 2024-06-23 PROCEDURE — 6360000002 HC RX W HCPCS: Performed by: INTERNAL MEDICINE

## 2024-06-23 PROCEDURE — 2580000003 HC RX 258: Performed by: STUDENT IN AN ORGANIZED HEALTH CARE EDUCATION/TRAINING PROGRAM

## 2024-06-23 PROCEDURE — 1200000000 HC SEMI PRIVATE

## 2024-06-23 PROCEDURE — 2500000003 HC RX 250 WO HCPCS: Performed by: STUDENT IN AN ORGANIZED HEALTH CARE EDUCATION/TRAINING PROGRAM

## 2024-06-23 PROCEDURE — 85025 COMPLETE CBC W/AUTO DIFF WBC: CPT

## 2024-06-23 PROCEDURE — APPNB45 APP NON BILLABLE 31-45 MINUTES

## 2024-06-23 PROCEDURE — 6370000000 HC RX 637 (ALT 250 FOR IP): Performed by: INTERNAL MEDICINE

## 2024-06-23 RX ORDER — SODIUM CHLORIDE 0.9 % (FLUSH) 0.9 %
5-40 SYRINGE (ML) INJECTION EVERY 12 HOURS SCHEDULED
Status: DISCONTINUED | OUTPATIENT
Start: 2024-06-23 | End: 2024-07-15 | Stop reason: HOSPADM

## 2024-06-23 RX ORDER — SODIUM CHLORIDE 0.9 % (FLUSH) 0.9 %
5-40 SYRINGE (ML) INJECTION PRN
Status: DISCONTINUED | OUTPATIENT
Start: 2024-06-23 | End: 2024-06-25

## 2024-06-23 RX ORDER — SODIUM CHLORIDE 9 MG/ML
INJECTION, SOLUTION INTRAVENOUS
Status: DISPENSED
Start: 2024-06-23 | End: 2024-06-24

## 2024-06-23 RX ORDER — MIDODRINE HYDROCHLORIDE 5 MG/1
10 TABLET ORAL
Status: DISCONTINUED | OUTPATIENT
Start: 2024-06-23 | End: 2024-07-04

## 2024-06-23 RX ORDER — SODIUM CHLORIDE 9 MG/ML
INJECTION, SOLUTION INTRAVENOUS PRN
Status: DISCONTINUED | OUTPATIENT
Start: 2024-06-23 | End: 2024-07-02

## 2024-06-23 RX ORDER — SODIUM CHLORIDE 0.9 % (FLUSH) 0.9 %
5-40 SYRINGE (ML) INJECTION PRN
Status: DISCONTINUED | OUTPATIENT
Start: 2024-06-23 | End: 2024-07-15 | Stop reason: HOSPADM

## 2024-06-23 RX ORDER — ALBUMIN, HUMAN INJ 5% 5 %
12.5 SOLUTION INTRAVENOUS EVERY 8 HOURS
Status: COMPLETED | OUTPATIENT
Start: 2024-06-23 | End: 2024-06-25

## 2024-06-23 RX ORDER — SODIUM CHLORIDE 0.9 % (FLUSH) 0.9 %
5-40 SYRINGE (ML) INJECTION EVERY 12 HOURS SCHEDULED
Status: DISCONTINUED | OUTPATIENT
Start: 2024-06-23 | End: 2024-06-25

## 2024-06-23 RX ADMIN — CEFEPIME 1000 MG: 1 INJECTION, POWDER, FOR SOLUTION INTRAMUSCULAR; INTRAVENOUS at 22:23

## 2024-06-23 RX ADMIN — INSULIN LISPRO 4 UNITS: 100 INJECTION, SOLUTION INTRAVENOUS; SUBCUTANEOUS at 12:02

## 2024-06-23 RX ADMIN — SENNOSIDES AND DOCUSATE SODIUM 1 TABLET: 50; 8.6 TABLET ORAL at 22:16

## 2024-06-23 RX ADMIN — SODIUM CHLORIDE: 9 INJECTION, SOLUTION INTRAVENOUS at 23:44

## 2024-06-23 RX ADMIN — OXYCODONE HYDROCHLORIDE 5 MG: 5 TABLET ORAL at 09:57

## 2024-06-23 RX ADMIN — MIDODRINE HYDROCHLORIDE 10 MG: 5 TABLET ORAL at 12:02

## 2024-06-23 RX ADMIN — SENNOSIDES AND DOCUSATE SODIUM 1 TABLET: 50; 8.6 TABLET ORAL at 09:08

## 2024-06-23 RX ADMIN — MIDODRINE HYDROCHLORIDE 5 MG: 5 TABLET ORAL at 09:08

## 2024-06-23 RX ADMIN — OXYCODONE HYDROCHLORIDE 10 MG: 5 TABLET ORAL at 22:16

## 2024-06-23 RX ADMIN — WATER 1000 MG: 1 INJECTION INTRAMUSCULAR; INTRAVENOUS; SUBCUTANEOUS at 14:24

## 2024-06-23 RX ADMIN — SODIUM BICARBONATE: 84 INJECTION, SOLUTION INTRAVENOUS at 03:13

## 2024-06-23 RX ADMIN — INSULIN LISPRO 6 UNITS: 100 INJECTION, SOLUTION INTRAVENOUS; SUBCUTANEOUS at 09:08

## 2024-06-23 RX ADMIN — ACETAMINOPHEN 650 MG: 325 TABLET ORAL at 09:57

## 2024-06-23 RX ADMIN — SODIUM BICARBONATE 650 MG: 650 TABLET ORAL at 16:14

## 2024-06-23 RX ADMIN — OXYCODONE HYDROCHLORIDE 10 MG: 5 TABLET ORAL at 16:14

## 2024-06-23 RX ADMIN — VANCOMYCIN HYDROCHLORIDE 2000 MG: 10 INJECTION, POWDER, LYOPHILIZED, FOR SOLUTION INTRAVENOUS at 23:45

## 2024-06-23 RX ADMIN — SODIUM BICARBONATE 650 MG: 650 TABLET ORAL at 22:16

## 2024-06-23 RX ADMIN — PANTOPRAZOLE SODIUM 40 MG: 40 TABLET, DELAYED RELEASE ORAL at 05:00

## 2024-06-23 RX ADMIN — ATORVASTATIN CALCIUM 10 MG: 10 TABLET, FILM COATED ORAL at 09:08

## 2024-06-23 RX ADMIN — Medication 10 ML: at 22:18

## 2024-06-23 RX ADMIN — SODIUM BICARBONATE 650 MG: 650 TABLET ORAL at 14:24

## 2024-06-23 RX ADMIN — SODIUM BICARBONATE 650 MG: 650 TABLET ORAL at 09:08

## 2024-06-23 RX ADMIN — MIDODRINE HYDROCHLORIDE 10 MG: 5 TABLET ORAL at 16:14

## 2024-06-23 RX ADMIN — MORPHINE SULFATE 2 MG: 2 INJECTION, SOLUTION INTRAMUSCULAR; INTRAVENOUS at 12:02

## 2024-06-23 RX ADMIN — MORPHINE SULFATE 2 MG: 2 INJECTION, SOLUTION INTRAMUSCULAR; INTRAVENOUS at 23:38

## 2024-06-23 RX ADMIN — SODIUM CHLORIDE: 9 INJECTION, SOLUTION INTRAVENOUS at 22:21

## 2024-06-23 RX ADMIN — ALBUMIN (HUMAN) 12.5 G: 12.5 INJECTION, SOLUTION INTRAVENOUS at 22:27

## 2024-06-23 RX ADMIN — CHOLECALCIFEROL TAB 25 MCG (1000 UNIT) 1000 UNITS: 25 TAB at 09:08

## 2024-06-23 RX ADMIN — FERROUS SULFATE TAB 325 MG (65 MG ELEMENTAL FE) 324 MG: 325 (65 FE) TAB at 09:08

## 2024-06-23 RX ADMIN — ACETAMINOPHEN 650 MG: 325 TABLET ORAL at 04:58

## 2024-06-23 RX ADMIN — ALBUMIN (HUMAN) 12.5 G: 12.5 INJECTION, SOLUTION INTRAVENOUS at 15:17

## 2024-06-23 RX ADMIN — ACETAMINOPHEN 650 MG: 325 TABLET ORAL at 22:16

## 2024-06-23 RX ADMIN — INSULIN LISPRO 4 UNITS: 100 INJECTION, SOLUTION INTRAVENOUS; SUBCUTANEOUS at 17:34

## 2024-06-23 RX ADMIN — ACETAMINOPHEN 650 MG: 325 TABLET ORAL at 16:14

## 2024-06-23 ASSESSMENT — PAIN DESCRIPTION - ORIENTATION
ORIENTATION: RIGHT
ORIENTATION: RIGHT

## 2024-06-23 ASSESSMENT — PAIN DESCRIPTION - ONSET
ONSET: ON-GOING
ONSET: ON-GOING

## 2024-06-23 ASSESSMENT — PAIN DESCRIPTION - LOCATION
LOCATION: LEG
LOCATION: LEG

## 2024-06-23 ASSESSMENT — PAIN DESCRIPTION - FREQUENCY
FREQUENCY: CONTINUOUS
FREQUENCY: CONTINUOUS

## 2024-06-23 ASSESSMENT — PAIN SCALES - GENERAL
PAINLEVEL_OUTOF10: 6
PAINLEVEL_OUTOF10: 8
PAINLEVEL_OUTOF10: 0
PAINLEVEL_OUTOF10: 6
PAINLEVEL_OUTOF10: 5

## 2024-06-23 ASSESSMENT — PAIN DESCRIPTION - DESCRIPTORS: DESCRIPTORS: SHARP

## 2024-06-23 ASSESSMENT — PAIN DESCRIPTION - PAIN TYPE
TYPE: ACUTE PAIN;SURGICAL PAIN
TYPE: ACUTE PAIN;SURGICAL PAIN

## 2024-06-24 ENCOUNTER — APPOINTMENT (OUTPATIENT)
Dept: GENERAL RADIOLOGY | Age: 69
DRG: 480 | End: 2024-06-24
Payer: MEDICARE

## 2024-06-24 LAB
ALBUMIN SERPL-MCNC: 2.6 G/DL (ref 3.4–5)
ANION GAP SERPL CALCULATED.3IONS-SCNC: 18 MMOL/L (ref 3–16)
ANISOCYTOSIS BLD QL SMEAR: ABNORMAL
BACTERIA UR CULT: ABNORMAL
BASOPHILS # BLD: 0 K/UL (ref 0–0.2)
BASOPHILS NFR BLD: 0 %
BUN SERPL-MCNC: 97 MG/DL (ref 7–20)
CALCIUM SERPL-MCNC: 7.6 MG/DL (ref 8.3–10.6)
CHLORIDE SERPL-SCNC: 103 MMOL/L (ref 99–110)
CO2 SERPL-SCNC: 19 MMOL/L (ref 21–32)
CREAT SERPL-MCNC: 6.7 MG/DL (ref 0.6–1.2)
DEPRECATED RDW RBC AUTO: 15.2 % (ref 12.4–15.4)
EOSINOPHIL # BLD: 0 K/UL (ref 0–0.6)
EOSINOPHIL NFR BLD: 0 %
GFR SERPLBLD CREATININE-BSD FMLA CKD-EPI: 6 ML/MIN/{1.73_M2}
GLUCOSE BLD-MCNC: 189 MG/DL (ref 70–99)
GLUCOSE BLD-MCNC: 196 MG/DL (ref 70–99)
GLUCOSE BLD-MCNC: 243 MG/DL (ref 70–99)
GLUCOSE BLD-MCNC: 255 MG/DL (ref 70–99)
GLUCOSE BLD-MCNC: 255 MG/DL (ref 70–99)
GLUCOSE SERPL-MCNC: 271 MG/DL (ref 70–99)
HCT VFR BLD AUTO: 26.3 % (ref 36–48)
HGB BLD-MCNC: 8.8 G/DL (ref 12–16)
LYMPHOCYTES # BLD: 0.3 K/UL (ref 1–5.1)
LYMPHOCYTES NFR BLD: 5 %
MAGNESIUM SERPL-MCNC: 1.8 MG/DL (ref 1.8–2.4)
MCH RBC QN AUTO: 28.4 PG (ref 26–34)
MCHC RBC AUTO-ENTMCNC: 33.3 G/DL (ref 31–36)
MCV RBC AUTO: 85.3 FL (ref 80–100)
METAMYELOCYTES NFR BLD MANUAL: 2 %
MONOCYTES # BLD: 0.6 K/UL (ref 0–1.3)
MONOCYTES NFR BLD: 11 %
NEUTROPHILS # BLD: 4.9 K/UL (ref 1.7–7.7)
NEUTROPHILS NFR BLD: 82 %
NT-PROBNP SERPL-MCNC: ABNORMAL PG/ML (ref 0–124)
ORGANISM: ABNORMAL
PERFORMED ON: ABNORMAL
PHOSPHATE SERPL-MCNC: 5.6 MG/DL (ref 2.5–4.9)
PLATELET # BLD AUTO: 132 K/UL (ref 135–450)
PMV BLD AUTO: 8.5 FL (ref 5–10.5)
POTASSIUM SERPL-SCNC: 4.9 MMOL/L (ref 3.5–5.1)
PROCALCITONIN SERPL IA-MCNC: 43.35 NG/ML (ref 0–0.15)
RBC # BLD AUTO: 3.09 M/UL (ref 4–5.2)
SLIDE REVIEW: ABNORMAL
SODIUM SERPL-SCNC: 140 MMOL/L (ref 136–145)
VANCOMYCIN SERPL-MCNC: 23.9 UG/ML
WBC # BLD AUTO: 5.8 K/UL (ref 4–11)

## 2024-06-24 PROCEDURE — 84145 PROCALCITONIN (PCT): CPT

## 2024-06-24 PROCEDURE — P9045 ALBUMIN (HUMAN), 5%, 250 ML: HCPCS | Performed by: INTERNAL MEDICINE

## 2024-06-24 PROCEDURE — 2580000003 HC RX 258: Performed by: UROLOGY

## 2024-06-24 PROCEDURE — 80069 RENAL FUNCTION PANEL: CPT

## 2024-06-24 PROCEDURE — 6360000002 HC RX W HCPCS

## 2024-06-24 PROCEDURE — 6370000000 HC RX 637 (ALT 250 FOR IP)

## 2024-06-24 PROCEDURE — 83735 ASSAY OF MAGNESIUM: CPT

## 2024-06-24 PROCEDURE — APPNB45 APP NON BILLABLE 31-45 MINUTES: Performed by: NURSE PRACTITIONER

## 2024-06-24 PROCEDURE — 71045 X-RAY EXAM CHEST 1 VIEW: CPT

## 2024-06-24 PROCEDURE — 80202 ASSAY OF VANCOMYCIN: CPT

## 2024-06-24 PROCEDURE — 85025 COMPLETE CBC W/AUTO DIFF WBC: CPT

## 2024-06-24 PROCEDURE — 36415 COLL VENOUS BLD VENIPUNCTURE: CPT

## 2024-06-24 PROCEDURE — 6360000002 HC RX W HCPCS: Performed by: NURSE PRACTITIONER

## 2024-06-24 PROCEDURE — 6370000000 HC RX 637 (ALT 250 FOR IP): Performed by: UROLOGY

## 2024-06-24 PROCEDURE — 2060000000 HC ICU INTERMEDIATE R&B

## 2024-06-24 PROCEDURE — 36568 INSJ PICC <5 YR W/O IMAGING: CPT

## 2024-06-24 PROCEDURE — 83880 ASSAY OF NATRIURETIC PEPTIDE: CPT

## 2024-06-24 PROCEDURE — 2580000003 HC RX 258: Performed by: INTERNAL MEDICINE

## 2024-06-24 PROCEDURE — 6370000000 HC RX 637 (ALT 250 FOR IP): Performed by: STUDENT IN AN ORGANIZED HEALTH CARE EDUCATION/TRAINING PROGRAM

## 2024-06-24 PROCEDURE — 99024 POSTOP FOLLOW-UP VISIT: CPT | Performed by: NURSE PRACTITIONER

## 2024-06-24 PROCEDURE — C1751 CATH, INF, PER/CENT/MIDLINE: HCPCS

## 2024-06-24 PROCEDURE — 2580000003 HC RX 258: Performed by: NURSE PRACTITIONER

## 2024-06-24 PROCEDURE — 6360000002 HC RX W HCPCS: Performed by: INTERNAL MEDICINE

## 2024-06-24 PROCEDURE — 6370000000 HC RX 637 (ALT 250 FOR IP): Performed by: INTERNAL MEDICINE

## 2024-06-24 PROCEDURE — 2580000003 HC RX 258

## 2024-06-24 RX ORDER — DIPHENHYDRAMINE HCL 25 MG
25 TABLET ORAL EVERY 8 HOURS PRN
Status: DISPENSED | OUTPATIENT
Start: 2024-06-24 | End: 2024-06-26

## 2024-06-24 RX ORDER — HEPARIN SODIUM 5000 [USP'U]/ML
5000 INJECTION, SOLUTION INTRAVENOUS; SUBCUTANEOUS 2 TIMES DAILY
Status: DISCONTINUED | OUTPATIENT
Start: 2024-06-25 | End: 2024-07-01

## 2024-06-24 RX ADMIN — MORPHINE SULFATE 4 MG: 4 INJECTION, SOLUTION INTRAMUSCULAR; INTRAVENOUS at 21:38

## 2024-06-24 RX ADMIN — SODIUM BICARBONATE 650 MG: 650 TABLET ORAL at 17:45

## 2024-06-24 RX ADMIN — ACETAMINOPHEN 650 MG: 325 TABLET ORAL at 14:43

## 2024-06-24 RX ADMIN — MORPHINE SULFATE 4 MG: 4 INJECTION, SOLUTION INTRAMUSCULAR; INTRAVENOUS at 08:32

## 2024-06-24 RX ADMIN — SODIUM CHLORIDE, PRESERVATIVE FREE 10 ML: 5 INJECTION INTRAVENOUS at 08:19

## 2024-06-24 RX ADMIN — CHOLECALCIFEROL TAB 25 MCG (1000 UNIT) 1000 UNITS: 25 TAB at 08:18

## 2024-06-24 RX ADMIN — MORPHINE SULFATE 4 MG: 4 INJECTION, SOLUTION INTRAMUSCULAR; INTRAVENOUS at 11:28

## 2024-06-24 RX ADMIN — PANTOPRAZOLE SODIUM 40 MG: 40 TABLET, DELAYED RELEASE ORAL at 05:58

## 2024-06-24 RX ADMIN — MORPHINE SULFATE 4 MG: 4 INJECTION, SOLUTION INTRAMUSCULAR; INTRAVENOUS at 17:43

## 2024-06-24 RX ADMIN — ALBUMIN (HUMAN) 12.5 G: 12.5 INJECTION, SOLUTION INTRAVENOUS at 22:16

## 2024-06-24 RX ADMIN — SODIUM BICARBONATE 650 MG: 650 TABLET ORAL at 08:19

## 2024-06-24 RX ADMIN — ATORVASTATIN CALCIUM 10 MG: 10 TABLET, FILM COATED ORAL at 08:19

## 2024-06-24 RX ADMIN — Medication 10 ML: at 20:00

## 2024-06-24 RX ADMIN — MIDODRINE HYDROCHLORIDE 10 MG: 5 TABLET ORAL at 08:31

## 2024-06-24 RX ADMIN — ACETAMINOPHEN 650 MG: 325 TABLET ORAL at 21:37

## 2024-06-24 RX ADMIN — SENNOSIDES AND DOCUSATE SODIUM 1 TABLET: 50; 8.6 TABLET ORAL at 21:37

## 2024-06-24 RX ADMIN — SODIUM CHLORIDE: 9 INJECTION, SOLUTION INTRAVENOUS at 22:11

## 2024-06-24 RX ADMIN — MORPHINE SULFATE 4 MG: 4 INJECTION, SOLUTION INTRAMUSCULAR; INTRAVENOUS at 14:27

## 2024-06-24 RX ADMIN — ALBUMIN (HUMAN) 12.5 G: 12.5 INJECTION, SOLUTION INTRAVENOUS at 14:32

## 2024-06-24 RX ADMIN — ALBUMIN (HUMAN) 12.5 G: 12.5 INJECTION, SOLUTION INTRAVENOUS at 06:07

## 2024-06-24 RX ADMIN — Medication 10 ML: at 08:19

## 2024-06-24 RX ADMIN — SODIUM BICARBONATE 650 MG: 650 TABLET ORAL at 11:29

## 2024-06-24 RX ADMIN — SODIUM BICARBONATE 650 MG: 650 TABLET ORAL at 21:37

## 2024-06-24 RX ADMIN — OXYCODONE HYDROCHLORIDE 10 MG: 5 TABLET ORAL at 06:01

## 2024-06-24 RX ADMIN — MIDODRINE HYDROCHLORIDE 10 MG: 5 TABLET ORAL at 11:29

## 2024-06-24 RX ADMIN — DIPHENHYDRAMINE HYDROCHLORIDE 25 MG: 25 TABLET ORAL at 08:18

## 2024-06-24 RX ADMIN — MORPHINE SULFATE 4 MG: 4 INJECTION, SOLUTION INTRAMUSCULAR; INTRAVENOUS at 23:43

## 2024-06-24 RX ADMIN — ACETAMINOPHEN 650 MG: 325 TABLET ORAL at 10:00

## 2024-06-24 RX ADMIN — INSULIN LISPRO 4 UNITS: 100 INJECTION, SOLUTION INTRAVENOUS; SUBCUTANEOUS at 13:13

## 2024-06-24 RX ADMIN — SODIUM CHLORIDE, PRESERVATIVE FREE 10 ML: 5 INJECTION INTRAVENOUS at 08:20

## 2024-06-24 RX ADMIN — INSULIN LISPRO 4 UNITS: 100 INJECTION, SOLUTION INTRAVENOUS; SUBCUTANEOUS at 08:31

## 2024-06-24 RX ADMIN — CEFEPIME 1000 MG: 1 INJECTION, POWDER, FOR SOLUTION INTRAMUSCULAR; INTRAVENOUS at 22:13

## 2024-06-24 RX ADMIN — MORPHINE SULFATE 4 MG: 4 INJECTION, SOLUTION INTRAMUSCULAR; INTRAVENOUS at 02:32

## 2024-06-24 RX ADMIN — SENNOSIDES AND DOCUSATE SODIUM 1 TABLET: 50; 8.6 TABLET ORAL at 08:31

## 2024-06-24 RX ADMIN — FERROUS SULFATE TAB 325 MG (65 MG ELEMENTAL FE) 324 MG: 325 (65 FE) TAB at 08:19

## 2024-06-24 RX ADMIN — ACETAMINOPHEN 650 MG: 325 TABLET ORAL at 05:58

## 2024-06-24 ASSESSMENT — PAIN SCALES - GENERAL
PAINLEVEL_OUTOF10: 10
PAINLEVEL_OUTOF10: 8
PAINLEVEL_OUTOF10: 10
PAINLEVEL_OUTOF10: 7
PAINLEVEL_OUTOF10: 8
PAINLEVEL_OUTOF10: 9
PAINLEVEL_OUTOF10: 10
PAINLEVEL_OUTOF10: 10
PAINLEVEL_OUTOF10: 3
PAINLEVEL_OUTOF10: 2
PAINLEVEL_OUTOF10: 8
PAINLEVEL_OUTOF10: 10
PAINLEVEL_OUTOF10: 8
PAINLEVEL_OUTOF10: 3
PAINLEVEL_OUTOF10: 10
PAINLEVEL_OUTOF10: 10
PAINLEVEL_OUTOF10: 9
PAINLEVEL_OUTOF10: 10

## 2024-06-24 ASSESSMENT — PAIN DESCRIPTION - ORIENTATION
ORIENTATION: RIGHT

## 2024-06-24 ASSESSMENT — PAIN SCALES - WONG BAKER
WONGBAKER_NUMERICALRESPONSE: HURTS WHOLE LOT

## 2024-06-24 ASSESSMENT — PAIN DESCRIPTION - LOCATION
LOCATION: LEG

## 2024-06-24 ASSESSMENT — PAIN - FUNCTIONAL ASSESSMENT
PAIN_FUNCTIONAL_ASSESSMENT: INTOLERABLE, UNABLE TO DO ANY ACTIVE OR PASSIVE ACTIVITIES
PAIN_FUNCTIONAL_ASSESSMENT: INTOLERABLE, UNABLE TO DO ANY ACTIVE OR PASSIVE ACTIVITIES
PAIN_FUNCTIONAL_ASSESSMENT: ACTIVITIES ARE NOT PREVENTED
PAIN_FUNCTIONAL_ASSESSMENT: PREVENTS OR INTERFERES SOME ACTIVE ACTIVITIES AND ADLS

## 2024-06-24 ASSESSMENT — PAIN DESCRIPTION - ONSET
ONSET: ON-GOING
ONSET: ON-GOING

## 2024-06-24 ASSESSMENT — PAIN DESCRIPTION - PAIN TYPE
TYPE: SURGICAL PAIN
TYPE: ACUTE PAIN
TYPE: ACUTE PAIN

## 2024-06-24 ASSESSMENT — PAIN DESCRIPTION - DESCRIPTORS
DESCRIPTORS: DISCOMFORT
DESCRIPTORS: ACHING
DESCRIPTORS: DISCOMFORT
DESCRIPTORS: DISCOMFORT
DESCRIPTORS: SHARP

## 2024-06-24 ASSESSMENT — PAIN DESCRIPTION - FREQUENCY
FREQUENCY: CONTINUOUS
FREQUENCY: CONTINUOUS

## 2024-06-25 ENCOUNTER — APPOINTMENT (OUTPATIENT)
Dept: GENERAL RADIOLOGY | Age: 69
DRG: 480 | End: 2024-06-25
Payer: MEDICARE

## 2024-06-25 LAB
ALBUMIN SERPL-MCNC: 3.1 G/DL (ref 3.4–5)
ALP SERPL-CCNC: 95 U/L (ref 40–129)
ALT SERPL-CCNC: 11 U/L (ref 10–40)
ANION GAP SERPL CALCULATED.3IONS-SCNC: 14 MMOL/L (ref 3–16)
ANISOCYTOSIS BLD QL SMEAR: ABNORMAL
AST SERPL-CCNC: 16 U/L (ref 15–37)
BACTERIA BLD CULT ORG #2: NORMAL
BACTERIA BLD CULT: NORMAL
BASOPHILS # BLD: 0 K/UL (ref 0–0.2)
BASOPHILS NFR BLD: 0 %
BILIRUB DIRECT SERPL-MCNC: 0.3 MG/DL (ref 0–0.3)
BILIRUB INDIRECT SERPL-MCNC: 0.2 MG/DL (ref 0–1)
BILIRUB SERPL-MCNC: 0.5 MG/DL (ref 0–1)
BUN SERPL-MCNC: 99 MG/DL (ref 7–20)
CALCIUM SERPL-MCNC: 7.7 MG/DL (ref 8.3–10.6)
CHLORIDE SERPL-SCNC: 102 MMOL/L (ref 99–110)
CO2 SERPL-SCNC: 23 MMOL/L (ref 21–32)
CREAT SERPL-MCNC: 6.4 MG/DL (ref 0.6–1.2)
DEPRECATED RDW RBC AUTO: 15.9 % (ref 12.4–15.4)
EOSINOPHIL # BLD: 0.1 K/UL (ref 0–0.6)
EOSINOPHIL NFR BLD: 2 %
GFR SERPLBLD CREATININE-BSD FMLA CKD-EPI: 7 ML/MIN/{1.73_M2}
GLUCOSE BLD-MCNC: 195 MG/DL (ref 70–99)
GLUCOSE BLD-MCNC: 204 MG/DL (ref 70–99)
GLUCOSE BLD-MCNC: 221 MG/DL (ref 70–99)
GLUCOSE SERPL-MCNC: 191 MG/DL (ref 70–99)
HCT VFR BLD AUTO: 26.1 % (ref 36–48)
HGB BLD-MCNC: 8.5 G/DL (ref 12–16)
LYMPHOCYTES # BLD: 0.9 K/UL (ref 1–5.1)
LYMPHOCYTES NFR BLD: 13 %
MAGNESIUM SERPL-MCNC: 1.7 MG/DL (ref 1.8–2.4)
MCH RBC QN AUTO: 28 PG (ref 26–34)
MCHC RBC AUTO-ENTMCNC: 32.6 G/DL (ref 31–36)
MCV RBC AUTO: 86 FL (ref 80–100)
METAMYELOCYTES NFR BLD MANUAL: 1 %
MONOCYTES # BLD: 1 K/UL (ref 0–1.3)
MONOCYTES NFR BLD: 14 %
NEUTROPHILS # BLD: 5 K/UL (ref 1.7–7.7)
NEUTROPHILS NFR BLD: 70 %
PERFORMED ON: ABNORMAL
PHOSPHATE SERPL-MCNC: 6.1 MG/DL (ref 2.5–4.9)
PLATELET # BLD AUTO: 157 K/UL (ref 135–450)
PLATELET BLD QL SMEAR: ADEQUATE
PMV BLD AUTO: 8 FL (ref 5–10.5)
POTASSIUM SERPL-SCNC: 4.5 MMOL/L (ref 3.5–5.1)
PROT SERPL-MCNC: 5.6 G/DL (ref 6.4–8.2)
RBC # BLD AUTO: 3.03 M/UL (ref 4–5.2)
SLIDE REVIEW: ABNORMAL
SODIUM SERPL-SCNC: 139 MMOL/L (ref 136–145)
WBC # BLD AUTO: 7 K/UL (ref 4–11)

## 2024-06-25 PROCEDURE — 6370000000 HC RX 637 (ALT 250 FOR IP): Performed by: UROLOGY

## 2024-06-25 PROCEDURE — 2580000003 HC RX 258: Performed by: NURSE PRACTITIONER

## 2024-06-25 PROCEDURE — 80069 RENAL FUNCTION PANEL: CPT

## 2024-06-25 PROCEDURE — 97530 THERAPEUTIC ACTIVITIES: CPT

## 2024-06-25 PROCEDURE — 6370000000 HC RX 637 (ALT 250 FOR IP): Performed by: STUDENT IN AN ORGANIZED HEALTH CARE EDUCATION/TRAINING PROGRAM

## 2024-06-25 PROCEDURE — 6370000000 HC RX 637 (ALT 250 FOR IP): Performed by: INTERNAL MEDICINE

## 2024-06-25 PROCEDURE — 6360000002 HC RX W HCPCS: Performed by: NURSE PRACTITIONER

## 2024-06-25 PROCEDURE — 2060000000 HC ICU INTERMEDIATE R&B

## 2024-06-25 PROCEDURE — 6360000002 HC RX W HCPCS: Performed by: INTERNAL MEDICINE

## 2024-06-25 PROCEDURE — 80076 HEPATIC FUNCTION PANEL: CPT

## 2024-06-25 PROCEDURE — 83735 ASSAY OF MAGNESIUM: CPT

## 2024-06-25 PROCEDURE — 97166 OT EVAL MOD COMPLEX 45 MIN: CPT

## 2024-06-25 PROCEDURE — 99024 POSTOP FOLLOW-UP VISIT: CPT | Performed by: NURSE PRACTITIONER

## 2024-06-25 PROCEDURE — 2580000003 HC RX 258: Performed by: INTERNAL MEDICINE

## 2024-06-25 PROCEDURE — 97162 PT EVAL MOD COMPLEX 30 MIN: CPT

## 2024-06-25 PROCEDURE — P9045 ALBUMIN (HUMAN), 5%, 250 ML: HCPCS | Performed by: INTERNAL MEDICINE

## 2024-06-25 PROCEDURE — APPNB45 APP NON BILLABLE 31-45 MINUTES: Performed by: NURSE PRACTITIONER

## 2024-06-25 PROCEDURE — 6360000002 HC RX W HCPCS

## 2024-06-25 PROCEDURE — 85025 COMPLETE CBC W/AUTO DIFF WBC: CPT

## 2024-06-25 PROCEDURE — 73620 X-RAY EXAM OF FOOT: CPT

## 2024-06-25 PROCEDURE — 6370000000 HC RX 637 (ALT 250 FOR IP)

## 2024-06-25 RX ORDER — MORPHINE SULFATE 2 MG/ML
2 INJECTION, SOLUTION INTRAMUSCULAR; INTRAVENOUS EVERY 4 HOURS PRN
Status: DISCONTINUED | OUTPATIENT
Start: 2024-06-25 | End: 2024-06-27

## 2024-06-25 RX ORDER — TRAMADOL HYDROCHLORIDE 50 MG/1
50 TABLET ORAL ONCE
Status: DISCONTINUED | OUTPATIENT
Start: 2024-06-25 | End: 2024-06-25

## 2024-06-25 RX ORDER — LANOLIN ALCOHOL/MO/W.PET/CERES
400 CREAM (GRAM) TOPICAL 2 TIMES DAILY
Status: COMPLETED | OUTPATIENT
Start: 2024-06-25 | End: 2024-06-26

## 2024-06-25 RX ORDER — MORPHINE SULFATE 4 MG/ML
4 INJECTION, SOLUTION INTRAMUSCULAR; INTRAVENOUS EVERY 4 HOURS PRN
Status: DISCONTINUED | OUTPATIENT
Start: 2024-06-25 | End: 2024-06-25

## 2024-06-25 RX ORDER — ALBUMIN, HUMAN INJ 5% 5 %
12.5 SOLUTION INTRAVENOUS EVERY 8 HOURS
Status: COMPLETED | OUTPATIENT
Start: 2024-06-25 | End: 2024-06-27

## 2024-06-25 RX ADMIN — MORPHINE SULFATE 2 MG: 2 INJECTION, SOLUTION INTRAMUSCULAR; INTRAVENOUS at 11:57

## 2024-06-25 RX ADMIN — Medication 10 ML: at 20:57

## 2024-06-25 RX ADMIN — OXYCODONE HYDROCHLORIDE 10 MG: 5 TABLET ORAL at 08:20

## 2024-06-25 RX ADMIN — OXYCODONE HYDROCHLORIDE 5 MG: 5 TABLET ORAL at 17:06

## 2024-06-25 RX ADMIN — ACETAMINOPHEN 650 MG: 325 TABLET ORAL at 17:05

## 2024-06-25 RX ADMIN — ACETAMINOPHEN 650 MG: 325 TABLET ORAL at 03:40

## 2024-06-25 RX ADMIN — SODIUM BICARBONATE 650 MG: 650 TABLET ORAL at 11:57

## 2024-06-25 RX ADMIN — MORPHINE SULFATE 4 MG: 4 INJECTION, SOLUTION INTRAMUSCULAR; INTRAVENOUS at 08:38

## 2024-06-25 RX ADMIN — SODIUM BICARBONATE 650 MG: 650 TABLET ORAL at 17:05

## 2024-06-25 RX ADMIN — CHOLECALCIFEROL TAB 25 MCG (1000 UNIT) 1000 UNITS: 25 TAB at 08:19

## 2024-06-25 RX ADMIN — MORPHINE SULFATE 2 MG: 2 INJECTION, SOLUTION INTRAMUSCULAR; INTRAVENOUS at 20:22

## 2024-06-25 RX ADMIN — MORPHINE SULFATE 4 MG: 4 INJECTION, SOLUTION INTRAMUSCULAR; INTRAVENOUS at 03:39

## 2024-06-25 RX ADMIN — INSULIN LISPRO 2 UNITS: 100 INJECTION, SOLUTION INTRAVENOUS; SUBCUTANEOUS at 17:14

## 2024-06-25 RX ADMIN — ACETAMINOPHEN 650 MG: 325 TABLET ORAL at 08:19

## 2024-06-25 RX ADMIN — OXYCODONE HYDROCHLORIDE 10 MG: 5 TABLET ORAL at 04:46

## 2024-06-25 RX ADMIN — CEFEPIME 1000 MG: 1 INJECTION, POWDER, FOR SOLUTION INTRAMUSCULAR; INTRAVENOUS at 21:00

## 2024-06-25 RX ADMIN — SENNOSIDES AND DOCUSATE SODIUM 1 TABLET: 50; 8.6 TABLET ORAL at 20:57

## 2024-06-25 RX ADMIN — Medication 400 MG: at 20:57

## 2024-06-25 RX ADMIN — ATORVASTATIN CALCIUM 10 MG: 10 TABLET, FILM COATED ORAL at 08:19

## 2024-06-25 RX ADMIN — ACETAMINOPHEN 650 MG: 325 TABLET ORAL at 20:57

## 2024-06-25 RX ADMIN — MORPHINE SULFATE 4 MG: 4 INJECTION, SOLUTION INTRAMUSCULAR; INTRAVENOUS at 06:56

## 2024-06-25 RX ADMIN — FERROUS SULFATE TAB 325 MG (65 MG ELEMENTAL FE) 324 MG: 325 (65 FE) TAB at 08:20

## 2024-06-25 RX ADMIN — ALBUMIN (HUMAN) 12.5 G: 12.5 INJECTION, SOLUTION INTRAVENOUS at 20:29

## 2024-06-25 RX ADMIN — SODIUM BICARBONATE 650 MG: 650 TABLET ORAL at 08:20

## 2024-06-25 RX ADMIN — ALBUMIN (HUMAN) 12.5 G: 12.5 INJECTION, SOLUTION INTRAVENOUS at 07:02

## 2024-06-25 RX ADMIN — SODIUM BICARBONATE 650 MG: 650 TABLET ORAL at 20:57

## 2024-06-25 RX ADMIN — SENNOSIDES AND DOCUSATE SODIUM 1 TABLET: 50; 8.6 TABLET ORAL at 08:20

## 2024-06-25 RX ADMIN — PANTOPRAZOLE SODIUM 40 MG: 40 TABLET, DELAYED RELEASE ORAL at 08:20

## 2024-06-25 RX ADMIN — HEPARIN SODIUM 5000 UNITS: 5000 INJECTION INTRAVENOUS; SUBCUTANEOUS at 20:58

## 2024-06-25 ASSESSMENT — PAIN SCALES - GENERAL
PAINLEVEL_OUTOF10: 10
PAINLEVEL_OUTOF10: 7
PAINLEVEL_OUTOF10: 5
PAINLEVEL_OUTOF10: 5
PAINLEVEL_OUTOF10: 10
PAINLEVEL_OUTOF10: 9
PAINLEVEL_OUTOF10: 8
PAINLEVEL_OUTOF10: 10
PAINLEVEL_OUTOF10: 9
PAINLEVEL_OUTOF10: 6
PAINLEVEL_OUTOF10: 6
PAINLEVEL_OUTOF10: 10
PAINLEVEL_OUTOF10: 9
PAINLEVEL_OUTOF10: 8
PAINLEVEL_OUTOF10: 10
PAINLEVEL_OUTOF10: 10
PAINLEVEL_OUTOF10: 9

## 2024-06-25 ASSESSMENT — PAIN DESCRIPTION - LOCATION
LOCATION: LEG

## 2024-06-25 ASSESSMENT — PAIN DESCRIPTION - DESCRIPTORS
DESCRIPTORS: ACHING

## 2024-06-25 ASSESSMENT — PAIN DESCRIPTION - ORIENTATION
ORIENTATION: RIGHT

## 2024-06-25 ASSESSMENT — PAIN - FUNCTIONAL ASSESSMENT: PAIN_FUNCTIONAL_ASSESSMENT: INTOLERABLE, UNABLE TO DO ANY ACTIVE OR PASSIVE ACTIVITIES

## 2024-06-25 ASSESSMENT — PAIN DESCRIPTION - PAIN TYPE: TYPE: SURGICAL PAIN

## 2024-06-25 ASSESSMENT — PAIN DESCRIPTION - ONSET: ONSET: ON-GOING

## 2024-06-25 ASSESSMENT — PAIN DESCRIPTION - FREQUENCY: FREQUENCY: CONTINUOUS

## 2024-06-25 NOTE — CARE COORDINATION
Discharge Planning Note:    CM spoke to patient & spouse bedside regarding DC plan and therapy recommendation for SNF.  CM provided spouse with Medicare SNF list to review.  Spouse reported that his daughter is the ADON at [facility] in [facility] and he would like for patient to go there.  Back-up option would be [facility].      CM called [facility] main number [phone] and was forwarded to the admissions office. [facility] for call back, requesting fax number for referral to be sent.     Electronically signed by Isabel Garay RN on 6/25/2024 at 3:26 PM       Hypothermia [place] maintained. Provider. Dariusz Garcia, notified. stated to recheck temp in 2 hrs. Will give warming blanket and hot packs as per provider. Will repeat temp in 2 hours on [address]. place Pt on warming blanket

## 2024-06-25 NOTE — CARE COORDINATION
Discharge Planning Note:    CM requested ARU consult.    Electronically signed by Isabel Garay RN on 6/25/2024 at 9:29 AM

## 2024-06-26 ENCOUNTER — APPOINTMENT (OUTPATIENT)
Dept: GENERAL RADIOLOGY | Age: 69
DRG: 480 | End: 2024-06-26
Payer: MEDICARE

## 2024-06-26 LAB
ALBUMIN SERPL-MCNC: 3.4 G/DL (ref 3.4–5)
ALP SERPL-CCNC: 102 U/L (ref 40–129)
ALT SERPL-CCNC: 11 U/L (ref 10–40)
ANION GAP SERPL CALCULATED.3IONS-SCNC: 17 MMOL/L (ref 3–16)
AST SERPL-CCNC: 18 U/L (ref 15–37)
BASE EXCESS BLDA CALC-SCNC: -3 MMOL/L (ref -3–3)
BILIRUB DIRECT SERPL-MCNC: 0.4 MG/DL (ref 0–0.3)
BILIRUB INDIRECT SERPL-MCNC: 0.3 MG/DL (ref 0–1)
BILIRUB SERPL-MCNC: 0.7 MG/DL (ref 0–1)
BUN SERPL-MCNC: 99 MG/DL (ref 7–20)
CALCIUM SERPL-MCNC: 8.5 MG/DL (ref 8.3–10.6)
CHLORIDE SERPL-SCNC: 104 MMOL/L (ref 99–110)
CO2 BLDA-SCNC: 53 MMOL/L
CO2 SERPL-SCNC: 19 MMOL/L (ref 21–32)
COHGB MFR BLDA: 1.5 % (ref 0–1.5)
CREAT SERPL-MCNC: 6.1 MG/DL (ref 0.6–1.2)
FOLATE SERPL-MCNC: 5.38 NG/ML (ref 4.78–24.2)
GFR SERPLBLD CREATININE-BSD FMLA CKD-EPI: 7 ML/MIN/{1.73_M2}
GLUCOSE BLD-MCNC: 189 MG/DL (ref 70–99)
GLUCOSE BLD-MCNC: 192 MG/DL (ref 70–99)
GLUCOSE SERPL-MCNC: 185 MG/DL (ref 70–99)
HAV IGM SERPL QL IA: NORMAL
HBV CORE IGM SERPL QL IA: NORMAL
HBV SURFACE AB SERPL IA-ACNC: <3.5 MIU/ML
HBV SURFACE AG SERPL QL IA: NORMAL
HCO3 BLDA-SCNC: 22.4 MMOL/L (ref 21–29)
HCV AB SERPL QL IA: NORMAL
HGB BLDA-MCNC: 8.9 G/DL (ref 12–16)
METHGB MFR BLDA: 0.5 %
O2 THERAPY: ABNORMAL
PCO2 BLDA: 40.6 MMHG (ref 35–45)
PERFORMED ON: ABNORMAL
PERFORMED ON: ABNORMAL
PH BLDA: 7.35 [PH] (ref 7.35–7.45)
PHOSPHATE SERPL-MCNC: 5.9 MG/DL (ref 2.5–4.9)
PO2 BLDA: 58.8 MMHG (ref 75–108)
POTASSIUM SERPL-SCNC: 4.8 MMOL/L (ref 3.5–5.1)
PROT SERPL-MCNC: 5.9 G/DL (ref 6.4–8.2)
SAO2 % BLDA: 90.2 %
SODIUM SERPL-SCNC: 140 MMOL/L (ref 136–145)
VANCOMYCIN SERPL-MCNC: 14 UG/ML
VIT B12 SERPL-MCNC: >2000 PG/ML (ref 211–911)

## 2024-06-26 PROCEDURE — 94660 CPAP INITIATION&MGMT: CPT

## 2024-06-26 PROCEDURE — 82803 BLOOD GASES ANY COMBINATION: CPT

## 2024-06-26 PROCEDURE — 6360000002 HC RX W HCPCS: Performed by: INTERNAL MEDICINE

## 2024-06-26 PROCEDURE — 6370000000 HC RX 637 (ALT 250 FOR IP): Performed by: INTERNAL MEDICINE

## 2024-06-26 PROCEDURE — 36556 INSERT NON-TUNNEL CV CATH: CPT

## 2024-06-26 PROCEDURE — 2580000003 HC RX 258: Performed by: INTERNAL MEDICINE

## 2024-06-26 PROCEDURE — 80076 HEPATIC FUNCTION PANEL: CPT

## 2024-06-26 PROCEDURE — 71045 X-RAY EXAM CHEST 1 VIEW: CPT

## 2024-06-26 PROCEDURE — 80202 ASSAY OF VANCOMYCIN: CPT

## 2024-06-26 PROCEDURE — 90935 HEMODIALYSIS ONE EVALUATION: CPT

## 2024-06-26 PROCEDURE — 5A0955A ASSISTANCE WITH RESPIRATORY VENTILATION, GREATER THAN 96 CONSECUTIVE HOURS, HIGH NASAL FLOW/VELOCITY: ICD-10-PCS | Performed by: STUDENT IN AN ORGANIZED HEALTH CARE EDUCATION/TRAINING PROGRAM

## 2024-06-26 PROCEDURE — 80069 RENAL FUNCTION PANEL: CPT

## 2024-06-26 PROCEDURE — 6370000000 HC RX 637 (ALT 250 FOR IP): Performed by: STUDENT IN AN ORGANIZED HEALTH CARE EDUCATION/TRAINING PROGRAM

## 2024-06-26 PROCEDURE — 02HV33Z INSERTION OF INFUSION DEVICE INTO SUPERIOR VENA CAVA, PERCUTANEOUS APPROACH: ICD-10-PCS | Performed by: STUDENT IN AN ORGANIZED HEALTH CARE EDUCATION/TRAINING PROGRAM

## 2024-06-26 PROCEDURE — 86706 HEP B SURFACE ANTIBODY: CPT

## 2024-06-26 PROCEDURE — 94761 N-INVAS EAR/PLS OXIMETRY MLT: CPT

## 2024-06-26 PROCEDURE — 6370000000 HC RX 637 (ALT 250 FOR IP)

## 2024-06-26 PROCEDURE — 82746 ASSAY OF FOLIC ACID SERUM: CPT

## 2024-06-26 PROCEDURE — 80074 ACUTE HEPATITIS PANEL: CPT

## 2024-06-26 PROCEDURE — 2700000000 HC OXYGEN THERAPY PER DAY

## 2024-06-26 PROCEDURE — 2580000003 HC RX 258: Performed by: NURSE PRACTITIONER

## 2024-06-26 PROCEDURE — 82607 VITAMIN B-12: CPT

## 2024-06-26 PROCEDURE — 2060000000 HC ICU INTERMEDIATE R&B

## 2024-06-26 PROCEDURE — P9045 ALBUMIN (HUMAN), 5%, 250 ML: HCPCS | Performed by: INTERNAL MEDICINE

## 2024-06-26 PROCEDURE — 36600 WITHDRAWAL OF ARTERIAL BLOOD: CPT

## 2024-06-26 PROCEDURE — 6370000000 HC RX 637 (ALT 250 FOR IP): Performed by: UROLOGY

## 2024-06-26 PROCEDURE — 6360000002 HC RX W HCPCS: Performed by: NURSE PRACTITIONER

## 2024-06-26 RX ORDER — FUROSEMIDE 10 MG/ML
60 INJECTION INTRAMUSCULAR; INTRAVENOUS ONCE
Status: COMPLETED | OUTPATIENT
Start: 2024-06-26 | End: 2024-06-26

## 2024-06-26 RX ORDER — HEPARIN SODIUM 1000 [USP'U]/ML
INJECTION, SOLUTION INTRAVENOUS; SUBCUTANEOUS
Status: DISPENSED
Start: 2024-06-26 | End: 2024-06-26

## 2024-06-26 RX ORDER — DIPHENHYDRAMINE HCL 25 MG
12.5 TABLET ORAL EVERY 12 HOURS PRN
Status: DISCONTINUED | OUTPATIENT
Start: 2024-06-26 | End: 2024-07-15 | Stop reason: HOSPADM

## 2024-06-26 RX ORDER — HEPARIN SODIUM 1000 [USP'U]/ML
2300 INJECTION, SOLUTION INTRAVENOUS; SUBCUTANEOUS PRN
Status: DISCONTINUED | OUTPATIENT
Start: 2024-06-26 | End: 2024-07-08 | Stop reason: SDUPTHER

## 2024-06-26 RX ADMIN — VANCOMYCIN HYDROCHLORIDE 1000 MG: 1 INJECTION, POWDER, LYOPHILIZED, FOR SOLUTION INTRAVENOUS at 12:36

## 2024-06-26 RX ADMIN — FUROSEMIDE 60 MG: 10 INJECTION, SOLUTION INTRAMUSCULAR; INTRAVENOUS at 12:32

## 2024-06-26 RX ADMIN — MORPHINE SULFATE 2 MG: 2 INJECTION, SOLUTION INTRAMUSCULAR; INTRAVENOUS at 05:16

## 2024-06-26 RX ADMIN — OXYCODONE HYDROCHLORIDE 5 MG: 5 TABLET ORAL at 03:27

## 2024-06-26 RX ADMIN — ALBUMIN (HUMAN) 12.5 G: 12.5 INJECTION, SOLUTION INTRAVENOUS at 17:33

## 2024-06-26 RX ADMIN — MORPHINE SULFATE 2 MG: 2 INJECTION, SOLUTION INTRAMUSCULAR; INTRAVENOUS at 22:20

## 2024-06-26 RX ADMIN — HEPARIN SODIUM 5000 UNITS: 5000 INJECTION INTRAVENOUS; SUBCUTANEOUS at 08:12

## 2024-06-26 RX ADMIN — CHOLECALCIFEROL TAB 25 MCG (1000 UNIT) 1000 UNITS: 25 TAB at 07:59

## 2024-06-26 RX ADMIN — ACETAMINOPHEN 650 MG: 325 TABLET ORAL at 09:21

## 2024-06-26 RX ADMIN — SODIUM BICARBONATE 650 MG: 650 TABLET ORAL at 17:27

## 2024-06-26 RX ADMIN — ACETAMINOPHEN 650 MG: 325 TABLET ORAL at 20:21

## 2024-06-26 RX ADMIN — ALBUMIN (HUMAN) 12.5 G: 12.5 INJECTION, SOLUTION INTRAVENOUS at 11:00

## 2024-06-26 RX ADMIN — PANTOPRAZOLE SODIUM 40 MG: 40 TABLET, DELAYED RELEASE ORAL at 05:10

## 2024-06-26 RX ADMIN — SENNOSIDES AND DOCUSATE SODIUM 1 TABLET: 50; 8.6 TABLET ORAL at 08:00

## 2024-06-26 RX ADMIN — ACETAMINOPHEN 650 MG: 325 TABLET ORAL at 03:27

## 2024-06-26 RX ADMIN — ALBUMIN (HUMAN) 12.5 G: 12.5 INJECTION, SOLUTION INTRAVENOUS at 03:17

## 2024-06-26 RX ADMIN — CEFEPIME 1000 MG: 1 INJECTION, POWDER, FOR SOLUTION INTRAMUSCULAR; INTRAVENOUS at 20:57

## 2024-06-26 RX ADMIN — FERROUS SULFATE TAB 325 MG (65 MG ELEMENTAL FE) 324 MG: 325 (65 FE) TAB at 08:00

## 2024-06-26 RX ADMIN — MIDODRINE HYDROCHLORIDE 10 MG: 5 TABLET ORAL at 10:51

## 2024-06-26 RX ADMIN — SODIUM BICARBONATE 650 MG: 650 TABLET ORAL at 12:32

## 2024-06-26 RX ADMIN — SENNOSIDES AND DOCUSATE SODIUM 1 TABLET: 50; 8.6 TABLET ORAL at 20:21

## 2024-06-26 RX ADMIN — Medication 400 MG: at 07:59

## 2024-06-26 RX ADMIN — MIDODRINE HYDROCHLORIDE 10 MG: 5 TABLET ORAL at 17:27

## 2024-06-26 RX ADMIN — Medication 10 ML: at 08:16

## 2024-06-26 RX ADMIN — Medication 10 ML: at 21:00

## 2024-06-26 RX ADMIN — MORPHINE SULFATE 2 MG: 2 INJECTION, SOLUTION INTRAMUSCULAR; INTRAVENOUS at 13:13

## 2024-06-26 RX ADMIN — OXYCODONE HYDROCHLORIDE 5 MG: 5 TABLET ORAL at 07:59

## 2024-06-26 RX ADMIN — MORPHINE SULFATE 2 MG: 2 INJECTION, SOLUTION INTRAMUSCULAR; INTRAVENOUS at 09:20

## 2024-06-26 RX ADMIN — SODIUM BICARBONATE 650 MG: 650 TABLET ORAL at 07:59

## 2024-06-26 RX ADMIN — SODIUM BICARBONATE 650 MG: 650 TABLET ORAL at 20:22

## 2024-06-26 RX ADMIN — MORPHINE SULFATE 2 MG: 2 INJECTION, SOLUTION INTRAMUSCULAR; INTRAVENOUS at 18:08

## 2024-06-26 RX ADMIN — ATORVASTATIN CALCIUM 10 MG: 10 TABLET, FILM COATED ORAL at 08:00

## 2024-06-26 RX ADMIN — OXYCODONE HYDROCHLORIDE 5 MG: 5 TABLET ORAL at 17:27

## 2024-06-26 RX ADMIN — HEPARIN SODIUM 5000 UNITS: 5000 INJECTION INTRAVENOUS; SUBCUTANEOUS at 20:30

## 2024-06-26 ASSESSMENT — PAIN DESCRIPTION - DESCRIPTORS
DESCRIPTORS: ACHING

## 2024-06-26 ASSESSMENT — PAIN DESCRIPTION - PAIN TYPE
TYPE: SURGICAL PAIN
TYPE: SURGICAL PAIN

## 2024-06-26 ASSESSMENT — PAIN DESCRIPTION - ONSET
ONSET: ON-GOING
ONSET: ON-GOING

## 2024-06-26 ASSESSMENT — PAIN SCALES - GENERAL
PAINLEVEL_OUTOF10: 7
PAINLEVEL_OUTOF10: 9
PAINLEVEL_OUTOF10: 3
PAINLEVEL_OUTOF10: 8
PAINLEVEL_OUTOF10: 3
PAINLEVEL_OUTOF10: 10
PAINLEVEL_OUTOF10: 10
PAINLEVEL_OUTOF10: 7
PAINLEVEL_OUTOF10: 8
PAINLEVEL_OUTOF10: 3
PAINLEVEL_OUTOF10: 8
PAINLEVEL_OUTOF10: 3
PAINLEVEL_OUTOF10: 10
PAINLEVEL_OUTOF10: 9
PAINLEVEL_OUTOF10: 3
PAINLEVEL_OUTOF10: 9

## 2024-06-26 ASSESSMENT — PAIN SCALES - WONG BAKER: WONGBAKER_NUMERICALRESPONSE: NO HURT

## 2024-06-26 ASSESSMENT — PAIN DESCRIPTION - ORIENTATION
ORIENTATION: RIGHT

## 2024-06-26 ASSESSMENT — PAIN - FUNCTIONAL ASSESSMENT
PAIN_FUNCTIONAL_ASSESSMENT: INTOLERABLE, UNABLE TO DO ANY ACTIVE OR PASSIVE ACTIVITIES
PAIN_FUNCTIONAL_ASSESSMENT: INTOLERABLE, UNABLE TO DO ANY ACTIVE OR PASSIVE ACTIVITIES

## 2024-06-26 ASSESSMENT — PAIN DESCRIPTION - LOCATION
LOCATION: LEG

## 2024-06-26 ASSESSMENT — PAIN DESCRIPTION - FREQUENCY
FREQUENCY: CONTINUOUS
FREQUENCY: CONTINUOUS

## 2024-06-26 NOTE — PROCEDURES
PROCEDURE NOTE  Date: 6/26/2024   Name: Yoseph Small  YOB: 1955    CENTRAL LINE    Date/Time: 6/26/2024 2:54 PM    Performed by: Maxim Mason MD  Authorized by: Maxim Mason MD  Consent: Verbal consent obtained.  Consent given by: patient  Patient understanding: patient states understanding of the procedure being performed  Patient identity confirmed: arm band  Time out: Immediately prior to procedure a \"time out\" was called to verify the correct patient, procedure, equipment, support staff and site/side marked as required.  Indications: vascular access    Anesthesia:  Local Anesthetic: lidocaine 1% without epinephrine    Sedation:  Patient sedated: no    Preparation: skin prepped with ChloraPrep  Skin prep agent dried: skin prep agent completely dried prior to procedure  Sterile barriers: all five maximum sterile barriers used - cap, mask, sterile gown, sterile gloves, and large sterile sheet  Hand hygiene: hand hygiene performed prior to central venous catheter insertion  Location details: right internal jugular  Catheter type: triple lumen  Catheter size: 14 Fr  Ultrasound guidance: yes  Sterile ultrasound techniques: sterile gel and sterile probe covers were used  Number of attempts: 1  Successful placement: yes  Post-procedure: line sutured  Assessment: blood return through all ports, placement verified by x-ray and no pneumothorax on x-ray  Patient tolerance: patient tolerated the procedure well with no immediate complications  Comments: Less than 10 cc blood loss

## 2024-06-26 NOTE — CARE COORDINATION
[facility] Nurse  Consult Note       NAME:  Yoseph Small  MEDICAL RECORD NUMBER:  [id number]  AGE: 68 y.o.   GENDER: female  : 1955  TODAY'S DATE:  2024    Subjective   Reason for WOCN Evaluation and Assessment: pressure injury to right heel      Yoseph Small is a 68 y.o. female referred by:   [x] Physician  [x] Nursing  [] Other:     Wound Identification:  Wound Type: pressure  Contributing Factors: edema, chronic pressure, and decreased mobility    Wound History: Wound to right heel noted on  to be purple/maroon with surrounding non-blanchable erythema.  Current Wound Care Treatment:  bordered foam dressing    Patient Goal of Care:  [x] Wound Healing  [] Odor Control  [] Palliative Care  [] Pain Control   [] Other:         PAST MEDICAL HISTORY        Diagnosis Date    Anxiety     Depression     Diabetes mellitus (HCC)     Diverticulosis, colon     Hyperlipidemia     Hypertension     Liver cirrhosis (HCC)     Obesity     Sleep apnea        PAST SURGICAL HISTORY    Past Surgical History:   Procedure Laterality Date    CYSTOSCOPY Right 2024    CYSTOSCOPY, RIGHT RETROGRADE PYELOGRAM, EXCHANGE OF RIGHT URETERAL STENT performed by Juan Manuel Cavazos MD at [facility] OR    FEMUR SURGERY Right 2024    RIGHT DISTAL FEMUR OPEN REDUCTION INTERNAL FIXATION - SYNTHES performed by Carline You MD at [facility] OR    HYSTERECTOMY (CERVIX STATUS UNKNOWN)      INCISIONAL HERNIA REPAIR  2018    open, with mesh        FAMILY HISTORY    Family History   Problem Relation Age of Onset    Diabetes Mother     Heart Disease Neg Hx        SOCIAL HISTORY    Social History     Tobacco Use    Smoking status: Never    Smokeless tobacco: Never   Substance Use Topics    Alcohol use: No       ALLERGIES    Allergies   Allergen Reactions    Amitriptyline Hcl Shortness Of Breath    Cephalexin Shortness Of Breath    Ciprofloxacin Shortness Of Breath     Sob  nausea    Levofloxacin Shortness Of

## 2024-06-26 NOTE — CARE COORDINATION
CM received a call back from [employer], [phone], admissions at [facility] stating to fax pt's referral to [phone].  ATIF completed this fax with all pt's notes and demographic information.  Also faxed a facesheet referral to [facility] since this is family's 2nd choice.    Addendum:  Received a call back from Nichelle at [facility] in [place] stating yes they can accept her.  ATIF followed up with hospitalist who stated patient will not discharge for a few days.  Informed [employer] this.  Will continue to follow.    Electronically signed by ZENY Rico, NITESHW on 6/26/2024 at 12:29 PM

## 2024-06-27 LAB
ALBUMIN SERPL-MCNC: 3.4 G/DL (ref 3.4–5)
ALP SERPL-CCNC: 99 U/L (ref 40–129)
ALT SERPL-CCNC: 10 U/L (ref 10–40)
ANION GAP SERPL CALCULATED.3IONS-SCNC: 17 MMOL/L (ref 3–16)
AST SERPL-CCNC: 16 U/L (ref 15–37)
BILIRUB DIRECT SERPL-MCNC: 0.5 MG/DL (ref 0–0.3)
BILIRUB INDIRECT SERPL-MCNC: 0.4 MG/DL (ref 0–1)
BILIRUB SERPL-MCNC: 0.9 MG/DL (ref 0–1)
BUN SERPL-MCNC: 63 MG/DL (ref 7–20)
CALCIUM SERPL-MCNC: 8.8 MG/DL (ref 8.3–10.6)
CHLORIDE SERPL-SCNC: 106 MMOL/L (ref 99–110)
CO2 SERPL-SCNC: 20 MMOL/L (ref 21–32)
CREAT SERPL-MCNC: 3.9 MG/DL (ref 0.6–1.2)
DEPRECATED RDW RBC AUTO: 16.4 % (ref 12.4–15.4)
EKG ATRIAL RATE: 31 BPM
EKG DIAGNOSIS: NORMAL
EKG Q-T INTERVAL: 304 MS
EKG QRS DURATION: 90 MS
EKG QTC CALCULATION (BAZETT): 472 MS
EKG R AXIS: -8 DEGREES
EKG T AXIS: -36 DEGREES
EKG VENTRICULAR RATE: 145 BPM
GFR SERPLBLD CREATININE-BSD FMLA CKD-EPI: 12 ML/MIN/{1.73_M2}
GLUCOSE BLD-MCNC: 147 MG/DL (ref 70–99)
GLUCOSE BLD-MCNC: 159 MG/DL (ref 70–99)
GLUCOSE BLD-MCNC: 183 MG/DL (ref 70–99)
GLUCOSE BLD-MCNC: 232 MG/DL (ref 70–99)
GLUCOSE SERPL-MCNC: 174 MG/DL (ref 70–99)
HCT VFR BLD AUTO: 24.5 % (ref 36–48)
HGB BLD-MCNC: 7.7 G/DL (ref 12–16)
MCH RBC QN AUTO: 27.9 PG (ref 26–34)
MCHC RBC AUTO-ENTMCNC: 31.4 G/DL (ref 31–36)
MCV RBC AUTO: 88.8 FL (ref 80–100)
PERFORMED ON: ABNORMAL
PHOSPHATE SERPL-MCNC: 5.3 MG/DL (ref 2.5–4.9)
PLATELET # BLD AUTO: 139 K/UL (ref 135–450)
PMV BLD AUTO: 7.9 FL (ref 5–10.5)
POTASSIUM SERPL-SCNC: 4.8 MMOL/L (ref 3.5–5.1)
PROT SERPL-MCNC: 5.8 G/DL (ref 6.4–8.2)
RBC # BLD AUTO: 2.76 M/UL (ref 4–5.2)
SODIUM SERPL-SCNC: 143 MMOL/L (ref 136–145)
VANCOMYCIN SERPL-MCNC: 19.2 UG/ML
WBC # BLD AUTO: 8.7 K/UL (ref 4–11)

## 2024-06-27 PROCEDURE — 93010 ELECTROCARDIOGRAM REPORT: CPT | Performed by: INTERNAL MEDICINE

## 2024-06-27 PROCEDURE — 6360000002 HC RX W HCPCS: Performed by: INTERNAL MEDICINE

## 2024-06-27 PROCEDURE — 2580000003 HC RX 258: Performed by: INTERNAL MEDICINE

## 2024-06-27 PROCEDURE — 6360000002 HC RX W HCPCS

## 2024-06-27 PROCEDURE — 6370000000 HC RX 637 (ALT 250 FOR IP): Performed by: INTERNAL MEDICINE

## 2024-06-27 PROCEDURE — 2700000000 HC OXYGEN THERAPY PER DAY

## 2024-06-27 PROCEDURE — 6370000000 HC RX 637 (ALT 250 FOR IP): Performed by: UROLOGY

## 2024-06-27 PROCEDURE — 5A1D70Z PERFORMANCE OF URINARY FILTRATION, INTERMITTENT, LESS THAN 6 HOURS PER DAY: ICD-10-PCS | Performed by: STUDENT IN AN ORGANIZED HEALTH CARE EDUCATION/TRAINING PROGRAM

## 2024-06-27 PROCEDURE — 2500000003 HC RX 250 WO HCPCS: Performed by: STUDENT IN AN ORGANIZED HEALTH CARE EDUCATION/TRAINING PROGRAM

## 2024-06-27 PROCEDURE — 85027 COMPLETE CBC AUTOMATED: CPT

## 2024-06-27 PROCEDURE — 99291 CRITICAL CARE FIRST HOUR: CPT | Performed by: INTERNAL MEDICINE

## 2024-06-27 PROCEDURE — 94761 N-INVAS EAR/PLS OXIMETRY MLT: CPT

## 2024-06-27 PROCEDURE — 6360000002 HC RX W HCPCS: Performed by: NURSE PRACTITIONER

## 2024-06-27 PROCEDURE — P9045 ALBUMIN (HUMAN), 5%, 250 ML: HCPCS | Performed by: INTERNAL MEDICINE

## 2024-06-27 PROCEDURE — 2000000000 HC ICU R&B

## 2024-06-27 PROCEDURE — 80076 HEPATIC FUNCTION PANEL: CPT

## 2024-06-27 PROCEDURE — 6370000000 HC RX 637 (ALT 250 FOR IP): Performed by: STUDENT IN AN ORGANIZED HEALTH CARE EDUCATION/TRAINING PROGRAM

## 2024-06-27 PROCEDURE — 90935 HEMODIALYSIS ONE EVALUATION: CPT

## 2024-06-27 PROCEDURE — 80202 ASSAY OF VANCOMYCIN: CPT

## 2024-06-27 PROCEDURE — 2580000003 HC RX 258: Performed by: NURSE PRACTITIONER

## 2024-06-27 PROCEDURE — 2500000003 HC RX 250 WO HCPCS: Performed by: INTERNAL MEDICINE

## 2024-06-27 PROCEDURE — 94660 CPAP INITIATION&MGMT: CPT

## 2024-06-27 PROCEDURE — 93005 ELECTROCARDIOGRAM TRACING: CPT | Performed by: INTERNAL MEDICINE

## 2024-06-27 PROCEDURE — 80069 RENAL FUNCTION PANEL: CPT

## 2024-06-27 PROCEDURE — 6370000000 HC RX 637 (ALT 250 FOR IP)

## 2024-06-27 PROCEDURE — 2580000003 HC RX 258

## 2024-06-27 RX ORDER — METOPROLOL SUCCINATE 50 MG/1
100 TABLET, EXTENDED RELEASE ORAL DAILY
Status: DISCONTINUED | OUTPATIENT
Start: 2024-06-27 | End: 2024-07-06

## 2024-06-27 RX ORDER — FUROSEMIDE 10 MG/ML
60 INJECTION INTRAMUSCULAR; INTRAVENOUS ONCE
Status: COMPLETED | OUTPATIENT
Start: 2024-06-27 | End: 2024-06-27

## 2024-06-27 RX ORDER — MORPHINE SULFATE 2 MG/ML
2 INJECTION, SOLUTION INTRAMUSCULAR; INTRAVENOUS
Status: DISCONTINUED | OUTPATIENT
Start: 2024-06-27 | End: 2024-07-06

## 2024-06-27 RX ORDER — METOPROLOL TARTRATE 1 MG/ML
10 INJECTION, SOLUTION INTRAVENOUS EVERY 6 HOURS PRN
Status: DISCONTINUED | OUTPATIENT
Start: 2024-06-27 | End: 2024-07-15 | Stop reason: HOSPADM

## 2024-06-27 RX ORDER — DEXMEDETOMIDINE HYDROCHLORIDE 4 UG/ML
.1-1 INJECTION, SOLUTION INTRAVENOUS CONTINUOUS
Status: DISCONTINUED | OUTPATIENT
Start: 2024-06-27 | End: 2024-07-02

## 2024-06-27 RX ADMIN — OXYCODONE HYDROCHLORIDE 5 MG: 5 TABLET ORAL at 06:41

## 2024-06-27 RX ADMIN — HEPARIN SODIUM 5000 UNITS: 5000 INJECTION INTRAVENOUS; SUBCUTANEOUS at 21:16

## 2024-06-27 RX ADMIN — MORPHINE SULFATE 2 MG: 2 INJECTION, SOLUTION INTRAMUSCULAR; INTRAVENOUS at 08:28

## 2024-06-27 RX ADMIN — METOPROLOL TARTRATE 25 MG: 25 TABLET, FILM COATED ORAL at 12:34

## 2024-06-27 RX ADMIN — METOPROLOL TARTRATE 10 MG: 5 INJECTION INTRAVENOUS at 13:57

## 2024-06-27 RX ADMIN — FUROSEMIDE 60 MG: 10 INJECTION, SOLUTION INTRAMUSCULAR; INTRAVENOUS at 16:16

## 2024-06-27 RX ADMIN — MORPHINE SULFATE 2 MG: 2 INJECTION, SOLUTION INTRAMUSCULAR; INTRAVENOUS at 12:33

## 2024-06-27 RX ADMIN — ALBUMIN (HUMAN) 12.5 G: 12.5 INJECTION, SOLUTION INTRAVENOUS at 03:01

## 2024-06-27 RX ADMIN — ACETAMINOPHEN 650 MG: 325 TABLET ORAL at 03:53

## 2024-06-27 RX ADMIN — OXYCODONE HYDROCHLORIDE 5 MG: 5 TABLET ORAL at 11:28

## 2024-06-27 RX ADMIN — DEXMEDETOMIDINE HYDROCHLORIDE 0.2 MCG/KG/HR: 400 INJECTION, SOLUTION INTRAVENOUS at 18:58

## 2024-06-27 RX ADMIN — DIPHENHYDRAMINE HYDROCHLORIDE 12.5 MG: 25 TABLET ORAL at 11:23

## 2024-06-27 RX ADMIN — SENNOSIDES AND DOCUSATE SODIUM 1 TABLET: 50; 8.6 TABLET ORAL at 11:23

## 2024-06-27 RX ADMIN — OXYCODONE HYDROCHLORIDE 5 MG: 5 TABLET ORAL at 01:36

## 2024-06-27 RX ADMIN — AMIODARONE HYDROCHLORIDE 1 MG/MIN: 50 INJECTION, SOLUTION INTRAVENOUS at 18:51

## 2024-06-27 RX ADMIN — MORPHINE SULFATE 2 MG: 2 INJECTION, SOLUTION INTRAMUSCULAR; INTRAVENOUS at 15:57

## 2024-06-27 RX ADMIN — HEPARIN SODIUM 5000 UNITS: 5000 INJECTION INTRAVENOUS; SUBCUTANEOUS at 11:23

## 2024-06-27 RX ADMIN — FUROSEMIDE 60 MG: 10 INJECTION, SOLUTION INTRAMUSCULAR; INTRAVENOUS at 11:25

## 2024-06-27 RX ADMIN — VANCOMYCIN HYDROCHLORIDE 750 MG: 750 INJECTION, POWDER, LYOPHILIZED, FOR SOLUTION INTRAVENOUS at 12:38

## 2024-06-27 RX ADMIN — CEFEPIME 1000 MG: 1 INJECTION, POWDER, FOR SOLUTION INTRAMUSCULAR; INTRAVENOUS at 21:22

## 2024-06-27 RX ADMIN — MORPHINE SULFATE 2 MG: 2 INJECTION, SOLUTION INTRAMUSCULAR; INTRAVENOUS at 21:16

## 2024-06-27 RX ADMIN — MORPHINE SULFATE 2 MG: 2 INJECTION, SOLUTION INTRAMUSCULAR; INTRAVENOUS at 03:53

## 2024-06-27 RX ADMIN — FERROUS SULFATE TAB 325 MG (65 MG ELEMENTAL FE) 324 MG: 325 (65 FE) TAB at 11:23

## 2024-06-27 RX ADMIN — CHOLECALCIFEROL TAB 25 MCG (1000 UNIT) 1000 UNITS: 25 TAB at 11:24

## 2024-06-27 RX ADMIN — Medication 10 ML: at 08:28

## 2024-06-27 RX ADMIN — PANTOPRAZOLE SODIUM 40 MG: 40 TABLET, DELAYED RELEASE ORAL at 06:41

## 2024-06-27 RX ADMIN — ACETAMINOPHEN 650 MG: 325 TABLET ORAL at 11:24

## 2024-06-27 RX ADMIN — SODIUM BICARBONATE 650 MG: 650 TABLET ORAL at 11:24

## 2024-06-27 RX ADMIN — ATORVASTATIN CALCIUM 10 MG: 10 TABLET, FILM COATED ORAL at 11:23

## 2024-06-27 RX ADMIN — AMIODARONE HYDROCHLORIDE 150 MG: 50 INJECTION, SOLUTION INTRAVENOUS at 18:34

## 2024-06-27 RX ADMIN — Medication 10 ML: at 21:16

## 2024-06-27 RX ADMIN — ALBUMIN (HUMAN) 12.5 G: 12.5 INJECTION, SOLUTION INTRAVENOUS at 11:47

## 2024-06-27 ASSESSMENT — PAIN DESCRIPTION - LOCATION
LOCATION: GENERALIZED
LOCATION: GENERALIZED
LOCATION: LEG;TOE (COMMENT WHICH ONE)
LOCATION: GENERALIZED
LOCATION: LEG
LOCATION: GENERALIZED

## 2024-06-27 ASSESSMENT — PAIN SCALES - GENERAL
PAINLEVEL_OUTOF10: 9
PAINLEVEL_OUTOF10: 8
PAINLEVEL_OUTOF10: 6
PAINLEVEL_OUTOF10: 5
PAINLEVEL_OUTOF10: 6
PAINLEVEL_OUTOF10: 5
PAINLEVEL_OUTOF10: 7
PAINLEVEL_OUTOF10: 8
PAINLEVEL_OUTOF10: 9
PAINLEVEL_OUTOF10: 5
PAINLEVEL_OUTOF10: 7
PAINLEVEL_OUTOF10: 0
PAINLEVEL_OUTOF10: 7
PAINLEVEL_OUTOF10: 4
PAINLEVEL_OUTOF10: 4
PAINLEVEL_OUTOF10: 6
PAINLEVEL_OUTOF10: 9
PAINLEVEL_OUTOF10: 4
PAINLEVEL_OUTOF10: 9
PAINLEVEL_OUTOF10: 4
PAINLEVEL_OUTOF10: 5
PAINLEVEL_OUTOF10: 8

## 2024-06-27 ASSESSMENT — PAIN DESCRIPTION - ORIENTATION
ORIENTATION: RIGHT

## 2024-06-27 ASSESSMENT — PAIN DESCRIPTION - PAIN TYPE
TYPE: SURGICAL PAIN;ACUTE PAIN
TYPE: SURGICAL PAIN;ACUTE PAIN

## 2024-06-27 ASSESSMENT — PAIN DESCRIPTION - DESCRIPTORS: DESCRIPTORS: ACHING;GNAWING

## 2024-06-27 NOTE — CARE COORDINATION
Discharge Planning Note:    DC plan is for patient to DC to [facility] when medically ready.       HD started on 6/26/24, again today.  Plan is for 3 days low-flow for fluid removal. Still has peripheral edema.  UOP increasing. Per nephrology note, Will monitor this weekend, by early next week if no major improvement, then will consider TDC + outpatient placement for dialysis     CM notified [facility] admissions of above nephrology plan.    If patient were to require long term HD, patient will need referral sent to [facility] with in-house HD.      Currently on 15L/HF O2    Electronically signed by Isabel Garay RN on 6/27/2024 at 1:19 PM

## 2024-06-28 ENCOUNTER — APPOINTMENT (OUTPATIENT)
Age: 69
DRG: 480 | End: 2024-06-28
Payer: MEDICARE

## 2024-06-28 PROBLEM — I95.9 HYPOTENSION: Status: ACTIVE | Noted: 2024-06-28

## 2024-06-28 PROBLEM — I48.91 ATRIAL FIBRILLATION, NEW ONSET (HCC): Status: ACTIVE | Noted: 2024-06-28

## 2024-06-28 PROBLEM — J96.01 ACUTE RESPIRATORY FAILURE WITH HYPOXIA (HCC): Status: ACTIVE | Noted: 2024-06-28

## 2024-06-28 PROBLEM — K74.60 HEPATIC CIRRHOSIS (HCC): Status: ACTIVE | Noted: 2024-06-28

## 2024-06-28 LAB
ALBUMIN SERPL-MCNC: 3.3 G/DL (ref 3.4–5)
ALBUMIN SERPL-MCNC: 3.4 G/DL (ref 3.4–5)
ANION GAP SERPL CALCULATED.3IONS-SCNC: 14 MMOL/L (ref 3–16)
ANION GAP SERPL CALCULATED.3IONS-SCNC: 19 MMOL/L (ref 3–16)
BUN SERPL-MCNC: 40 MG/DL (ref 7–20)
BUN SERPL-MCNC: 52 MG/DL (ref 7–20)
CA-I BLD-SCNC: 1.19 MMOL/L (ref 1.12–1.32)
CALCIUM SERPL-MCNC: 9 MG/DL (ref 8.3–10.6)
CALCIUM SERPL-MCNC: 9.1 MG/DL (ref 8.3–10.6)
CHLORIDE SERPL-SCNC: 101 MMOL/L (ref 99–110)
CHLORIDE SERPL-SCNC: 107 MMOL/L (ref 99–110)
CO2 SERPL-SCNC: 21 MMOL/L (ref 21–32)
CO2 SERPL-SCNC: 21 MMOL/L (ref 21–32)
CREAT SERPL-MCNC: 2.8 MG/DL (ref 0.6–1.2)
CREAT SERPL-MCNC: 3.6 MG/DL (ref 0.6–1.2)
ECHO AO ASC DIAM: 2.9 CM
ECHO AO ASCENDING AORTA INDEX: 1.24 CM/M2
ECHO AO ROOT DIAM: 3.2 CM
ECHO AO ROOT INDEX: 1.37 CM/M2
ECHO AV AREA PEAK VELOCITY: 1.5 CM2
ECHO AV AREA VTI: 1.5 CM2
ECHO AV AREA/BSA PEAK VELOCITY: 0.6 CM2/M2
ECHO AV AREA/BSA VTI: 0.6 CM2/M2
ECHO AV MEAN GRADIENT: 18 MMHG
ECHO AV MEAN VELOCITY: 2 M/S
ECHO AV PEAK GRADIENT: 32 MMHG
ECHO AV PEAK VELOCITY: 2.8 M/S
ECHO AV VELOCITY RATIO: 0.54
ECHO AV VTI: 43.2 CM
ECHO BSA: 2.49 M2
ECHO EST RA PRESSURE: 15 MMHG
ECHO IVC PROX: 2.2 CM
ECHO LA AREA 4C: 31.4 CM2
ECHO LA MAJOR AXIS: 7.7 CM
ECHO LA VOL MOD A4C: 107 ML (ref 22–52)
ECHO LA VOLUME INDEX MOD A4C: 46 ML/M2 (ref 16–34)
ECHO LV E' LATERAL VELOCITY: 9 CM/S
ECHO LV E' SEPTAL VELOCITY: 7 CM/S
ECHO LV EDV A2C: 111 ML
ECHO LV EDV A4C: 86 ML
ECHO LV EDV INDEX A4C: 37 ML/M2
ECHO LV EDV NDEX A2C: 47 ML/M2
ECHO LV EJECTION FRACTION A2C: 72 %
ECHO LV EJECTION FRACTION A4C: 66 %
ECHO LV EJECTION FRACTION BIPLANE: 71 % (ref 55–100)
ECHO LV ESV A2C: 31 ML
ECHO LV ESV A4C: 29 ML
ECHO LV ESV INDEX A2C: 13 ML/M2
ECHO LV ESV INDEX A4C: 12 ML/M2
ECHO LV FRACTIONAL SHORTENING: 34 % (ref 28–44)
ECHO LV INTERNAL DIMENSION DIASTOLE INDEX: 1.5 CM/M2
ECHO LV INTERNAL DIMENSION DIASTOLIC: 3.5 CM (ref 3.9–5.3)
ECHO LV INTERNAL DIMENSION SYSTOLIC INDEX: 0.98 CM/M2
ECHO LV INTERNAL DIMENSION SYSTOLIC: 2.3 CM
ECHO LV IVSD: 1.4 CM (ref 0.6–0.9)
ECHO LV MASS 2D: 173 G (ref 67–162)
ECHO LV MASS INDEX 2D: 73.9 G/M2 (ref 43–95)
ECHO LV POSTERIOR WALL DIASTOLIC: 1.4 CM (ref 0.6–0.9)
ECHO LV RELATIVE WALL THICKNESS RATIO: 0.8
ECHO LVOT AREA: 2.8 CM2
ECHO LVOT AV VTI INDEX: 0.52
ECHO LVOT DIAM: 1.9 CM
ECHO LVOT MEAN GRADIENT: 4 MMHG
ECHO LVOT PEAK GRADIENT: 9 MMHG
ECHO LVOT PEAK VELOCITY: 1.5 M/S
ECHO LVOT STROKE VOLUME INDEX: 27.1 ML/M2
ECHO LVOT SV: 63.5 ML
ECHO LVOT VTI: 22.4 CM
ECHO MV AREA VTI: 1.6 CM2
ECHO MV E VELOCITY: 1.98 M/S
ECHO MV E/E' LATERAL: 22
ECHO MV E/E' RATIO (AVERAGED): 25.14
ECHO MV E/E' SEPTAL: 28.29
ECHO MV LVOT VTI INDEX: 1.75
ECHO MV MAX VELOCITY: 2 M/S
ECHO MV MEAN GRADIENT: 9 MMHG
ECHO MV MEAN VELOCITY: 1.4 M/S
ECHO MV PEAK GRADIENT: 15 MMHG
ECHO MV VTI: 39.1 CM
ECHO PV MAX VELOCITY: 1.2 M/S
ECHO PV PEAK GRADIENT: 6 MMHG
ECHO RA AREA 4C: 17.1 CM2
ECHO RA END SYSTOLIC VOLUME APICAL 4 CHAMBER INDEX BSA: 17 ML/M2
ECHO RA VOLUME: 39 ML
ECHO RIGHT VENTRICULAR SYSTOLIC PRESSURE (RVSP): 51 MMHG
ECHO RV BASAL DIMENSION: 4.5 CM
ECHO RV FREE WALL PEAK S': 10 CM/S
ECHO RV LONGITUDINAL DIMENSION: 6.3 CM
ECHO RV MID DIMENSION: 3.1 CM
ECHO RV TAPSE: 1.7 CM (ref 1.7–?)
ECHO TV REGURGITANT MAX VELOCITY: 3.01 M/S
ECHO TV REGURGITANT PEAK GRADIENT: 36 MMHG
GFR SERPLBLD CREATININE-BSD FMLA CKD-EPI: 13 ML/MIN/{1.73_M2}
GFR SERPLBLD CREATININE-BSD FMLA CKD-EPI: 18 ML/MIN/{1.73_M2}
GLUCOSE BLD-MCNC: 177 MG/DL (ref 70–99)
GLUCOSE BLD-MCNC: 200 MG/DL (ref 70–99)
GLUCOSE BLD-MCNC: 216 MG/DL (ref 70–99)
GLUCOSE SERPL-MCNC: 215 MG/DL (ref 70–99)
GLUCOSE SERPL-MCNC: 262 MG/DL (ref 70–99)
LACTATE BLDV-SCNC: 0.9 MMOL/L (ref 0.4–2)
MAGNESIUM SERPL-MCNC: 2 MG/DL (ref 1.8–2.4)
MAGNESIUM SERPL-MCNC: 2.1 MG/DL (ref 1.8–2.4)
PERFORMED ON: ABNORMAL
PH BLDV: 7.37 [PH] (ref 7.35–7.45)
PHOSPHATE SERPL-MCNC: 3.3 MG/DL (ref 2.5–4.9)
PHOSPHATE SERPL-MCNC: 5.2 MG/DL (ref 2.5–4.9)
POTASSIUM SERPL-SCNC: 4.4 MMOL/L (ref 3.5–5.1)
POTASSIUM SERPL-SCNC: 4.6 MMOL/L (ref 3.5–5.1)
SODIUM SERPL-SCNC: 141 MMOL/L (ref 136–145)
SODIUM SERPL-SCNC: 142 MMOL/L (ref 136–145)
VANCOMYCIN SERPL-MCNC: 18.8 UG/ML

## 2024-06-28 PROCEDURE — 94660 CPAP INITIATION&MGMT: CPT

## 2024-06-28 PROCEDURE — 6370000000 HC RX 637 (ALT 250 FOR IP): Performed by: INTERNAL MEDICINE

## 2024-06-28 PROCEDURE — 2000000000 HC ICU R&B

## 2024-06-28 PROCEDURE — 6360000002 HC RX W HCPCS: Performed by: INTERNAL MEDICINE

## 2024-06-28 PROCEDURE — 2500000003 HC RX 250 WO HCPCS: Performed by: STUDENT IN AN ORGANIZED HEALTH CARE EDUCATION/TRAINING PROGRAM

## 2024-06-28 PROCEDURE — 83605 ASSAY OF LACTIC ACID: CPT

## 2024-06-28 PROCEDURE — 6370000000 HC RX 637 (ALT 250 FOR IP)

## 2024-06-28 PROCEDURE — P9047 ALBUMIN (HUMAN), 25%, 50ML: HCPCS

## 2024-06-28 PROCEDURE — 2700000000 HC OXYGEN THERAPY PER DAY

## 2024-06-28 PROCEDURE — 82330 ASSAY OF CALCIUM: CPT

## 2024-06-28 PROCEDURE — 6360000002 HC RX W HCPCS

## 2024-06-28 PROCEDURE — 2580000003 HC RX 258: Performed by: INTERNAL MEDICINE

## 2024-06-28 PROCEDURE — 93306 TTE W/DOPPLER COMPLETE: CPT | Performed by: INTERNAL MEDICINE

## 2024-06-28 PROCEDURE — 80069 RENAL FUNCTION PANEL: CPT

## 2024-06-28 PROCEDURE — 6360000004 HC RX CONTRAST MEDICATION: Performed by: INTERNAL MEDICINE

## 2024-06-28 PROCEDURE — C8929 TTE W OR WO FOL WCON,DOPPLER: HCPCS

## 2024-06-28 PROCEDURE — 83735 ASSAY OF MAGNESIUM: CPT

## 2024-06-28 PROCEDURE — 94761 N-INVAS EAR/PLS OXIMETRY MLT: CPT

## 2024-06-28 PROCEDURE — 36592 COLLECT BLOOD FROM PICC: CPT

## 2024-06-28 PROCEDURE — 6370000000 HC RX 637 (ALT 250 FOR IP): Performed by: UROLOGY

## 2024-06-28 PROCEDURE — 90935 HEMODIALYSIS ONE EVALUATION: CPT

## 2024-06-28 PROCEDURE — 6370000000 HC RX 637 (ALT 250 FOR IP): Performed by: STUDENT IN AN ORGANIZED HEALTH CARE EDUCATION/TRAINING PROGRAM

## 2024-06-28 PROCEDURE — 99291 CRITICAL CARE FIRST HOUR: CPT | Performed by: INTERNAL MEDICINE

## 2024-06-28 PROCEDURE — 99232 SBSQ HOSP IP/OBS MODERATE 35: CPT

## 2024-06-28 PROCEDURE — 90945 DIALYSIS ONE EVALUATION: CPT

## 2024-06-28 PROCEDURE — 2580000003 HC RX 258

## 2024-06-28 PROCEDURE — 80202 ASSAY OF VANCOMYCIN: CPT

## 2024-06-28 RX ORDER — MAGNESIUM SULFATE 1 G/100ML
1000 INJECTION INTRAVENOUS PRN
Status: DISCONTINUED | OUTPATIENT
Start: 2024-06-28 | End: 2024-06-30

## 2024-06-28 RX ORDER — SODIUM CHLORIDE 0.9 % (FLUSH) 0.9 %
1.1-1.9 SYRINGE (ML) INJECTION PRN
Status: DISCONTINUED | OUTPATIENT
Start: 2024-06-28 | End: 2024-07-15 | Stop reason: HOSPADM

## 2024-06-28 RX ORDER — ALBUMIN (HUMAN) 12.5 G/50ML
SOLUTION INTRAVENOUS
Status: COMPLETED
Start: 2024-06-28 | End: 2024-06-28

## 2024-06-28 RX ORDER — AMIODARONE HYDROCHLORIDE 200 MG/1
200 TABLET ORAL 2 TIMES DAILY
Status: DISCONTINUED | OUTPATIENT
Start: 2024-06-28 | End: 2024-06-30

## 2024-06-28 RX ORDER — POTASSIUM CHLORIDE 29.8 MG/ML
20 INJECTION INTRAVENOUS PRN
Status: DISCONTINUED | OUTPATIENT
Start: 2024-06-28 | End: 2024-06-30

## 2024-06-28 RX ORDER — CALCIUM GLUCONATE 20 MG/ML
2000 INJECTION, SOLUTION INTRAVENOUS PRN
Status: DISCONTINUED | OUTPATIENT
Start: 2024-06-28 | End: 2024-06-30

## 2024-06-28 RX ORDER — CALCIUM GLUCONATE 20 MG/ML
1000 INJECTION, SOLUTION INTRAVENOUS PRN
Status: DISCONTINUED | OUTPATIENT
Start: 2024-06-28 | End: 2024-06-30

## 2024-06-28 RX ORDER — ALBUMIN (HUMAN) 12.5 G/50ML
25 SOLUTION INTRAVENOUS ONCE
Status: COMPLETED | OUTPATIENT
Start: 2024-06-28 | End: 2024-06-28

## 2024-06-28 RX ADMIN — ALBUMIN (HUMAN) 25 G: 12.5 SOLUTION INTRAVENOUS at 08:40

## 2024-06-28 RX ADMIN — SENNOSIDES AND DOCUSATE SODIUM 1 TABLET: 50; 8.6 TABLET ORAL at 11:23

## 2024-06-28 RX ADMIN — MORPHINE SULFATE 2 MG: 2 INJECTION, SOLUTION INTRAMUSCULAR; INTRAVENOUS at 18:19

## 2024-06-28 RX ADMIN — ATORVASTATIN CALCIUM 10 MG: 10 TABLET, FILM COATED ORAL at 11:23

## 2024-06-28 RX ADMIN — DEXMEDETOMIDINE HYDROCHLORIDE 0.3 MCG/KG/HR: 400 INJECTION, SOLUTION INTRAVENOUS at 20:02

## 2024-06-28 RX ADMIN — MIDODRINE HYDROCHLORIDE 10 MG: 5 TABLET ORAL at 07:41

## 2024-06-28 RX ADMIN — PANTOPRAZOLE SODIUM 40 MG: 40 TABLET, DELAYED RELEASE ORAL at 11:29

## 2024-06-28 RX ADMIN — AMIODARONE HYDROCHLORIDE 0.5 MG/MIN: 50 INJECTION, SOLUTION INTRAVENOUS at 16:28

## 2024-06-28 RX ADMIN — MIDODRINE HYDROCHLORIDE 10 MG: 5 TABLET ORAL at 16:33

## 2024-06-28 RX ADMIN — INSULIN LISPRO 2 UNITS: 100 INJECTION, SOLUTION INTRAVENOUS; SUBCUTANEOUS at 16:50

## 2024-06-28 RX ADMIN — Medication: at 15:35

## 2024-06-28 RX ADMIN — SODIUM BICARBONATE 650 MG: 650 TABLET ORAL at 11:22

## 2024-06-28 RX ADMIN — Medication 10 ML: at 11:31

## 2024-06-28 RX ADMIN — AMIODARONE HYDROCHLORIDE 200 MG: 200 TABLET ORAL at 20:06

## 2024-06-28 RX ADMIN — CHOLECALCIFEROL TAB 25 MCG (1000 UNIT) 1000 UNITS: 25 TAB at 11:22

## 2024-06-28 RX ADMIN — OXYCODONE HYDROCHLORIDE 5 MG: 5 TABLET ORAL at 11:30

## 2024-06-28 RX ADMIN — VANCOMYCIN HYDROCHLORIDE 1000 MG: 1 INJECTION, POWDER, LYOPHILIZED, FOR SOLUTION INTRAVENOUS at 11:47

## 2024-06-28 RX ADMIN — PERFLUTREN 1.5 ML: 6.52 INJECTION, SUSPENSION INTRAVENOUS at 12:43

## 2024-06-28 RX ADMIN — OXYCODONE HYDROCHLORIDE 5 MG: 5 TABLET ORAL at 16:33

## 2024-06-28 RX ADMIN — OXYCODONE HYDROCHLORIDE 5 MG: 5 TABLET ORAL at 20:29

## 2024-06-28 RX ADMIN — ACETAMINOPHEN 650 MG: 325 TABLET ORAL at 11:29

## 2024-06-28 RX ADMIN — DEXMEDETOMIDINE HYDROCHLORIDE 0.2 MCG/KG/HR: 400 INJECTION, SOLUTION INTRAVENOUS at 06:03

## 2024-06-28 RX ADMIN — CEFEPIME 1000 MG: 1 INJECTION, POWDER, FOR SOLUTION INTRAMUSCULAR; INTRAVENOUS at 20:05

## 2024-06-28 RX ADMIN — ALBUMIN (HUMAN) 25 G: 0.25 INJECTION, SOLUTION INTRAVENOUS at 08:40

## 2024-06-28 RX ADMIN — Medication 10 ML: at 19:01

## 2024-06-28 RX ADMIN — CEFEPIME 1000 MG: 1 INJECTION, POWDER, FOR SOLUTION INTRAMUSCULAR; INTRAVENOUS at 12:52

## 2024-06-28 RX ADMIN — AMIODARONE HYDROCHLORIDE 0.5 MG/MIN: 50 INJECTION, SOLUTION INTRAVENOUS at 01:10

## 2024-06-28 RX ADMIN — SODIUM CHLORIDE: 9 INJECTION, SOLUTION INTRAVENOUS at 20:04

## 2024-06-28 RX ADMIN — Medication: at 15:36

## 2024-06-28 RX ADMIN — MIDODRINE HYDROCHLORIDE 10 MG: 5 TABLET ORAL at 11:29

## 2024-06-28 RX ADMIN — HEPARIN SODIUM 5000 UNITS: 5000 INJECTION INTRAVENOUS; SUBCUTANEOUS at 20:06

## 2024-06-28 RX ADMIN — HEPARIN SODIUM 5000 UNITS: 5000 INJECTION INTRAVENOUS; SUBCUTANEOUS at 11:30

## 2024-06-28 RX ADMIN — SENNOSIDES AND DOCUSATE SODIUM 1 TABLET: 50; 8.6 TABLET ORAL at 20:06

## 2024-06-28 RX ADMIN — MORPHINE SULFATE 2 MG: 2 INJECTION, SOLUTION INTRAMUSCULAR; INTRAVENOUS at 04:12

## 2024-06-28 RX ADMIN — FERROUS SULFATE TAB 325 MG (65 MG ELEMENTAL FE) 324 MG: 325 (65 FE) TAB at 11:30

## 2024-06-28 RX ADMIN — SODIUM BICARBONATE 650 MG: 650 TABLET ORAL at 16:33

## 2024-06-28 ASSESSMENT — PAIN SCALES - GENERAL
PAINLEVEL_OUTOF10: 8
PAINLEVEL_OUTOF10: 0
PAINLEVEL_OUTOF10: 3
PAINLEVEL_OUTOF10: 0
PAINLEVEL_OUTOF10: 0
PAINLEVEL_OUTOF10: 8
PAINLEVEL_OUTOF10: 5
PAINLEVEL_OUTOF10: 10
PAINLEVEL_OUTOF10: 10
PAINLEVEL_OUTOF10: 7
PAINLEVEL_OUTOF10: 10
PAINLEVEL_OUTOF10: 0
PAINLEVEL_OUTOF10: 8
PAINLEVEL_OUTOF10: 10
PAINLEVEL_OUTOF10: 8

## 2024-06-28 ASSESSMENT — PAIN DESCRIPTION - DESCRIPTORS
DESCRIPTORS: THROBBING

## 2024-06-28 ASSESSMENT — PAIN DESCRIPTION - LOCATION
LOCATION: LEG
LOCATION: GENERALIZED;BUTTOCKS
LOCATION: LEG

## 2024-06-28 ASSESSMENT — PAIN DESCRIPTION - ORIENTATION
ORIENTATION: RIGHT

## 2024-06-28 ASSESSMENT — PAIN SCALES - WONG BAKER
WONGBAKER_NUMERICALRESPONSE: NO HURT

## 2024-06-29 ENCOUNTER — APPOINTMENT (OUTPATIENT)
Dept: GENERAL RADIOLOGY | Age: 69
DRG: 480 | End: 2024-06-29
Payer: MEDICARE

## 2024-06-29 PROBLEM — I48.0 PAF (PAROXYSMAL ATRIAL FIBRILLATION) (HCC): Status: ACTIVE | Noted: 2024-06-28

## 2024-06-29 LAB
ALBUMIN SERPL-MCNC: 3.4 G/DL (ref 3.4–5)
ALBUMIN SERPL-MCNC: 3.5 G/DL (ref 3.4–5)
ANION GAP SERPL CALCULATED.3IONS-SCNC: 11 MMOL/L (ref 3–16)
ANION GAP SERPL CALCULATED.3IONS-SCNC: 15 MMOL/L (ref 3–16)
BUN SERPL-MCNC: 31 MG/DL (ref 7–20)
BUN SERPL-MCNC: 35 MG/DL (ref 7–20)
CA-I BLD-SCNC: 1.18 MMOL/L (ref 1.12–1.32)
CA-I BLD-SCNC: 1.19 MMOL/L (ref 1.12–1.32)
CALCIUM SERPL-MCNC: 8.9 MG/DL (ref 8.3–10.6)
CALCIUM SERPL-MCNC: 9 MG/DL (ref 8.3–10.6)
CHLORIDE SERPL-SCNC: 102 MMOL/L (ref 99–110)
CHLORIDE SERPL-SCNC: 106 MMOL/L (ref 99–110)
CO2 SERPL-SCNC: 21 MMOL/L (ref 21–32)
CO2 SERPL-SCNC: 24 MMOL/L (ref 21–32)
CREAT SERPL-MCNC: 2 MG/DL (ref 0.6–1.2)
CREAT SERPL-MCNC: 2.5 MG/DL (ref 0.6–1.2)
DEPRECATED RDW RBC AUTO: 16.4 % (ref 12.4–15.4)
EKG ATRIAL RATE: 174 BPM
EKG DIAGNOSIS: NORMAL
EKG Q-T INTERVAL: 322 MS
EKG QRS DURATION: 92 MS
EKG QTC CALCULATION (BAZETT): 479 MS
EKG R AXIS: -10 DEGREES
EKG T AXIS: 84 DEGREES
EKG VENTRICULAR RATE: 133 BPM
GFR SERPLBLD CREATININE-BSD FMLA CKD-EPI: 21 ML/MIN/{1.73_M2}
GFR SERPLBLD CREATININE-BSD FMLA CKD-EPI: 27 ML/MIN/{1.73_M2}
GLUCOSE BLD-MCNC: 174 MG/DL (ref 70–99)
GLUCOSE BLD-MCNC: 191 MG/DL (ref 70–99)
GLUCOSE BLD-MCNC: 201 MG/DL (ref 70–99)
GLUCOSE BLD-MCNC: 201 MG/DL (ref 70–99)
GLUCOSE SERPL-MCNC: 199 MG/DL (ref 70–99)
GLUCOSE SERPL-MCNC: 207 MG/DL (ref 70–99)
HCT VFR BLD AUTO: 22.7 % (ref 36–48)
HGB BLD-MCNC: 7.3 G/DL (ref 12–16)
MAGNESIUM SERPL-MCNC: 2.2 MG/DL (ref 1.8–2.4)
MAGNESIUM SERPL-MCNC: 2.2 MG/DL (ref 1.8–2.4)
MCH RBC QN AUTO: 28.3 PG (ref 26–34)
MCHC RBC AUTO-ENTMCNC: 32.2 G/DL (ref 31–36)
MCV RBC AUTO: 87.9 FL (ref 80–100)
PERFORMED ON: ABNORMAL
PH BLDV: 7.37 [PH] (ref 7.35–7.45)
PH BLDV: 7.4 [PH] (ref 7.35–7.45)
PHOSPHATE SERPL-MCNC: 2.4 MG/DL (ref 2.5–4.9)
PHOSPHATE SERPL-MCNC: 2.8 MG/DL (ref 2.5–4.9)
PLATELET # BLD AUTO: 145 K/UL (ref 135–450)
PMV BLD AUTO: 7.9 FL (ref 5–10.5)
POTASSIUM SERPL-SCNC: 4.4 MMOL/L (ref 3.5–5.1)
POTASSIUM SERPL-SCNC: 4.6 MMOL/L (ref 3.5–5.1)
RBC # BLD AUTO: 2.58 M/UL (ref 4–5.2)
REASON FOR REJECTION: NORMAL
REJECTED TEST: NORMAL
SODIUM SERPL-SCNC: 138 MMOL/L (ref 136–145)
SODIUM SERPL-SCNC: 141 MMOL/L (ref 136–145)
TSH SERPL DL<=0.005 MIU/L-ACNC: 0.42 UIU/ML (ref 0.27–4.2)
VANCOMYCIN SERPL-MCNC: 19.2 UG/ML
WBC # BLD AUTO: 8.2 K/UL (ref 4–11)

## 2024-06-29 PROCEDURE — 97530 THERAPEUTIC ACTIVITIES: CPT

## 2024-06-29 PROCEDURE — 90945 DIALYSIS ONE EVALUATION: CPT

## 2024-06-29 PROCEDURE — 6370000000 HC RX 637 (ALT 250 FOR IP): Performed by: UROLOGY

## 2024-06-29 PROCEDURE — 94761 N-INVAS EAR/PLS OXIMETRY MLT: CPT

## 2024-06-29 PROCEDURE — 6360000002 HC RX W HCPCS: Performed by: INTERNAL MEDICINE

## 2024-06-29 PROCEDURE — 6370000000 HC RX 637 (ALT 250 FOR IP)

## 2024-06-29 PROCEDURE — 6370000000 HC RX 637 (ALT 250 FOR IP): Performed by: STUDENT IN AN ORGANIZED HEALTH CARE EDUCATION/TRAINING PROGRAM

## 2024-06-29 PROCEDURE — 2580000003 HC RX 258: Performed by: INTERNAL MEDICINE

## 2024-06-29 PROCEDURE — 85027 COMPLETE CBC AUTOMATED: CPT

## 2024-06-29 PROCEDURE — 2500000003 HC RX 250 WO HCPCS: Performed by: STUDENT IN AN ORGANIZED HEALTH CARE EDUCATION/TRAINING PROGRAM

## 2024-06-29 PROCEDURE — 80202 ASSAY OF VANCOMYCIN: CPT

## 2024-06-29 PROCEDURE — 93010 ELECTROCARDIOGRAM REPORT: CPT | Performed by: INTERNAL MEDICINE

## 2024-06-29 PROCEDURE — 82330 ASSAY OF CALCIUM: CPT

## 2024-06-29 PROCEDURE — 71045 X-RAY EXAM CHEST 1 VIEW: CPT

## 2024-06-29 PROCEDURE — 6370000000 HC RX 637 (ALT 250 FOR IP): Performed by: INTERNAL MEDICINE

## 2024-06-29 PROCEDURE — 80069 RENAL FUNCTION PANEL: CPT

## 2024-06-29 PROCEDURE — 83735 ASSAY OF MAGNESIUM: CPT

## 2024-06-29 PROCEDURE — 99291 CRITICAL CARE FIRST HOUR: CPT | Performed by: INTERNAL MEDICINE

## 2024-06-29 PROCEDURE — 94660 CPAP INITIATION&MGMT: CPT

## 2024-06-29 PROCEDURE — 2700000000 HC OXYGEN THERAPY PER DAY

## 2024-06-29 PROCEDURE — 84443 ASSAY THYROID STIM HORMONE: CPT

## 2024-06-29 PROCEDURE — 2000000000 HC ICU R&B

## 2024-06-29 PROCEDURE — 36592 COLLECT BLOOD FROM PICC: CPT

## 2024-06-29 PROCEDURE — 97110 THERAPEUTIC EXERCISES: CPT

## 2024-06-29 PROCEDURE — 99233 SBSQ HOSP IP/OBS HIGH 50: CPT | Performed by: INTERNAL MEDICINE

## 2024-06-29 RX ADMIN — ATORVASTATIN CALCIUM 10 MG: 10 TABLET, FILM COATED ORAL at 08:35

## 2024-06-29 RX ADMIN — OXYCODONE HYDROCHLORIDE 5 MG: 5 TABLET ORAL at 12:31

## 2024-06-29 RX ADMIN — HEPARIN SODIUM 5000 UNITS: 5000 INJECTION INTRAVENOUS; SUBCUTANEOUS at 08:36

## 2024-06-29 RX ADMIN — Medication: at 01:40

## 2024-06-29 RX ADMIN — OXYCODONE HYDROCHLORIDE 5 MG: 5 TABLET ORAL at 20:23

## 2024-06-29 RX ADMIN — FERROUS SULFATE TAB 325 MG (65 MG ELEMENTAL FE) 324 MG: 325 (65 FE) TAB at 08:35

## 2024-06-29 RX ADMIN — SODIUM BICARBONATE 650 MG: 650 TABLET ORAL at 22:41

## 2024-06-29 RX ADMIN — Medication: at 11:27

## 2024-06-29 RX ADMIN — Medication: at 11:28

## 2024-06-29 RX ADMIN — TIZANIDINE 4 MG: 4 TABLET ORAL at 19:17

## 2024-06-29 RX ADMIN — CEFEPIME 1000 MG: 1 INJECTION, POWDER, FOR SOLUTION INTRAMUSCULAR; INTRAVENOUS at 12:34

## 2024-06-29 RX ADMIN — OXYCODONE HYDROCHLORIDE 5 MG: 5 TABLET ORAL at 16:30

## 2024-06-29 RX ADMIN — DEXMEDETOMIDINE HYDROCHLORIDE 0.4 MCG/KG/HR: 400 INJECTION, SOLUTION INTRAVENOUS at 04:03

## 2024-06-29 RX ADMIN — DEXMEDETOMIDINE HYDROCHLORIDE 0.2 MCG/KG/HR: 400 INJECTION, SOLUTION INTRAVENOUS at 16:30

## 2024-06-29 RX ADMIN — CEFEPIME 1000 MG: 1 INJECTION, POWDER, FOR SOLUTION INTRAMUSCULAR; INTRAVENOUS at 20:10

## 2024-06-29 RX ADMIN — AMIODARONE HYDROCHLORIDE 200 MG: 200 TABLET ORAL at 08:35

## 2024-06-29 RX ADMIN — INSULIN LISPRO 2 UNITS: 100 INJECTION, SOLUTION INTRAVENOUS; SUBCUTANEOUS at 08:36

## 2024-06-29 RX ADMIN — HEPARIN SODIUM 5000 UNITS: 5000 INJECTION INTRAVENOUS; SUBCUTANEOUS at 20:23

## 2024-06-29 RX ADMIN — OXYCODONE HYDROCHLORIDE 5 MG: 5 TABLET ORAL at 08:40

## 2024-06-29 RX ADMIN — SENNOSIDES AND DOCUSATE SODIUM 1 TABLET: 50; 8.6 TABLET ORAL at 08:35

## 2024-06-29 RX ADMIN — Medication: at 22:13

## 2024-06-29 RX ADMIN — SODIUM BICARBONATE 650 MG: 650 TABLET ORAL at 01:20

## 2024-06-29 RX ADMIN — SENNOSIDES AND DOCUSATE SODIUM 1 TABLET: 50; 8.6 TABLET ORAL at 20:22

## 2024-06-29 RX ADMIN — SODIUM BICARBONATE 650 MG: 650 TABLET ORAL at 16:30

## 2024-06-29 RX ADMIN — OXYCODONE HYDROCHLORIDE 5 MG: 5 TABLET ORAL at 01:10

## 2024-06-29 RX ADMIN — AMIODARONE HYDROCHLORIDE 200 MG: 200 TABLET ORAL at 20:23

## 2024-06-29 RX ADMIN — CHOLECALCIFEROL TAB 25 MCG (1000 UNIT) 1000 UNITS: 25 TAB at 08:35

## 2024-06-29 RX ADMIN — CEFEPIME 1000 MG: 1 INJECTION, POWDER, FOR SOLUTION INTRAMUSCULAR; INTRAVENOUS at 04:12

## 2024-06-29 RX ADMIN — PANTOPRAZOLE SODIUM 40 MG: 40 TABLET, DELAYED RELEASE ORAL at 08:36

## 2024-06-29 RX ADMIN — MIDODRINE HYDROCHLORIDE 10 MG: 5 TABLET ORAL at 08:35

## 2024-06-29 RX ADMIN — Medication: at 21:49

## 2024-06-29 RX ADMIN — SODIUM BICARBONATE 650 MG: 650 TABLET ORAL at 12:31

## 2024-06-29 RX ADMIN — Medication 10 ML: at 08:40

## 2024-06-29 ASSESSMENT — PAIN DESCRIPTION - LOCATION
LOCATION: GENERALIZED
LOCATION: LEG

## 2024-06-29 ASSESSMENT — PAIN SCALES - GENERAL
PAINLEVEL_OUTOF10: 8
PAINLEVEL_OUTOF10: 9
PAINLEVEL_OUTOF10: 10
PAINLEVEL_OUTOF10: 7
PAINLEVEL_OUTOF10: 0
PAINLEVEL_OUTOF10: 9
PAINLEVEL_OUTOF10: 4
PAINLEVEL_OUTOF10: 0
PAINLEVEL_OUTOF10: 10
PAINLEVEL_OUTOF10: 7
PAINLEVEL_OUTOF10: 0
PAINLEVEL_OUTOF10: 7
PAINLEVEL_OUTOF10: 7
PAINLEVEL_OUTOF10: 5
PAINLEVEL_OUTOF10: 10

## 2024-06-29 ASSESSMENT — PAIN DESCRIPTION - DESCRIPTORS
DESCRIPTORS: ACHING;THROBBING
DESCRIPTORS: THROBBING
DESCRIPTORS: ACHING;THROBBING

## 2024-06-29 ASSESSMENT — PAIN SCALES - WONG BAKER
WONGBAKER_NUMERICALRESPONSE: NO HURT

## 2024-06-29 ASSESSMENT — PAIN DESCRIPTION - ORIENTATION
ORIENTATION: RIGHT
ORIENTATION: RIGHT;LEFT
ORIENTATION: RIGHT

## 2024-06-29 ASSESSMENT — PAIN DESCRIPTION - PAIN TYPE: TYPE: ACUTE PAIN

## 2024-06-30 LAB
ALBUMIN SERPL-MCNC: 3.4 G/DL (ref 3.4–5)
ANION GAP SERPL CALCULATED.3IONS-SCNC: 13 MMOL/L (ref 3–16)
BUN SERPL-MCNC: 29 MG/DL (ref 7–20)
CA-I BLD-SCNC: 1.21 MMOL/L (ref 1.12–1.32)
CALCIUM SERPL-MCNC: 8.9 MG/DL (ref 8.3–10.6)
CHLORIDE SERPL-SCNC: 103 MMOL/L (ref 99–110)
CO2 SERPL-SCNC: 24 MMOL/L (ref 21–32)
CREAT SERPL-MCNC: 1.8 MG/DL (ref 0.6–1.2)
DEPRECATED RDW RBC AUTO: 15.9 % (ref 12.4–15.4)
GFR SERPLBLD CREATININE-BSD FMLA CKD-EPI: 31 ML/MIN/{1.73_M2}
GLUCOSE BLD-MCNC: 155 MG/DL (ref 70–99)
GLUCOSE BLD-MCNC: 170 MG/DL (ref 70–99)
GLUCOSE BLD-MCNC: 181 MG/DL (ref 70–99)
GLUCOSE BLD-MCNC: 191 MG/DL (ref 70–99)
GLUCOSE SERPL-MCNC: 188 MG/DL (ref 70–99)
HCT VFR BLD AUTO: 22.8 % (ref 36–48)
HGB BLD-MCNC: 7.2 G/DL (ref 12–16)
MAGNESIUM SERPL-MCNC: 2.3 MG/DL (ref 1.8–2.4)
MCH RBC QN AUTO: 27.8 PG (ref 26–34)
MCHC RBC AUTO-ENTMCNC: 31.4 G/DL (ref 31–36)
MCV RBC AUTO: 88.6 FL (ref 80–100)
PERFORMED ON: ABNORMAL
PH BLDV: 7.34 [PH] (ref 7.35–7.45)
PHOSPHATE SERPL-MCNC: 2.4 MG/DL (ref 2.5–4.9)
PLATELET # BLD AUTO: 139 K/UL (ref 135–450)
PMV BLD AUTO: 7.7 FL (ref 5–10.5)
POTASSIUM SERPL-SCNC: 4.8 MMOL/L (ref 3.5–5.1)
RBC # BLD AUTO: 2.57 M/UL (ref 4–5.2)
SODIUM SERPL-SCNC: 140 MMOL/L (ref 136–145)
WBC # BLD AUTO: 6.8 K/UL (ref 4–11)

## 2024-06-30 PROCEDURE — 6370000000 HC RX 637 (ALT 250 FOR IP): Performed by: INTERNAL MEDICINE

## 2024-06-30 PROCEDURE — 6370000000 HC RX 637 (ALT 250 FOR IP): Performed by: STUDENT IN AN ORGANIZED HEALTH CARE EDUCATION/TRAINING PROGRAM

## 2024-06-30 PROCEDURE — 6370000000 HC RX 637 (ALT 250 FOR IP): Performed by: UROLOGY

## 2024-06-30 PROCEDURE — 85027 COMPLETE CBC AUTOMATED: CPT

## 2024-06-30 PROCEDURE — 6370000000 HC RX 637 (ALT 250 FOR IP)

## 2024-06-30 PROCEDURE — 80069 RENAL FUNCTION PANEL: CPT

## 2024-06-30 PROCEDURE — 2500000003 HC RX 250 WO HCPCS: Performed by: INTERNAL MEDICINE

## 2024-06-30 PROCEDURE — 2580000003 HC RX 258: Performed by: INTERNAL MEDICINE

## 2024-06-30 PROCEDURE — 94660 CPAP INITIATION&MGMT: CPT

## 2024-06-30 PROCEDURE — 94761 N-INVAS EAR/PLS OXIMETRY MLT: CPT

## 2024-06-30 PROCEDURE — 83735 ASSAY OF MAGNESIUM: CPT

## 2024-06-30 PROCEDURE — 99291 CRITICAL CARE FIRST HOUR: CPT | Performed by: INTERNAL MEDICINE

## 2024-06-30 PROCEDURE — 2500000003 HC RX 250 WO HCPCS: Performed by: STUDENT IN AN ORGANIZED HEALTH CARE EDUCATION/TRAINING PROGRAM

## 2024-06-30 PROCEDURE — 99232 SBSQ HOSP IP/OBS MODERATE 35: CPT | Performed by: INTERNAL MEDICINE

## 2024-06-30 PROCEDURE — 82330 ASSAY OF CALCIUM: CPT

## 2024-06-30 PROCEDURE — 2000000000 HC ICU R&B

## 2024-06-30 PROCEDURE — 2700000000 HC OXYGEN THERAPY PER DAY

## 2024-06-30 PROCEDURE — 6360000002 HC RX W HCPCS: Performed by: INTERNAL MEDICINE

## 2024-06-30 RX ADMIN — TIZANIDINE 4 MG: 4 TABLET ORAL at 12:15

## 2024-06-30 RX ADMIN — OXYCODONE HYDROCHLORIDE 5 MG: 5 TABLET ORAL at 09:20

## 2024-06-30 RX ADMIN — ATORVASTATIN CALCIUM 10 MG: 10 TABLET, FILM COATED ORAL at 08:53

## 2024-06-30 RX ADMIN — SODIUM BICARBONATE 650 MG: 650 TABLET ORAL at 05:13

## 2024-06-30 RX ADMIN — Medication 10 ML: at 21:44

## 2024-06-30 RX ADMIN — FERROUS SULFATE TAB 325 MG (65 MG ELEMENTAL FE) 324 MG: 325 (65 FE) TAB at 08:53

## 2024-06-30 RX ADMIN — Medication 10 ML: at 08:56

## 2024-06-30 RX ADMIN — Medication: at 08:58

## 2024-06-30 RX ADMIN — SODIUM BICARBONATE 650 MG: 650 TABLET ORAL at 12:15

## 2024-06-30 RX ADMIN — SENNOSIDES AND DOCUSATE SODIUM 1 TABLET: 50; 8.6 TABLET ORAL at 08:53

## 2024-06-30 RX ADMIN — OXYCODONE HYDROCHLORIDE 5 MG: 5 TABLET ORAL at 16:55

## 2024-06-30 RX ADMIN — HEPARIN SODIUM 5000 UNITS: 5000 INJECTION INTRAVENOUS; SUBCUTANEOUS at 21:26

## 2024-06-30 RX ADMIN — SODIUM BICARBONATE 650 MG: 650 TABLET ORAL at 21:35

## 2024-06-30 RX ADMIN — AMIODARONE HYDROCHLORIDE 200 MG: 200 TABLET ORAL at 08:53

## 2024-06-30 RX ADMIN — HEPARIN SODIUM 5000 UNITS: 5000 INJECTION INTRAVENOUS; SUBCUTANEOUS at 08:54

## 2024-06-30 RX ADMIN — OXYCODONE HYDROCHLORIDE 5 MG: 5 TABLET ORAL at 05:13

## 2024-06-30 RX ADMIN — MIDODRINE HYDROCHLORIDE 10 MG: 5 TABLET ORAL at 08:53

## 2024-06-30 RX ADMIN — SODIUM PHOSPHATE, MONOBASIC, MONOHYDRATE AND SODIUM PHOSPHATE, DIBASIC, ANHYDROUS 6 MMOL: 142; 276 INJECTION, SOLUTION INTRAVENOUS at 10:08

## 2024-06-30 RX ADMIN — CHOLECALCIFEROL TAB 25 MCG (1000 UNIT) 1000 UNITS: 25 TAB at 08:53

## 2024-06-30 RX ADMIN — PANTOPRAZOLE SODIUM 40 MG: 40 TABLET, DELAYED RELEASE ORAL at 05:13

## 2024-06-30 RX ADMIN — SENNOSIDES AND DOCUSATE SODIUM 1 TABLET: 50; 8.6 TABLET ORAL at 21:27

## 2024-06-30 RX ADMIN — OXYCODONE HYDROCHLORIDE 5 MG: 5 TABLET ORAL at 21:27

## 2024-06-30 RX ADMIN — SODIUM BICARBONATE 650 MG: 650 TABLET ORAL at 16:55

## 2024-06-30 RX ADMIN — DEXMEDETOMIDINE HYDROCHLORIDE 0.2 MCG/KG/HR: 400 INJECTION, SOLUTION INTRAVENOUS at 05:23

## 2024-06-30 ASSESSMENT — PAIN DESCRIPTION - ORIENTATION
ORIENTATION: OTHER (COMMENT)
ORIENTATION: RIGHT
ORIENTATION: RIGHT
ORIENTATION: OUTER
ORIENTATION: OTHER (COMMENT)

## 2024-06-30 ASSESSMENT — PAIN DESCRIPTION - DESCRIPTORS
DESCRIPTORS: THROBBING
DESCRIPTORS: ACHING;BURNING;THROBBING
DESCRIPTORS: CRAMPING
DESCRIPTORS: ACHING;DISCOMFORT;DULL
DESCRIPTORS: ACHING;THROBBING
DESCRIPTORS: ACHING;BURNING;THROBBING

## 2024-06-30 ASSESSMENT — PAIN SCALES - WONG BAKER
WONGBAKER_NUMERICALRESPONSE: NO HURT

## 2024-06-30 ASSESSMENT — PAIN DESCRIPTION - LOCATION
LOCATION: LEG
LOCATION: BACK;LEG;NECK
LOCATION: LEG;NECK;BACK
LOCATION: LEG
LOCATION: LEG;NECK

## 2024-06-30 ASSESSMENT — PAIN SCALES - GENERAL
PAINLEVEL_OUTOF10: 0
PAINLEVEL_OUTOF10: 5
PAINLEVEL_OUTOF10: 8
PAINLEVEL_OUTOF10: 6
PAINLEVEL_OUTOF10: 5
PAINLEVEL_OUTOF10: 9
PAINLEVEL_OUTOF10: 0
PAINLEVEL_OUTOF10: 0
PAINLEVEL_OUTOF10: 10

## 2024-06-30 ASSESSMENT — PAIN - FUNCTIONAL ASSESSMENT: PAIN_FUNCTIONAL_ASSESSMENT: PREVENTS OR INTERFERES SOME ACTIVE ACTIVITIES AND ADLS

## 2024-07-01 ENCOUNTER — APPOINTMENT (OUTPATIENT)
Dept: GENERAL RADIOLOGY | Age: 69
DRG: 480 | End: 2024-07-01
Payer: MEDICARE

## 2024-07-01 PROBLEM — N13.9 OBSTRUCTIVE UROPATHY: Status: ACTIVE | Noted: 2024-07-01

## 2024-07-01 PROBLEM — E87.70 HYPERVOLEMIA: Status: ACTIVE | Noted: 2024-07-01

## 2024-07-01 LAB
ANION GAP SERPL CALCULATED.3IONS-SCNC: 18 MMOL/L (ref 3–16)
BUN SERPL-MCNC: 40 MG/DL (ref 7–20)
CALCIUM SERPL-MCNC: 9.3 MG/DL (ref 8.3–10.6)
CHLORIDE SERPL-SCNC: 102 MMOL/L (ref 99–110)
CO2 SERPL-SCNC: 21 MMOL/L (ref 21–32)
CREAT SERPL-MCNC: 2.3 MG/DL (ref 0.6–1.2)
DEPRECATED RDW RBC AUTO: 16 % (ref 12.4–15.4)
GFR SERPLBLD CREATININE-BSD FMLA CKD-EPI: 23 ML/MIN/{1.73_M2}
GLUCOSE BLD-MCNC: 161 MG/DL (ref 70–99)
GLUCOSE BLD-MCNC: 187 MG/DL (ref 70–99)
GLUCOSE BLD-MCNC: 192 MG/DL (ref 70–99)
GLUCOSE BLD-MCNC: 201 MG/DL (ref 70–99)
GLUCOSE SERPL-MCNC: 168 MG/DL (ref 70–99)
HCT VFR BLD AUTO: 23.7 % (ref 36–48)
HGB BLD-MCNC: 7.5 G/DL (ref 12–16)
MAGNESIUM SERPL-MCNC: 2.3 MG/DL (ref 1.8–2.4)
MCH RBC QN AUTO: 28.1 PG (ref 26–34)
MCHC RBC AUTO-ENTMCNC: 31.6 G/DL (ref 31–36)
MCV RBC AUTO: 88.7 FL (ref 80–100)
PERFORMED ON: ABNORMAL
PLATELET # BLD AUTO: 161 K/UL (ref 135–450)
PMV BLD AUTO: 7.8 FL (ref 5–10.5)
POTASSIUM SERPL-SCNC: 4.6 MMOL/L (ref 3.5–5.1)
RBC # BLD AUTO: 2.68 M/UL (ref 4–5.2)
SODIUM SERPL-SCNC: 141 MMOL/L (ref 136–145)
WBC # BLD AUTO: 7.6 K/UL (ref 4–11)

## 2024-07-01 PROCEDURE — 2700000000 HC OXYGEN THERAPY PER DAY

## 2024-07-01 PROCEDURE — 94660 CPAP INITIATION&MGMT: CPT

## 2024-07-01 PROCEDURE — 94760 N-INVAS EAR/PLS OXIMETRY 1: CPT

## 2024-07-01 PROCEDURE — 83735 ASSAY OF MAGNESIUM: CPT

## 2024-07-01 PROCEDURE — 6370000000 HC RX 637 (ALT 250 FOR IP): Performed by: INTERNAL MEDICINE

## 2024-07-01 PROCEDURE — 6370000000 HC RX 637 (ALT 250 FOR IP): Performed by: UROLOGY

## 2024-07-01 PROCEDURE — 2580000003 HC RX 258: Performed by: INTERNAL MEDICINE

## 2024-07-01 PROCEDURE — 6360000002 HC RX W HCPCS: Performed by: INTERNAL MEDICINE

## 2024-07-01 PROCEDURE — 2000000000 HC ICU R&B

## 2024-07-01 PROCEDURE — 80048 BASIC METABOLIC PNL TOTAL CA: CPT

## 2024-07-01 PROCEDURE — 99233 SBSQ HOSP IP/OBS HIGH 50: CPT | Performed by: INTERNAL MEDICINE

## 2024-07-01 PROCEDURE — 6370000000 HC RX 637 (ALT 250 FOR IP)

## 2024-07-01 PROCEDURE — 85027 COMPLETE CBC AUTOMATED: CPT

## 2024-07-01 PROCEDURE — 97530 THERAPEUTIC ACTIVITIES: CPT

## 2024-07-01 PROCEDURE — 6370000000 HC RX 637 (ALT 250 FOR IP): Performed by: STUDENT IN AN ORGANIZED HEALTH CARE EDUCATION/TRAINING PROGRAM

## 2024-07-01 PROCEDURE — 90935 HEMODIALYSIS ONE EVALUATION: CPT

## 2024-07-01 PROCEDURE — 94150 VITAL CAPACITY TEST: CPT

## 2024-07-01 PROCEDURE — 6360000002 HC RX W HCPCS: Performed by: UROLOGY

## 2024-07-01 PROCEDURE — 71045 X-RAY EXAM CHEST 1 VIEW: CPT

## 2024-07-01 RX ORDER — HEPARIN SODIUM 5000 [USP'U]/ML
5000 INJECTION, SOLUTION INTRAVENOUS; SUBCUTANEOUS EVERY 8 HOURS SCHEDULED
Status: DISCONTINUED | OUTPATIENT
Start: 2024-07-01 | End: 2024-07-15 | Stop reason: HOSPADM

## 2024-07-01 RX ADMIN — SENNOSIDES AND DOCUSATE SODIUM 1 TABLET: 50; 8.6 TABLET ORAL at 20:56

## 2024-07-01 RX ADMIN — Medication 10 ML: at 09:43

## 2024-07-01 RX ADMIN — SODIUM BICARBONATE 650 MG: 650 TABLET ORAL at 17:04

## 2024-07-01 RX ADMIN — MORPHINE SULFATE 2 MG: 2 INJECTION, SOLUTION INTRAMUSCULAR; INTRAVENOUS at 09:05

## 2024-07-01 RX ADMIN — FERROUS SULFATE TAB 325 MG (65 MG ELEMENTAL FE) 324 MG: 325 (65 FE) TAB at 09:41

## 2024-07-01 RX ADMIN — INSULIN LISPRO 2 UNITS: 100 INJECTION, SOLUTION INTRAVENOUS; SUBCUTANEOUS at 11:55

## 2024-07-01 RX ADMIN — HYDRALAZINE HYDROCHLORIDE 10 MG: 20 INJECTION INTRAMUSCULAR; INTRAVENOUS at 11:55

## 2024-07-01 RX ADMIN — SODIUM BICARBONATE 650 MG: 650 TABLET ORAL at 05:46

## 2024-07-01 RX ADMIN — PANTOPRAZOLE SODIUM 40 MG: 40 TABLET, DELAYED RELEASE ORAL at 05:46

## 2024-07-01 RX ADMIN — DIPHENHYDRAMINE HYDROCHLORIDE 12.5 MG: 25 TABLET ORAL at 11:39

## 2024-07-01 RX ADMIN — SENNOSIDES AND DOCUSATE SODIUM 1 TABLET: 50; 8.6 TABLET ORAL at 09:42

## 2024-07-01 RX ADMIN — ATORVASTATIN CALCIUM 10 MG: 10 TABLET, FILM COATED ORAL at 09:41

## 2024-07-01 RX ADMIN — EPOETIN ALFA-EPBX 10000 UNITS: 10000 INJECTION, SOLUTION INTRAVENOUS; SUBCUTANEOUS at 15:59

## 2024-07-01 RX ADMIN — SODIUM BICARBONATE 650 MG: 650 TABLET ORAL at 21:55

## 2024-07-01 RX ADMIN — OXYCODONE HYDROCHLORIDE 5 MG: 5 TABLET ORAL at 10:30

## 2024-07-01 RX ADMIN — MORPHINE SULFATE 2 MG: 2 INJECTION, SOLUTION INTRAMUSCULAR; INTRAVENOUS at 17:04

## 2024-07-01 RX ADMIN — HEPARIN SODIUM 5000 UNITS: 5000 INJECTION INTRAVENOUS; SUBCUTANEOUS at 09:41

## 2024-07-01 RX ADMIN — OXYCODONE HYDROCHLORIDE 5 MG: 5 TABLET ORAL at 20:55

## 2024-07-01 RX ADMIN — SODIUM BICARBONATE 650 MG: 650 TABLET ORAL at 11:39

## 2024-07-01 RX ADMIN — MORPHINE SULFATE 2 MG: 2 INJECTION, SOLUTION INTRAMUSCULAR; INTRAVENOUS at 05:46

## 2024-07-01 RX ADMIN — CHOLECALCIFEROL TAB 25 MCG (1000 UNIT) 1000 UNITS: 25 TAB at 09:41

## 2024-07-01 RX ADMIN — MORPHINE SULFATE 2 MG: 2 INJECTION, SOLUTION INTRAMUSCULAR; INTRAVENOUS at 12:23

## 2024-07-01 RX ADMIN — HEPARIN SODIUM 5000 UNITS: 5000 INJECTION INTRAVENOUS; SUBCUTANEOUS at 21:54

## 2024-07-01 RX ADMIN — METOPROLOL SUCCINATE 100 MG: 50 TABLET, EXTENDED RELEASE ORAL at 09:41

## 2024-07-01 RX ADMIN — Medication 10 ML: at 20:49

## 2024-07-01 RX ADMIN — MORPHINE SULFATE 2 MG: 2 INJECTION, SOLUTION INTRAMUSCULAR; INTRAVENOUS at 00:40

## 2024-07-01 ASSESSMENT — PAIN SCALES - WONG BAKER
WONGBAKER_NUMERICALRESPONSE: NO HURT
WONGBAKER_NUMERICALRESPONSE: HURTS EVEN MORE
WONGBAKER_NUMERICALRESPONSE: NO HURT

## 2024-07-01 ASSESSMENT — PAIN DESCRIPTION - ORIENTATION
ORIENTATION: RIGHT
ORIENTATION: RIGHT
ORIENTATION: RIGHT;LEFT

## 2024-07-01 ASSESSMENT — PAIN SCALES - GENERAL
PAINLEVEL_OUTOF10: 6
PAINLEVEL_OUTOF10: 0
PAINLEVEL_OUTOF10: 0
PAINLEVEL_OUTOF10: 8
PAINLEVEL_OUTOF10: 0
PAINLEVEL_OUTOF10: 6
PAINLEVEL_OUTOF10: 1
PAINLEVEL_OUTOF10: 0
PAINLEVEL_OUTOF10: 10
PAINLEVEL_OUTOF10: 0
PAINLEVEL_OUTOF10: 0
PAINLEVEL_OUTOF10: 8
PAINLEVEL_OUTOF10: 10
PAINLEVEL_OUTOF10: 10
PAINLEVEL_OUTOF10: 0
PAINLEVEL_OUTOF10: 0
PAINLEVEL_OUTOF10: 6
PAINLEVEL_OUTOF10: 10
PAINLEVEL_OUTOF10: 4

## 2024-07-01 ASSESSMENT — PAIN DESCRIPTION - DESCRIPTORS
DESCRIPTORS: THROBBING
DESCRIPTORS: ACHING
DESCRIPTORS: ACHING

## 2024-07-01 ASSESSMENT — PAIN - FUNCTIONAL ASSESSMENT: PAIN_FUNCTIONAL_ASSESSMENT: ACTIVITIES ARE NOT PREVENTED

## 2024-07-01 ASSESSMENT — PAIN DESCRIPTION - LOCATION
LOCATION: GENERALIZED
LOCATION: LEG;FOOT
LOCATION: TOE (COMMENT WHICH ONE);LEG
LOCATION: LEG

## 2024-07-01 ASSESSMENT — PAIN DESCRIPTION - FREQUENCY: FREQUENCY: CONTINUOUS

## 2024-07-01 ASSESSMENT — PAIN DESCRIPTION - PAIN TYPE: TYPE: ACUTE PAIN

## 2024-07-01 ASSESSMENT — PAIN DESCRIPTION - ONSET: ONSET: ON-GOING

## 2024-07-01 NOTE — CARE COORDINATION
Chart reviewed for discharge planning    CM/SW has continued to follow patient progress to anticipate potential discharge needs. At this time, patient is not ready for discharge.       Inpatient day #- 10    Barrier(s) to discharge-patient- had difficulty tolerating HD fluid removal over the weekend, per nephrology plans to re-eval for CRRT need today.  Still has fluid overload.  Making some UOP. Monitoring hypotension & electrolytes closely. Cardiology recommending 30-day monitor at [place].    Tentative discharge plan-  Accepted at [facility] for SNF.  If it is determined that she will need long term dialysis, will send referral to [facility].    Tentative discharge date- TBD    CM spoke to Nichelle at [facility] this morning, provided update and answered questions.  Upon DC pt luz need the followin-day monitor per cardiology  BiPAP settings for SNF to obtain equipment ahead of time  Long term HD plan TBD    *Case management will continue to follow progress and update discharge plan as needed.

## 2024-07-02 LAB
ALBUMIN SERPL-MCNC: 3.2 G/DL (ref 3.4–5)
ANION GAP SERPL CALCULATED.3IONS-SCNC: 15 MMOL/L (ref 3–16)
BUN SERPL-MCNC: 33 MG/DL (ref 7–20)
CALCIUM SERPL-MCNC: 9.5 MG/DL (ref 8.3–10.6)
CHLORIDE SERPL-SCNC: 106 MMOL/L (ref 99–110)
CO2 SERPL-SCNC: 21 MMOL/L (ref 21–32)
CREAT SERPL-MCNC: 2.1 MG/DL (ref 0.6–1.2)
DEPRECATED RDW RBC AUTO: 16.3 % (ref 12.4–15.4)
GFR SERPLBLD CREATININE-BSD FMLA CKD-EPI: 25 ML/MIN/{1.73_M2}
GLUCOSE BLD-MCNC: 179 MG/DL (ref 70–99)
GLUCOSE BLD-MCNC: 189 MG/DL (ref 70–99)
GLUCOSE BLD-MCNC: 220 MG/DL (ref 70–99)
GLUCOSE BLD-MCNC: 223 MG/DL (ref 70–99)
GLUCOSE SERPL-MCNC: 184 MG/DL (ref 70–99)
HCT VFR BLD AUTO: 24.7 % (ref 36–48)
HGB BLD-MCNC: 7.8 G/DL (ref 12–16)
MCH RBC QN AUTO: 28 PG (ref 26–34)
MCHC RBC AUTO-ENTMCNC: 31.6 G/DL (ref 31–36)
MCV RBC AUTO: 88.8 FL (ref 80–100)
PERFORMED ON: ABNORMAL
PHOSPHATE SERPL-MCNC: 3.3 MG/DL (ref 2.5–4.9)
PLATELET # BLD AUTO: 174 K/UL (ref 135–450)
PMV BLD AUTO: 8.1 FL (ref 5–10.5)
POTASSIUM SERPL-SCNC: 4.3 MMOL/L (ref 3.5–5.1)
RBC # BLD AUTO: 2.79 M/UL (ref 4–5.2)
REASON FOR REJECTION: NORMAL
REJECTED TEST: NORMAL
SODIUM SERPL-SCNC: 142 MMOL/L (ref 136–145)
WBC # BLD AUTO: 8.5 K/UL (ref 4–11)

## 2024-07-02 PROCEDURE — 97110 THERAPEUTIC EXERCISES: CPT

## 2024-07-02 PROCEDURE — 97530 THERAPEUTIC ACTIVITIES: CPT

## 2024-07-02 PROCEDURE — 94761 N-INVAS EAR/PLS OXIMETRY MLT: CPT

## 2024-07-02 PROCEDURE — 2700000000 HC OXYGEN THERAPY PER DAY

## 2024-07-02 PROCEDURE — 6370000000 HC RX 637 (ALT 250 FOR IP)

## 2024-07-02 PROCEDURE — 2580000003 HC RX 258: Performed by: INTERNAL MEDICINE

## 2024-07-02 PROCEDURE — 99233 SBSQ HOSP IP/OBS HIGH 50: CPT | Performed by: INTERNAL MEDICINE

## 2024-07-02 PROCEDURE — 6360000002 HC RX W HCPCS: Performed by: INTERNAL MEDICINE

## 2024-07-02 PROCEDURE — 36415 COLL VENOUS BLD VENIPUNCTURE: CPT

## 2024-07-02 PROCEDURE — 85027 COMPLETE CBC AUTOMATED: CPT

## 2024-07-02 PROCEDURE — 80069 RENAL FUNCTION PANEL: CPT

## 2024-07-02 PROCEDURE — 6370000000 HC RX 637 (ALT 250 FOR IP): Performed by: STUDENT IN AN ORGANIZED HEALTH CARE EDUCATION/TRAINING PROGRAM

## 2024-07-02 PROCEDURE — 6370000000 HC RX 637 (ALT 250 FOR IP): Performed by: INTERNAL MEDICINE

## 2024-07-02 PROCEDURE — 1200000000 HC SEMI PRIVATE

## 2024-07-02 PROCEDURE — 90935 HEMODIALYSIS ONE EVALUATION: CPT

## 2024-07-02 PROCEDURE — 6370000000 HC RX 637 (ALT 250 FOR IP): Performed by: UROLOGY

## 2024-07-02 PROCEDURE — 2060000000 HC ICU INTERMEDIATE R&B

## 2024-07-02 PROCEDURE — 80074 ACUTE HEPATITIS PANEL: CPT

## 2024-07-02 PROCEDURE — 99024 POSTOP FOLLOW-UP VISIT: CPT | Performed by: NURSE PRACTITIONER

## 2024-07-02 PROCEDURE — APPNB45 APP NON BILLABLE 31-45 MINUTES: Performed by: NURSE PRACTITIONER

## 2024-07-02 PROCEDURE — 36592 COLLECT BLOOD FROM PICC: CPT

## 2024-07-02 RX ADMIN — CHOLECALCIFEROL TAB 25 MCG (1000 UNIT) 1000 UNITS: 25 TAB at 12:29

## 2024-07-02 RX ADMIN — OXYCODONE HYDROCHLORIDE 5 MG: 5 TABLET ORAL at 12:29

## 2024-07-02 RX ADMIN — SODIUM BICARBONATE 650 MG: 650 TABLET ORAL at 05:34

## 2024-07-02 RX ADMIN — Medication 10 ML: at 08:04

## 2024-07-02 RX ADMIN — INSULIN LISPRO 2 UNITS: 100 INJECTION, SOLUTION INTRAVENOUS; SUBCUTANEOUS at 12:39

## 2024-07-02 RX ADMIN — SODIUM BICARBONATE 650 MG: 650 TABLET ORAL at 17:10

## 2024-07-02 RX ADMIN — OXYCODONE HYDROCHLORIDE 5 MG: 5 TABLET ORAL at 21:20

## 2024-07-02 RX ADMIN — HEPARIN SODIUM 5000 UNITS: 5000 INJECTION INTRAVENOUS; SUBCUTANEOUS at 21:20

## 2024-07-02 RX ADMIN — DIPHENHYDRAMINE HYDROCHLORIDE 12.5 MG: 25 TABLET ORAL at 12:29

## 2024-07-02 RX ADMIN — MORPHINE SULFATE 2 MG: 2 INJECTION, SOLUTION INTRAMUSCULAR; INTRAVENOUS at 04:00

## 2024-07-02 RX ADMIN — SENNOSIDES AND DOCUSATE SODIUM 1 TABLET: 50; 8.6 TABLET ORAL at 21:21

## 2024-07-02 RX ADMIN — PANTOPRAZOLE SODIUM 40 MG: 40 TABLET, DELAYED RELEASE ORAL at 06:23

## 2024-07-02 RX ADMIN — SODIUM BICARBONATE 650 MG: 650 TABLET ORAL at 12:29

## 2024-07-02 RX ADMIN — MORPHINE SULFATE 2 MG: 2 INJECTION, SOLUTION INTRAMUSCULAR; INTRAVENOUS at 00:53

## 2024-07-02 RX ADMIN — HEPARIN SODIUM 5000 UNITS: 5000 INJECTION INTRAVENOUS; SUBCUTANEOUS at 05:34

## 2024-07-02 RX ADMIN — FERROUS SULFATE TAB 325 MG (65 MG ELEMENTAL FE) 324 MG: 325 (65 FE) TAB at 08:04

## 2024-07-02 RX ADMIN — SENNOSIDES AND DOCUSATE SODIUM 1 TABLET: 50; 8.6 TABLET ORAL at 08:04

## 2024-07-02 RX ADMIN — OXYCODONE HYDROCHLORIDE 5 MG: 5 TABLET ORAL at 17:10

## 2024-07-02 RX ADMIN — ATORVASTATIN CALCIUM 10 MG: 10 TABLET, FILM COATED ORAL at 08:04

## 2024-07-02 RX ADMIN — HEPARIN SODIUM 5000 UNITS: 5000 INJECTION INTRAVENOUS; SUBCUTANEOUS at 14:17

## 2024-07-02 RX ADMIN — HEPARIN SODIUM 2300 UNITS: 1000 INJECTION INTRAVENOUS; SUBCUTANEOUS at 12:22

## 2024-07-02 RX ADMIN — MORPHINE SULFATE 2 MG: 2 INJECTION, SOLUTION INTRAMUSCULAR; INTRAVENOUS at 14:17

## 2024-07-02 ASSESSMENT — PAIN SCALES - WONG BAKER
WONGBAKER_NUMERICALRESPONSE: NO HURT
WONGBAKER_NUMERICALRESPONSE: NO HURT
WONGBAKER_NUMERICALRESPONSE: HURTS WORST
WONGBAKER_NUMERICALRESPONSE: NO HURT
WONGBAKER_NUMERICALRESPONSE: NO HURT
WONGBAKER_NUMERICALRESPONSE: HURTS WORST
WONGBAKER_NUMERICALRESPONSE: NO HURT

## 2024-07-02 ASSESSMENT — PAIN SCALES - GENERAL
PAINLEVEL_OUTOF10: 0
PAINLEVEL_OUTOF10: 5
PAINLEVEL_OUTOF10: 0
PAINLEVEL_OUTOF10: 10
PAINLEVEL_OUTOF10: 6
PAINLEVEL_OUTOF10: 4
PAINLEVEL_OUTOF10: 10
PAINLEVEL_OUTOF10: 9
PAINLEVEL_OUTOF10: 5
PAINLEVEL_OUTOF10: 6
PAINLEVEL_OUTOF10: 0
PAINLEVEL_OUTOF10: 0
PAINLEVEL_OUTOF10: 9
PAINLEVEL_OUTOF10: 0
PAINLEVEL_OUTOF10: 5
PAINLEVEL_OUTOF10: 9
PAINLEVEL_OUTOF10: 0
PAINLEVEL_OUTOF10: 8
PAINLEVEL_OUTOF10: 9
PAINLEVEL_OUTOF10: 10
PAINLEVEL_OUTOF10: 6

## 2024-07-02 ASSESSMENT — PAIN DESCRIPTION - LOCATION
LOCATION: LEG;TOE (COMMENT WHICH ONE)
LOCATION: LEG;GENERALIZED
LOCATION: GENERALIZED;LEG
LOCATION: FOOT;LEG;TOE (COMMENT WHICH ONE)
LOCATION: LEG;TOE (COMMENT WHICH ONE)
LOCATION: LEG;TOE (COMMENT WHICH ONE)

## 2024-07-02 ASSESSMENT — PAIN DESCRIPTION - ORIENTATION
ORIENTATION: RIGHT
ORIENTATION: LEFT
ORIENTATION: RIGHT

## 2024-07-02 ASSESSMENT — PAIN DESCRIPTION - DESCRIPTORS
DESCRIPTORS: ACHING
DESCRIPTORS: ACHING
DESCRIPTORS: OTHER (COMMENT)
DESCRIPTORS: OTHER (COMMENT)

## 2024-07-02 ASSESSMENT — PAIN DESCRIPTION - FREQUENCY
FREQUENCY: CONTINUOUS
FREQUENCY: INTERMITTENT
FREQUENCY: INTERMITTENT

## 2024-07-02 ASSESSMENT — PAIN DESCRIPTION - ONSET
ONSET: ON-GOING

## 2024-07-02 ASSESSMENT — PAIN - FUNCTIONAL ASSESSMENT
PAIN_FUNCTIONAL_ASSESSMENT: PREVENTS OR INTERFERES SOME ACTIVE ACTIVITIES AND ADLS

## 2024-07-02 ASSESSMENT — PAIN DESCRIPTION - PAIN TYPE
TYPE: SURGICAL PAIN

## 2024-07-02 NOTE — CARE COORDINATION
Discharge Planning Note:    MARCIA spoke to patient's spouse Duane bedside to discuss plan for longer term HD and how this affects the DC plan.  MARCIA reminded Duane that we discussed his back-up facility being [facility] which does have in-house HD. Duane expressed that he wants Yoseph at [facility] even with the HD.  MARCIA explained to Duane that nephrology has requested CM set up a community HD chair with [facility] and will be followed by this nephrology team and that the SNF would have to agree to be responsible for coordinating and covering the cost of transporting patient from [place] to [place].  Duane stated that the facility will because his DIL (Jessenia Doshi) is the ADON.  MARCIA agreed to call Nichelle in admission to ask if that is something they will do but he needs to be open to other options if they decline.      MARCIA called Nichelle in admissions at [facility] ([phone]), [facility] with request for call back regarding above information.     MARCIA has requested Hep B panel labs to be ordered, they have been ordered and are waiting to be drawn.     MARCIA will initiate community HD chair at [facility].     Electronically signed by Isabel Garay RN on 7/2/2024 at 1:20 PM

## 2024-07-02 NOTE — CARE COORDINATION
Discharge Planning Note:    CM called referral to Lynnette at [facility] (in-house HD) based on previous conversation with spouse.  Appears pt will need HD for a few months at minimum and this cannot be provided at [facility].     CM will request Hep Panel, required for admission.     Electronically signed by Isabel Garay RN on 7/2/2024 at 11:29 AM

## 2024-07-02 NOTE — CARE COORDINATION
Discharge Planning Note:    CM spoke to Nichelle at [facility] who confirmed that they cannot transport patient to [facility] for HD, they would be open to the local [facility] in [facility]. Nichelle reported that she discussed this with patients MARQUEZ Ruelas (DEBBIE) who plans to discuss with her  (patient's son) and her father in law this evening to discuss their options and determine which is best or short and long term goals.     CM spoke to spouse Duane bedside and explained the options along with pros and cons to each. Duane explained his goal is for her to return home which is local, after rehab. Jessenia is unsure if Duane will be able to continue to take care of her at home and patient may need to transition to Jessenia's home in [facility] or LTC. We discussed the concern's with depending on transport companies for HD 3/wk and that re-hospitalization is often the result if HD is missed whereas, that barrier is non-existent if patient is at a SNF with in-house HD ([facility]). We further discussed that if patient were to be set up with a community HD chair in [facility], she will need a new nephrology team as ours will not follow her there and they know her \"medically.\" Also that after SNF stay, they would have to relocate her HD to [place] and get re-started with a new nephrologist. CM asked that Duane talk with his son and MARQUEZ this evening, think about everything and what is best for Yoseph both in the short term and long term. CM will f/u with Duane tomorrow for a final decision.     CM will initiate the HD chair via the [facility] portal.     Electronically signed by Isabel Garay RN on 7/2/2024 at 3:32 PM

## 2024-07-02 NOTE — CARE COORDINATION
Outpatient Hemodialysis Clinic Planning    CM/SW has initiated a request for a new [facility] HD chair [facility]    Preferred days: MWF    Requested location: [facility]    Portal ID: [id number]    Nephrologist: Ang Cameron MD    Notes: All clinicals and labs have been uploaded into the portal.     **If family decides on patient going to [facility] in [facility], will need HD chair location changed.       Requirements:  Nephrology order    Hep B panel   CXR  Estimated start date    The clinic also requires:   Demographics/Face Sheet  Social Security number  insurance information  Ambulatory status  H&P  Last 3 dialysis log sheets   and preferred clinic location and schedule request.      Electronically signed by Isabel Garay RN on 7/2/2024 at 3:52 PM

## 2024-07-03 ENCOUNTER — APPOINTMENT (OUTPATIENT)
Dept: INTERVENTIONAL RADIOLOGY/VASCULAR | Age: 69
DRG: 480 | End: 2024-07-03
Payer: MEDICARE

## 2024-07-03 LAB
ALBUMIN SERPL-MCNC: 3.5 G/DL (ref 3.4–5)
AMORPH SED URNS QL MICRO: ABNORMAL /HPF
ANION GAP SERPL CALCULATED.3IONS-SCNC: 14 MMOL/L (ref 3–16)
BILIRUB UR QL STRIP.AUTO: NEGATIVE
BUN SERPL-MCNC: 52 MG/DL (ref 7–20)
CALCIUM SERPL-MCNC: 10.1 MG/DL (ref 8.3–10.6)
CHLORIDE SERPL-SCNC: 104 MMOL/L (ref 99–110)
CLARITY UR: ABNORMAL
CO2 SERPL-SCNC: 23 MMOL/L (ref 21–32)
COLOR UR: YELLOW
CREAT SERPL-MCNC: 3.6 MG/DL (ref 0.6–1.2)
DEPRECATED RDW RBC AUTO: 16.9 % (ref 12.4–15.4)
GFR SERPLBLD CREATININE-BSD FMLA CKD-EPI: 13 ML/MIN/{1.73_M2}
GLUCOSE BLD-MCNC: 164 MG/DL (ref 70–99)
GLUCOSE BLD-MCNC: 183 MG/DL (ref 70–99)
GLUCOSE BLD-MCNC: 185 MG/DL (ref 70–99)
GLUCOSE BLD-MCNC: 196 MG/DL (ref 70–99)
GLUCOSE SERPL-MCNC: 171 MG/DL (ref 70–99)
GLUCOSE UR STRIP.AUTO-MCNC: NEGATIVE MG/DL
HAV IGM SERPL QL IA: NORMAL
HBV CORE IGM SERPL QL IA: NORMAL
HBV SURFACE AG SERPL QL IA: NORMAL
HCT VFR BLD AUTO: 26.2 % (ref 36–48)
HCV AB SERPL QL IA: NORMAL
HGB BLD-MCNC: 8.6 G/DL (ref 12–16)
HGB UR QL STRIP.AUTO: ABNORMAL
INR PPP: 1.2 (ref 0.85–1.15)
KETONES UR STRIP.AUTO-MCNC: NEGATIVE MG/DL
LEUKOCYTE ESTERASE UR QL STRIP.AUTO: ABNORMAL
MCH RBC QN AUTO: 29 PG (ref 26–34)
MCHC RBC AUTO-ENTMCNC: 32.6 G/DL (ref 31–36)
MCV RBC AUTO: 89 FL (ref 80–100)
NITRITE UR QL STRIP.AUTO: NEGATIVE
PERFORMED ON: ABNORMAL
PH UR STRIP.AUTO: 5 [PH] (ref 5–8)
PHOSPHATE SERPL-MCNC: 4.8 MG/DL (ref 2.5–4.9)
PLATELET # BLD AUTO: 194 K/UL (ref 135–450)
PMV BLD AUTO: 7.6 FL (ref 5–10.5)
POTASSIUM SERPL-SCNC: 4.5 MMOL/L (ref 3.5–5.1)
PROT UR STRIP.AUTO-MCNC: NEGATIVE MG/DL
PROTHROMBIN TIME: 15.4 SEC (ref 11.9–14.9)
RBC # BLD AUTO: 2.95 M/UL (ref 4–5.2)
RBC #/AREA URNS HPF: >100 /HPF (ref 0–4)
RENAL EPI CELLS #/AREA UR COMP ASSIST: ABNORMAL /HPF (ref 0–1)
SODIUM SERPL-SCNC: 141 MMOL/L (ref 136–145)
SP GR UR STRIP.AUTO: <=1.005 (ref 1–1.03)
UA DIPSTICK W REFLEX MICRO PNL UR: YES
URN SPEC COLLECT METH UR: ABNORMAL
UROBILINOGEN UR STRIP-ACNC: 0.2 E.U./DL
WBC # BLD AUTO: 12.3 K/UL (ref 4–11)
WBC #/AREA URNS HPF: >100 /HPF (ref 0–5)

## 2024-07-03 PROCEDURE — 85027 COMPLETE CBC AUTOMATED: CPT

## 2024-07-03 PROCEDURE — 87040 BLOOD CULTURE FOR BACTERIA: CPT

## 2024-07-03 PROCEDURE — 6370000000 HC RX 637 (ALT 250 FOR IP): Performed by: INTERNAL MEDICINE

## 2024-07-03 PROCEDURE — 2060000000 HC ICU INTERMEDIATE R&B

## 2024-07-03 PROCEDURE — 85610 PROTHROMBIN TIME: CPT

## 2024-07-03 PROCEDURE — 6360000002 HC RX W HCPCS: Performed by: INTERNAL MEDICINE

## 2024-07-03 PROCEDURE — 81001 URINALYSIS AUTO W/SCOPE: CPT

## 2024-07-03 PROCEDURE — 80069 RENAL FUNCTION PANEL: CPT

## 2024-07-03 PROCEDURE — 6370000000 HC RX 637 (ALT 250 FOR IP)

## 2024-07-03 PROCEDURE — 51702 INSERT TEMP BLADDER CATH: CPT

## 2024-07-03 PROCEDURE — 36415 COLL VENOUS BLD VENIPUNCTURE: CPT

## 2024-07-03 PROCEDURE — 6370000000 HC RX 637 (ALT 250 FOR IP): Performed by: UROLOGY

## 2024-07-03 PROCEDURE — 1200000000 HC SEMI PRIVATE

## 2024-07-03 PROCEDURE — 2580000003 HC RX 258: Performed by: INTERNAL MEDICINE

## 2024-07-03 PROCEDURE — 6370000000 HC RX 637 (ALT 250 FOR IP): Performed by: STUDENT IN AN ORGANIZED HEALTH CARE EDUCATION/TRAINING PROGRAM

## 2024-07-03 PROCEDURE — 36592 COLLECT BLOOD FROM PICC: CPT

## 2024-07-03 PROCEDURE — 90935 HEMODIALYSIS ONE EVALUATION: CPT

## 2024-07-03 PROCEDURE — 99232 SBSQ HOSP IP/OBS MODERATE 35: CPT | Performed by: INTERNAL MEDICINE

## 2024-07-03 RX ADMIN — METOPROLOL SUCCINATE 100 MG: 50 TABLET, EXTENDED RELEASE ORAL at 08:22

## 2024-07-03 RX ADMIN — MORPHINE SULFATE 2 MG: 2 INJECTION, SOLUTION INTRAMUSCULAR; INTRAVENOUS at 11:37

## 2024-07-03 RX ADMIN — MIDODRINE HYDROCHLORIDE 10 MG: 5 TABLET ORAL at 17:24

## 2024-07-03 RX ADMIN — OXYCODONE HYDROCHLORIDE 5 MG: 5 TABLET ORAL at 09:26

## 2024-07-03 RX ADMIN — OXYCODONE HYDROCHLORIDE 5 MG: 5 TABLET ORAL at 05:20

## 2024-07-03 RX ADMIN — HEPARIN SODIUM 5000 UNITS: 5000 INJECTION INTRAVENOUS; SUBCUTANEOUS at 22:18

## 2024-07-03 RX ADMIN — Medication 10 ML: at 20:26

## 2024-07-03 RX ADMIN — MORPHINE SULFATE 2 MG: 2 INJECTION, SOLUTION INTRAMUSCULAR; INTRAVENOUS at 00:09

## 2024-07-03 RX ADMIN — SODIUM BICARBONATE 650 MG: 650 TABLET ORAL at 22:18

## 2024-07-03 RX ADMIN — PANTOPRAZOLE SODIUM 40 MG: 40 TABLET, DELAYED RELEASE ORAL at 05:04

## 2024-07-03 RX ADMIN — MORPHINE SULFATE 2 MG: 2 INJECTION, SOLUTION INTRAMUSCULAR; INTRAVENOUS at 16:08

## 2024-07-03 RX ADMIN — DIPHENHYDRAMINE HYDROCHLORIDE 12.5 MG: 25 TABLET ORAL at 12:49

## 2024-07-03 RX ADMIN — MORPHINE SULFATE 2 MG: 2 INJECTION, SOLUTION INTRAMUSCULAR; INTRAVENOUS at 08:22

## 2024-07-03 RX ADMIN — DIPHENHYDRAMINE HYDROCHLORIDE 12.5 MG: 25 TABLET ORAL at 00:04

## 2024-07-03 RX ADMIN — MIDODRINE HYDROCHLORIDE 10 MG: 5 TABLET ORAL at 12:49

## 2024-07-03 RX ADMIN — SODIUM BICARBONATE 650 MG: 650 TABLET ORAL at 00:04

## 2024-07-03 RX ADMIN — OXYCODONE HYDROCHLORIDE 5 MG: 5 TABLET ORAL at 01:21

## 2024-07-03 RX ADMIN — OXYCODONE HYDROCHLORIDE 5 MG: 5 TABLET ORAL at 17:23

## 2024-07-03 RX ADMIN — FERROUS SULFATE TAB 325 MG (65 MG ELEMENTAL FE) 324 MG: 325 (65 FE) TAB at 08:22

## 2024-07-03 RX ADMIN — SODIUM BICARBONATE 650 MG: 650 TABLET ORAL at 17:23

## 2024-07-03 RX ADMIN — HEPARIN SODIUM 5000 UNITS: 5000 INJECTION INTRAVENOUS; SUBCUTANEOUS at 16:08

## 2024-07-03 RX ADMIN — MIDODRINE HYDROCHLORIDE 10 MG: 5 TABLET ORAL at 08:22

## 2024-07-03 RX ADMIN — CHOLECALCIFEROL TAB 25 MCG (1000 UNIT) 1000 UNITS: 25 TAB at 08:22

## 2024-07-03 RX ADMIN — EPOETIN ALFA-EPBX 10000 UNITS: 10000 INJECTION, SOLUTION INTRAVENOUS; SUBCUTANEOUS at 19:09

## 2024-07-03 RX ADMIN — ATORVASTATIN CALCIUM 10 MG: 10 TABLET, FILM COATED ORAL at 08:22

## 2024-07-03 RX ADMIN — SENNOSIDES AND DOCUSATE SODIUM 1 TABLET: 50; 8.6 TABLET ORAL at 12:49

## 2024-07-03 RX ADMIN — SODIUM BICARBONATE 650 MG: 650 TABLET ORAL at 12:49

## 2024-07-03 RX ADMIN — SENNOSIDES AND DOCUSATE SODIUM 1 TABLET: 50; 8.6 TABLET ORAL at 22:18

## 2024-07-03 RX ADMIN — SODIUM BICARBONATE 650 MG: 650 TABLET ORAL at 05:04

## 2024-07-03 ASSESSMENT — PAIN SCALES - GENERAL
PAINLEVEL_OUTOF10: 4
PAINLEVEL_OUTOF10: 10
PAINLEVEL_OUTOF10: 9
PAINLEVEL_OUTOF10: 0
PAINLEVEL_OUTOF10: 6
PAINLEVEL_OUTOF10: 8
PAINLEVEL_OUTOF10: 7
PAINLEVEL_OUTOF10: 7
PAINLEVEL_OUTOF10: 10
PAINLEVEL_OUTOF10: 9
PAINLEVEL_OUTOF10: 6
PAINLEVEL_OUTOF10: 8
PAINLEVEL_OUTOF10: 8
PAINLEVEL_OUTOF10: 7
PAINLEVEL_OUTOF10: 4
PAINLEVEL_OUTOF10: 9
PAINLEVEL_OUTOF10: 10

## 2024-07-03 ASSESSMENT — PAIN DESCRIPTION - DESCRIPTORS
DESCRIPTORS: ACHING;DISCOMFORT
DESCRIPTORS: ACHING;THROBBING
DESCRIPTORS: ACHING;DISCOMFORT
DESCRIPTORS: ACHING;DISCOMFORT

## 2024-07-03 ASSESSMENT — PAIN DESCRIPTION - LOCATION
LOCATION: LEG
LOCATION: LEG
LOCATION: KNEE
LOCATION: LEG

## 2024-07-03 ASSESSMENT — PAIN DESCRIPTION - ORIENTATION
ORIENTATION: RIGHT

## 2024-07-03 NOTE — CARE COORDINATION
Patient on [facility], bariatric-quartet bed.  Bed being exchanged out. New bed to be delivered [facility] # [id number] Gunner.  OLIVE HELTONN, RN, CWOCN  Inpatient  Wound/Ostomy Care  [phone]

## 2024-07-03 NOTE — CARE COORDINATION
Discharge Planning Note:    MARCIA has been in discussions with [facility] with [facility] intake and with the [facility] and they are not able to accommodate patient other than the TTS at 0645 which [facility] cannot work out.  The next closest [facility] facility is in [place], [place] and this it also too far for transport.    MARCIA spoke again with Nichelle at  who asked if we could try for a chair at [facility] in [place]    CM has started an HD chair request via the [facility] Portal. Ref # [id number]    [place] [address]  Phone: [phone]  Fax: [phone]    CM to f/u with Nichelle at [facility] [phone].    Patient will need the following at [place]  30-day monitor per cardiology  BiPAP settings for SNF to obtain equipment ahead of time    SNF will secure stretcher transport to/from HD once community chair secured.     Electronically signed by Isabel Garay RN on 7/3/2024 at 4:13 PM

## 2024-07-03 NOTE — DISCHARGE INSTR - COC
Colostomy/Ileostomy/Ileal Conduit: No       Date of Last BM: 7/13/24    Intake/Output Summary (Last 24 hours) at 7/3/2024 1111  Last data filed at 7/3/2024 0600  Gross per 24 hour   Intake --   Output 235 ml   Net -235 ml     I/O last 3 completed shifts:  In: 250 [P.O.:180; I.V.:70]  Out: 560 [Urine:560]    Safety Concerns:     History of Falls (last 30 days)    Impairments/Disabilities:      None    Nutrition Therapy:  Current Nutrition Therapy:   - Oral Diet:  Dysphagia - Soft and Bite Sized    Routes of Feeding: Oral  Liquids: No Restrictions  Daily Fluid Restriction: no  Last Modified Barium Swallow with Video (Video Swallowing Test): not done    Treatments at the Time of Hospital Discharge:   Respiratory Treatments: N/A  Oxygen Therapy:  is not on home oxygen therapy.  Ventilator:    - CPAP   only when sleeping    Rehab Therapies: Physical Therapy and Occupational Therapy  Weight Bearing Status/Restrictions: No weight bearing restrictions  Other Medical Equipment (for information only, NOT a DME order):  wheelchair, bedside commode, and hospital bed  Other Treatments: N/A    Patient's personal belongings (please select all that are sent with patient):  None    RN SIGNATURE:  Electronically signed by DESHAUN HILL RN on 7/15/24 at 1:50 PM EDT    CASE MANAGEMENT/SOCIAL WORK SECTION    Inpatient Status Date: 6/21/24    Readmission Risk Assessment Score:  Readmission Risk              Risk of Unplanned Readmission:  29           Discharging to Facility/ Agency     [facility]  [address] [place] [address] [place] [address]  Phone: [phone]  Fax: [phone]      Dialysis Facility (if applicable)         / signature: Electronically signed by Isabel Garay RN on 7/3/24 at 11:12 AM EDT    PHYSICIAN SECTION    Prognosis: Guarded    Condition at Discharge: Stable    Rehab Potential (if transferring to Rehab): Guarded    Recommended Labs or Other Treatments After Discharge:     Physician

## 2024-07-03 NOTE — CARE COORDINATION
Discharge Planning Note:    MARCIA spoke to spouse Duane who  reported that family has decided they do want patient to go to [facility] where DIL works.      CM called and left Nichelle in admissions a  with update.     MARCIA spoke to Hunter at [facility] who assisted to locate nearest [facility] location and will transfer [facility] to [facility]. Tentatively there is an opening for a TTS chair at 0645, possible start on Saturday 7/6/24.    [facility]  [address] [place] [address]  Phone: [phone]      Electronically signed by Isabel Garay RN on 7/3/2024 at 9:22 AM    Update    CM received call from Nichelle at  informing that they cannot accommodate a dialysis time before 8am as transport is not available.      Patient will need stretcher transport to/from HD.     Electronically signed by Isabel Garay RN on 7/3/2024 at 11:11 AM

## 2024-07-04 ENCOUNTER — APPOINTMENT (OUTPATIENT)
Dept: GENERAL RADIOLOGY | Age: 69
DRG: 480 | End: 2024-07-04
Payer: MEDICARE

## 2024-07-04 LAB
ALBUMIN SERPL-MCNC: 3.4 G/DL (ref 3.4–5)
ANION GAP SERPL CALCULATED.3IONS-SCNC: 18 MMOL/L (ref 3–16)
BUN SERPL-MCNC: 56 MG/DL (ref 7–20)
CALCIUM SERPL-MCNC: 9.9 MG/DL (ref 8.3–10.6)
CHLORIDE SERPL-SCNC: 100 MMOL/L (ref 99–110)
CO2 SERPL-SCNC: 22 MMOL/L (ref 21–32)
CREAT SERPL-MCNC: 4.7 MG/DL (ref 0.6–1.2)
DEPRECATED RDW RBC AUTO: 17.9 % (ref 12.4–15.4)
GFR SERPLBLD CREATININE-BSD FMLA CKD-EPI: 10 ML/MIN/{1.73_M2}
GLUCOSE BLD-MCNC: 170 MG/DL (ref 70–99)
GLUCOSE BLD-MCNC: 175 MG/DL (ref 70–99)
GLUCOSE BLD-MCNC: 202 MG/DL (ref 70–99)
GLUCOSE BLD-MCNC: 203 MG/DL (ref 70–99)
GLUCOSE SERPL-MCNC: 200 MG/DL (ref 70–99)
HCT VFR BLD AUTO: 26.8 % (ref 36–48)
HGB BLD-MCNC: 8.6 G/DL (ref 12–16)
MCH RBC QN AUTO: 28.9 PG (ref 26–34)
MCHC RBC AUTO-ENTMCNC: 31.9 G/DL (ref 31–36)
MCV RBC AUTO: 90.5 FL (ref 80–100)
PERFORMED ON: ABNORMAL
PHOSPHATE SERPL-MCNC: 4.6 MG/DL (ref 2.5–4.9)
PLATELET # BLD AUTO: 207 K/UL (ref 135–450)
PMV BLD AUTO: 8 FL (ref 5–10.5)
POTASSIUM SERPL-SCNC: 4.7 MMOL/L (ref 3.5–5.1)
RBC # BLD AUTO: 2.96 M/UL (ref 4–5.2)
SODIUM SERPL-SCNC: 140 MMOL/L (ref 136–145)
WBC # BLD AUTO: 16.7 K/UL (ref 4–11)

## 2024-07-04 PROCEDURE — 85027 COMPLETE CBC AUTOMATED: CPT

## 2024-07-04 PROCEDURE — 74018 RADEX ABDOMEN 1 VIEW: CPT

## 2024-07-04 PROCEDURE — 97530 THERAPEUTIC ACTIVITIES: CPT

## 2024-07-04 PROCEDURE — 1200000000 HC SEMI PRIVATE

## 2024-07-04 PROCEDURE — 6370000000 HC RX 637 (ALT 250 FOR IP): Performed by: STUDENT IN AN ORGANIZED HEALTH CARE EDUCATION/TRAINING PROGRAM

## 2024-07-04 PROCEDURE — 6370000000 HC RX 637 (ALT 250 FOR IP): Performed by: INTERNAL MEDICINE

## 2024-07-04 PROCEDURE — 6360000002 HC RX W HCPCS: Performed by: INTERNAL MEDICINE

## 2024-07-04 PROCEDURE — 80069 RENAL FUNCTION PANEL: CPT

## 2024-07-04 PROCEDURE — 97535 SELF CARE MNGMENT TRAINING: CPT

## 2024-07-04 PROCEDURE — 6370000000 HC RX 637 (ALT 250 FOR IP): Performed by: UROLOGY

## 2024-07-04 PROCEDURE — 2580000003 HC RX 258: Performed by: INTERNAL MEDICINE

## 2024-07-04 PROCEDURE — 36592 COLLECT BLOOD FROM PICC: CPT

## 2024-07-04 PROCEDURE — 94761 N-INVAS EAR/PLS OXIMETRY MLT: CPT

## 2024-07-04 PROCEDURE — 6370000000 HC RX 637 (ALT 250 FOR IP)

## 2024-07-04 RX ORDER — BISACODYL 5 MG/1
5 TABLET, DELAYED RELEASE ORAL ONCE
Status: COMPLETED | OUTPATIENT
Start: 2024-07-04 | End: 2024-07-04

## 2024-07-04 RX ORDER — MIDODRINE HYDROCHLORIDE 5 MG/1
15 TABLET ORAL
Status: DISCONTINUED | OUTPATIENT
Start: 2024-07-04 | End: 2024-07-15 | Stop reason: HOSPADM

## 2024-07-04 RX ORDER — FUROSEMIDE 10 MG/ML
60 INJECTION INTRAMUSCULAR; INTRAVENOUS ONCE
Status: COMPLETED | OUTPATIENT
Start: 2024-07-04 | End: 2024-07-04

## 2024-07-04 RX ADMIN — INSULIN LISPRO 2 UNITS: 100 INJECTION, SOLUTION INTRAVENOUS; SUBCUTANEOUS at 12:09

## 2024-07-04 RX ADMIN — MIDODRINE HYDROCHLORIDE 10 MG: 5 TABLET ORAL at 08:39

## 2024-07-04 RX ADMIN — MORPHINE SULFATE 2 MG: 2 INJECTION, SOLUTION INTRAMUSCULAR; INTRAVENOUS at 13:56

## 2024-07-04 RX ADMIN — BISACODYL 5 MG: 5 TABLET, COATED ORAL at 12:09

## 2024-07-04 RX ADMIN — HEPARIN SODIUM 5000 UNITS: 5000 INJECTION INTRAVENOUS; SUBCUTANEOUS at 04:46

## 2024-07-04 RX ADMIN — CHOLECALCIFEROL TAB 25 MCG (1000 UNIT) 1000 UNITS: 25 TAB at 08:39

## 2024-07-04 RX ADMIN — DIPHENHYDRAMINE HYDROCHLORIDE 12.5 MG: 25 TABLET ORAL at 04:50

## 2024-07-04 RX ADMIN — OXYCODONE HYDROCHLORIDE 5 MG: 5 TABLET ORAL at 04:45

## 2024-07-04 RX ADMIN — FUROSEMIDE 60 MG: 10 INJECTION, SOLUTION INTRAMUSCULAR; INTRAVENOUS at 17:04

## 2024-07-04 RX ADMIN — POLYETHYLENE GLYCOL 3350 17 G: 17 POWDER, FOR SOLUTION ORAL at 11:17

## 2024-07-04 RX ADMIN — SODIUM BICARBONATE 650 MG: 650 TABLET ORAL at 11:17

## 2024-07-04 RX ADMIN — PANTOPRAZOLE SODIUM 40 MG: 40 TABLET, DELAYED RELEASE ORAL at 04:46

## 2024-07-04 RX ADMIN — Medication 10 ML: at 21:29

## 2024-07-04 RX ADMIN — SENNOSIDES AND DOCUSATE SODIUM 1 TABLET: 50; 8.6 TABLET ORAL at 21:23

## 2024-07-04 RX ADMIN — FERROUS SULFATE TAB 325 MG (65 MG ELEMENTAL FE) 324 MG: 325 (65 FE) TAB at 08:39

## 2024-07-04 RX ADMIN — OXYCODONE HYDROCHLORIDE 5 MG: 5 TABLET ORAL at 17:04

## 2024-07-04 RX ADMIN — OXYCODONE HYDROCHLORIDE 5 MG: 5 TABLET ORAL at 12:33

## 2024-07-04 RX ADMIN — Medication 10 ML: at 08:39

## 2024-07-04 RX ADMIN — SODIUM BICARBONATE 650 MG: 650 TABLET ORAL at 04:46

## 2024-07-04 RX ADMIN — OXYCODONE HYDROCHLORIDE 5 MG: 5 TABLET ORAL at 08:39

## 2024-07-04 RX ADMIN — MIDODRINE HYDROCHLORIDE 10 MG: 5 TABLET ORAL at 11:17

## 2024-07-04 RX ADMIN — ATORVASTATIN CALCIUM 10 MG: 10 TABLET, FILM COATED ORAL at 08:39

## 2024-07-04 RX ADMIN — SENNOSIDES AND DOCUSATE SODIUM 1 TABLET: 50; 8.6 TABLET ORAL at 08:39

## 2024-07-04 RX ADMIN — HEPARIN SODIUM 5000 UNITS: 5000 INJECTION INTRAVENOUS; SUBCUTANEOUS at 21:23

## 2024-07-04 RX ADMIN — HEPARIN SODIUM 5000 UNITS: 5000 INJECTION INTRAVENOUS; SUBCUTANEOUS at 13:56

## 2024-07-04 RX ADMIN — NALOXEGOL OXALATE 25 MG: 25 TABLET, FILM COATED ORAL at 12:10

## 2024-07-04 RX ADMIN — MIDODRINE HYDROCHLORIDE 15 MG: 5 TABLET ORAL at 17:04

## 2024-07-04 ASSESSMENT — PAIN SCALES - GENERAL
PAINLEVEL_OUTOF10: 0
PAINLEVEL_OUTOF10: 8
PAINLEVEL_OUTOF10: 10
PAINLEVEL_OUTOF10: 7
PAINLEVEL_OUTOF10: 6
PAINLEVEL_OUTOF10: 8
PAINLEVEL_OUTOF10: 10
PAINLEVEL_OUTOF10: 9
PAINLEVEL_OUTOF10: 8
PAINLEVEL_OUTOF10: 9
PAINLEVEL_OUTOF10: 3

## 2024-07-04 ASSESSMENT — PAIN DESCRIPTION - ORIENTATION
ORIENTATION: RIGHT

## 2024-07-04 ASSESSMENT — PAIN DESCRIPTION - LOCATION
LOCATION: LEG;INCISION
LOCATION: INCISION
LOCATION: NECK;LEG
LOCATION: NECK;INCISION
LOCATION: INCISION

## 2024-07-04 ASSESSMENT — PAIN DESCRIPTION - DESCRIPTORS
DESCRIPTORS: THROBBING
DESCRIPTORS: ACHING;DISCOMFORT
DESCRIPTORS: THROBBING

## 2024-07-04 ASSESSMENT — PAIN SCALES - WONG BAKER: WONGBAKER_NUMERICALRESPONSE: NO HURT

## 2024-07-05 LAB
ALBUMIN SERPL-MCNC: 3.3 G/DL (ref 3.4–5)
ANION GAP SERPL CALCULATED.3IONS-SCNC: 19 MMOL/L (ref 3–16)
BUN SERPL-MCNC: 77 MG/DL (ref 7–20)
CALCIUM SERPL-MCNC: 9.8 MG/DL (ref 8.3–10.6)
CHLORIDE SERPL-SCNC: 99 MMOL/L (ref 99–110)
CO2 SERPL-SCNC: 22 MMOL/L (ref 21–32)
CREAT SERPL-MCNC: 6.8 MG/DL (ref 0.6–1.2)
DEPRECATED RDW RBC AUTO: 17.1 % (ref 12.4–15.4)
GFR SERPLBLD CREATININE-BSD FMLA CKD-EPI: 6 ML/MIN/{1.73_M2}
GLUCOSE BLD-MCNC: 152 MG/DL (ref 70–99)
GLUCOSE BLD-MCNC: 162 MG/DL (ref 70–99)
GLUCOSE BLD-MCNC: 196 MG/DL (ref 70–99)
GLUCOSE SERPL-MCNC: 159 MG/DL (ref 70–99)
HCT VFR BLD AUTO: 24.4 % (ref 36–48)
HGB BLD-MCNC: 7.9 G/DL (ref 12–16)
MCH RBC QN AUTO: 29.3 PG (ref 26–34)
MCHC RBC AUTO-ENTMCNC: 32.4 G/DL (ref 31–36)
MCV RBC AUTO: 90.4 FL (ref 80–100)
PERFORMED ON: ABNORMAL
PHOSPHATE SERPL-MCNC: 5.7 MG/DL (ref 2.5–4.9)
PLATELET # BLD AUTO: 218 K/UL (ref 135–450)
PMV BLD AUTO: 7.6 FL (ref 5–10.5)
POTASSIUM SERPL-SCNC: 5.1 MMOL/L (ref 3.5–5.1)
RBC # BLD AUTO: 2.69 M/UL (ref 4–5.2)
SODIUM SERPL-SCNC: 140 MMOL/L (ref 136–145)
WBC # BLD AUTO: 16.1 K/UL (ref 4–11)

## 2024-07-05 PROCEDURE — 6370000000 HC RX 637 (ALT 250 FOR IP): Performed by: INTERNAL MEDICINE

## 2024-07-05 PROCEDURE — 2700000000 HC OXYGEN THERAPY PER DAY

## 2024-07-05 PROCEDURE — 6370000000 HC RX 637 (ALT 250 FOR IP)

## 2024-07-05 PROCEDURE — 85027 COMPLETE CBC AUTOMATED: CPT

## 2024-07-05 PROCEDURE — 80069 RENAL FUNCTION PANEL: CPT

## 2024-07-05 PROCEDURE — 6360000002 HC RX W HCPCS: Performed by: INTERNAL MEDICINE

## 2024-07-05 PROCEDURE — 90935 HEMODIALYSIS ONE EVALUATION: CPT

## 2024-07-05 PROCEDURE — 6360000002 HC RX W HCPCS: Performed by: STUDENT IN AN ORGANIZED HEALTH CARE EDUCATION/TRAINING PROGRAM

## 2024-07-05 PROCEDURE — 1200000000 HC SEMI PRIVATE

## 2024-07-05 PROCEDURE — 2580000003 HC RX 258: Performed by: STUDENT IN AN ORGANIZED HEALTH CARE EDUCATION/TRAINING PROGRAM

## 2024-07-05 PROCEDURE — 94760 N-INVAS EAR/PLS OXIMETRY 1: CPT

## 2024-07-05 PROCEDURE — 6370000000 HC RX 637 (ALT 250 FOR IP): Performed by: UROLOGY

## 2024-07-05 PROCEDURE — 2580000003 HC RX 258: Performed by: INTERNAL MEDICINE

## 2024-07-05 RX ADMIN — DIPHENHYDRAMINE HYDROCHLORIDE 12.5 MG: 25 TABLET ORAL at 16:25

## 2024-07-05 RX ADMIN — PANTOPRAZOLE SODIUM 40 MG: 40 TABLET, DELAYED RELEASE ORAL at 05:09

## 2024-07-05 RX ADMIN — MIDODRINE HYDROCHLORIDE 15 MG: 5 TABLET ORAL at 11:51

## 2024-07-05 RX ADMIN — OXYCODONE HYDROCHLORIDE 5 MG: 5 TABLET ORAL at 11:51

## 2024-07-05 RX ADMIN — MORPHINE SULFATE 2 MG: 2 INJECTION, SOLUTION INTRAMUSCULAR; INTRAVENOUS at 07:32

## 2024-07-05 RX ADMIN — OXYCODONE HYDROCHLORIDE 5 MG: 5 TABLET ORAL at 21:07

## 2024-07-05 RX ADMIN — MORPHINE SULFATE 2 MG: 2 INJECTION, SOLUTION INTRAMUSCULAR; INTRAVENOUS at 17:48

## 2024-07-05 RX ADMIN — EPOETIN ALFA-EPBX 10000 UNITS: 10000 INJECTION, SOLUTION INTRAVENOUS; SUBCUTANEOUS at 11:21

## 2024-07-05 RX ADMIN — Medication 10 ML: at 20:53

## 2024-07-05 RX ADMIN — MORPHINE SULFATE 2 MG: 2 INJECTION, SOLUTION INTRAMUSCULAR; INTRAVENOUS at 13:17

## 2024-07-05 RX ADMIN — SENNOSIDES AND DOCUSATE SODIUM 1 TABLET: 50; 8.6 TABLET ORAL at 11:51

## 2024-07-05 RX ADMIN — SENNOSIDES AND DOCUSATE SODIUM 1 TABLET: 50; 8.6 TABLET ORAL at 20:52

## 2024-07-05 RX ADMIN — SODIUM CHLORIDE 1250 MG: 9 INJECTION, SOLUTION INTRAVENOUS at 12:01

## 2024-07-05 RX ADMIN — HEPARIN SODIUM 2300 UNITS: 1000 INJECTION INTRAVENOUS; SUBCUTANEOUS at 11:26

## 2024-07-05 RX ADMIN — MORPHINE SULFATE 2 MG: 2 INJECTION, SOLUTION INTRAMUSCULAR; INTRAVENOUS at 23:49

## 2024-07-05 RX ADMIN — MIDODRINE HYDROCHLORIDE 15 MG: 5 TABLET ORAL at 16:08

## 2024-07-05 RX ADMIN — HEPARIN SODIUM 5000 UNITS: 5000 INJECTION INTRAVENOUS; SUBCUTANEOUS at 05:09

## 2024-07-05 RX ADMIN — HEPARIN SODIUM 5000 UNITS: 5000 INJECTION INTRAVENOUS; SUBCUTANEOUS at 14:22

## 2024-07-05 RX ADMIN — POLYETHYLENE GLYCOL 3350 17 G: 17 POWDER, FOR SOLUTION ORAL at 11:50

## 2024-07-05 RX ADMIN — HEPARIN SODIUM 5000 UNITS: 5000 INJECTION INTRAVENOUS; SUBCUTANEOUS at 23:06

## 2024-07-05 RX ADMIN — CHOLECALCIFEROL TAB 25 MCG (1000 UNIT) 1000 UNITS: 25 TAB at 11:51

## 2024-07-05 RX ADMIN — CEFEPIME 1000 MG: 1 INJECTION, POWDER, FOR SOLUTION INTRAMUSCULAR; INTRAVENOUS at 12:04

## 2024-07-05 RX ADMIN — OXYCODONE HYDROCHLORIDE 5 MG: 5 TABLET ORAL at 16:09

## 2024-07-05 RX ADMIN — Medication 10 ML: at 11:52

## 2024-07-05 RX ADMIN — ACETAMINOPHEN 650 MG: 325 TABLET ORAL at 05:28

## 2024-07-05 RX ADMIN — OXYCODONE HYDROCHLORIDE 5 MG: 5 TABLET ORAL at 05:28

## 2024-07-05 RX ADMIN — ATORVASTATIN CALCIUM 10 MG: 10 TABLET, FILM COATED ORAL at 11:50

## 2024-07-05 RX ADMIN — FERROUS SULFATE TAB 325 MG (65 MG ELEMENTAL FE) 324 MG: 325 (65 FE) TAB at 11:51

## 2024-07-05 ASSESSMENT — PAIN SCALES - GENERAL
PAINLEVEL_OUTOF10: 0
PAINLEVEL_OUTOF10: 0
PAINLEVEL_OUTOF10: 9
PAINLEVEL_OUTOF10: 9
PAINLEVEL_OUTOF10: 0
PAINLEVEL_OUTOF10: 0
PAINLEVEL_OUTOF10: 10
PAINLEVEL_OUTOF10: 8
PAINLEVEL_OUTOF10: 7
PAINLEVEL_OUTOF10: 8
PAINLEVEL_OUTOF10: 0
PAINLEVEL_OUTOF10: 7
PAINLEVEL_OUTOF10: 9
PAINLEVEL_OUTOF10: 10
PAINLEVEL_OUTOF10: 8
PAINLEVEL_OUTOF10: 0
PAINLEVEL_OUTOF10: 8
PAINLEVEL_OUTOF10: 8
PAINLEVEL_OUTOF10: 9

## 2024-07-05 ASSESSMENT — PAIN DESCRIPTION - ORIENTATION
ORIENTATION: RIGHT

## 2024-07-05 ASSESSMENT — PAIN DESCRIPTION - LOCATION
LOCATION: KNEE;LEG;INCISION
LOCATION: INCISION
LOCATION: LEG
LOCATION: KNEE;LEG
LOCATION: LEG
LOCATION: KNEE;LEG
LOCATION: INCISION;KNEE;LEG
LOCATION: KNEE

## 2024-07-05 ASSESSMENT — PAIN SCALES - WONG BAKER
WONGBAKER_NUMERICALRESPONSE: NO HURT
WONGBAKER_NUMERICALRESPONSE: NO HURT
WONGBAKER_NUMERICALRESPONSE: HURTS LITTLE MORE
WONGBAKER_NUMERICALRESPONSE: NO HURT
WONGBAKER_NUMERICALRESPONSE: HURTS WHOLE LOT
WONGBAKER_NUMERICALRESPONSE: NO HURT

## 2024-07-05 ASSESSMENT — PAIN DESCRIPTION - DESCRIPTORS
DESCRIPTORS: THROBBING
DESCRIPTORS: ACHING

## 2024-07-05 ASSESSMENT — PAIN DESCRIPTION - FREQUENCY
FREQUENCY: INTERMITTENT
FREQUENCY: INTERMITTENT

## 2024-07-05 ASSESSMENT — PAIN DESCRIPTION - ONSET
ONSET: ON-GOING
ONSET: ON-GOING

## 2024-07-05 ASSESSMENT — PAIN - FUNCTIONAL ASSESSMENT
PAIN_FUNCTIONAL_ASSESSMENT: PREVENTS OR INTERFERES SOME ACTIVE ACTIVITIES AND ADLS
PAIN_FUNCTIONAL_ASSESSMENT: PREVENTS OR INTERFERES SOME ACTIVE ACTIVITIES AND ADLS

## 2024-07-05 ASSESSMENT — PAIN DESCRIPTION - PAIN TYPE
TYPE: CHRONIC PAIN
TYPE: CHRONIC PAIN

## 2024-07-05 NOTE — CARE COORDINATION
Outpatient Hemodialysis Clinic Planning    HD chair has been TENTATIVELY secured for this patient as requested for discharge.      Location:  [place] [address]  Phone: [phone]  Fax: [phone]    Schedule:  MWF    Chair Time:  3:00pm    Start Date: TBD    Notes: This schedule is pending SNF ability to secure transport to/from HD sessions.  CM has sent all needed clinical documentation to [facility] via the [facility] Portal.    Mitsy with admissions at [facility] will return on Tuesday 7/9/24    HD chair coordination PENDING    Electronically signed by Isabel Garay RN on 7/5/2024 at 7:59 AM

## 2024-07-05 NOTE — FLOWSHEET NOTE
Patient returned from HD. Patient is moaning in pain, prn medication given. Patient given AM medications. Will replaced durand per protocol and collect ordered urine specimen. Patient's  at bedside.        07/05/24 1145   Vitals   Pulse 75   BP (!) 94/42   MAP (Calculated) 59   MAP (mmHg) (!) 57   Oxygen Therapy   SpO2 99 %

## 2024-07-05 NOTE — FLOWSHEET NOTE
Patient to HD with transport on portable tele monitor. Patient is alert and oriented, assessment complete. Called patient's  to update that pt is going to HD.        07/05/24 5526   Mobility   Location Change/Off Floor Dialysis   Transport Method Bed

## 2024-07-05 NOTE — CARE COORDINATION
Discharge Planning Note:    Per Darline Marshall (intake dept) at [facility], patient is pending clinical clearance.  Planning for start date of Wed, 7/10/24 pending DC from acute care to SNF.     MARCIA spoke to Jessenia, patient's DIL (ADON at [facility]) who is working to secure stretcher transport for dialysis.       Received call from Angie, also in intake, to determine the patient's following nephrologist.  MARCIA explained that patient does not have a nephrologist, only who is currently seeing her in the hospital.  Angie will reach out to their medical director to see if he or one of his team can follow this patient as she will need a nephrologist in the are.  MARCIA explained that patient/family has no preference, their main concern is to get her to [facility].     Electronically signed by Isabel Garay RN on 7/5/2024 at 1:01 PM

## 2024-07-05 NOTE — CARE COORDINATION
SW received a message from Kior via the portal asking who's pt's nephrologist is and if patient was going to get a tunneled CVC done and when.  SW responded that patient has been seen by Dr. Ernst and that we have been attempting the tunneled CVC but need to wait until pt's white blood cell count goes down.      Electronically signed by ZENY Rico, CHLOE on 7/5/2024 at 4:39 PM
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
## 2024-07-06 PROBLEM — E87.5 HYPERKALEMIA: Status: ACTIVE | Noted: 2024-07-06

## 2024-07-06 PROBLEM — R79.89 ELEVATED PROCALCITONIN: Status: ACTIVE | Noted: 2024-07-06

## 2024-07-06 PROBLEM — R52 INTRACTABLE PAIN: Status: ACTIVE | Noted: 2024-07-06

## 2024-07-06 LAB
ALBUMIN SERPL-MCNC: 3.2 G/DL (ref 3.4–5)
ANION GAP SERPL CALCULATED.3IONS-SCNC: 19 MMOL/L (ref 3–16)
BUN SERPL-MCNC: 53 MG/DL (ref 7–20)
CALCIUM SERPL-MCNC: 8.8 MG/DL (ref 8.3–10.6)
CHLORIDE SERPL-SCNC: 101 MMOL/L (ref 99–110)
CO2 SERPL-SCNC: 19 MMOL/L (ref 21–32)
CREAT SERPL-MCNC: 5.6 MG/DL (ref 0.6–1.2)
CRP SERPL-MCNC: 65.3 MG/L (ref 0–5.1)
DEPRECATED RDW RBC AUTO: 18.4 % (ref 12.4–15.4)
ERYTHROCYTE [SEDIMENTATION RATE] IN BLOOD BY WESTERGREN METHOD: 19 MM/HR (ref 0–30)
GFR SERPLBLD CREATININE-BSD FMLA CKD-EPI: 8 ML/MIN/{1.73_M2}
GLUCOSE BLD-MCNC: 126 MG/DL (ref 70–99)
GLUCOSE BLD-MCNC: 137 MG/DL (ref 70–99)
GLUCOSE BLD-MCNC: 140 MG/DL (ref 70–99)
GLUCOSE BLD-MCNC: 141 MG/DL (ref 70–99)
GLUCOSE SERPL-MCNC: 140 MG/DL (ref 70–99)
HCT VFR BLD AUTO: 24 % (ref 36–48)
HGB BLD-MCNC: 7.7 G/DL (ref 12–16)
MCH RBC QN AUTO: 29.2 PG (ref 26–34)
MCHC RBC AUTO-ENTMCNC: 32 G/DL (ref 31–36)
MCV RBC AUTO: 91.5 FL (ref 80–100)
PERFORMED ON: ABNORMAL
PHOSPHATE SERPL-MCNC: 5.2 MG/DL (ref 2.5–4.9)
PLATELET # BLD AUTO: 157 K/UL (ref 135–450)
PMV BLD AUTO: 7.7 FL (ref 5–10.5)
POTASSIUM SERPL-SCNC: 5.3 MMOL/L (ref 3.5–5.1)
PROCALCITONIN SERPL IA-MCNC: 3.9 NG/ML (ref 0–0.15)
RBC # BLD AUTO: 2.62 M/UL (ref 4–5.2)
SODIUM SERPL-SCNC: 139 MMOL/L (ref 136–145)
VANCOMYCIN SERPL-MCNC: 22.1 UG/ML
WBC # BLD AUTO: 14 K/UL (ref 4–11)

## 2024-07-06 PROCEDURE — 6360000002 HC RX W HCPCS: Performed by: INTERNAL MEDICINE

## 2024-07-06 PROCEDURE — 6370000000 HC RX 637 (ALT 250 FOR IP): Performed by: INTERNAL MEDICINE

## 2024-07-06 PROCEDURE — 2580000003 HC RX 258: Performed by: STUDENT IN AN ORGANIZED HEALTH CARE EDUCATION/TRAINING PROGRAM

## 2024-07-06 PROCEDURE — 84145 PROCALCITONIN (PCT): CPT

## 2024-07-06 PROCEDURE — 87106 FUNGI IDENTIFICATION YEAST: CPT

## 2024-07-06 PROCEDURE — 99291 CRITICAL CARE FIRST HOUR: CPT | Performed by: INTERNAL MEDICINE

## 2024-07-06 PROCEDURE — 86140 C-REACTIVE PROTEIN: CPT

## 2024-07-06 PROCEDURE — 2700000000 HC OXYGEN THERAPY PER DAY

## 2024-07-06 PROCEDURE — 2580000003 HC RX 258: Performed by: INTERNAL MEDICINE

## 2024-07-06 PROCEDURE — 85027 COMPLETE CBC AUTOMATED: CPT

## 2024-07-06 PROCEDURE — 6370000000 HC RX 637 (ALT 250 FOR IP)

## 2024-07-06 PROCEDURE — 80202 ASSAY OF VANCOMYCIN: CPT

## 2024-07-06 PROCEDURE — 87641 MR-STAPH DNA AMP PROBE: CPT

## 2024-07-06 PROCEDURE — 87086 URINE CULTURE/COLONY COUNT: CPT

## 2024-07-06 PROCEDURE — P9045 ALBUMIN (HUMAN), 5%, 250 ML: HCPCS | Performed by: INTERNAL MEDICINE

## 2024-07-06 PROCEDURE — 90935 HEMODIALYSIS ONE EVALUATION: CPT

## 2024-07-06 PROCEDURE — 6370000000 HC RX 637 (ALT 250 FOR IP): Performed by: UROLOGY

## 2024-07-06 PROCEDURE — 6360000002 HC RX W HCPCS: Performed by: STUDENT IN AN ORGANIZED HEALTH CARE EDUCATION/TRAINING PROGRAM

## 2024-07-06 PROCEDURE — 85652 RBC SED RATE AUTOMATED: CPT

## 2024-07-06 PROCEDURE — 94761 N-INVAS EAR/PLS OXIMETRY MLT: CPT

## 2024-07-06 PROCEDURE — 36415 COLL VENOUS BLD VENIPUNCTURE: CPT

## 2024-07-06 PROCEDURE — 36592 COLLECT BLOOD FROM PICC: CPT

## 2024-07-06 PROCEDURE — 80069 RENAL FUNCTION PANEL: CPT

## 2024-07-06 PROCEDURE — 1200000000 HC SEMI PRIVATE

## 2024-07-06 RX ORDER — ALBUMIN, HUMAN INJ 5% 5 %
12.5 SOLUTION INTRAVENOUS ONCE
Status: COMPLETED | OUTPATIENT
Start: 2024-07-06 | End: 2024-07-06

## 2024-07-06 RX ORDER — FUROSEMIDE 10 MG/ML
80 INJECTION INTRAMUSCULAR; INTRAVENOUS ONCE
Status: COMPLETED | OUTPATIENT
Start: 2024-07-06 | End: 2024-07-06

## 2024-07-06 RX ADMIN — OXYCODONE HYDROCHLORIDE 5 MG: 5 TABLET ORAL at 02:11

## 2024-07-06 RX ADMIN — SENNOSIDES AND DOCUSATE SODIUM 1 TABLET: 50; 8.6 TABLET ORAL at 09:35

## 2024-07-06 RX ADMIN — OXYCODONE HYDROCHLORIDE 5 MG: 5 TABLET ORAL at 09:35

## 2024-07-06 RX ADMIN — MIDODRINE HYDROCHLORIDE 15 MG: 5 TABLET ORAL at 12:19

## 2024-07-06 RX ADMIN — FERROUS SULFATE TAB 325 MG (65 MG ELEMENTAL FE) 324 MG: 325 (65 FE) TAB at 09:34

## 2024-07-06 RX ADMIN — HEPARIN SODIUM 5000 UNITS: 5000 INJECTION INTRAVENOUS; SUBCUTANEOUS at 13:31

## 2024-07-06 RX ADMIN — DIPHENHYDRAMINE HYDROCHLORIDE 12.5 MG: 25 TABLET ORAL at 02:11

## 2024-07-06 RX ADMIN — CHOLECALCIFEROL TAB 25 MCG (1000 UNIT) 1000 UNITS: 25 TAB at 09:35

## 2024-07-06 RX ADMIN — OXYCODONE HYDROCHLORIDE 5 MG: 5 TABLET ORAL at 18:03

## 2024-07-06 RX ADMIN — MIDODRINE HYDROCHLORIDE 15 MG: 5 TABLET ORAL at 07:36

## 2024-07-06 RX ADMIN — PANTOPRAZOLE SODIUM 40 MG: 40 TABLET, DELAYED RELEASE ORAL at 05:59

## 2024-07-06 RX ADMIN — ALBUMIN (HUMAN) 12.5 G: 12.5 INJECTION, SOLUTION INTRAVENOUS at 13:34

## 2024-07-06 RX ADMIN — FUROSEMIDE 80 MG: 10 INJECTION, SOLUTION INTRAMUSCULAR; INTRAVENOUS at 16:59

## 2024-07-06 RX ADMIN — MORPHINE SULFATE 2 MG: 2 INJECTION, SOLUTION INTRAMUSCULAR; INTRAVENOUS at 07:55

## 2024-07-06 RX ADMIN — SENNOSIDES AND DOCUSATE SODIUM 1 TABLET: 50; 8.6 TABLET ORAL at 20:19

## 2024-07-06 RX ADMIN — ALTEPLASE 2 MG: 2.2 INJECTION, POWDER, LYOPHILIZED, FOR SOLUTION INTRAVENOUS at 08:37

## 2024-07-06 RX ADMIN — HEPARIN SODIUM 5000 UNITS: 5000 INJECTION INTRAVENOUS; SUBCUTANEOUS at 05:50

## 2024-07-06 RX ADMIN — ATORVASTATIN CALCIUM 10 MG: 10 TABLET, FILM COATED ORAL at 09:35

## 2024-07-06 RX ADMIN — POLYETHYLENE GLYCOL 3350 17 G: 17 POWDER, FOR SOLUTION ORAL at 14:20

## 2024-07-06 RX ADMIN — VANCOMYCIN HYDROCHLORIDE 1000 MG: 1 INJECTION, POWDER, LYOPHILIZED, FOR SOLUTION INTRAVENOUS at 09:42

## 2024-07-06 RX ADMIN — HEPARIN SODIUM 5000 UNITS: 5000 INJECTION INTRAVENOUS; SUBCUTANEOUS at 21:59

## 2024-07-06 RX ADMIN — OXYCODONE HYDROCHLORIDE 5 MG: 5 TABLET ORAL at 14:20

## 2024-07-06 RX ADMIN — Medication 10 ML: at 20:20

## 2024-07-06 RX ADMIN — ACETAMINOPHEN 650 MG: 325 TABLET ORAL at 18:23

## 2024-07-06 RX ADMIN — MORPHINE SULFATE 2 MG: 2 INJECTION, SOLUTION INTRAMUSCULAR; INTRAVENOUS at 11:23

## 2024-07-06 RX ADMIN — Medication 10 ML: at 09:37

## 2024-07-06 RX ADMIN — MIDODRINE HYDROCHLORIDE 15 MG: 5 TABLET ORAL at 16:59

## 2024-07-06 RX ADMIN — CEFEPIME 1000 MG: 1 INJECTION, POWDER, FOR SOLUTION INTRAMUSCULAR; INTRAVENOUS at 12:22

## 2024-07-06 ASSESSMENT — PAIN SCALES - WONG BAKER
WONGBAKER_NUMERICALRESPONSE: NO HURT
WONGBAKER_NUMERICALRESPONSE: HURTS LITTLE MORE
WONGBAKER_NUMERICALRESPONSE: NO HURT

## 2024-07-06 ASSESSMENT — PAIN SCALES - GENERAL
PAINLEVEL_OUTOF10: 10
PAINLEVEL_OUTOF10: 8
PAINLEVEL_OUTOF10: 0
PAINLEVEL_OUTOF10: 9
PAINLEVEL_OUTOF10: 10
PAINLEVEL_OUTOF10: 0
PAINLEVEL_OUTOF10: 7
PAINLEVEL_OUTOF10: 0
PAINLEVEL_OUTOF10: 9
PAINLEVEL_OUTOF10: 0
PAINLEVEL_OUTOF10: 8
PAINLEVEL_OUTOF10: 10
PAINLEVEL_OUTOF10: 9
PAINLEVEL_OUTOF10: 8
PAINLEVEL_OUTOF10: 0

## 2024-07-06 ASSESSMENT — PAIN DESCRIPTION - DESCRIPTORS
DESCRIPTORS: ACHING
DESCRIPTORS: THROBBING
DESCRIPTORS: ACHING
DESCRIPTORS: THROBBING
DESCRIPTORS: THROBBING
DESCRIPTORS: ACHING
DESCRIPTORS: THROBBING
DESCRIPTORS: THROBBING

## 2024-07-06 ASSESSMENT — PAIN DESCRIPTION - LOCATION
LOCATION: KNEE;LEG
LOCATION: BACK
LOCATION: LEG
LOCATION: LEG
LOCATION: KNEE;LEG
LOCATION: KNEE;LEG
LOCATION: LEG
LOCATION: KNEE;LEG
LOCATION: KNEE;LEG

## 2024-07-06 ASSESSMENT — PAIN DESCRIPTION - ORIENTATION
ORIENTATION: RIGHT

## 2024-07-06 ASSESSMENT — PAIN DESCRIPTION - ONSET
ONSET: ON-GOING

## 2024-07-06 ASSESSMENT — PAIN DESCRIPTION - FREQUENCY
FREQUENCY: INTERMITTENT
FREQUENCY: INTERMITTENT

## 2024-07-06 ASSESSMENT — PAIN DESCRIPTION - PAIN TYPE
TYPE: CHRONIC PAIN
TYPE: CHRONIC PAIN

## 2024-07-06 NOTE — CONSULTS
Clinical Pharmacy Note: Pharmacy to Dose Vancomycin    Yoseph Small is a 68 y.o. female started on Vancomycin for UTI; consult received from Dr. Baca to manage therapy. Also receiving the following antibiotics: cefepime.    Goal AUC: 400-600 mg/L*hr  Goal Trough Level: 10-20 mcg/mL    Assessment/Plan:  A 1250 mg loading dose has been ordered for 7/5 at 1100.  Patient is ESRD on HD MWF with UF on TTS. Will dose vancomycin by levels, an intermittent dosing placeholder has been ordered at this time.  A vancomycin random level has been ordered for 7/6 at 0600.  Changes in regimen will be determined based on culture results, renal function, and clinical response.  Pharmacy will continue to monitor and adjust regimen as necessary.    Allergies:  Amitriptyline hcl, Cephalexin, Ciprofloxacin, Levofloxacin, Penicillins, Sulfa antibiotics, Cephalexin hcl, Elavil [amitriptyline hcl], Elemental sulfur, Levofloxacin, Methocarbamol, and Tetracyclines & related     Recent Labs     07/04/24  0545 07/05/24  0527   CREATININE 4.7* 6.8*       Recent Labs     07/04/24  0545 07/05/24  0527   WBC 16.7* 16.1*       Ht Readings from Last 1 Encounters:   07/02/24 1.651 m (5' 5\")        Wt Readings from Last 1 Encounters:   07/05/24 114.8 kg (253 lb 1.4 oz)         Estimated Creatinine Clearance: 10 mL/min (A) (based on SCr of 6.8 mg/dL (HH)).      Thank you for the consult,    Angie Sanders, PharmD, BCPS  Clinical Pharmacist  [id number]  
    Clinical Pharmacy Note: Pharmacy to Dose Vancomycin    Yoseph Small is a 68 y.o. female started on Vancomycin for UTI; consult received from Parker NP to manage therapy. Also receiving the following antibiotics: cefepime.    Assessment/Plan:  A 2000 mg loading dose was given on 6/23 at 2100.  Intermittent dosing due to BARBARA.   A vancomycin random level has been ordered for 6/24 at 0600.  Changes in regimen will be determined based on culture results, renal function, and clinical response.  Pharmacy will continue to monitor and adjust regimen as necessary.    Allergies:  Amitriptyline hcl, Cephalexin, Ciprofloxacin, Levofloxacin, Penicillins, Sulfa antibiotics, Cephalexin hcl, Elavil [amitriptyline hcl], Elemental sulfur, Levofloxacin, Methocarbamol, and Tetracyclines & related     Recent Labs     06/22/24 1918 06/23/24  1510   CREATININE 5.9* 6.9*       Recent Labs     06/22/24 1912 06/23/24  1510   WBC 7.6 6.4       Ht Readings from Last 1 Encounters:   06/20/24 1.651 m (5' 5\")        Wt Readings from Last 1 Encounters:   06/20/24 124.7 kg (275 lb)         Estimated Creatinine Clearance: 11 mL/min (A) (based on SCr of 6.9 mg/dL (HH)).      Thank you for the consult,    Mp Price, GisellD      
 [facility] [place]   Electrophysiology   Date: 6/27/2024  Reason for Consultation: Atrial fibrillation with RVR    Consult Requesting Physician: Maxim Mason MD     Chief Complaint   Patient presents with    Fall     Came by [facility] from home with c/o falling while going to the bathroom. C/o 10/10 pain to right knee. Unsure if hit head when falling. 50mcg Fentanyl given en route       CC: Fall   HPI: Yoseph Small is a 68 y.o. female with past medical history of hypertension, hyperlipidemia, diabetes, liver cirrhosis, anxiety , depression, obesity and sleep apnea.    She presented to hospital after a fall. Found to have right distal femur fracture. She was also found to have have severe acute tubular necrosis. Thought to be related to prolonged obstructive uropathy an UTI.     S/p right distal femur ORIF.      Ep was consulted for new onset atrial fibrillation with RVR     Patient had received HD today- new this admission. After her session was completed, she became tachycardic in the 150's and increase in o2 demand (unclear which happened first). She is now on bipap.     Assessment:   Atrial fibrillation with RVR   Sepsis  Respiratory failure   BARBARA  History of HFpEF  Liver cirrhosis  LVH  DM  Morbid obesity Body mass index is 49.6 kg/m².     Plan:     New onset Afib with RVR in critically ill patient with multiple co morbidities, BARBARA, respiratory failure     Patient is critically ill. Her heart rate is uncontrolled at this time. Blood pressure is low. We will start amiodarone gtt. She has history of cirrhosis. Recommend short term amiodarone therapy. Discussed risks/benefits with family members. Family is at bedside discussing goals of care moving forward.     She is in respiratory distress even with Bipap on. Intubation may be needed. Lasix has been given. Critical care team to discuss with family.     [id number] is high, at least 4. She will need anticoagulation. Recommend 
PICC line education:    -Risks  -Benefits  -Alternatives  -Procedure    Discussed the above with patient, verbalized understanding, answered all questions. Provided with information on PICC care to review. PICC tip verified via [employer] (Ok to use). Reported off to patient's  Nurse Nan.  
[facility] Orthopedic Surgery  Consult Note    Patient: Yoseph Small  Admit Date: 6/20/2024  Requesting Physician: Chris Mcdermott MD  Room: [address] [place] [address]    Chief complaint: RIGHT knee pain    HPI: Yoseph Small is a 68 y.o. female who presented to [facility] ER after fall at home with severe pain.  She recently had ureteral stent placed.     Describes pain in the right knee of severe intensity and of sharp nature since the fall which is relieved by narcotics partially. Denies new numbness/tingling.       Independent imaging review of the right knee via plain films and CT scan demonstrated: displaced oblique/spiral distal femur fracture with intercondylar extension.     Patient uses walker to ambulate.  Takes three 5-325mg norco daily.   at bedside.     Relevant notes, labs and other tests reviewed.  Medical History:  Past Medical History:   Diagnosis Date    Anxiety     Depression     Diabetes mellitus (HCC)     Diverticulosis, colon     Hyperlipidemia     Hypertension     Liver cirrhosis (HCC)     Obesity     Sleep apnea      Past Surgical History:   Procedure Laterality Date    HYSTERECTOMY (CERVIX STATUS UNKNOWN)      INCISIONAL HERNIA REPAIR  07/25/2018    open, with mesh        Social History:    reports that she has never smoked. She has never used smokeless tobacco.    Family History:        Problem Relation Age of Onset    Diabetes Mother     Heart Disease Neg Hx        Medications:  ALL MEDICATIONS HAVE BEEN REVIEWED:  Scheduled:   atorvastatin  10 mg Oral Daily    ferrous sulfate  324 mg Oral Daily with breakfast    insulin lispro  0-8 Units SubCUTAneous TID WC    insulin lispro  0-4 Units SubCUTAneous Nightly    pantoprazole  40 mg Oral QAM AC    metoprolol succinate  100 mg Oral Daily    Vitamin D  1,000 Units Oral Daily    sodium chloride flush  5-40 mL IntraVENous 2 times per day    [Held by provider] heparin (porcine)  5,000 Units SubCUTAneous 3 times per day    sodium zirconium 
Yoseph Small  6/25/2024  [id number]    Chief Complaint: Fracture, subtrochanteric, right femur, closed, initial encounter ([facility])    Subjective   HPI: Yoseph Small is a 68 y.o. female with PMHx notable for HTN, HLD, DM2, liver cirrhosis, depression, anxiety, morbid obesity who presented on 6/21 with mechanical fall at home. She was found to have distal right femur fracture. Ortho was consulted, and patient is s/p right distal femur ORIF on 6/23. She is cleared for TTWB w/ knee immobilizer. Hospital course complicated by: sepsis secondary to UTI, BARBARA, hyperkalemia. Nephrology following for severe renal failure, managed with IVF and Urological intervention, currently monitoring for renal recovery. Urology following, exchanged patient's ureteral stent (hx of right renal stones, s/p uteral stent placement initially on 6/18).     Currently, patient reports significant pain throughout her right leg. Has not tolerated being up and working with therapies. Denies fevers/chills, chest pain, dyspnea.       Past Medical History:   Diagnosis Date    Anxiety     Depression     Diabetes mellitus (HCC)     Diverticulosis, colon     Hyperlipidemia     Hypertension     Liver cirrhosis (HCC)     Obesity     Sleep apnea        Past Surgical History:   Procedure Laterality Date    CYSTOSCOPY Right 6/21/2024    CYSTOSCOPY, RIGHT RETROGRADE PYELOGRAM, EXCHANGE OF RIGHT URETERAL STENT performed by Juan Manuel Cavazos MD at [facility] OR    FEMUR SURGERY Right 6/22/2024    RIGHT DISTAL FEMUR OPEN REDUCTION INTERNAL FIXATION - SYNTHES performed by Carline You MD at [facility] OR    HYSTERECTOMY (CERVIX STATUS UNKNOWN)      INCISIONAL HERNIA REPAIR  07/25/2018    open, with mesh        Social History     Tobacco Use    Smoking status: Never    Smokeless tobacco: Never   Substance Use Topics    Alcohol use: No       Prior Level of Function:   Mod-I for mobility with 4WW, limited ambulation at baseline. Mod-I for ADLs.   Lives with spouse in 
respiratory failure on admission, was thought to be secondary to pulm edema and fluid overload  Acute renal failure, requiring hemodialysis, serum creatinine is 5.6 today  Elevated procalcitonin of 43.35 on 6/24/2024  Atrial fibrillation with rapid ventricular response on admission  Fall at home prior to admission resulting in right femur fracture, s/p right distal femur open reduction internal fixation surgery on 6/21/2024  Type 2 diabetes mellitus  Essential hypertension  Mixed hyperlipidemia  Retained ureteral stent, s/p stent exchange on 6/21/2024  Liver cirrhosis  Obesity Class 3 due to excess calorie intake : Body mass index is 42.04 kg/m².        Discussion:      The patient has been in the hospital for 16 days.  She had fallen at her home before admission and had suffered closed fracture of right distal femur requiring open reduction internal fixation surgery on 6/21/2024.  She received IV vancomycin and IV cefepime for about a week after hospitalization for Enterococcus faecalis and Citrobacter UTI, respectively.    Her white cell count started to go up on 7-3-24.  2 sets of blood cultures were sent out on 7/3/2024 and they are running negative so far.  The white cell count went up to 16,000 yesterday    IV vancomycin and IV cefepime have been restarted yesterday.    The patient had acute kidney injury and was on CRRT.  She was switched to intermittent hemodialysis on 7/1/2024.  She has right ureteral stent, which was dislodged and was replaced on June 21 without complication.  Nephrology and urology notes reviewed.    I reviewed the imaging portable chest x-ray done on 7/1/2024 and x-ray KUB done on 7/4/2024 independently interpreted the x-ray findings.  No acute abnormalities on the abdominal x-ray.  The chest x-ray showed bilateral haziness.  Multifocal lung infiltrates could not be ruled out.  Small bilateral pleural effusions were noted      Plan:     Diagnostic Workup:    Follow-up on blood cultures 
maintained. No osseous destructive lesion is seen. DEGENERATIVE CHANGES:  Evidence of DISH is present.  Mild multilevel degenerative disc disease is seen. SOFT TISSUES: No paraspinal mass is seen. Other:  Small bilateral pleural effusions are present.     1. No acute osseous abnormality of the thoracic spine. 2. Chronic kyphosis of the upper thoracic spine. 3. DISH. 4. Small bilateral pleural effusions.     CT LUMBAR SPINE WO CONTRAST    Result Date: 6/20/2024  EXAMINATION: CT OF THE LUMBAR SPINE WITHOUT CONTRAST  6/20/2024 TECHNIQUE: CT of the lumbar spine was performed without the administration of intravenous contrast. Multiplanar reformatted images are provided for review. Adjustment of mA and/or kV according to patient size was utilized.  Automated exposure control, iterative reconstruction, and/or weight based adjustment of the mA/kV was utilized to reduce the radiation dose to as low as reasonably achievable. COMPARISON: None HISTORY: ORDERING SYSTEM PROVIDED HISTORY: fall, injury TECHNOLOGIST PROVIDED HISTORY: Reason for exam:->fall, injury Decision Support Exception - unselect if not a suspected or confirmed emergency medical condition->Emergency Medical Condition (MA) Reason for Exam: fall, injury Relevant Medical/Surgical History: Fall (Came by [facility] from home with c/o falling while going to the bathroom. C/o 10/10 pain to right knee. Unsure if hit head when falling. 50mcg Fentanyl given en route) FINDINGS: BONES/ALIGNMENT: There is mild disc space narrowing throughout the lumbar spine with mild marginal osteophytes.  No fracture or subluxation is seen. The bones are osteopenic.  The vertebral body height is well maintained throughout.  The posterior elements are intact.  There are no pars defects. The posterior elements are intact. DEGENERATIVE CHANGES: There are moderate central disc bulges at L2-3 and L4-5 causing moderate anterior dural sac effacement.  There are moderate hypertrophic changes 
with life threatening illness, including direct patient contact, management of life support systems, review of data including imaging and labs, discussions with other team members and physicians excluding procedures.      Electronically signed by Rebeca Vargas MD on 6/28/24 at 12:46 PM EDT     This note was completed using dragon medical speech recognition software. Grammatical errors, random word insertions, pronoun errors and incomplete sentences are occasional consequences of this technology due to software limitations. If there are questions or concerns about the content of this note of information contained within the body of this dictation, they should be addressed with the provider for clarification.   
right knee. Unsure if hit head when falling. 50mcg Fentanyl given en route) FINDINGS: BRAIN/VENTRICLES: There is no acute intracranial hemorrhage, mass effect or midline shift.  No abnormal extra-axial fluid collection.  The gray-white differentiation is maintained without evidence of an acute infarct.  There is no evidence of hydrocephalus. ORBITS: The visualized portion of the orbits demonstrate no acute abnormality. SINUSES: The visualized paranasal sinuses and mastoid air cells demonstrate no acute abnormality. SOFT TISSUES/SKULL:  No acute abnormality of the visualized skull or soft tissues.     No acute intracranial abnormality.     XR KNEE RIGHT (3 VIEWS)    Result Date: 6/20/2024  EXAMINATION: THREE XRAY VIEWS OF THE RIGHT KNEE 6/20/2024 9:16 pm COMPARISON: None. HISTORY: ORDERING SYSTEM PROVIDED HISTORY: injury TECHNOLOGIST PROVIDED HISTORY: Reason for exam:->injury Reason for Exam: R/Knee pain from fall FINDINGS: There is a mildly displaced spiral fracture along the diametaphyseal region of the distal femur with mild posterior displacement of the distal fragment. The joint space is intact with no dislocation.  The patella is intact.  The bones are osteopenic.     Mildly displaced spiral fracture along the diametaphyseal region of the distal femur with prominent posterior displacement of the distal fragment. Moderate osteoarthritic changes in the knee and diffuse osteopenia.     XR ABDOMEN (KUB) (SINGLE AP VIEW)    Result Date: 5/30/2024  EXAMINATION: ONE SUPINE XRAY VIEW(S) OF THE ABDOMEN 5/28/2024 1:39 pm COMPARISON: 04/29/2024 HISTORY: ORDERING SYSTEM PROVIDED HISTORY: Calculus of kidney TECHNOLOGIST PROVIDED HISTORY: Reason for Exam: Calculus of kidney FINDINGS: Gas seen in nondilated bowel loops.  Right renal calcifications appear unchanged.  No significant stool burden.     Unchanged right renal calcification       Imaging Results.  Chest X Ray reviwed by me          Electronically Signed:  Preet BRAUN

## 2024-07-06 NOTE — DIALYSIS
Treatment time: 40 mins   Net UF: 0 ml     Pre weight: 114.6 kg  Post weight:114.6 kg  EDW: tbd kg      Access used:15MG MIDODRINE PO    Access function: POOR with  ml/min     Medications or blood products given: activase      Regular outpatient schedule: TTS     Summary of response to treatment: Patient tolerated treatment well. R ntdc, clotted lines within 40 min of tx start. New system set up and catheter not pulling at all, activase admin per hd RN, unable to aspirate activase out of lumes, dr montero aware and pt to have new tdc placed on Monday.  Patient remained stable throughout entire treatment and upon the exiting the dialysis suite via transport.     Report given to Lolita Mauricio RN and copy of dialysis treatment record placed in chart, to be scanned into EMR.

## 2024-07-07 LAB
ALBUMIN SERPL-MCNC: 3.2 G/DL (ref 3.4–5)
ANION GAP SERPL CALCULATED.3IONS-SCNC: 20 MMOL/L (ref 3–16)
ANISOCYTOSIS BLD QL SMEAR: ABNORMAL
BACTERIA BLD CULT ORG #2: NORMAL
BACTERIA BLD CULT: NORMAL
BACTERIA UR CULT: ABNORMAL
BASOPHILS # BLD: 0 K/UL (ref 0–0.2)
BASOPHILS NFR BLD: 0 %
BUN SERPL-MCNC: 60 MG/DL (ref 7–20)
CALCIUM SERPL-MCNC: 9.7 MG/DL (ref 8.3–10.6)
CHLORIDE SERPL-SCNC: 102 MMOL/L (ref 99–110)
CO2 SERPL-SCNC: 16 MMOL/L (ref 21–32)
CREAT SERPL-MCNC: 6.8 MG/DL (ref 0.6–1.2)
DEPRECATED RDW RBC AUTO: 20.4 % (ref 12.4–15.4)
EOSINOPHIL # BLD: 0.4 K/UL (ref 0–0.6)
EOSINOPHIL NFR BLD: 4 %
GFR SERPLBLD CREATININE-BSD FMLA CKD-EPI: 6 ML/MIN/{1.73_M2}
GLUCOSE BLD-MCNC: 117 MG/DL (ref 70–99)
GLUCOSE BLD-MCNC: 129 MG/DL (ref 70–99)
GLUCOSE BLD-MCNC: 130 MG/DL (ref 70–99)
GLUCOSE BLD-MCNC: 148 MG/DL (ref 70–99)
GLUCOSE SERPL-MCNC: 132 MG/DL (ref 70–99)
HCT VFR BLD AUTO: 22.7 % (ref 36–48)
HGB BLD-MCNC: 7.2 G/DL (ref 12–16)
LACTATE BLDV-SCNC: 1.1 MMOL/L (ref 0.4–2)
LYMPHOCYTES # BLD: 2.1 K/UL (ref 1–5.1)
LYMPHOCYTES NFR BLD: 19 %
MACROCYTES BLD QL SMEAR: ABNORMAL
MCH RBC QN AUTO: 29.4 PG (ref 26–34)
MCHC RBC AUTO-ENTMCNC: 31.6 G/DL (ref 31–36)
MCV RBC AUTO: 93.1 FL (ref 80–100)
MONOCYTES # BLD: 1.3 K/UL (ref 0–1.3)
MONOCYTES NFR BLD: 12 %
MRSA DNA SPEC QL NAA+PROBE: NORMAL
NEUTROPHILS # BLD: 7.2 K/UL (ref 1.7–7.7)
NEUTROPHILS NFR BLD: 65 %
ORGANISM: ABNORMAL
PERFORMED ON: ABNORMAL
PHOSPHATE SERPL-MCNC: 6.6 MG/DL (ref 2.5–4.9)
PLATELET # BLD AUTO: 147 K/UL (ref 135–450)
PLATELET BLD QL SMEAR: ABNORMAL
PMV BLD AUTO: 7.9 FL (ref 5–10.5)
POLYCHROMASIA BLD QL SMEAR: ABNORMAL
POTASSIUM SERPL-SCNC: 5.1 MMOL/L (ref 3.5–5.1)
RBC # BLD AUTO: 2.44 M/UL (ref 4–5.2)
REASON FOR REJECTION: NORMAL
REJECTED TEST: NORMAL
SLIDE REVIEW: ABNORMAL
SODIUM SERPL-SCNC: 138 MMOL/L (ref 136–145)
WBC # BLD AUTO: 11.1 K/UL (ref 4–11)

## 2024-07-07 PROCEDURE — 6370000000 HC RX 637 (ALT 250 FOR IP): Performed by: INTERNAL MEDICINE

## 2024-07-07 PROCEDURE — 2580000003 HC RX 258: Performed by: INTERNAL MEDICINE

## 2024-07-07 PROCEDURE — 97530 THERAPEUTIC ACTIVITIES: CPT

## 2024-07-07 PROCEDURE — 6360000002 HC RX W HCPCS: Performed by: STUDENT IN AN ORGANIZED HEALTH CARE EDUCATION/TRAINING PROGRAM

## 2024-07-07 PROCEDURE — 83605 ASSAY OF LACTIC ACID: CPT

## 2024-07-07 PROCEDURE — 2580000003 HC RX 258: Performed by: STUDENT IN AN ORGANIZED HEALTH CARE EDUCATION/TRAINING PROGRAM

## 2024-07-07 PROCEDURE — 1200000000 HC SEMI PRIVATE

## 2024-07-07 PROCEDURE — 80069 RENAL FUNCTION PANEL: CPT

## 2024-07-07 PROCEDURE — 6370000000 HC RX 637 (ALT 250 FOR IP)

## 2024-07-07 PROCEDURE — 97535 SELF CARE MNGMENT TRAINING: CPT

## 2024-07-07 PROCEDURE — 6370000000 HC RX 637 (ALT 250 FOR IP): Performed by: UROLOGY

## 2024-07-07 PROCEDURE — 87205 SMEAR GRAM STAIN: CPT

## 2024-07-07 PROCEDURE — 85025 COMPLETE CBC W/AUTO DIFF WBC: CPT

## 2024-07-07 PROCEDURE — 6360000002 HC RX W HCPCS: Performed by: INTERNAL MEDICINE

## 2024-07-07 RX ADMIN — HEPARIN SODIUM 5000 UNITS: 5000 INJECTION INTRAVENOUS; SUBCUTANEOUS at 06:13

## 2024-07-07 RX ADMIN — OXYCODONE HYDROCHLORIDE 5 MG: 5 TABLET ORAL at 21:14

## 2024-07-07 RX ADMIN — OXYCODONE HYDROCHLORIDE 5 MG: 5 TABLET ORAL at 13:07

## 2024-07-07 RX ADMIN — MIDODRINE HYDROCHLORIDE 15 MG: 5 TABLET ORAL at 16:19

## 2024-07-07 RX ADMIN — ATORVASTATIN CALCIUM 10 MG: 10 TABLET, FILM COATED ORAL at 09:20

## 2024-07-07 RX ADMIN — MIDODRINE HYDROCHLORIDE 15 MG: 5 TABLET ORAL at 09:21

## 2024-07-07 RX ADMIN — OXYCODONE HYDROCHLORIDE 5 MG: 5 TABLET ORAL at 09:20

## 2024-07-07 RX ADMIN — OXYCODONE HYDROCHLORIDE 5 MG: 5 TABLET ORAL at 17:10

## 2024-07-07 RX ADMIN — DIPHENHYDRAMINE HYDROCHLORIDE 12.5 MG: 25 TABLET ORAL at 16:19

## 2024-07-07 RX ADMIN — CEFEPIME 1000 MG: 1 INJECTION, POWDER, FOR SOLUTION INTRAMUSCULAR; INTRAVENOUS at 13:06

## 2024-07-07 RX ADMIN — OXYCODONE HYDROCHLORIDE 5 MG: 5 TABLET ORAL at 04:27

## 2024-07-07 RX ADMIN — MIDODRINE HYDROCHLORIDE 15 MG: 5 TABLET ORAL at 13:07

## 2024-07-07 RX ADMIN — SENNOSIDES AND DOCUSATE SODIUM 1 TABLET: 50; 8.6 TABLET ORAL at 20:17

## 2024-07-07 RX ADMIN — SENNOSIDES AND DOCUSATE SODIUM 1 TABLET: 50; 8.6 TABLET ORAL at 09:20

## 2024-07-07 RX ADMIN — FERROUS SULFATE TAB 325 MG (65 MG ELEMENTAL FE) 324 MG: 325 (65 FE) TAB at 09:21

## 2024-07-07 RX ADMIN — PANTOPRAZOLE SODIUM 40 MG: 40 TABLET, DELAYED RELEASE ORAL at 06:13

## 2024-07-07 RX ADMIN — HEPARIN SODIUM 5000 UNITS: 5000 INJECTION INTRAVENOUS; SUBCUTANEOUS at 20:17

## 2024-07-07 RX ADMIN — HEPARIN SODIUM 5000 UNITS: 5000 INJECTION INTRAVENOUS; SUBCUTANEOUS at 13:12

## 2024-07-07 RX ADMIN — ACETAMINOPHEN 650 MG: 325 TABLET ORAL at 04:27

## 2024-07-07 RX ADMIN — Medication 10 ML: at 09:07

## 2024-07-07 RX ADMIN — CHOLECALCIFEROL TAB 25 MCG (1000 UNIT) 1000 UNITS: 25 TAB at 09:20

## 2024-07-07 ASSESSMENT — PAIN SCALES - GENERAL
PAINLEVEL_OUTOF10: 4
PAINLEVEL_OUTOF10: 9
PAINLEVEL_OUTOF10: 6
PAINLEVEL_OUTOF10: 9
PAINLEVEL_OUTOF10: 3
PAINLEVEL_OUTOF10: 5
PAINLEVEL_OUTOF10: 10
PAINLEVEL_OUTOF10: 8
PAINLEVEL_OUTOF10: 5
PAINLEVEL_OUTOF10: 8
PAINLEVEL_OUTOF10: 10
PAINLEVEL_OUTOF10: 4
PAINLEVEL_OUTOF10: 5
PAINLEVEL_OUTOF10: 8
PAINLEVEL_OUTOF10: 6
PAINLEVEL_OUTOF10: 5

## 2024-07-07 ASSESSMENT — PAIN DESCRIPTION - ORIENTATION
ORIENTATION: RIGHT

## 2024-07-07 ASSESSMENT — PAIN DESCRIPTION - LOCATION
LOCATION: LEG

## 2024-07-07 ASSESSMENT — PAIN DESCRIPTION - ONSET: ONSET: ON-GOING

## 2024-07-08 ENCOUNTER — APPOINTMENT (OUTPATIENT)
Dept: GENERAL RADIOLOGY | Age: 69
DRG: 480 | End: 2024-07-08
Payer: MEDICARE

## 2024-07-08 ENCOUNTER — APPOINTMENT (OUTPATIENT)
Dept: INTERVENTIONAL RADIOLOGY/VASCULAR | Age: 69
DRG: 480 | End: 2024-07-08
Payer: MEDICARE

## 2024-07-08 PROBLEM — B37.49 CANDIDAL UTI (URINARY TRACT INFECTION): Status: ACTIVE | Noted: 2020-03-09

## 2024-07-08 LAB
ALBUMIN SERPL-MCNC: 3.2 G/DL (ref 3.4–5)
ANION GAP SERPL CALCULATED.3IONS-SCNC: 23 MMOL/L (ref 3–16)
ANION GAP SERPL CALCULATED.3IONS-SCNC: 23 MMOL/L (ref 3–16)
ANISOCYTOSIS BLD QL SMEAR: ABNORMAL
BASE EXCESS BLDV CALC-SCNC: -1.2 MMOL/L (ref -3–3)
BASOPHILS # BLD: 0 K/UL (ref 0–0.2)
BASOPHILS # BLD: 0.1 K/UL (ref 0–0.2)
BASOPHILS NFR BLD: 0 %
BASOPHILS NFR BLD: 1 %
BUN SERPL-MCNC: 31 MG/DL (ref 7–20)
BUN SERPL-MCNC: 69 MG/DL (ref 7–20)
CALCIUM SERPL-MCNC: 8.8 MG/DL (ref 8.3–10.6)
CALCIUM SERPL-MCNC: 9.5 MG/DL (ref 8.3–10.6)
CHLORIDE SERPL-SCNC: 102 MMOL/L (ref 99–110)
CHLORIDE SERPL-SCNC: 95 MMOL/L (ref 99–110)
CO2 BLDV-SCNC: 56 MMOL/L
CO2 SERPL-SCNC: 14 MMOL/L (ref 21–32)
CO2 SERPL-SCNC: 16 MMOL/L (ref 21–32)
COHGB MFR BLDV: 8 % (ref 0–1.5)
CREAT SERPL-MCNC: 4.5 MG/DL (ref 0.6–1.2)
CREAT SERPL-MCNC: 8.6 MG/DL (ref 0.6–1.2)
DEPRECATED RDW RBC AUTO: 21.1 % (ref 12.4–15.4)
DEPRECATED RDW RBC AUTO: 21.7 % (ref 12.4–15.4)
EOSINOPHIL # BLD: 0.4 K/UL (ref 0–0.6)
EOSINOPHIL # BLD: 0.5 K/UL (ref 0–0.6)
EOSINOPHIL NFR BLD: 5 %
EOSINOPHIL NFR BLD: 5.2 %
GFR SERPLBLD CREATININE-BSD FMLA CKD-EPI: 10 ML/MIN/{1.73_M2}
GFR SERPLBLD CREATININE-BSD FMLA CKD-EPI: 5 ML/MIN/{1.73_M2}
GLUCOSE BLD-MCNC: 110 MG/DL (ref 70–99)
GLUCOSE BLD-MCNC: 119 MG/DL (ref 70–99)
GLUCOSE BLD-MCNC: 122 MG/DL (ref 70–99)
GLUCOSE BLD-MCNC: 99 MG/DL (ref 70–99)
GLUCOSE SERPL-MCNC: 111 MG/DL (ref 70–99)
GLUCOSE SERPL-MCNC: 125 MG/DL (ref 70–99)
HCO3 BLDV-SCNC: 23.7 MMOL/L (ref 23–29)
HCT VFR BLD AUTO: 21.2 % (ref 36–48)
HCT VFR BLD AUTO: 24.4 % (ref 36–48)
HGB BLD-MCNC: 6.6 G/DL (ref 12–16)
HGB BLD-MCNC: 7.6 G/DL (ref 12–16)
LACTATE BLDV-SCNC: 1.2 MMOL/L (ref 0.4–2)
LYMPHOCYTES # BLD: 1.2 K/UL (ref 1–5.1)
LYMPHOCYTES # BLD: 1.3 K/UL (ref 1–5.1)
LYMPHOCYTES NFR BLD: 12 %
LYMPHOCYTES NFR BLD: 14.3 %
MACROCYTES BLD QL SMEAR: ABNORMAL
MAGNESIUM SERPL-MCNC: 2.4 MG/DL (ref 1.8–2.4)
MCH RBC QN AUTO: 28.7 PG (ref 26–34)
MCH RBC QN AUTO: 29.4 PG (ref 26–34)
MCHC RBC AUTO-ENTMCNC: 30.9 G/DL (ref 31–36)
MCHC RBC AUTO-ENTMCNC: 31.3 G/DL (ref 31–36)
MCV RBC AUTO: 93 FL (ref 80–100)
MCV RBC AUTO: 94.2 FL (ref 80–100)
METHGB MFR BLDV: 0.8 %
MONOCYTES # BLD: 0 K/UL (ref 0–1.3)
MONOCYTES # BLD: 1 K/UL (ref 0–1.3)
MONOCYTES NFR BLD: 0 %
MONOCYTES NFR BLD: 11.8 %
NEUTROPHILS # BLD: 5.6 K/UL (ref 1.7–7.7)
NEUTROPHILS # BLD: 8.9 K/UL (ref 1.7–7.7)
NEUTROPHILS NFR BLD: 67.7 %
NEUTROPHILS NFR BLD: 83 %
O2 CT VFR BLDV CALC: 9 VOL %
O2 THERAPY: ABNORMAL
OVALOCYTES BLD QL SMEAR: ABNORMAL
PCO2 BLDV: 39.5 MMHG (ref 40–50)
PERFORMED ON: ABNORMAL
PERFORMED ON: NORMAL
PH BLDV: 7.39 [PH] (ref 7.35–7.45)
PHOSPHATE SERPL-MCNC: 7.1 MG/DL (ref 2.5–4.9)
PLATELET # BLD AUTO: 141 K/UL (ref 135–450)
PLATELET # BLD AUTO: 87 K/UL (ref 135–450)
PLATELET BLD QL SMEAR: ABNORMAL
PLATELET BLD QL SMEAR: ABNORMAL
PMV BLD AUTO: 7.7 FL (ref 5–10.5)
PMV BLD AUTO: 7.9 FL (ref 5–10.5)
PO2 BLDV: 93.3 MMHG (ref 25–40)
POLYCHROMASIA BLD QL SMEAR: ABNORMAL
POTASSIUM SERPL-SCNC: 4 MMOL/L (ref 3.5–5.1)
POTASSIUM SERPL-SCNC: 5.3 MMOL/L (ref 3.5–5.1)
RBC # BLD AUTO: 2.29 M/UL (ref 4–5.2)
RBC # BLD AUTO: 2.59 M/UL (ref 4–5.2)
REASON FOR REJECTION: NORMAL
REJECTED TEST: NORMAL
SAO2 % BLDV: 98 %
SLIDE REVIEW: ABNORMAL
SLIDE REVIEW: ABNORMAL
SODIUM SERPL-SCNC: 134 MMOL/L (ref 136–145)
SODIUM SERPL-SCNC: 139 MMOL/L (ref 136–145)
WBC # BLD AUTO: 10.7 K/UL (ref 4–11)
WBC # BLD AUTO: 8.2 K/UL (ref 4–11)

## 2024-07-08 PROCEDURE — 83735 ASSAY OF MAGNESIUM: CPT

## 2024-07-08 PROCEDURE — 6370000000 HC RX 637 (ALT 250 FOR IP): Performed by: INTERNAL MEDICINE

## 2024-07-08 PROCEDURE — 77001 FLUOROGUIDE FOR VEIN DEVICE: CPT

## 2024-07-08 PROCEDURE — P9016 RBC LEUKOCYTES REDUCED: HCPCS

## 2024-07-08 PROCEDURE — 6370000000 HC RX 637 (ALT 250 FOR IP): Performed by: UROLOGY

## 2024-07-08 PROCEDURE — 83605 ASSAY OF LACTIC ACID: CPT

## 2024-07-08 PROCEDURE — 1200000000 HC SEMI PRIVATE

## 2024-07-08 PROCEDURE — 99024 POSTOP FOLLOW-UP VISIT: CPT | Performed by: NURSE PRACTITIONER

## 2024-07-08 PROCEDURE — 6360000002 HC RX W HCPCS: Performed by: NURSE PRACTITIONER

## 2024-07-08 PROCEDURE — P9047 ALBUMIN (HUMAN), 25%, 50ML: HCPCS | Performed by: INTERNAL MEDICINE

## 2024-07-08 PROCEDURE — 0JH63XZ INSERTION OF TUNNELED VASCULAR ACCESS DEVICE INTO CHEST SUBCUTANEOUS TISSUE AND FASCIA, PERCUTANEOUS APPROACH: ICD-10-PCS | Performed by: RADIOLOGY

## 2024-07-08 PROCEDURE — 99152 MOD SED SAME PHYS/QHP 5/>YRS: CPT

## 2024-07-08 PROCEDURE — 82803 BLOOD GASES ANY COMBINATION: CPT

## 2024-07-08 PROCEDURE — 76937 US GUIDE VASCULAR ACCESS: CPT

## 2024-07-08 PROCEDURE — 30233N1 TRANSFUSION OF NONAUTOLOGOUS RED BLOOD CELLS INTO PERIPHERAL VEIN, PERCUTANEOUS APPROACH: ICD-10-PCS | Performed by: STUDENT IN AN ORGANIZED HEALTH CARE EDUCATION/TRAINING PROGRAM

## 2024-07-08 PROCEDURE — 86901 BLOOD TYPING SEROLOGIC RH(D): CPT

## 2024-07-08 PROCEDURE — 2500000003 HC RX 250 WO HCPCS: Performed by: STUDENT IN AN ORGANIZED HEALTH CARE EDUCATION/TRAINING PROGRAM

## 2024-07-08 PROCEDURE — APPNB45 APP NON BILLABLE 31-45 MINUTES: Performed by: NURSE PRACTITIONER

## 2024-07-08 PROCEDURE — 2500000003 HC RX 250 WO HCPCS: Performed by: INTERNAL MEDICINE

## 2024-07-08 PROCEDURE — 02H633Z INSERTION OF INFUSION DEVICE INTO RIGHT ATRIUM, PERCUTANEOUS APPROACH: ICD-10-PCS | Performed by: RADIOLOGY

## 2024-07-08 PROCEDURE — 2580000003 HC RX 258: Performed by: INTERNAL MEDICINE

## 2024-07-08 PROCEDURE — 86850 RBC ANTIBODY SCREEN: CPT

## 2024-07-08 PROCEDURE — 85025 COMPLETE CBC W/AUTO DIFF WBC: CPT

## 2024-07-08 PROCEDURE — 36558 INSERT TUNNELED CV CATH: CPT

## 2024-07-08 PROCEDURE — 6360000002 HC RX W HCPCS: Performed by: RADIOLOGY

## 2024-07-08 PROCEDURE — 6360000002 HC RX W HCPCS: Performed by: STUDENT IN AN ORGANIZED HEALTH CARE EDUCATION/TRAINING PROGRAM

## 2024-07-08 PROCEDURE — 99233 SBSQ HOSP IP/OBS HIGH 50: CPT | Performed by: INTERNAL MEDICINE

## 2024-07-08 PROCEDURE — 6360000002 HC RX W HCPCS: Performed by: INTERNAL MEDICINE

## 2024-07-08 PROCEDURE — 6370000000 HC RX 637 (ALT 250 FOR IP)

## 2024-07-08 PROCEDURE — 2580000003 HC RX 258: Performed by: STUDENT IN AN ORGANIZED HEALTH CARE EDUCATION/TRAINING PROGRAM

## 2024-07-08 PROCEDURE — 86923 COMPATIBILITY TEST ELECTRIC: CPT

## 2024-07-08 PROCEDURE — 73560 X-RAY EXAM OF KNEE 1 OR 2: CPT

## 2024-07-08 PROCEDURE — 86900 BLOOD TYPING SEROLOGIC ABO: CPT

## 2024-07-08 PROCEDURE — C1750 CATH, HEMODIALYSIS,LONG-TERM: HCPCS

## 2024-07-08 PROCEDURE — 80069 RENAL FUNCTION PANEL: CPT

## 2024-07-08 PROCEDURE — 6360000002 HC RX W HCPCS: Performed by: UROLOGY

## 2024-07-08 PROCEDURE — 90935 HEMODIALYSIS ONE EVALUATION: CPT

## 2024-07-08 RX ORDER — MIDAZOLAM HYDROCHLORIDE 2 MG/2ML
INJECTION, SOLUTION INTRAMUSCULAR; INTRAVENOUS PRN
Status: COMPLETED | OUTPATIENT
Start: 2024-07-08 | End: 2024-07-08

## 2024-07-08 RX ORDER — SODIUM CHLORIDE 9 MG/ML
INJECTION, SOLUTION INTRAVENOUS PRN
Status: DISCONTINUED | OUTPATIENT
Start: 2024-07-08 | End: 2024-07-15 | Stop reason: HOSPADM

## 2024-07-08 RX ORDER — HEPARIN SODIUM 1000 [USP'U]/ML
3200 INJECTION, SOLUTION INTRAVENOUS; SUBCUTANEOUS AS NEEDED
Status: DISCONTINUED | OUTPATIENT
Start: 2024-07-08 | End: 2024-07-15 | Stop reason: HOSPADM

## 2024-07-08 RX ORDER — FLUCONAZOLE 100 MG/1
200 TABLET ORAL ONCE
Status: COMPLETED | OUTPATIENT
Start: 2024-07-08 | End: 2024-07-08

## 2024-07-08 RX ORDER — LIDOCAINE HYDROCHLORIDE 10 MG/ML
10 INJECTION, SOLUTION EPIDURAL; INFILTRATION; INTRACAUDAL; PERINEURAL ONCE
Status: COMPLETED | OUTPATIENT
Start: 2024-07-08 | End: 2024-07-08

## 2024-07-08 RX ORDER — HEPARIN SODIUM 5000 [USP'U]/ML
6000 INJECTION, SOLUTION INTRAVENOUS; SUBCUTANEOUS ONCE
Status: COMPLETED | OUTPATIENT
Start: 2024-07-08 | End: 2024-07-08

## 2024-07-08 RX ORDER — FLUCONAZOLE 100 MG/1
200 TABLET ORAL DAILY
Status: DISCONTINUED | OUTPATIENT
Start: 2024-07-08 | End: 2024-07-08 | Stop reason: SDUPTHER

## 2024-07-08 RX ORDER — LIDOCAINE HYDROCHLORIDE AND EPINEPHRINE 10; 10 MG/ML; UG/ML
7.5 INJECTION, SOLUTION INFILTRATION; PERINEURAL ONCE
Status: COMPLETED | OUTPATIENT
Start: 2024-07-08 | End: 2024-07-08

## 2024-07-08 RX ORDER — FLUCONAZOLE 100 MG/1
100 TABLET ORAL DAILY
Status: COMPLETED | OUTPATIENT
Start: 2024-07-09 | End: 2024-07-13

## 2024-07-08 RX ORDER — ALBUMIN (HUMAN) 12.5 G/50ML
25 SOLUTION INTRAVENOUS ONCE
Status: COMPLETED | OUTPATIENT
Start: 2024-07-08 | End: 2024-07-08

## 2024-07-08 RX ORDER — FENTANYL CITRATE 50 UG/ML
INJECTION, SOLUTION INTRAMUSCULAR; INTRAVENOUS PRN
Status: COMPLETED | OUTPATIENT
Start: 2024-07-08 | End: 2024-07-08

## 2024-07-08 RX ADMIN — ACETAMINOPHEN 650 MG: 325 TABLET ORAL at 02:58

## 2024-07-08 RX ADMIN — PANTOPRAZOLE SODIUM 40 MG: 40 TABLET, DELAYED RELEASE ORAL at 06:04

## 2024-07-08 RX ADMIN — Medication 10 ML: at 08:56

## 2024-07-08 RX ADMIN — ATORVASTATIN CALCIUM 10 MG: 10 TABLET, FILM COATED ORAL at 08:55

## 2024-07-08 RX ADMIN — OXYCODONE HYDROCHLORIDE 5 MG: 5 TABLET ORAL at 11:51

## 2024-07-08 RX ADMIN — ALBUMIN (HUMAN) 25 G: 0.25 INJECTION, SOLUTION INTRAVENOUS at 14:08

## 2024-07-08 RX ADMIN — LIDOCAINE HYDROCHLORIDE,EPINEPHRINE BITARTRATE 7.5 ML: 10; .01 INJECTION, SOLUTION INFILTRATION; PERINEURAL at 10:10

## 2024-07-08 RX ADMIN — LIDOCAINE HYDROCHLORIDE 10 ML: 10 INJECTION, SOLUTION EPIDURAL; INFILTRATION; INTRACAUDAL; PERINEURAL at 10:10

## 2024-07-08 RX ADMIN — CEFEPIME 1000 MG: 1 INJECTION, POWDER, FOR SOLUTION INTRAMUSCULAR; INTRAVENOUS at 17:18

## 2024-07-08 RX ADMIN — OXYCODONE HYDROCHLORIDE 5 MG: 5 TABLET ORAL at 06:04

## 2024-07-08 RX ADMIN — HEPARIN SODIUM 3200 UNITS: 5000 INJECTION INTRAVENOUS; SUBCUTANEOUS at 10:09

## 2024-07-08 RX ADMIN — FERROUS SULFATE TAB 325 MG (65 MG ELEMENTAL FE) 324 MG: 325 (65 FE) TAB at 08:55

## 2024-07-08 RX ADMIN — FLUCONAZOLE 200 MG: 100 TABLET ORAL at 17:33

## 2024-07-08 RX ADMIN — MIDODRINE HYDROCHLORIDE 15 MG: 5 TABLET ORAL at 08:55

## 2024-07-08 RX ADMIN — EPOETIN ALFA-EPBX 10000 UNITS: 10000 INJECTION, SOLUTION INTRAVENOUS; SUBCUTANEOUS at 15:50

## 2024-07-08 RX ADMIN — MIDODRINE HYDROCHLORIDE 15 MG: 5 TABLET ORAL at 11:52

## 2024-07-08 RX ADMIN — MIDODRINE HYDROCHLORIDE 15 MG: 5 TABLET ORAL at 17:31

## 2024-07-08 RX ADMIN — MIDAZOLAM HYDROCHLORIDE 1 MG: 1 INJECTION, SOLUTION INTRAMUSCULAR; INTRAVENOUS at 09:28

## 2024-07-08 RX ADMIN — SODIUM BICARBONATE 50 MEQ: 84 INJECTION, SOLUTION INTRAVENOUS at 12:05

## 2024-07-08 RX ADMIN — ONDANSETRON 4 MG: 2 INJECTION INTRAMUSCULAR; INTRAVENOUS at 19:02

## 2024-07-08 RX ADMIN — OXYCODONE HYDROCHLORIDE 5 MG: 5 TABLET ORAL at 18:47

## 2024-07-08 RX ADMIN — CHOLECALCIFEROL TAB 25 MCG (1000 UNIT) 1000 UNITS: 25 TAB at 08:55

## 2024-07-08 RX ADMIN — SENNOSIDES AND DOCUSATE SODIUM 1 TABLET: 50; 8.6 TABLET ORAL at 08:55

## 2024-07-08 RX ADMIN — HEPARIN SODIUM 5000 UNITS: 5000 INJECTION INTRAVENOUS; SUBCUTANEOUS at 17:44

## 2024-07-08 RX ADMIN — FENTANYL CITRATE 25 MCG: 50 INJECTION, SOLUTION INTRAMUSCULAR; INTRAVENOUS at 09:37

## 2024-07-08 RX ADMIN — ALTEPLASE 1 MG: 2.2 INJECTION, POWDER, LYOPHILIZED, FOR SOLUTION INTRAVENOUS at 04:21

## 2024-07-08 ASSESSMENT — PAIN DESCRIPTION - LOCATION
LOCATION: OTHER (COMMENT)
LOCATION: BACK;NECK
LOCATION: OTHER (COMMENT)
LOCATION: OTHER (COMMENT)
LOCATION: LEG

## 2024-07-08 ASSESSMENT — PAIN SCALES - WONG BAKER: WONGBAKER_NUMERICALRESPONSE: HURTS A LITTLE BIT

## 2024-07-08 ASSESSMENT — PAIN SCALES - GENERAL
PAINLEVEL_OUTOF10: 0
PAINLEVEL_OUTOF10: 10
PAINLEVEL_OUTOF10: 5
PAINLEVEL_OUTOF10: 0
PAINLEVEL_OUTOF10: 10
PAINLEVEL_OUTOF10: 10
PAINLEVEL_OUTOF10: 7
PAINLEVEL_OUTOF10: 10

## 2024-07-08 ASSESSMENT — PAIN DESCRIPTION - DESCRIPTORS
DESCRIPTORS: ACHING;DISCOMFORT
DESCRIPTORS: ACHING
DESCRIPTORS: ACHING;DISCOMFORT
DESCRIPTORS: ACHING;DISCOMFORT

## 2024-07-08 ASSESSMENT — PAIN DESCRIPTION - ONSET: ONSET: ON-GOING

## 2024-07-08 ASSESSMENT — PAIN DESCRIPTION - PAIN TYPE: TYPE: ACUTE PAIN;CHRONIC PAIN

## 2024-07-08 NOTE — PRE SEDATION
Sedation Pre-Procedure Note    Patient Name: Yoseph Small   YOB: 1955  Room/Bed: [facility]-4471/4471-02  Medical Record Number: [id number]  Date: 7/8/2024   Time: 10:35 AM       Indication:  Tunneled dialysis catheter placement.    Consent: I have discussed with the patient and/or the patient representative the indication, alternatives, and the possible risks and/or complications of the planned procedure and the anesthesia methods. The patient and/or patient representative appear to understand and agree to proceed.    Vital Signs:   Vitals:    07/08/24 1009   BP: (!) 81/46   Pulse: 95   Resp: 18   Temp: 97.5 °F (36.4 °C)   SpO2: 93%       Past Medical History:   has a past medical history of Anxiety, Depression, Diabetes mellitus (HCC), Diverticulosis, colon, Hyperlipidemia, Hypertension, Liver cirrhosis (HCC), Obesity, and Sleep apnea.    Past Surgical History:   has a past surgical history that includes Hysterectomy; Incisional hernia repair (07/25/2018); Cystoscopy (Right, 6/21/2024); and Femur Surgery (Right, 6/22/2024).    Medications:   Scheduled Meds:    cefepime  1,000 mg IntraVENous Q24H    midodrine  15 mg Oral TID WC    heparin (porcine)  5,000 Units SubCUTAneous 3 times per day    sodium chloride flush  5-40 mL IntraVENous 2 times per day    sennosides-docusate sodium  1 tablet Oral BID    atorvastatin  10 mg Oral Daily    ferrous sulfate  324 mg Oral Daily with breakfast    insulin lispro  0-8 Units SubCUTAneous TID WC    insulin lispro  0-4 Units SubCUTAneous Nightly    pantoprazole  40 mg Oral QAM AC    Vitamin D  1,000 Units Oral Daily     Continuous Infusions:    dextrose      sodium chloride       PRN Meds: sodium chloride flush **AND** sodium chloride flush, metoprolol, diphenhydrAMINE, heparin (porcine), sodium chloride flush, oxyCODONE **OR** [DISCONTINUED] oxyCODONE, dextrose bolus **OR** dextrose bolus, glucagon (rDNA), dextrose, sodium chloride, ondansetron **OR** ondansetron,

## 2024-07-08 NOTE — CARE COORDINATION
Discharge Planning Note:    Update:    - PT/OT recommends SNF    - Skilled Nursing Facility:    [address] [place] [address]     Phone: [phone]    [place], Admissions: [phone]    Fax: [phone]    ([facility] will return on Tuesday 07/09/2024)    Note: Jessenia Doshi, daughter-in-law, is the Assistant Director of Nursing at [facility].        - Patient is a New HD Patient:    Note: Tunnel Cath has been placed.    [address] [address]    Phone: [phone]  Fax: [phone]     Schedule:  F     Chair Time:  3:00pm    Will continue to follow.    CATHY Calero RN    [facility]  Phone: [phone]

## 2024-07-08 NOTE — BRIEF OP NOTE
Brief Postoperative Note    Yoseph Small  YOB: 1955  [id number]    Pre-operative Diagnosis: BARBARA    Post-operative Diagnosis: Same    Procedure: Tunneled hemodialysis catheter placement    Anesthesia: Moderate Sedation    Surgeons: Marty Lim MD    Estimated Blood Loss: Less than 5 mL    Complications: None    Specimens: Was Not Obtained    Findings: Successful placement of a right IJ tunneled hemodialysis catheter.    Electronically signed by Marty Lim MD on 7/8/2024 at 10:36 AM

## 2024-07-09 PROBLEM — Z71.89 DIABETES EDUCATION, ENCOUNTER FOR: Status: ACTIVE | Noted: 2024-07-09

## 2024-07-09 LAB
ABO + RH BLD: NORMAL
ALBUMIN SERPL-MCNC: 3.2 G/DL (ref 3.4–5)
ANION GAP SERPL CALCULATED.3IONS-SCNC: 24 MMOL/L (ref 3–16)
BLD GP AB SCN SERPL QL: NORMAL
BLOOD BANK DISPENSE STATUS: NORMAL
BLOOD BANK PRODUCT CODE: NORMAL
BPU ID: NORMAL
BUN SERPL-MCNC: 34 MG/DL (ref 7–20)
CALCIUM SERPL-MCNC: 9 MG/DL (ref 8.3–10.6)
CHLORIDE SERPL-SCNC: 98 MMOL/L (ref 99–110)
CO2 SERPL-SCNC: 16 MMOL/L (ref 21–32)
CREAT SERPL-MCNC: 5.5 MG/DL (ref 0.6–1.2)
DEPRECATED RDW RBC AUTO: 19.3 % (ref 12.4–15.4)
DESCRIPTION BLOOD BANK: NORMAL
GFR SERPLBLD CREATININE-BSD FMLA CKD-EPI: 8 ML/MIN/{1.73_M2}
GLUCOSE BLD-MCNC: 124 MG/DL (ref 70–99)
GLUCOSE BLD-MCNC: 138 MG/DL (ref 70–99)
GLUCOSE BLD-MCNC: 141 MG/DL (ref 70–99)
GLUCOSE SERPL-MCNC: 109 MG/DL (ref 70–99)
HCT VFR BLD AUTO: 23.1 % (ref 36–48)
HGB BLD-MCNC: 7.5 G/DL (ref 12–16)
MCH RBC QN AUTO: 29.9 PG (ref 26–34)
MCHC RBC AUTO-ENTMCNC: 32.3 G/DL (ref 31–36)
MCV RBC AUTO: 92.5 FL (ref 80–100)
PERFORMED ON: ABNORMAL
PHOSPHATE SERPL-MCNC: 5.3 MG/DL (ref 2.5–4.9)
PLATELET # BLD AUTO: 87 K/UL (ref 135–450)
PMV BLD AUTO: 7.6 FL (ref 5–10.5)
POTASSIUM SERPL-SCNC: 4.1 MMOL/L (ref 3.5–5.1)
RBC # BLD AUTO: 2.49 M/UL (ref 4–5.2)
SODIUM SERPL-SCNC: 138 MMOL/L (ref 136–145)
WBC # BLD AUTO: 7.8 K/UL (ref 4–11)

## 2024-07-09 PROCEDURE — 6370000000 HC RX 637 (ALT 250 FOR IP): Performed by: INTERNAL MEDICINE

## 2024-07-09 PROCEDURE — 85027 COMPLETE CBC AUTOMATED: CPT

## 2024-07-09 PROCEDURE — 6370000000 HC RX 637 (ALT 250 FOR IP)

## 2024-07-09 PROCEDURE — 36430 TRANSFUSION BLD/BLD COMPNT: CPT

## 2024-07-09 PROCEDURE — 97530 THERAPEUTIC ACTIVITIES: CPT

## 2024-07-09 PROCEDURE — 6370000000 HC RX 637 (ALT 250 FOR IP): Performed by: STUDENT IN AN ORGANIZED HEALTH CARE EDUCATION/TRAINING PROGRAM

## 2024-07-09 PROCEDURE — 99233 SBSQ HOSP IP/OBS HIGH 50: CPT | Performed by: INTERNAL MEDICINE

## 2024-07-09 PROCEDURE — 6360000002 HC RX W HCPCS: Performed by: STUDENT IN AN ORGANIZED HEALTH CARE EDUCATION/TRAINING PROGRAM

## 2024-07-09 PROCEDURE — 6360000002 HC RX W HCPCS: Performed by: INTERNAL MEDICINE

## 2024-07-09 PROCEDURE — 1200000000 HC SEMI PRIVATE

## 2024-07-09 PROCEDURE — 2580000003 HC RX 258: Performed by: INTERNAL MEDICINE

## 2024-07-09 PROCEDURE — 2580000003 HC RX 258: Performed by: STUDENT IN AN ORGANIZED HEALTH CARE EDUCATION/TRAINING PROGRAM

## 2024-07-09 PROCEDURE — P9047 ALBUMIN (HUMAN), 25%, 50ML: HCPCS | Performed by: INTERNAL MEDICINE

## 2024-07-09 PROCEDURE — 90935 HEMODIALYSIS ONE EVALUATION: CPT

## 2024-07-09 PROCEDURE — 6370000000 HC RX 637 (ALT 250 FOR IP): Performed by: UROLOGY

## 2024-07-09 PROCEDURE — 80069 RENAL FUNCTION PANEL: CPT

## 2024-07-09 PROCEDURE — 92610 EVALUATE SWALLOWING FUNCTION: CPT

## 2024-07-09 PROCEDURE — 92526 ORAL FUNCTION THERAPY: CPT

## 2024-07-09 PROCEDURE — 92523 SPEECH SOUND LANG COMPREHEN: CPT

## 2024-07-09 RX ORDER — VITAMIN B COMPLEX
1000 TABLET ORAL DAILY
Status: CANCELLED | OUTPATIENT
Start: 2024-07-10

## 2024-07-09 RX ORDER — HYDROMORPHONE HYDROCHLORIDE 1 MG/ML
0.5 INJECTION, SOLUTION INTRAMUSCULAR; INTRAVENOUS; SUBCUTANEOUS
Status: DISCONTINUED | OUTPATIENT
Start: 2024-07-09 | End: 2024-07-15 | Stop reason: HOSPADM

## 2024-07-09 RX ORDER — LACTULOSE 10 G/15ML
20 SOLUTION ORAL 3 TIMES DAILY
Status: DISCONTINUED | OUTPATIENT
Start: 2024-07-09 | End: 2024-07-14

## 2024-07-09 RX ORDER — CEFUROXIME AXETIL 250 MG/1
250 TABLET ORAL EVERY 12 HOURS SCHEDULED
Status: DISPENSED | OUTPATIENT
Start: 2024-07-09 | End: 2024-07-14

## 2024-07-09 RX ORDER — ALBUMIN (HUMAN) 12.5 G/50ML
12.5 SOLUTION INTRAVENOUS PRN
Status: DISCONTINUED | OUTPATIENT
Start: 2024-07-09 | End: 2024-07-15 | Stop reason: HOSPADM

## 2024-07-09 RX ORDER — SODIUM BICARBONATE 650 MG/1
650 TABLET ORAL 3 TIMES DAILY
Status: DISCONTINUED | OUTPATIENT
Start: 2024-07-09 | End: 2024-07-12

## 2024-07-09 RX ADMIN — ALBUMIN (HUMAN) 12.5 G: 0.25 INJECTION, SOLUTION INTRAVENOUS at 08:38

## 2024-07-09 RX ADMIN — HEPARIN SODIUM 3200 UNITS: 1000 INJECTION INTRAVENOUS; SUBCUTANEOUS at 09:11

## 2024-07-09 RX ADMIN — MIDODRINE HYDROCHLORIDE 15 MG: 5 TABLET ORAL at 11:32

## 2024-07-09 RX ADMIN — LACTULOSE 20 G: 20 SOLUTION ORAL at 15:50

## 2024-07-09 RX ADMIN — HEPARIN SODIUM 5000 UNITS: 5000 INJECTION INTRAVENOUS; SUBCUTANEOUS at 21:48

## 2024-07-09 RX ADMIN — SENNOSIDES AND DOCUSATE SODIUM 1 TABLET: 50; 8.6 TABLET ORAL at 10:54

## 2024-07-09 RX ADMIN — Medication 10 ML: at 10:54

## 2024-07-09 RX ADMIN — ATORVASTATIN CALCIUM 10 MG: 10 TABLET, FILM COATED ORAL at 10:54

## 2024-07-09 RX ADMIN — MIDODRINE HYDROCHLORIDE 15 MG: 5 TABLET ORAL at 08:27

## 2024-07-09 RX ADMIN — OXYCODONE HYDROCHLORIDE 5 MG: 5 TABLET ORAL at 10:54

## 2024-07-09 RX ADMIN — ALBUMIN (HUMAN) 12.5 G: 0.25 INJECTION, SOLUTION INTRAVENOUS at 09:05

## 2024-07-09 RX ADMIN — WATER 1000 MG: 1 INJECTION INTRAMUSCULAR; INTRAVENOUS; SUBCUTANEOUS at 15:50

## 2024-07-09 RX ADMIN — Medication 10 ML: at 19:56

## 2024-07-09 RX ADMIN — SENNOSIDES AND DOCUSATE SODIUM 1 TABLET: 50; 8.6 TABLET ORAL at 19:56

## 2024-07-09 RX ADMIN — FERROUS SULFATE TAB 325 MG (65 MG ELEMENTAL FE) 324 MG: 325 (65 FE) TAB at 10:57

## 2024-07-09 RX ADMIN — HYDROMORPHONE HYDROCHLORIDE 0.5 MG: 1 INJECTION, SOLUTION INTRAMUSCULAR; INTRAVENOUS; SUBCUTANEOUS at 15:48

## 2024-07-09 RX ADMIN — HEPARIN SODIUM 5000 UNITS: 5000 INJECTION INTRAVENOUS; SUBCUTANEOUS at 05:47

## 2024-07-09 RX ADMIN — SODIUM BICARBONATE 650 MG: 650 TABLET ORAL at 15:50

## 2024-07-09 RX ADMIN — RIFAXIMIN 400 MG: 200 TABLET ORAL at 15:50

## 2024-07-09 RX ADMIN — MIDODRINE HYDROCHLORIDE 15 MG: 5 TABLET ORAL at 15:50

## 2024-07-09 RX ADMIN — SODIUM BICARBONATE 650 MG: 650 TABLET ORAL at 19:56

## 2024-07-09 RX ADMIN — FLUCONAZOLE 100 MG: 100 TABLET ORAL at 10:54

## 2024-07-09 RX ADMIN — HEPARIN SODIUM 5000 UNITS: 5000 INJECTION INTRAVENOUS; SUBCUTANEOUS at 12:39

## 2024-07-09 RX ADMIN — CHOLECALCIFEROL TAB 25 MCG (1000 UNIT) 1000 UNITS: 25 TAB at 10:54

## 2024-07-09 RX ADMIN — ALTEPLASE 1 MG: 2.2 INJECTION, POWDER, LYOPHILIZED, FOR SOLUTION INTRAVENOUS at 12:39

## 2024-07-09 ASSESSMENT — PAIN DESCRIPTION - DESCRIPTORS
DESCRIPTORS: THROBBING

## 2024-07-09 ASSESSMENT — PAIN DESCRIPTION - LOCATION
LOCATION: HIP;LEG

## 2024-07-09 ASSESSMENT — PAIN DESCRIPTION - ORIENTATION
ORIENTATION: RIGHT

## 2024-07-09 ASSESSMENT — PAIN SCALES - GENERAL
PAINLEVEL_OUTOF10: 10

## 2024-07-09 NOTE — FLOWSHEET NOTE
07/09/24 0705 07/09/24 0923   Vital Signs   BP (!) 108/44 (!) 91/42   Temp 97.3 °F (36.3 °C) 97.6 °F (36.4 °C)   Pulse 94 (!) 108   Respirations 20 16       Treatment time: 4 hours ordered, see summary below.  Net UF: 572 ml    Pre weight: 114.1 kg (bed scale)  Post weight: 113.6 kg (bed scale)  EDW: TBD    Access used: RIJ HD Tunneled cath  Access function: No problems    Medications or blood products given: Albumin 12.5 Gm IVPB x2, Midodrine 15 mg po.    Regular outpatient schedule: [facility] upon discharge    Summary of response to treatment: Tolerated tx poorly, hypotension throughout tx, SBP 60's, nonresponsive to Midodrine 15 mg po/Albumin 12.5 Gm IVPB x2.  System clotted, tx paused, new system setup/primed and tx resumed for 10 minutes, hypotension continued.  Tx stopped per Dr. Cameron order. Actual tx time 1:56, RTD 2:04.  Net  ml.    Copy of dialysis treatment record placed in chart, to be scanned into EMR.    Report called to Julissa Villegas RN

## 2024-07-09 NOTE — SIGNIFICANT EVENT
APRN notified by RN that patient lethargic and appears pale on exam.  RN reports patient with weakness to the point she is having difficulty holding a cup to drink.  Blood sugar was 110.  Vitals are stable at 112/62 with pulse rate of 97.  -CBC, BMP completed    Update 2307  -Results of CBC reveal hemoglobin 6.6  -Patient does have some acidosis although this is improving from earlier BMP  -Lactic acid negative  -Will transfuse 1 unit PRBCs now repeat labs in the morning    CK Hope - NP

## 2024-07-09 NOTE — CONSENT
Informed Consent for Blood Component Transfusion Note    I have discussed with the patient the rationale for blood component transfusion; its benefits in treating or preventing fatigue, organ damage, or death; and its risk which includes mild transfusion reactions, rare risk of blood borne infection, or more serious but rare reactions. I have discussed the alternatives to transfusion, including the risk and consequences of not receiving transfusion. The patient had an opportunity to ask questions and had agreed to proceed with transfusion of blood components.    Convert station took place via phone with the RN present.    Electronically signed by CK Hope NP on 7/8/24 at 11:04 PM EDT

## 2024-07-10 ENCOUNTER — APPOINTMENT (OUTPATIENT)
Dept: GENERAL RADIOLOGY | Age: 69
DRG: 480 | End: 2024-07-10
Payer: MEDICARE

## 2024-07-10 LAB
ALBUMIN SERPL-MCNC: 3.5 G/DL (ref 3.4–5)
AMMONIA PLAS-SCNC: 31 UMOL/L (ref 11–51)
ANION GAP SERPL CALCULATED.3IONS-SCNC: 24 MMOL/L (ref 3–16)
BASE EXCESS BLDV CALC-SCNC: -10.5 MMOL/L (ref -3–3)
BUN SERPL-MCNC: 24 MG/DL (ref 7–20)
CALCIUM SERPL-MCNC: 9.4 MG/DL (ref 8.3–10.6)
CHLORIDE SERPL-SCNC: 103 MMOL/L (ref 99–110)
CO2 BLDV-SCNC: 33 MMOL/L
CO2 SERPL-SCNC: 14 MMOL/L (ref 21–32)
COHGB MFR BLDV: 6 % (ref 0–1.5)
CREAT SERPL-MCNC: 4.6 MG/DL (ref 0.6–1.2)
DEPRECATED RDW RBC AUTO: 22 % (ref 12.4–15.4)
GFR SERPLBLD CREATININE-BSD FMLA CKD-EPI: 10 ML/MIN/{1.73_M2}
GLUCOSE BLD-MCNC: 134 MG/DL (ref 70–99)
GLUCOSE BLD-MCNC: 146 MG/DL (ref 70–99)
GLUCOSE BLD-MCNC: 147 MG/DL (ref 70–99)
GLUCOSE BLD-MCNC: 148 MG/DL (ref 70–99)
GLUCOSE SERPL-MCNC: 131 MG/DL (ref 70–99)
HCO3 BLDV-SCNC: 13.9 MMOL/L (ref 23–29)
HCT VFR BLD AUTO: 26.9 % (ref 36–48)
HGB BLD-MCNC: 8.1 G/DL (ref 12–16)
LACTATE BLDV-SCNC: 1.6 MMOL/L (ref 0.4–2)
MCH RBC QN AUTO: 28.7 PG (ref 26–34)
MCHC RBC AUTO-ENTMCNC: 30 G/DL (ref 31–36)
MCV RBC AUTO: 95.4 FL (ref 80–100)
METHGB MFR BLDV: 0.6 %
O2 CT VFR BLDV CALC: 11 VOL %
O2 THERAPY: ABNORMAL
PCO2 BLDV: 25.1 MMHG (ref 40–50)
PERFORMED ON: ABNORMAL
PH BLDV: 7.35 [PH] (ref 7.35–7.45)
PHOSPHATE SERPL-MCNC: 4.6 MG/DL (ref 2.5–4.9)
PLATELET # BLD AUTO: 117 K/UL (ref 135–450)
PMV BLD AUTO: 7.7 FL (ref 5–10.5)
PO2 BLDV: 170 MMHG (ref 25–40)
POTASSIUM SERPL-SCNC: 4 MMOL/L (ref 3.5–5.1)
RBC # BLD AUTO: 2.82 M/UL (ref 4–5.2)
SAO2 % BLDV: 100 %
SODIUM SERPL-SCNC: 141 MMOL/L (ref 136–145)
WBC # BLD AUTO: 9.3 K/UL (ref 4–11)

## 2024-07-10 PROCEDURE — 80069 RENAL FUNCTION PANEL: CPT

## 2024-07-10 PROCEDURE — 2580000003 HC RX 258: Performed by: INTERNAL MEDICINE

## 2024-07-10 PROCEDURE — 6370000000 HC RX 637 (ALT 250 FOR IP): Performed by: INTERNAL MEDICINE

## 2024-07-10 PROCEDURE — 97530 THERAPEUTIC ACTIVITIES: CPT

## 2024-07-10 PROCEDURE — 82140 ASSAY OF AMMONIA: CPT

## 2024-07-10 PROCEDURE — 92526 ORAL FUNCTION THERAPY: CPT

## 2024-07-10 PROCEDURE — 99233 SBSQ HOSP IP/OBS HIGH 50: CPT | Performed by: INTERNAL MEDICINE

## 2024-07-10 PROCEDURE — 6370000000 HC RX 637 (ALT 250 FOR IP): Performed by: STUDENT IN AN ORGANIZED HEALTH CARE EDUCATION/TRAINING PROGRAM

## 2024-07-10 PROCEDURE — 85027 COMPLETE CBC AUTOMATED: CPT

## 2024-07-10 PROCEDURE — 6370000000 HC RX 637 (ALT 250 FOR IP)

## 2024-07-10 PROCEDURE — 2580000003 HC RX 258: Performed by: STUDENT IN AN ORGANIZED HEALTH CARE EDUCATION/TRAINING PROGRAM

## 2024-07-10 PROCEDURE — 82803 BLOOD GASES ANY COMBINATION: CPT

## 2024-07-10 PROCEDURE — 83605 ASSAY OF LACTIC ACID: CPT

## 2024-07-10 PROCEDURE — 94761 N-INVAS EAR/PLS OXIMETRY MLT: CPT

## 2024-07-10 PROCEDURE — 74018 RADEX ABDOMEN 1 VIEW: CPT

## 2024-07-10 PROCEDURE — 2500000003 HC RX 250 WO HCPCS: Performed by: INTERNAL MEDICINE

## 2024-07-10 PROCEDURE — 97535 SELF CARE MNGMENT TRAINING: CPT

## 2024-07-10 PROCEDURE — 6360000002 HC RX W HCPCS: Performed by: INTERNAL MEDICINE

## 2024-07-10 PROCEDURE — 6370000000 HC RX 637 (ALT 250 FOR IP): Performed by: UROLOGY

## 2024-07-10 PROCEDURE — 1200000000 HC SEMI PRIVATE

## 2024-07-10 PROCEDURE — 97129 THER IVNTJ 1ST 15 MIN: CPT

## 2024-07-10 RX ORDER — SODIUM CHLORIDE, SODIUM LACTATE, POTASSIUM CHLORIDE, CALCIUM CHLORIDE 600; 310; 30; 20 MG/100ML; MG/100ML; MG/100ML; MG/100ML
INJECTION, SOLUTION INTRAVENOUS CONTINUOUS
Status: DISCONTINUED | OUTPATIENT
Start: 2024-07-10 | End: 2024-07-10

## 2024-07-10 RX ADMIN — SENNOSIDES AND DOCUSATE SODIUM 1 TABLET: 50; 8.6 TABLET ORAL at 20:17

## 2024-07-10 RX ADMIN — SODIUM CHLORIDE, POTASSIUM CHLORIDE, SODIUM LACTATE AND CALCIUM CHLORIDE: 600; 310; 30; 20 INJECTION, SOLUTION INTRAVENOUS at 09:56

## 2024-07-10 RX ADMIN — Medication 10 ML: at 20:16

## 2024-07-10 RX ADMIN — MIDODRINE HYDROCHLORIDE 15 MG: 5 TABLET ORAL at 17:00

## 2024-07-10 RX ADMIN — CHOLECALCIFEROL TAB 25 MCG (1000 UNIT) 1000 UNITS: 25 TAB at 09:24

## 2024-07-10 RX ADMIN — OXYCODONE HYDROCHLORIDE 5 MG: 5 TABLET ORAL at 14:33

## 2024-07-10 RX ADMIN — FLUCONAZOLE 100 MG: 100 TABLET ORAL at 09:27

## 2024-07-10 RX ADMIN — PANTOPRAZOLE SODIUM 40 MG: 40 TABLET, DELAYED RELEASE ORAL at 09:26

## 2024-07-10 RX ADMIN — SODIUM BICARBONATE: 84 INJECTION, SOLUTION INTRAVENOUS at 17:03

## 2024-07-10 RX ADMIN — ATORVASTATIN CALCIUM 10 MG: 10 TABLET, FILM COATED ORAL at 09:25

## 2024-07-10 RX ADMIN — SENNOSIDES AND DOCUSATE SODIUM 1 TABLET: 50; 8.6 TABLET ORAL at 09:28

## 2024-07-10 RX ADMIN — RIFAXIMIN 550 MG: 550 TABLET ORAL at 09:28

## 2024-07-10 RX ADMIN — Medication 10 ML: at 09:29

## 2024-07-10 RX ADMIN — FERROUS SULFATE TAB 325 MG (65 MG ELEMENTAL FE) 324 MG: 325 (65 FE) TAB at 09:26

## 2024-07-10 RX ADMIN — HEPARIN SODIUM 5000 UNITS: 5000 INJECTION INTRAVENOUS; SUBCUTANEOUS at 22:30

## 2024-07-10 RX ADMIN — SODIUM BICARBONATE 650 MG: 650 TABLET ORAL at 14:33

## 2024-07-10 RX ADMIN — OXYCODONE HYDROCHLORIDE 5 MG: 5 TABLET ORAL at 09:23

## 2024-07-10 RX ADMIN — LACTULOSE 20 G: 20 SOLUTION ORAL at 20:22

## 2024-07-10 RX ADMIN — SODIUM BICARBONATE 650 MG: 650 TABLET ORAL at 09:27

## 2024-07-10 RX ADMIN — MIDODRINE HYDROCHLORIDE 15 MG: 5 TABLET ORAL at 09:25

## 2024-07-10 RX ADMIN — RIFAXIMIN 550 MG: 550 TABLET ORAL at 20:22

## 2024-07-10 RX ADMIN — HEPARIN SODIUM 5000 UNITS: 5000 INJECTION INTRAVENOUS; SUBCUTANEOUS at 14:33

## 2024-07-10 RX ADMIN — SODIUM BICARBONATE 650 MG: 650 TABLET ORAL at 20:17

## 2024-07-10 RX ADMIN — CEFUROXIME AXETIL 250 MG: 250 TABLET ORAL at 20:17

## 2024-07-10 ASSESSMENT — PAIN SCALES - WONG BAKER
WONGBAKER_NUMERICALRESPONSE: NO HURT
WONGBAKER_NUMERICALRESPONSE: NO HURT

## 2024-07-10 ASSESSMENT — PAIN SCALES - GENERAL
PAINLEVEL_OUTOF10: 6
PAINLEVEL_OUTOF10: 0
PAINLEVEL_OUTOF10: 0
PAINLEVEL_OUTOF10: 1
PAINLEVEL_OUTOF10: 6

## 2024-07-10 NOTE — CARE COORDINATION
Discharge Planning Note:    - Dr. Toy Gomez, Nephrologist, [facility] [place], [place] has been chosen for the nephrologist from an approved list.    - the above information was placed into the [employer] Portal.    Will continue to follow.    OLIVE CaleroN RN    [facility]  Phone: [phone]

## 2024-07-11 DIAGNOSIS — I48.0 PAROXYSMAL ATRIAL FIBRILLATION (HCC): Primary | ICD-10-CM

## 2024-07-11 LAB
ALBUMIN SERPL-MCNC: 3.5 G/DL (ref 3.4–5)
ANION GAP SERPL CALCULATED.3IONS-SCNC: 18 MMOL/L (ref 3–16)
BUN SERPL-MCNC: 31 MG/DL (ref 7–20)
CALCIUM SERPL-MCNC: 9.6 MG/DL (ref 8.3–10.6)
CHLORIDE SERPL-SCNC: 102 MMOL/L (ref 99–110)
CO2 SERPL-SCNC: 21 MMOL/L (ref 21–32)
CREAT SERPL-MCNC: 6.1 MG/DL (ref 0.6–1.2)
GFR SERPLBLD CREATININE-BSD FMLA CKD-EPI: 7 ML/MIN/{1.73_M2}
GLUCOSE BLD-MCNC: 154 MG/DL (ref 70–99)
GLUCOSE BLD-MCNC: 179 MG/DL (ref 70–99)
GLUCOSE BLD-MCNC: 190 MG/DL (ref 70–99)
GLUCOSE SERPL-MCNC: 165 MG/DL (ref 70–99)
PERFORMED ON: ABNORMAL
PHOSPHATE SERPL-MCNC: 4.3 MG/DL (ref 2.5–4.9)
POTASSIUM SERPL-SCNC: 3.8 MMOL/L (ref 3.5–5.1)
REASON FOR REJECTION: NORMAL
REJECTED TEST: NORMAL
SODIUM SERPL-SCNC: 141 MMOL/L (ref 136–145)

## 2024-07-11 PROCEDURE — 6370000000 HC RX 637 (ALT 250 FOR IP): Performed by: STUDENT IN AN ORGANIZED HEALTH CARE EDUCATION/TRAINING PROGRAM

## 2024-07-11 PROCEDURE — 6370000000 HC RX 637 (ALT 250 FOR IP)

## 2024-07-11 PROCEDURE — 80069 RENAL FUNCTION PANEL: CPT

## 2024-07-11 PROCEDURE — 2500000003 HC RX 250 WO HCPCS: Performed by: INTERNAL MEDICINE

## 2024-07-11 PROCEDURE — 6370000000 HC RX 637 (ALT 250 FOR IP): Performed by: INTERNAL MEDICINE

## 2024-07-11 PROCEDURE — 6360000002 HC RX W HCPCS: Performed by: INTERNAL MEDICINE

## 2024-07-11 PROCEDURE — 6370000000 HC RX 637 (ALT 250 FOR IP): Performed by: UROLOGY

## 2024-07-11 PROCEDURE — 94660 CPAP INITIATION&MGMT: CPT

## 2024-07-11 PROCEDURE — 99232 SBSQ HOSP IP/OBS MODERATE 35: CPT | Performed by: INTERNAL MEDICINE

## 2024-07-11 PROCEDURE — 90935 HEMODIALYSIS ONE EVALUATION: CPT

## 2024-07-11 PROCEDURE — 94760 N-INVAS EAR/PLS OXIMETRY 1: CPT

## 2024-07-11 PROCEDURE — 1200000000 HC SEMI PRIVATE

## 2024-07-11 PROCEDURE — 2580000003 HC RX 258: Performed by: INTERNAL MEDICINE

## 2024-07-11 PROCEDURE — 6370000000 HC RX 637 (ALT 250 FOR IP): Performed by: NURSE PRACTITIONER

## 2024-07-11 RX ORDER — INSULIN LISPRO 100 [IU]/ML
INJECTION, SOLUTION INTRAVENOUS; SUBCUTANEOUS
Qty: 10 ML | Refills: 1 | Status: SHIPPED | OUTPATIENT
Start: 2024-07-11

## 2024-07-11 RX ORDER — MIDODRINE HYDROCHLORIDE 5 MG/1
2.5 TABLET ORAL
Status: COMPLETED | OUTPATIENT
Start: 2024-07-11 | End: 2024-07-11

## 2024-07-11 RX ORDER — SODIUM BICARBONATE 325 MG/1
325 TABLET ORAL 3 TIMES DAILY
Qty: 90 TABLET | Refills: 11 | Status: SHIPPED | OUTPATIENT
Start: 2024-07-11 | End: 2025-07-11

## 2024-07-11 RX ORDER — CEFUROXIME AXETIL 250 MG/1
250 TABLET ORAL EVERY 12 HOURS SCHEDULED
Qty: 8 TABLET | Refills: 0 | Status: SHIPPED | OUTPATIENT
Start: 2024-07-11 | End: 2024-07-15

## 2024-07-11 RX ORDER — MIDODRINE HYDROCHLORIDE 5 MG/1
15 TABLET ORAL
Qty: 90 TABLET | Refills: 3 | Status: SHIPPED | OUTPATIENT
Start: 2024-07-11

## 2024-07-11 RX ADMIN — SENNOSIDES AND DOCUSATE SODIUM 1 TABLET: 50; 8.6 TABLET ORAL at 11:19

## 2024-07-11 RX ADMIN — FLUCONAZOLE 100 MG: 100 TABLET ORAL at 11:19

## 2024-07-11 RX ADMIN — CEFUROXIME AXETIL 250 MG: 250 TABLET ORAL at 20:17

## 2024-07-11 RX ADMIN — LACTULOSE 20 G: 20 SOLUTION ORAL at 11:20

## 2024-07-11 RX ADMIN — CHOLECALCIFEROL TAB 25 MCG (1000 UNIT) 1000 UNITS: 25 TAB at 11:19

## 2024-07-11 RX ADMIN — HEPARIN SODIUM 5000 UNITS: 5000 INJECTION INTRAVENOUS; SUBCUTANEOUS at 14:58

## 2024-07-11 RX ADMIN — HEPARIN SODIUM 5000 UNITS: 5000 INJECTION INTRAVENOUS; SUBCUTANEOUS at 22:37

## 2024-07-11 RX ADMIN — SODIUM BICARBONATE 650 MG: 650 TABLET ORAL at 20:17

## 2024-07-11 RX ADMIN — OXYCODONE HYDROCHLORIDE 5 MG: 5 TABLET ORAL at 10:32

## 2024-07-11 RX ADMIN — MIDODRINE HYDROCHLORIDE 15 MG: 5 TABLET ORAL at 18:27

## 2024-07-11 RX ADMIN — HEPARIN SODIUM 5000 UNITS: 5000 INJECTION INTRAVENOUS; SUBCUTANEOUS at 05:40

## 2024-07-11 RX ADMIN — SODIUM BICARBONATE 650 MG: 650 TABLET ORAL at 11:19

## 2024-07-11 RX ADMIN — LACTULOSE 20 G: 20 SOLUTION ORAL at 20:17

## 2024-07-11 RX ADMIN — EPOETIN ALFA-EPBX 10000 UNITS: 10000 INJECTION, SOLUTION INTRAVENOUS; SUBCUTANEOUS at 13:39

## 2024-07-11 RX ADMIN — MIDODRINE HYDROCHLORIDE 15 MG: 5 TABLET ORAL at 10:32

## 2024-07-11 RX ADMIN — LACTULOSE 20 G: 20 SOLUTION ORAL at 14:58

## 2024-07-11 RX ADMIN — SODIUM BICARBONATE 650 MG: 650 TABLET ORAL at 14:56

## 2024-07-11 RX ADMIN — SODIUM BICARBONATE: 84 INJECTION, SOLUTION INTRAVENOUS at 20:11

## 2024-07-11 RX ADMIN — ATORVASTATIN CALCIUM 10 MG: 10 TABLET, FILM COATED ORAL at 11:19

## 2024-07-11 RX ADMIN — SENNOSIDES AND DOCUSATE SODIUM 1 TABLET: 50; 8.6 TABLET ORAL at 20:17

## 2024-07-11 RX ADMIN — OXYCODONE HYDROCHLORIDE 5 MG: 5 TABLET ORAL at 15:11

## 2024-07-11 RX ADMIN — CEFUROXIME AXETIL 250 MG: 250 TABLET ORAL at 14:56

## 2024-07-11 RX ADMIN — RIFAXIMIN 550 MG: 550 TABLET ORAL at 20:17

## 2024-07-11 RX ADMIN — MIDODRINE HYDROCHLORIDE 2.5 MG: 5 TABLET ORAL at 05:12

## 2024-07-11 RX ADMIN — MIDODRINE HYDROCHLORIDE 15 MG: 5 TABLET ORAL at 14:57

## 2024-07-11 ASSESSMENT — PAIN SCALES - GENERAL: PAINLEVEL_OUTOF10: 4

## 2024-07-11 NOTE — DIALYSIS
Treatment time: 2 hours  Net UF: 1000 ml     Pre weight: 108 kg  Post weight:107 kg  EDW: TBD kg    Access used: R TDC    Access function: Well with  ml/min     Medications or blood products given: Retacrit     Regular outpatient schedule: [facility]     Summary of response to treatment: Patient tolerated treatment well and without any complications. Patient terminated treatment early due to non-dialysis related pain. MD Cameron notified and AMA signed and placed in chart. Patient remained stable throughout entire treatment and upon the exiting the dialysis suite via transport.     Report given to Chaya Razo RN and copy of dialysis treatment record placed in chart, to be scanned into EMR.

## 2024-07-11 NOTE — CARE COORDINATION
Discharge Planning Note:    Update:     - Patient is a New HD Patient:     Note: Tunnel Cath has been placed.     [address] [address]     Phone: [phone]  Fax: [phone]     Schedule:  [facility]     Chair Time:  3:00pm    Note: - Dr. Gomez, Nephrologist, [facility] [place], [place] has been chosen for the nephrologist from an approved list.      Will continue to follow.     CATHY Calero RN    [facility]  Phone: [phone]

## 2024-07-12 PROBLEM — E44.0 MODERATE MALNUTRITION (HCC): Chronic | Status: ACTIVE | Noted: 2024-07-12

## 2024-07-12 LAB
ALBUMIN SERPL-MCNC: 3.4 G/DL (ref 3.4–5)
ANION GAP SERPL CALCULATED.3IONS-SCNC: 17 MMOL/L (ref 3–16)
BUN SERPL-MCNC: 17 MG/DL (ref 7–20)
CALCIUM SERPL-MCNC: 9.1 MG/DL (ref 8.3–10.6)
CHLORIDE SERPL-SCNC: 100 MMOL/L (ref 99–110)
CO2 SERPL-SCNC: 23 MMOL/L (ref 21–32)
CREAT SERPL-MCNC: 4.3 MG/DL (ref 0.6–1.2)
GFR SERPLBLD CREATININE-BSD FMLA CKD-EPI: 11 ML/MIN/{1.73_M2}
GLUCOSE BLD-MCNC: 158 MG/DL (ref 70–99)
GLUCOSE BLD-MCNC: 164 MG/DL (ref 70–99)
GLUCOSE BLD-MCNC: 170 MG/DL (ref 70–99)
GLUCOSE BLD-MCNC: 184 MG/DL (ref 70–99)
GLUCOSE SERPL-MCNC: 162 MG/DL (ref 70–99)
PERFORMED ON: ABNORMAL
PHOSPHATE SERPL-MCNC: 3.1 MG/DL (ref 2.5–4.9)
POTASSIUM SERPL-SCNC: 3.8 MMOL/L (ref 3.5–5.1)
SODIUM SERPL-SCNC: 140 MMOL/L (ref 136–145)

## 2024-07-12 PROCEDURE — 1200000000 HC SEMI PRIVATE

## 2024-07-12 PROCEDURE — 97530 THERAPEUTIC ACTIVITIES: CPT

## 2024-07-12 PROCEDURE — 94760 N-INVAS EAR/PLS OXIMETRY 1: CPT

## 2024-07-12 PROCEDURE — 97535 SELF CARE MNGMENT TRAINING: CPT

## 2024-07-12 PROCEDURE — 6370000000 HC RX 637 (ALT 250 FOR IP): Performed by: INTERNAL MEDICINE

## 2024-07-12 PROCEDURE — 80069 RENAL FUNCTION PANEL: CPT

## 2024-07-12 PROCEDURE — 6370000000 HC RX 637 (ALT 250 FOR IP): Performed by: STUDENT IN AN ORGANIZED HEALTH CARE EDUCATION/TRAINING PROGRAM

## 2024-07-12 PROCEDURE — 6370000000 HC RX 637 (ALT 250 FOR IP): Performed by: UROLOGY

## 2024-07-12 PROCEDURE — 6360000002 HC RX W HCPCS: Performed by: STUDENT IN AN ORGANIZED HEALTH CARE EDUCATION/TRAINING PROGRAM

## 2024-07-12 PROCEDURE — 6360000002 HC RX W HCPCS: Performed by: INTERNAL MEDICINE

## 2024-07-12 PROCEDURE — 2580000003 HC RX 258: Performed by: INTERNAL MEDICINE

## 2024-07-12 RX ORDER — OXYCODONE HYDROCHLORIDE 5 MG/1
5 TABLET ORAL EVERY 4 HOURS PRN
Qty: 10 TABLET | Refills: 0 | Status: SHIPPED | OUTPATIENT
Start: 2024-07-12 | End: 2024-07-15

## 2024-07-12 RX ORDER — SENNA AND DOCUSATE SODIUM 50; 8.6 MG/1; MG/1
2 TABLET, FILM COATED ORAL DAILY
Status: DISCONTINUED | OUTPATIENT
Start: 2024-07-13 | End: 2024-07-14

## 2024-07-12 RX ORDER — LACTULOSE 10 G/15ML
20 SOLUTION ORAL DAILY
Qty: 237 ML | Refills: 1 | Status: SHIPPED | OUTPATIENT
Start: 2024-07-12

## 2024-07-12 RX ORDER — SENNOSIDES A AND B 8.6 MG/1
1 TABLET, FILM COATED ORAL 2 TIMES DAILY PRN
Qty: 60 TABLET | Refills: 11 | Status: SHIPPED | OUTPATIENT
Start: 2024-07-12 | End: 2025-07-12

## 2024-07-12 RX ORDER — SENNA AND DOCUSATE SODIUM 50; 8.6 MG/1; MG/1
2 TABLET, FILM COATED ORAL DAILY
Status: DISCONTINUED | OUTPATIENT
Start: 2024-07-12 | End: 2024-07-12

## 2024-07-12 RX ORDER — SODIUM BICARBONATE 650 MG/1
325 TABLET ORAL 3 TIMES DAILY
Status: DISCONTINUED | OUTPATIENT
Start: 2024-07-12 | End: 2024-07-15 | Stop reason: HOSPADM

## 2024-07-12 RX ADMIN — PANTOPRAZOLE SODIUM 40 MG: 40 TABLET, DELAYED RELEASE ORAL at 10:37

## 2024-07-12 RX ADMIN — RIFAXIMIN 550 MG: 550 TABLET ORAL at 10:35

## 2024-07-12 RX ADMIN — CEFUROXIME AXETIL 250 MG: 250 TABLET ORAL at 10:35

## 2024-07-12 RX ADMIN — FLUCONAZOLE 100 MG: 100 TABLET ORAL at 10:35

## 2024-07-12 RX ADMIN — HEPARIN SODIUM 5000 UNITS: 5000 INJECTION INTRAVENOUS; SUBCUTANEOUS at 14:53

## 2024-07-12 RX ADMIN — SODIUM BICARBONATE 325 MG: 650 TABLET ORAL at 14:52

## 2024-07-12 RX ADMIN — LACTULOSE 20 G: 20 SOLUTION ORAL at 10:35

## 2024-07-12 RX ADMIN — LINACLOTIDE 145 MCG: 145 CAPSULE, GELATIN COATED ORAL at 11:05

## 2024-07-12 RX ADMIN — HYDROMORPHONE HYDROCHLORIDE 0.5 MG: 1 INJECTION, SOLUTION INTRAMUSCULAR; INTRAVENOUS; SUBCUTANEOUS at 14:53

## 2024-07-12 RX ADMIN — FERROUS SULFATE TAB 325 MG (65 MG ELEMENTAL FE) 324 MG: 325 (65 FE) TAB at 10:35

## 2024-07-12 RX ADMIN — Medication 10 ML: at 21:40

## 2024-07-12 RX ADMIN — RIFAXIMIN 550 MG: 550 TABLET ORAL at 21:39

## 2024-07-12 RX ADMIN — ATORVASTATIN CALCIUM 10 MG: 10 TABLET, FILM COATED ORAL at 10:35

## 2024-07-12 RX ADMIN — CHOLECALCIFEROL TAB 25 MCG (1000 UNIT) 1000 UNITS: 25 TAB at 10:35

## 2024-07-12 RX ADMIN — HEPARIN SODIUM 5000 UNITS: 5000 INJECTION INTRAVENOUS; SUBCUTANEOUS at 21:39

## 2024-07-12 RX ADMIN — MIDODRINE HYDROCHLORIDE 15 MG: 5 TABLET ORAL at 10:35

## 2024-07-12 RX ADMIN — HYDROMORPHONE HYDROCHLORIDE 0.5 MG: 1 INJECTION, SOLUTION INTRAMUSCULAR; INTRAVENOUS; SUBCUTANEOUS at 05:51

## 2024-07-12 RX ADMIN — NALOXEGOL OXALATE 12.5 MG: 25 TABLET, FILM COATED ORAL at 11:04

## 2024-07-12 RX ADMIN — SODIUM BICARBONATE 650 MG: 650 TABLET ORAL at 11:02

## 2024-07-12 RX ADMIN — MIDODRINE HYDROCHLORIDE 15 MG: 5 TABLET ORAL at 14:53

## 2024-07-12 RX ADMIN — SODIUM BICARBONATE 325 MG: 650 TABLET ORAL at 12:01

## 2024-07-12 RX ADMIN — SODIUM BICARBONATE 325 MG: 650 TABLET ORAL at 21:39

## 2024-07-12 RX ADMIN — CEFUROXIME AXETIL 250 MG: 250 TABLET ORAL at 21:39

## 2024-07-12 RX ADMIN — MIDODRINE HYDROCHLORIDE 15 MG: 5 TABLET ORAL at 17:20

## 2024-07-12 RX ADMIN — OXYCODONE HYDROCHLORIDE 5 MG: 5 TABLET ORAL at 17:20

## 2024-07-12 RX ADMIN — Medication 10 ML: at 10:44

## 2024-07-12 RX ADMIN — OXYCODONE HYDROCHLORIDE 5 MG: 5 TABLET ORAL at 12:01

## 2024-07-12 ASSESSMENT — PAIN SCALES - GENERAL
PAINLEVEL_OUTOF10: 10
PAINLEVEL_OUTOF10: 8
PAINLEVEL_OUTOF10: 10
PAINLEVEL_OUTOF10: 0
PAINLEVEL_OUTOF10: 10
PAINLEVEL_OUTOF10: 4
PAINLEVEL_OUTOF10: 0

## 2024-07-12 ASSESSMENT — PAIN - FUNCTIONAL ASSESSMENT
PAIN_FUNCTIONAL_ASSESSMENT: PREVENTS OR INTERFERES SOME ACTIVE ACTIVITIES AND ADLS

## 2024-07-12 ASSESSMENT — PAIN DESCRIPTION - LOCATION
LOCATION: LEG
LOCATION: BACK
LOCATION: LEG

## 2024-07-12 ASSESSMENT — PAIN DESCRIPTION - ORIENTATION
ORIENTATION: RIGHT
ORIENTATION: MID
ORIENTATION: RIGHT

## 2024-07-12 ASSESSMENT — PAIN DESCRIPTION - DESCRIPTORS
DESCRIPTORS: ACHING
DESCRIPTORS: SHOOTING;SORE;ACHING
DESCRIPTORS: ACHING
DESCRIPTORS: ACHING
DESCRIPTORS: DISCOMFORT

## 2024-07-12 ASSESSMENT — PAIN DESCRIPTION - PAIN TYPE: TYPE: SURGICAL PAIN

## 2024-07-12 NOTE — CARE COORDINATION
Discharge Planning Note:     Update:     - PT/OT recommends SNF     - Skilled Nursing Facility:     [address] [place] [address]      Phone: [phone]     Nichelle, Admissions: [phone]     Fax: [phone]     (Per note Nichelle, Admission, will return on Tuesday 07/09/2024)     Note: Jessenia Doshi, daughter-in-law, is the Assistant Director of Nursing at [facility].    Note: [facility] is obtaining HD stretcher transport to  from [facility] for the patient.           - Patient is a New HD Patient:     Note: Tunnel Cath has been placed.     [address] [address]     Phone: [phone]  Fax: [phone]     Schedule:  [facility]     Chair Time:  2:45 PM    Note: [facility] HD chair has been obtained and has a start date of Wednesday 07/17/2024. This CM informed [facility] that the patient would be ready for HD on Monday 07/15/2024 but they returned with the above information.    - Having trouble with quick response times with [facility].     Will continue to follow.     CATHY Calero RN    [facility]  Phone: [phone]

## 2024-07-13 LAB
ANION GAP SERPL CALCULATED.3IONS-SCNC: 15 MMOL/L (ref 3–16)
BASOPHILS # BLD: 0 K/UL (ref 0–0.2)
BASOPHILS NFR BLD: 0.8 %
BUN SERPL-MCNC: 15 MG/DL (ref 7–20)
CALCIUM SERPL-MCNC: 9.1 MG/DL (ref 8.3–10.6)
CHLORIDE SERPL-SCNC: 101 MMOL/L (ref 99–110)
CO2 SERPL-SCNC: 23 MMOL/L (ref 21–32)
CREAT SERPL-MCNC: 3.6 MG/DL (ref 0.6–1.2)
DEPRECATED RDW RBC AUTO: 22.1 % (ref 12.4–15.4)
EOSINOPHIL # BLD: 0.6 K/UL (ref 0–0.6)
EOSINOPHIL NFR BLD: 8.7 %
GFR SERPLBLD CREATININE-BSD FMLA CKD-EPI: 13 ML/MIN/{1.73_M2}
GLUCOSE BLD-MCNC: 130 MG/DL (ref 70–99)
GLUCOSE BLD-MCNC: 132 MG/DL (ref 70–99)
GLUCOSE BLD-MCNC: 166 MG/DL (ref 70–99)
GLUCOSE BLD-MCNC: 228 MG/DL (ref 70–99)
GLUCOSE SERPL-MCNC: 120 MG/DL (ref 70–99)
HCT VFR BLD AUTO: 26.6 % (ref 36–48)
HGB BLD-MCNC: 8.1 G/DL (ref 12–16)
LYMPHOCYTES # BLD: 1 K/UL (ref 1–5.1)
LYMPHOCYTES NFR BLD: 16.3 %
MCH RBC QN AUTO: 29.2 PG (ref 26–34)
MCHC RBC AUTO-ENTMCNC: 30.4 G/DL (ref 31–36)
MCV RBC AUTO: 96.2 FL (ref 80–100)
MONOCYTES # BLD: 1.1 K/UL (ref 0–1.3)
MONOCYTES NFR BLD: 17.2 %
NEUTROPHILS # BLD: 3.6 K/UL (ref 1.7–7.7)
NEUTROPHILS NFR BLD: 57 %
PERFORMED ON: ABNORMAL
PLATELET # BLD AUTO: 110 K/UL (ref 135–450)
PMV BLD AUTO: 8.4 FL (ref 5–10.5)
POTASSIUM SERPL-SCNC: 3.8 MMOL/L (ref 3.5–5.1)
RBC # BLD AUTO: 2.76 M/UL (ref 4–5.2)
SODIUM SERPL-SCNC: 139 MMOL/L (ref 136–145)
WBC # BLD AUTO: 6.3 K/UL (ref 4–11)

## 2024-07-13 PROCEDURE — 1200000000 HC SEMI PRIVATE

## 2024-07-13 PROCEDURE — 85025 COMPLETE CBC W/AUTO DIFF WBC: CPT

## 2024-07-13 PROCEDURE — 80048 BASIC METABOLIC PNL TOTAL CA: CPT

## 2024-07-13 PROCEDURE — 6370000000 HC RX 637 (ALT 250 FOR IP): Performed by: UROLOGY

## 2024-07-13 PROCEDURE — 6360000002 HC RX W HCPCS: Performed by: INTERNAL MEDICINE

## 2024-07-13 PROCEDURE — 6370000000 HC RX 637 (ALT 250 FOR IP): Performed by: INTERNAL MEDICINE

## 2024-07-13 PROCEDURE — 90935 HEMODIALYSIS ONE EVALUATION: CPT

## 2024-07-13 PROCEDURE — 2580000003 HC RX 258: Performed by: INTERNAL MEDICINE

## 2024-07-13 PROCEDURE — 94660 CPAP INITIATION&MGMT: CPT

## 2024-07-13 PROCEDURE — 6370000000 HC RX 637 (ALT 250 FOR IP): Performed by: STUDENT IN AN ORGANIZED HEALTH CARE EDUCATION/TRAINING PROGRAM

## 2024-07-13 RX ADMIN — PANTOPRAZOLE SODIUM 40 MG: 40 TABLET, DELAYED RELEASE ORAL at 05:28

## 2024-07-13 RX ADMIN — LACTULOSE 20 G: 20 SOLUTION ORAL at 20:40

## 2024-07-13 RX ADMIN — FLUCONAZOLE 100 MG: 100 TABLET ORAL at 17:53

## 2024-07-13 RX ADMIN — OXYCODONE HYDROCHLORIDE 5 MG: 5 TABLET ORAL at 17:54

## 2024-07-13 RX ADMIN — OXYCODONE HYDROCHLORIDE 5 MG: 5 TABLET ORAL at 05:28

## 2024-07-13 RX ADMIN — CHOLECALCIFEROL TAB 25 MCG (1000 UNIT) 1000 UNITS: 25 TAB at 12:54

## 2024-07-13 RX ADMIN — Medication 10 ML: at 20:46

## 2024-07-13 RX ADMIN — MIDODRINE HYDROCHLORIDE 15 MG: 5 TABLET ORAL at 17:53

## 2024-07-13 RX ADMIN — OXYCODONE HYDROCHLORIDE 5 MG: 5 TABLET ORAL at 21:05

## 2024-07-13 RX ADMIN — SODIUM CHLORIDE, PRESERVATIVE FREE 10 ML: 5 INJECTION INTRAVENOUS at 12:57

## 2024-07-13 RX ADMIN — OXYCODONE HYDROCHLORIDE 5 MG: 5 TABLET ORAL at 12:54

## 2024-07-13 RX ADMIN — NALOXEGOL OXALATE 12.5 MG: 25 TABLET, FILM COATED ORAL at 05:28

## 2024-07-13 RX ADMIN — RIFAXIMIN 550 MG: 550 TABLET ORAL at 12:54

## 2024-07-13 RX ADMIN — HEPARIN SODIUM 5000 UNITS: 5000 INJECTION INTRAVENOUS; SUBCUTANEOUS at 17:53

## 2024-07-13 RX ADMIN — ATORVASTATIN CALCIUM 10 MG: 10 TABLET, FILM COATED ORAL at 12:54

## 2024-07-13 RX ADMIN — SODIUM BICARBONATE 325 MG: 650 TABLET ORAL at 12:54

## 2024-07-13 RX ADMIN — CEFUROXIME AXETIL 250 MG: 250 TABLET ORAL at 12:54

## 2024-07-13 RX ADMIN — SENNOSIDES AND DOCUSATE SODIUM 2 TABLET: 50; 8.6 TABLET ORAL at 12:56

## 2024-07-13 RX ADMIN — SODIUM BICARBONATE 325 MG: 650 TABLET ORAL at 20:40

## 2024-07-13 RX ADMIN — EPOETIN ALFA-EPBX 10000 UNITS: 10000 INJECTION, SOLUTION INTRAVENOUS; SUBCUTANEOUS at 17:53

## 2024-07-13 RX ADMIN — INSULIN LISPRO 2 UNITS: 100 INJECTION, SOLUTION INTRAVENOUS; SUBCUTANEOUS at 17:53

## 2024-07-13 RX ADMIN — LINACLOTIDE 145 MCG: 145 CAPSULE, GELATIN COATED ORAL at 07:08

## 2024-07-13 RX ADMIN — CEFUROXIME AXETIL 250 MG: 250 TABLET ORAL at 20:40

## 2024-07-13 RX ADMIN — MIDODRINE HYDROCHLORIDE 15 MG: 5 TABLET ORAL at 12:54

## 2024-07-13 RX ADMIN — HEPARIN SODIUM 5000 UNITS: 5000 INJECTION INTRAVENOUS; SUBCUTANEOUS at 05:28

## 2024-07-13 RX ADMIN — HEPARIN SODIUM 5000 UNITS: 5000 INJECTION INTRAVENOUS; SUBCUTANEOUS at 20:39

## 2024-07-13 RX ADMIN — RIFAXIMIN 550 MG: 550 TABLET ORAL at 20:40

## 2024-07-13 RX ADMIN — Medication 10 ML: at 12:56

## 2024-07-13 ASSESSMENT — PAIN SCALES - GENERAL
PAINLEVEL_OUTOF10: 8
PAINLEVEL_OUTOF10: 8
PAINLEVEL_OUTOF10: 0
PAINLEVEL_OUTOF10: 8
PAINLEVEL_OUTOF10: 0
PAINLEVEL_OUTOF10: 7
PAINLEVEL_OUTOF10: 8
PAINLEVEL_OUTOF10: 9
PAINLEVEL_OUTOF10: 0
PAINLEVEL_OUTOF10: 9
PAINLEVEL_OUTOF10: 6

## 2024-07-13 ASSESSMENT — PAIN DESCRIPTION - LOCATION
LOCATION: LEG;BUTTOCKS
LOCATION: LEG;BUTTOCKS
LOCATION: KNEE;BUTTOCKS
LOCATION: LEG
LOCATION: LEG

## 2024-07-13 ASSESSMENT — PAIN DESCRIPTION - ORIENTATION
ORIENTATION: RIGHT
ORIENTATION: RIGHT;MID

## 2024-07-13 ASSESSMENT — PAIN DESCRIPTION - DESCRIPTORS
DESCRIPTORS: DISCOMFORT
DESCRIPTORS: DISCOMFORT;PRESSURE
DESCRIPTORS: ACHING;SHARP
DESCRIPTORS: SHARP

## 2024-07-13 ASSESSMENT — PAIN DESCRIPTION - FREQUENCY
FREQUENCY: INTERMITTENT

## 2024-07-13 ASSESSMENT — PAIN DESCRIPTION - PAIN TYPE
TYPE: SURGICAL PAIN

## 2024-07-13 ASSESSMENT — PAIN DESCRIPTION - ONSET
ONSET: ON-GOING
ONSET: ON-GOING

## 2024-07-13 NOTE — CARE COORDINATION
Received confirmation from [facility] through the portal that patient is ok to start on Monday 07/15/24 for dialysis at their [place] location.  SW left a message for Ayesha in admissions, [phone], to see if they have transport set up yet and if patient can admit to the facility today or tomorrow.  Waiting on a call back but facility stated that they don't believe admissions works on the weekend.  May have to wait until Monday for more answers.    Electronically signed by ZENY Rico, CHLOE on 7/13/2024 at 12:19 PM

## 2024-07-13 NOTE — CARE COORDINATION
Discharge Planning:     (CM)  placed call to Ayesha in Admissions at [facility] [phone]  to inquire if Pt is able to come to facility. Last noted by CM Team that Facility is working on arranging Pt transport for Pt's OP Dialysis visits.   CM LVM requesting if Pt came to [facility] today.     CM called [facility] it inquire if Admissions is on call this weekend, spoke to Sariah she reports Admissions in on call for weekend Sariah has no information on Pt and cannot say if Pt can return to Facility.          Electronically signed by Lidia Muñoz on 7/13/2024 at 11:58 AM

## 2024-07-14 ENCOUNTER — APPOINTMENT (OUTPATIENT)
Dept: GENERAL RADIOLOGY | Age: 69
DRG: 480 | End: 2024-07-14
Payer: MEDICARE

## 2024-07-14 LAB
ALBUMIN SERPL-MCNC: 3.4 G/DL (ref 3.4–5)
ANION GAP SERPL CALCULATED.3IONS-SCNC: 17 MMOL/L (ref 3–16)
BUN SERPL-MCNC: 23 MG/DL (ref 7–20)
CALCIUM SERPL-MCNC: 10 MG/DL (ref 8.3–10.6)
CHLORIDE SERPL-SCNC: 99 MMOL/L (ref 99–110)
CO2 SERPL-SCNC: 21 MMOL/L (ref 21–32)
CREAT SERPL-MCNC: 5 MG/DL (ref 0.6–1.2)
GFR SERPLBLD CREATININE-BSD FMLA CKD-EPI: 9 ML/MIN/{1.73_M2}
GLUCOSE BLD-MCNC: 147 MG/DL (ref 70–99)
GLUCOSE BLD-MCNC: 152 MG/DL (ref 70–99)
GLUCOSE BLD-MCNC: 166 MG/DL (ref 70–99)
GLUCOSE SERPL-MCNC: 149 MG/DL (ref 70–99)
PERFORMED ON: ABNORMAL
PHOSPHATE SERPL-MCNC: 3.2 MG/DL (ref 2.5–4.9)
POTASSIUM SERPL-SCNC: 4.3 MMOL/L (ref 3.5–5.1)
SODIUM SERPL-SCNC: 137 MMOL/L (ref 136–145)

## 2024-07-14 PROCEDURE — 2580000003 HC RX 258: Performed by: INTERNAL MEDICINE

## 2024-07-14 PROCEDURE — 6370000000 HC RX 637 (ALT 250 FOR IP): Performed by: INTERNAL MEDICINE

## 2024-07-14 PROCEDURE — 6360000002 HC RX W HCPCS: Performed by: INTERNAL MEDICINE

## 2024-07-14 PROCEDURE — 6370000000 HC RX 637 (ALT 250 FOR IP): Performed by: UROLOGY

## 2024-07-14 PROCEDURE — 2700000000 HC OXYGEN THERAPY PER DAY

## 2024-07-14 PROCEDURE — 94762 N-INVAS EAR/PLS OXIMTRY CONT: CPT

## 2024-07-14 PROCEDURE — 36415 COLL VENOUS BLD VENIPUNCTURE: CPT

## 2024-07-14 PROCEDURE — 94660 CPAP INITIATION&MGMT: CPT

## 2024-07-14 PROCEDURE — 71045 X-RAY EXAM CHEST 1 VIEW: CPT

## 2024-07-14 PROCEDURE — 1200000000 HC SEMI PRIVATE

## 2024-07-14 PROCEDURE — 80069 RENAL FUNCTION PANEL: CPT

## 2024-07-14 PROCEDURE — 6370000000 HC RX 637 (ALT 250 FOR IP): Performed by: STUDENT IN AN ORGANIZED HEALTH CARE EDUCATION/TRAINING PROGRAM

## 2024-07-14 RX ORDER — SENNA AND DOCUSATE SODIUM 50; 8.6 MG/1; MG/1
2 TABLET, FILM COATED ORAL DAILY PRN
Status: DISCONTINUED | OUTPATIENT
Start: 2024-07-14 | End: 2024-07-15 | Stop reason: HOSPADM

## 2024-07-14 RX ORDER — LACTULOSE 10 G/15ML
20 SOLUTION ORAL DAILY
Status: DISCONTINUED | OUTPATIENT
Start: 2024-07-15 | End: 2024-07-15 | Stop reason: HOSPADM

## 2024-07-14 RX ADMIN — HEPARIN SODIUM 5000 UNITS: 5000 INJECTION INTRAVENOUS; SUBCUTANEOUS at 13:04

## 2024-07-14 RX ADMIN — MIDODRINE HYDROCHLORIDE 15 MG: 5 TABLET ORAL at 13:04

## 2024-07-14 RX ADMIN — CEFUROXIME AXETIL 250 MG: 250 TABLET ORAL at 08:49

## 2024-07-14 RX ADMIN — MIDODRINE HYDROCHLORIDE 15 MG: 5 TABLET ORAL at 17:51

## 2024-07-14 RX ADMIN — SODIUM BICARBONATE 325 MG: 650 TABLET ORAL at 08:48

## 2024-07-14 RX ADMIN — NALOXEGOL OXALATE 12.5 MG: 25 TABLET, FILM COATED ORAL at 06:24

## 2024-07-14 RX ADMIN — PANTOPRAZOLE SODIUM 40 MG: 40 TABLET, DELAYED RELEASE ORAL at 06:24

## 2024-07-14 RX ADMIN — RIFAXIMIN 550 MG: 550 TABLET ORAL at 08:49

## 2024-07-14 RX ADMIN — OXYCODONE HYDROCHLORIDE 5 MG: 5 TABLET ORAL at 17:51

## 2024-07-14 RX ADMIN — FERROUS SULFATE TAB 325 MG (65 MG ELEMENTAL FE) 324 MG: 325 (65 FE) TAB at 08:49

## 2024-07-14 RX ADMIN — SODIUM CHLORIDE, PRESERVATIVE FREE 10 ML: 5 INJECTION INTRAVENOUS at 08:54

## 2024-07-14 RX ADMIN — CHOLECALCIFEROL TAB 25 MCG (1000 UNIT) 1000 UNITS: 25 TAB at 08:49

## 2024-07-14 RX ADMIN — SENNOSIDES AND DOCUSATE SODIUM 2 TABLET: 50; 8.6 TABLET ORAL at 08:48

## 2024-07-14 RX ADMIN — Medication 10 ML: at 08:55

## 2024-07-14 RX ADMIN — ATORVASTATIN CALCIUM 10 MG: 10 TABLET, FILM COATED ORAL at 08:49

## 2024-07-14 RX ADMIN — LINACLOTIDE 145 MCG: 145 CAPSULE, GELATIN COATED ORAL at 08:48

## 2024-07-14 RX ADMIN — SODIUM BICARBONATE 325 MG: 650 TABLET ORAL at 13:04

## 2024-07-14 RX ADMIN — HEPARIN SODIUM 5000 UNITS: 5000 INJECTION INTRAVENOUS; SUBCUTANEOUS at 06:24

## 2024-07-14 RX ADMIN — OXYCODONE HYDROCHLORIDE 5 MG: 5 TABLET ORAL at 08:48

## 2024-07-14 RX ADMIN — OXYCODONE HYDROCHLORIDE 5 MG: 5 TABLET ORAL at 13:04

## 2024-07-14 ASSESSMENT — PAIN DESCRIPTION - LOCATION
LOCATION: BUTTOCKS;LEG

## 2024-07-14 ASSESSMENT — PAIN SCALES - GENERAL
PAINLEVEL_OUTOF10: 0
PAINLEVEL_OUTOF10: 6
PAINLEVEL_OUTOF10: 9
PAINLEVEL_OUTOF10: 9
PAINLEVEL_OUTOF10: 8

## 2024-07-14 ASSESSMENT — PAIN - FUNCTIONAL ASSESSMENT
PAIN_FUNCTIONAL_ASSESSMENT: PREVENTS OR INTERFERES SOME ACTIVE ACTIVITIES AND ADLS

## 2024-07-14 ASSESSMENT — PAIN DESCRIPTION - ORIENTATION
ORIENTATION: RIGHT

## 2024-07-14 ASSESSMENT — PAIN DESCRIPTION - DESCRIPTORS
DESCRIPTORS: DISCOMFORT;SHARP;PRESSURE
DESCRIPTORS: DISCOMFORT;PRESSURE;SHARP
DESCRIPTORS: DISCOMFORT;PRESSURE

## 2024-07-15 VITALS
BODY MASS INDEX: 39.93 KG/M2 | TEMPERATURE: 98.3 F | OXYGEN SATURATION: 95 % | DIASTOLIC BLOOD PRESSURE: 58 MMHG | HEIGHT: 65 IN | WEIGHT: 239.64 LBS | SYSTOLIC BLOOD PRESSURE: 94 MMHG | RESPIRATION RATE: 16 BRPM | HEART RATE: 93 BPM

## 2024-07-15 LAB
ANION GAP SERPL CALCULATED.3IONS-SCNC: 15 MMOL/L (ref 3–16)
BASOPHILS # BLD: 0 K/UL (ref 0–0.2)
BASOPHILS NFR BLD: 0.8 %
BUN SERPL-MCNC: 35 MG/DL (ref 7–20)
CALCIUM SERPL-MCNC: 9.3 MG/DL (ref 8.3–10.6)
CHLORIDE SERPL-SCNC: 98 MMOL/L (ref 99–110)
CO2 SERPL-SCNC: 21 MMOL/L (ref 21–32)
CREAT SERPL-MCNC: 6.5 MG/DL (ref 0.6–1.2)
DEPRECATED RDW RBC AUTO: 22.7 % (ref 12.4–15.4)
EOSINOPHIL # BLD: 0.7 K/UL (ref 0–0.6)
EOSINOPHIL NFR BLD: 12 %
GFR SERPLBLD CREATININE-BSD FMLA CKD-EPI: 7 ML/MIN/{1.73_M2}
GLUCOSE BLD-MCNC: 114 MG/DL (ref 70–99)
GLUCOSE BLD-MCNC: 131 MG/DL (ref 70–99)
GLUCOSE BLD-MCNC: 185 MG/DL (ref 70–99)
GLUCOSE SERPL-MCNC: 117 MG/DL (ref 70–99)
HCT VFR BLD AUTO: 26.4 % (ref 36–48)
HGB BLD-MCNC: 7.9 G/DL (ref 12–16)
LYMPHOCYTES # BLD: 1.2 K/UL (ref 1–5.1)
LYMPHOCYTES NFR BLD: 21.2 %
MAGNESIUM SERPL-MCNC: 2.1 MG/DL (ref 1.8–2.4)
MCH RBC QN AUTO: 29.1 PG (ref 26–34)
MCHC RBC AUTO-ENTMCNC: 29.8 G/DL (ref 31–36)
MCV RBC AUTO: 97.6 FL (ref 80–100)
MONOCYTES # BLD: 0.9 K/UL (ref 0–1.3)
MONOCYTES NFR BLD: 16.6 %
NEUTROPHILS # BLD: 2.8 K/UL (ref 1.7–7.7)
NEUTROPHILS NFR BLD: 49.4 %
PERFORMED ON: ABNORMAL
PLATELET # BLD AUTO: 117 K/UL (ref 135–450)
PMV BLD AUTO: 8.4 FL (ref 5–10.5)
POTASSIUM SERPL-SCNC: 4.2 MMOL/L (ref 3.5–5.1)
RBC # BLD AUTO: 2.7 M/UL (ref 4–5.2)
SODIUM SERPL-SCNC: 134 MMOL/L (ref 136–145)
WBC # BLD AUTO: 5.6 K/UL (ref 4–11)

## 2024-07-15 PROCEDURE — 6370000000 HC RX 637 (ALT 250 FOR IP): Performed by: STUDENT IN AN ORGANIZED HEALTH CARE EDUCATION/TRAINING PROGRAM

## 2024-07-15 PROCEDURE — 6360000002 HC RX W HCPCS: Performed by: INTERNAL MEDICINE

## 2024-07-15 PROCEDURE — 80048 BASIC METABOLIC PNL TOTAL CA: CPT

## 2024-07-15 PROCEDURE — 36415 COLL VENOUS BLD VENIPUNCTURE: CPT

## 2024-07-15 PROCEDURE — 6370000000 HC RX 637 (ALT 250 FOR IP): Performed by: UROLOGY

## 2024-07-15 PROCEDURE — 2580000003 HC RX 258: Performed by: INTERNAL MEDICINE

## 2024-07-15 PROCEDURE — 85025 COMPLETE CBC W/AUTO DIFF WBC: CPT

## 2024-07-15 PROCEDURE — 83735 ASSAY OF MAGNESIUM: CPT

## 2024-07-15 PROCEDURE — 90935 HEMODIALYSIS ONE EVALUATION: CPT

## 2024-07-15 PROCEDURE — 6370000000 HC RX 637 (ALT 250 FOR IP): Performed by: INTERNAL MEDICINE

## 2024-07-15 PROCEDURE — 6360000002 HC RX W HCPCS: Performed by: STUDENT IN AN ORGANIZED HEALTH CARE EDUCATION/TRAINING PROGRAM

## 2024-07-15 RX ORDER — WATER 10 ML/10ML
INJECTION INTRAMUSCULAR; INTRAVENOUS; SUBCUTANEOUS
Status: DISCONTINUED
Start: 2024-07-15 | End: 2024-07-15 | Stop reason: HOSPADM

## 2024-07-15 RX ADMIN — SODIUM BICARBONATE 325 MG: 650 TABLET ORAL at 12:42

## 2024-07-15 RX ADMIN — HEPARIN SODIUM 5000 UNITS: 5000 INJECTION INTRAVENOUS; SUBCUTANEOUS at 03:19

## 2024-07-15 RX ADMIN — NALOXEGOL OXALATE 12.5 MG: 25 TABLET, FILM COATED ORAL at 06:40

## 2024-07-15 RX ADMIN — SODIUM BICARBONATE 325 MG: 650 TABLET ORAL at 08:29

## 2024-07-15 RX ADMIN — OXYCODONE HYDROCHLORIDE 5 MG: 5 TABLET ORAL at 11:05

## 2024-07-15 RX ADMIN — LINACLOTIDE 145 MCG: 145 CAPSULE, GELATIN COATED ORAL at 06:40

## 2024-07-15 RX ADMIN — OXYCODONE HYDROCHLORIDE 5 MG: 5 TABLET ORAL at 07:02

## 2024-07-15 RX ADMIN — RIFAXIMIN 550 MG: 550 TABLET ORAL at 03:18

## 2024-07-15 RX ADMIN — MIDODRINE HYDROCHLORIDE 15 MG: 5 TABLET ORAL at 08:29

## 2024-07-15 RX ADMIN — OXYCODONE HYDROCHLORIDE 5 MG: 5 TABLET ORAL at 03:18

## 2024-07-15 RX ADMIN — PANTOPRAZOLE SODIUM 40 MG: 40 TABLET, DELAYED RELEASE ORAL at 06:40

## 2024-07-15 RX ADMIN — Medication 10 ML: at 08:30

## 2024-07-15 RX ADMIN — CHOLECALCIFEROL TAB 25 MCG (1000 UNIT) 1000 UNITS: 25 TAB at 08:29

## 2024-07-15 RX ADMIN — SODIUM BICARBONATE 325 MG: 650 TABLET ORAL at 03:18

## 2024-07-15 RX ADMIN — ATORVASTATIN CALCIUM 10 MG: 10 TABLET, FILM COATED ORAL at 08:30

## 2024-07-15 RX ADMIN — FERROUS SULFATE TAB 325 MG (65 MG ELEMENTAL FE) 324 MG: 325 (65 FE) TAB at 08:30

## 2024-07-15 RX ADMIN — MIDODRINE HYDROCHLORIDE 15 MG: 5 TABLET ORAL at 16:46

## 2024-07-15 RX ADMIN — HYDROMORPHONE HYDROCHLORIDE 0.5 MG: 1 INJECTION, SOLUTION INTRAMUSCULAR; INTRAVENOUS; SUBCUTANEOUS at 12:52

## 2024-07-15 RX ADMIN — HYDROMORPHONE HYDROCHLORIDE 0.5 MG: 1 INJECTION, SOLUTION INTRAMUSCULAR; INTRAVENOUS; SUBCUTANEOUS at 16:45

## 2024-07-15 RX ADMIN — HEPARIN SODIUM 5000 UNITS: 5000 INJECTION INTRAVENOUS; SUBCUTANEOUS at 12:42

## 2024-07-15 RX ADMIN — MIDODRINE HYDROCHLORIDE 15 MG: 5 TABLET ORAL at 12:42

## 2024-07-15 RX ADMIN — RIFAXIMIN 550 MG: 550 TABLET ORAL at 08:30

## 2024-07-15 ASSESSMENT — PAIN SCALES - GENERAL
PAINLEVEL_OUTOF10: 6
PAINLEVEL_OUTOF10: 6
PAINLEVEL_OUTOF10: 7
PAINLEVEL_OUTOF10: 9

## 2024-07-15 ASSESSMENT — PAIN DESCRIPTION - LOCATION
LOCATION: LEG;NECK;HEAD
LOCATION: LEG
LOCATION: LEG

## 2024-07-15 ASSESSMENT — PAIN DESCRIPTION - ORIENTATION
ORIENTATION: RIGHT

## 2024-07-15 ASSESSMENT — PAIN DESCRIPTION - DESCRIPTORS
DESCRIPTORS: ACHING

## 2024-07-15 NOTE — CARE COORDINATION
Case Management -  Discharge Note      Patient Name: Yoseph Small                   YOB: 1955            Readmission Risk (Low < 19, Mod (19-27), High > 27): Readmission Risk Score: 24.4    Current PCP: Sariah Teresa    ([facility]) Important Message from Medicare:    Date: 07/15/2024    PT AM-PAC: 6 /24  OT AM-PAC: 9 /24    Patient/patient representative has been educated on the benefits of SNF as well as the possible risks of declining recommended services. Patient/patient representative has acknowledged the information provided and decided on the following discharge plan. Patient/ patient representative has been provided freedom of choice regarding service provider, supported by basic dialogue that supports the patient's individualized plan of care/goals.    Patient noted to have a discharge order.  Pt has been medically cleared for transition to Skilled Nursing Rehab Facility    Patient discharged to:    [facility]  [address]  [address] Completed:  N/A, Patient be [place] out of state    Pre-cert required/obtained:  yes    Transportation scheduled for 1700    Transportation provided by [facility] [phone]    AVS faxed and agency notified:  [phone]    The following prescriptions sent with pt: Oxycodone    Family Notified:  Spouse    Nurse to call report to facility: [phone]; ask for \"Household B RN\"      Financial    Payor: MEDICARE / Plan: MEDICARE PART A AND B / Product Type: *No Product type* /     Pharmacy:  Potential assistance Purchasing Medications: No  Meds-to-Beds request: No      [id number] - [facility] [address] - P [phone] - F [phone]  [address]  [facility] [address]  Phone: [phone] Fax: [phone]      Notes:    Additional Case Management Notes:     - Nichelle Altman, stated that HD transportation has been put into place.    - Faxed Toya

## 2024-07-15 NOTE — DISCHARGE INSTR - DIET

## 2024-07-15 NOTE — PLAN OF CARE
Problem: Discharge Planning  Goal: Discharge to home or other facility with appropriate resources  6/27/2024 1030 by Rehana Reveles RN  Outcome: Progressing  6/27/2024 0633 by Alycia Chen RN  Outcome: Progressing     Problem: Pain  Goal: Verbalizes/displays adequate comfort level or baseline comfort level  6/27/2024 1030 by Rehana Reveles RN  Outcome: Progressing  6/27/2024 0633 by Alycia Chen RN  Outcome: Progressing     Problem: Skin/Tissue Integrity  Goal: Absence of new skin breakdown  Description: 1.  Monitor for areas of redness and/or skin breakdown  2.  Assess vascular access sites hourly  3.  Every 4-6 hours minimum:  Change oxygen saturation probe site  4.  Every 4-6 hours:  If on nasal continuous positive airway pressure, respiratory therapy assess nares and determine need for appliance change or resting period.  6/27/2024 1030 by Rehana Reveles RN  Outcome: Progressing  6/27/2024 0633 by Alycia Chen RN  Outcome: Progressing     Problem: Safety - Adult  Goal: Free from fall injury  6/27/2024 1030 by Rehana Reveles RN  Outcome: Progressing  6/27/2024 0633 by Alycia Chen RN  Outcome: Progressing     Problem: ABCDS Injury Assessment  Goal: Absence of physical injury  6/27/2024 1030 by Rehana Reveles RN  Outcome: Progressing  6/27/2024 0633 by Alycia Chen RN  Outcome: Progressing     Problem: Chronic Conditions and Co-morbidities  Goal: Patient's chronic conditions and co-morbidity symptoms are monitored and maintained or improved  6/27/2024 1030 by Rehana Reveles RN  Outcome: Progressing  6/27/2024 0633 by Alycia Chen RN  Outcome: Progressing     Problem: Nutrition Deficit:  Goal: Optimize nutritional status  6/27/2024 1030 by Rehana Reveles RN  Outcome: Progressing  6/27/2024 0633 by Alycia Chen RN  Outcome: Progressing     
  Problem: Discharge Planning  Goal: Discharge to home or other facility with appropriate resources  6/27/2024 2203 by Isac Peoples RN  Outcome: Progressing  Flowsheets (Taken 6/27/2024 2116)  Discharge to home or other facility with appropriate resources:   Identify barriers to discharge with patient and caregiver   Arrange for needed discharge resources and transportation as appropriate   Identify discharge learning needs (meds, wound care, etc)  6/27/2024 1030 by Rehana Reveles, RN  Outcome: Progressing     Problem: Pain  Goal: Verbalizes/displays adequate comfort level or baseline comfort level  6/27/2024 2203 by Isac Peoples RN  Outcome: Progressing  6/27/2024 1030 by Rehana Reveles RN  Outcome: Progressing     Problem: Skin/Tissue Integrity  Goal: Absence of new skin breakdown  Description: 1.  Monitor for areas of redness and/or skin breakdown  2.  Assess vascular access sites hourly  3.  Every 4-6 hours minimum:  Change oxygen saturation probe site  4.  Every 4-6 hours:  If on nasal continuous positive airway pressure, respiratory therapy assess nares and determine need for appliance change or resting period.  6/27/2024 2203 by Isac Peoples RN  Outcome: Progressing  6/27/2024 1030 by Rehana Reveles RN  Outcome: Progressing     Problem: Safety - Adult  Goal: Free from fall injury  6/27/2024 2203 by Isac Peoples RN  Outcome: Progressing  6/27/2024 1030 by Rehana Reveles RN  Outcome: Progressing     Problem: ABCDS Injury Assessment  Goal: Absence of physical injury  6/27/2024 2203 by Isac Peoples RN  Outcome: Progressing  Flowsheets (Taken 6/27/2024 2200)  Absence of Physical Injury: Implement safety measures based on patient assessment  6/27/2024 1030 by Rehana Reveles RN  Outcome: Progressing     Problem: Chronic Conditions and Co-morbidities  Goal: Patient's chronic conditions and co-morbidity symptoms are monitored and maintained or 
  Problem: Discharge Planning  Goal: Discharge to home or other facility with appropriate resources  7/10/2024 0114 by Benita Wilson RN  Outcome: Progressing  7/9/2024 1211 by Julissa Villegas RN  Outcome: Progressing     Problem: Pain  Goal: Verbalizes/displays adequate comfort level or baseline comfort level  7/10/2024 0114 by Benita Wilson RN  Outcome: Progressing  7/9/2024 1211 by Julissa Villegas RN  Outcome: Progressing     Problem: Skin/Tissue Integrity  Goal: Absence of new skin breakdown  Description: 1.  Monitor for areas of redness and/or skin breakdown  2.  Assess vascular access sites hourly  3.  Every 4-6 hours minimum:  Change oxygen saturation probe site  4.  Every 4-6 hours:  If on nasal continuous positive airway pressure, respiratory therapy assess nares and determine need for appliance change or resting period.  7/10/2024 0114 by Benita Wilson RN  Outcome: Progressing  7/9/2024 1211 by Julissa Villegas RN  Outcome: Progressing     Problem: Safety - Adult  Goal: Free from fall injury  7/10/2024 0114 by Benita Wilson RN  Outcome: Progressing  7/9/2024 1211 by Julissa Villegas RN  Outcome: Progressing     Problem: ABCDS Injury Assessment  Goal: Absence of physical injury  7/10/2024 0114 by Benita Wilson RN  Outcome: Progressing  7/9/2024 1211 by Julissa Villegas RN  Outcome: Progressing     Problem: Chronic Conditions and Co-morbidities  Goal: Patient's chronic conditions and co-morbidity symptoms are monitored and maintained or improved  7/10/2024 0114 by Benita Wilson RN  Outcome: Progressing  7/9/2024 1211 by Julissa Villegas RN  Outcome: Progressing     Problem: Nutrition Deficit:  Goal: Optimize nutritional status  7/10/2024 0114 by Benita Wilson RN  Outcome: Progressing  7/9/2024 1211 by Julissa Villegas RN  Outcome: Progressing  Flowsheets (Taken 7/9/2024 0841 by Kristina Lee, RD, LD)  Nutrient intake appropriate for improving, 
  Problem: Discharge Planning  Goal: Discharge to home or other facility with appropriate resources  7/10/2024 1121 by Chaya Razo RN  Outcome: Progressing     Problem: Pain  Goal: Verbalizes/displays adequate comfort level or baseline comfort level  7/10/2024 1121 by Chaya Razo RN  Outcome: Progressing     Problem: Skin/Tissue Integrity  Goal: Absence of new skin breakdown  Description: 1.  Monitor for areas of redness and/or skin breakdown  2.  Assess vascular access sites hourly  3.  Every 4-6 hours minimum:  Change oxygen saturation probe site  4.  Every 4-6 hours:  If on nasal continuous positive airway pressure, respiratory therapy assess nares and determine need for appliance change or resting period.  7/10/2024 1121 by Chaya Razo RN  Outcome: Progressing     Problem: Safety - Adult  Goal: Free from fall injury  7/10/2024 1121 by Chaya Razo RN  Outcome: Progressing     Problem: ABCDS Injury Assessment  Goal: Absence of physical injury  7/10/2024 1121 by Chaya Razo RN  Outcome: Progressing     Problem: Chronic Conditions and Co-morbidities  Goal: Patient's chronic conditions and co-morbidity symptoms are monitored and maintained or improved  7/10/2024 1121 by Chaya Razo RN  Outcome: Progressing     
  Problem: Discharge Planning  Goal: Discharge to home or other facility with appropriate resources  7/11/2024 1134 by Chaya Razo, RN  Outcome: Progressing     Problem: Pain  Goal: Verbalizes/displays adequate comfort level or baseline comfort level  7/11/2024 1134 by Chaya Razo, RN  Outcome: Progressing     Problem: Skin/Tissue Integrity  Goal: Absence of new skin breakdown  Description: 1.  Monitor for areas of redness and/or skin breakdown  2.  Assess vascular access sites hourly  3.  Every 4-6 hours minimum:  Change oxygen saturation probe site  4.  Every 4-6 hours:  If on nasal continuous positive airway pressure, respiratory therapy assess nares and determine need for appliance change or resting period.  7/11/2024 1134 by Chaya Razo, RN  Outcome: Progressing     
  Problem: Discharge Planning  Goal: Discharge to home or other facility with appropriate resources  7/13/2024 1100 by Valarie Sexton RN  Outcome: Progressing  7/13/2024 0041 by Valarie Marroquin RN  Outcome: Progressing     Problem: Pain  Goal: Verbalizes/displays adequate comfort level or baseline comfort level  7/13/2024 1100 by Valarie Sexton RN  Outcome: Progressing  7/13/2024 0041 by Valarie Marroquin RN  Outcome: Progressing     Problem: Skin/Tissue Integrity  Goal: Absence of new skin breakdown  Description: 1.  Monitor for areas of redness and/or skin breakdown  2.  Assess vascular access sites hourly  3.  Every 4-6 hours minimum:  Change oxygen saturation probe site  4.  Every 4-6 hours:  If on nasal continuous positive airway pressure, respiratory therapy assess nares and determine need for appliance change or resting period.  7/13/2024 1100 by Valarie Sexton RN  Outcome: Progressing  7/13/2024 0041 by Valarie Marroquin RN  Outcome: Progressing     Problem: Safety - Adult  Goal: Free from fall injury  7/13/2024 1100 by Valarie Sexton RN  Outcome: Progressing  7/13/2024 0041 by Valarie Marroquin RN  Outcome: Progressing     Problem: ABCDS Injury Assessment  Goal: Absence of physical injury  7/13/2024 1100 by Valarie Sexton RN  Outcome: Progressing  7/13/2024 0041 by Valarie Marroquin RN  Outcome: Progressing     Problem: Chronic Conditions and Co-morbidities  Goal: Patient's chronic conditions and co-morbidity symptoms are monitored and maintained or improved  7/13/2024 1100 by Valarie Sexton RN  Outcome: Progressing  7/13/2024 0041 by Valarie Marroquin RN  Outcome: Progressing     Problem: Nutrition Deficit:  Goal: Optimize nutritional status  7/13/2024 1100 by Valarie Sexton RN  Outcome: Progressing  7/13/2024 0041 by Valarie Marroquin RN  Outcome: Progressing     
  Problem: Discharge Planning  Goal: Discharge to home or other facility with appropriate resources  7/13/2024 2316 by Valarie Marroquin RN  Outcome: Progressing  7/13/2024 1100 by Valarie Sexton RN  Outcome: Progressing     Problem: Pain  Goal: Verbalizes/displays adequate comfort level or baseline comfort level  7/13/2024 2316 by Valarie Marroquin RN  Outcome: Progressing  7/13/2024 1100 by Valarie Sexton RN  Outcome: Progressing     Problem: Skin/Tissue Integrity  Goal: Absence of new skin breakdown  Description: 1.  Monitor for areas of redness and/or skin breakdown  2.  Assess vascular access sites hourly  3.  Every 4-6 hours minimum:  Change oxygen saturation probe site  4.  Every 4-6 hours:  If on nasal continuous positive airway pressure, respiratory therapy assess nares and determine need for appliance change or resting period.  7/13/2024 2316 by Valarie Marroquin RN  Outcome: Progressing  7/13/2024 1100 by Valarie Sexton RN  Outcome: Progressing     Problem: Safety - Adult  Goal: Free from fall injury  7/13/2024 2316 by Valarie Marroquin RN  Outcome: Progressing  7/13/2024 1100 by Valarie Sexton RN  Outcome: Progressing     Problem: ABCDS Injury Assessment  Goal: Absence of physical injury  7/13/2024 2316 by Valarie Marroquin RN  Outcome: Progressing  7/13/2024 1100 by Valarie Sexton RN  Outcome: Progressing     Problem: Chronic Conditions and Co-morbidities  Goal: Patient's chronic conditions and co-morbidity symptoms are monitored and maintained or improved  7/13/2024 2316 by Valarie Marroquin RN  Outcome: Progressing  7/13/2024 1100 by Valarie Sexton RN  Outcome: Progressing     Problem: Nutrition Deficit:  Goal: Optimize nutritional status  7/13/2024 2316 by Valarie Marroquin RN  Outcome: Progressing  7/13/2024 1100 by Valarie Sexton RN  Outcome: Progressing     
  Problem: Discharge Planning  Goal: Discharge to home or other facility with appropriate resources  7/2/2024 0125 by Alicia Ortega RN  Outcome: Progressing  Discharge to home or other facility with appropriate resources:   Identify barriers to discharge with patient and caregiver   Arrange for needed discharge resources and transportation as appropriate   Identify discharge learning needs (meds, wound care, etc)   Refer to discharge planning if patient needs post-hospital services based on physician order or complex needs related to functional status, cognitive ability or social support system     Problem: Pain  Goal: Verbalizes/displays adequate comfort level or baseline comfort level  7/2/2024 0125 by Alicia Ortega RN    Problem: Skin/Tissue Integrity  Goal: Absence of new skin breakdown  Description: 1.  Monitor for areas of redness and/or skin breakdown  2.  Assess vascular access sites hourly  3.  Every 4-6 hours minimum:  Change oxygen saturation probe site  4.  Every 4-6 hours:  If on nasal continuous positive airway pressure, respiratory therapy assess nares and determine need for appliance change or resting period.  7/2/2024 0125 by Alicia Ortega RN  Outcome: Progressing    Problem: Safety - Adult  Goal: Free from fall injury  7/2/2024 0125 by Alicia Ortega RN  Outcome: Progressing     Problem: ABCDS Injury Assessment  Goal: Absence of physical injury  7/2/2024 0125 by Alicia Ortega RN  Outcome: Progressing  Absence of Physical Injury: Implement safety measures based on patient assessment     Problem: Chronic Conditions and Co-morbidities  Goal: Patient's chronic conditions and co-morbidity symptoms are monitored and maintained or improved  7/2/2024 0125 by Alicia Ortega RN  Outcome: Progressing  Care Plan - Patient's Chronic Conditions and Co-Morbidity Symptoms are Monitored and Maintained or Improved:   Monitor and assess patient's chronic conditions and comorbid symptoms for stability, 
  Problem: Discharge Planning  Goal: Discharge to home or other facility with appropriate resources  7/2/2024 1134 by Schoenig, Angela, RN  Outcome: Progressing  Flowsheets (Taken 7/2/2024 0800)  Discharge to home or other facility with appropriate resources:   Identify barriers to discharge with patient and caregiver   Arrange for needed discharge resources and transportation as appropriate   Identify discharge learning needs (meds, wound care, etc)   Refer to discharge planning if patient needs post-hospital services based on physician order or complex needs related to functional status, cognitive ability or social support system     Problem: Pain  Goal: Verbalizes/displays adequate comfort level or baseline comfort level  7/2/2024 1134 by Schoenig, Angela, RN  Outcome: Progressing     Problem: Skin/Tissue Integrity  Goal: Absence of new skin breakdown  Description: 1.  Monitor for areas of redness and/or skin breakdown  2.  Assess vascular access sites hourly  3.  Every 4-6 hours minimum:  Change oxygen saturation probe site  4.  Every 4-6 hours:  If on nasal continuous positive airway pressure, respiratory therapy assess nares and determine need for appliance change or resting period.  7/2/2024 1134 by Schoenig, Angela, RN  Outcome: Progressing     Problem: Safety - Adult  Goal: Free from fall injury  7/2/2024 1134 by Schoenig, Angela, RN  Outcome: Progressing     Problem: ABCDS Injury Assessment  Goal: Absence of physical injury  7/2/2024 1134 by Schoenig, Angela, RN  Outcome: Progressing  Flowsheets (Taken 7/2/2024 1133)  Absence of Physical Injury: Implement safety measures based on patient assessment     Problem: Chronic Conditions and Co-morbidities  Goal: Patient's chronic conditions and co-morbidity symptoms are monitored and maintained or improved  7/2/2024 1134 by Schoenig, Angela, RN  Outcome: Progressing  Flowsheets (Taken 7/2/2024 0800)  Care Plan - Patient's Chronic Conditions and Co-Morbidity 
  Problem: Discharge Planning  Goal: Discharge to home or other facility with appropriate resources  7/4/2024 2151 by Jessenia Wayne RN  Outcome: Progressing  7/4/2024 2030 by Jessenia Wayne RN  Outcome: Progressing  Flowsheets (Taken 7/4/2024 2000)  Discharge to home or other facility with appropriate resources: Identify barriers to discharge with patient and caregiver  7/4/2024 1513 by Lolita Arora RN  Outcome: Progressing     Problem: Pain  Goal: Verbalizes/displays adequate comfort level or baseline comfort level  7/4/2024 2151 by Jessenia Wayne RN  Outcome: Progressing  7/4/2024 2030 by Jessenia Wayne RN  Outcome: Progressing  7/4/2024 1513 by Lolita Arora RN  Outcome: Progressing     Problem: Skin/Tissue Integrity  Goal: Absence of new skin breakdown  Description: 1.  Monitor for areas of redness and/or skin breakdown  2.  Assess vascular access sites hourly  3.  Every 4-6 hours minimum:  Change oxygen saturation probe site  4.  Every 4-6 hours:  If on nasal continuous positive airway pressure, respiratory therapy assess nares and determine need for appliance change or resting period.  7/4/2024 2151 by Jessenia Wayne RN  Outcome: Progressing  7/4/2024 2030 by Jessenia Wayne RN  Outcome: Progressing  7/4/2024 1513 by Lolita Arora RN  Outcome: Progressing     Problem: Safety - Adult  Goal: Free from fall injury  7/4/2024 2151 by Jessenia Wayne RN  Outcome: Progressing  7/4/2024 2030 by Jessenia Wayne RN  Outcome: Progressing  7/4/2024 1513 by Lolita Arora RN  Outcome: Progressing     
  Problem: Discharge Planning  Goal: Discharge to home or other facility with appropriate resources  7/7/2024 0148 by Alycia Chen RN  Outcome: Progressing  7/6/2024 1423 by Lolita Arora RN  Outcome: Progressing  Flowsheets (Taken 7/6/2024 0929)  Discharge to home or other facility with appropriate resources:   Identify barriers to discharge with patient and caregiver   Arrange for needed discharge resources and transportation as appropriate   Identify discharge learning needs (meds, wound care, etc)     Problem: Pain  Goal: Verbalizes/displays adequate comfort level or baseline comfort level  7/7/2024 0148 by Alycia Chen RN  Outcome: Progressing  7/6/2024 1423 by Lolita Arora RN  Outcome: Progressing     Problem: Skin/Tissue Integrity  Goal: Absence of new skin breakdown  Description: 1.  Monitor for areas of redness and/or skin breakdown  2.  Assess vascular access sites hourly  3.  Every 4-6 hours minimum:  Change oxygen saturation probe site  4.  Every 4-6 hours:  If on nasal continuous positive airway pressure, respiratory therapy assess nares and determine need for appliance change or resting period.  7/7/2024 0148 by Alycia Chen RN  Outcome: Progressing  7/6/2024 1423 by Lolita Arora RN  Outcome: Progressing     Problem: Safety - Adult  Goal: Free from fall injury  7/7/2024 0148 by Alycia Chen RN  Outcome: Progressing  7/6/2024 1423 by Lolita Arora RN  Outcome: Progressing     Problem: ABCDS Injury Assessment  Goal: Absence of physical injury  7/7/2024 0148 by Alycia Chen RN  Outcome: Progressing  7/6/2024 1423 by Lolita Arora RN  Outcome: Progressing     
  Problem: Discharge Planning  Goal: Discharge to home or other facility with appropriate resources  7/8/2024 2208 by Guido Cortez RN  Outcome: Progressing     Problem: Pain  Goal: Verbalizes/displays adequate comfort level or baseline comfort level  7/8/2024 2208 by Guido Cortez RN  Outcome: Progressing     Problem: Skin/Tissue Integrity  Goal: Absence of new skin breakdown  Description: 1.  Monitor for areas of redness and/or skin breakdown  2.  Assess vascular access sites hourly  3.  Every 4-6 hours minimum:  Change oxygen saturation probe site  4.  Every 4-6 hours:  If on nasal continuous positive airway pressure, respiratory therapy assess nares and determine need for appliance change or resting period.  7/8/2024 2208 by Guido Cortez RN  Outcome: Progressing     Problem: Safety - Adult  Goal: Free from fall injury  7/8/2024 2208 by Guido Cortez RN  Outcome: Progressing     Problem: ABCDS Injury Assessment  Goal: Absence of physical injury  7/8/2024 2208 by Guido Cortez RN  Outcome: Progressing    Problem: Chronic Conditions and Co-morbidities  Goal: Patient's chronic conditions and co-morbidity symptoms are monitored and maintained or improved  7/8/2024 2208 by Guido Cortez RN  Outcome: Progressing     Problem: Nutrition Deficit:  Goal: Optimize nutritional status  7/8/2024 2208 by Guido Cortez RN  Outcome: Progressing     
  Problem: Discharge Planning  Goal: Discharge to home or other facility with appropriate resources  Outcome: Not Progressing     Problem: Pain  Goal: Verbalizes/displays adequate comfort level or baseline comfort level  Outcome: Not Progressing  Flowsheets (Taken 6/30/2024 0800)  Verbalizes/displays adequate comfort level or baseline comfort level: Assess pain using appropriate pain scale     Problem: Skin/Tissue Integrity  Goal: Absence of new skin breakdown  Description: 1.  Monitor for areas of redness and/or skin breakdown  2.  Assess vascular access sites hourly  3.  Every 4-6 hours minimum:  Change oxygen saturation probe site  4.  Every 4-6 hours:  If on nasal continuous positive airway pressure, respiratory therapy assess nares and determine need for appliance change or resting period.  Outcome: Not Progressing     Problem: Safety - Adult  Goal: Free from fall injury  Outcome: Not Progressing     Problem: ABCDS Injury Assessment  Goal: Absence of physical injury  Outcome: Not Progressing     Problem: Chronic Conditions and Co-morbidities  Goal: Patient's chronic conditions and co-morbidity symptoms are monitored and maintained or improved  Outcome: Not Progressing     Problem: Nutrition Deficit:  Goal: Optimize nutritional status  Outcome: Not Progressing     
  Problem: Discharge Planning  Goal: Discharge to home or other facility with appropriate resources  Outcome: Progressing     Problem: Pain  Goal: Verbalizes/displays adequate comfort level or baseline comfort level  Outcome: Progressing     Problem: Skin/Tissue Integrity  Goal: Absence of new skin breakdown  Description: 1.  Monitor for areas of redness and/or skin breakdown  2.  Assess vascular access sites hourly  3.  Every 4-6 hours minimum:  Change oxygen saturation probe site  4.  Every 4-6 hours:  If on nasal continuous positive airway pressure, respiratory therapy assess nares and determine need for appliance change or resting period.  Outcome: Progressing     Problem: Safety - Adult  Goal: Free from fall injury  Outcome: Progressing     Problem: ABCDS Injury Assessment  Goal: Absence of physical injury  Outcome: Progressing     Problem: Chronic Conditions and Co-morbidities  Goal: Patient's chronic conditions and co-morbidity symptoms are monitored and maintained or improved  Outcome: Progressing     
  Problem: Discharge Planning  Goal: Discharge to home or other facility with appropriate resources  Outcome: Progressing     Problem: Pain  Goal: Verbalizes/displays adequate comfort level or baseline comfort level  Outcome: Progressing     Problem: Skin/Tissue Integrity  Goal: Absence of new skin breakdown  Description: 1.  Monitor for areas of redness and/or skin breakdown  2.  Assess vascular access sites hourly  3.  Every 4-6 hours minimum:  Change oxygen saturation probe site  4.  Every 4-6 hours:  If on nasal continuous positive airway pressure, respiratory therapy assess nares and determine need for appliance change or resting period.  Outcome: Progressing     Problem: Safety - Adult  Goal: Free from fall injury  Outcome: Progressing     Problem: ABCDS Injury Assessment  Goal: Absence of physical injury  Outcome: Progressing     Problem: Chronic Conditions and Co-morbidities  Goal: Patient's chronic conditions and co-morbidity symptoms are monitored and maintained or improved  Outcome: Progressing     Problem: Nutrition Deficit:  Goal: Optimize nutritional status  Outcome: Progressing     
  Problem: Discharge Planning  Goal: Discharge to home or other facility with appropriate resources  Outcome: Progressing     Problem: Pain  Goal: Verbalizes/displays adequate comfort level or baseline comfort level  Outcome: Progressing  Flowsheets (Taken 6/24/2024 0800)  Verbalizes/displays adequate comfort level or baseline comfort level: Encourage patient to monitor pain and request assistance     Problem: Skin/Tissue Integrity  Goal: Absence of new skin breakdown  Description: 1.  Monitor for areas of redness and/or skin breakdown  2.  Assess vascular access sites hourly  3.  Every 4-6 hours minimum:  Change oxygen saturation probe site  4.  Every 4-6 hours:  If on nasal continuous positive airway pressure, respiratory therapy assess nares and determine need for appliance change or resting period.  Outcome: Progressing     Problem: Safety - Adult  Goal: Free from fall injury  Outcome: Progressing     Problem: ABCDS Injury Assessment  Goal: Absence of physical injury  Outcome: Progressing     Problem: Chronic Conditions and Co-morbidities  Goal: Patient's chronic conditions and co-morbidity symptoms are monitored and maintained or improved  Outcome: Progressing     
  Problem: Discharge Planning  Goal: Discharge to home or other facility with appropriate resources  Outcome: Progressing  Flowsheets (Taken 6/21/2024 4714)  Discharge to home or other facility with appropriate resources:   Identify barriers to discharge with patient and caregiver   Arrange for needed discharge resources and transportation as appropriate   Identify discharge learning needs (meds, wound care, etc)   Refer to discharge planning if patient needs post-hospital services based on physician order or complex needs related to functional status, cognitive ability or social support system     Problem: Pain  Goal: Verbalizes/displays adequate comfort level or baseline comfort level  Outcome: Progressing     Problem: Skin/Tissue Integrity  Goal: Absence of new skin breakdown  Description: 1.  Monitor for areas of redness and/or skin breakdown  2.  Assess vascular access sites hourly  3.  Every 4-6 hours minimum:  Change oxygen saturation probe site  4.  Every 4-6 hours:  If on nasal continuous positive airway pressure, respiratory therapy assess nares and determine need for appliance change or resting period.  Outcome: Progressing     Problem: Safety - Adult  Goal: Free from fall injury  Outcome: Progressing     Problem: ABCDS Injury Assessment  Goal: Absence of physical injury  Outcome: Progressing     
  Problem: Discharge Planning  Goal: Discharge to home or other facility with appropriate resources  Outcome: Progressing  Flowsheets (Taken 6/24/2024 2000)  Discharge to home or other facility with appropriate resources:   Identify barriers to discharge with patient and caregiver   Arrange for needed discharge resources and transportation as appropriate   Identify discharge learning needs (meds, wound care, etc)   Refer to discharge planning if patient needs post-hospital services based on physician order or complex needs related to functional status, cognitive ability or social support system     Problem: Pain  Goal: Verbalizes/displays adequate comfort level or baseline comfort level  Outcome: Progressing     Problem: Skin/Tissue Integrity  Goal: Absence of new skin breakdown  Description: 1.  Monitor for areas of redness and/or skin breakdown  2.  Assess vascular access sites hourly  3.  Every 4-6 hours minimum:  Change oxygen saturation probe site  4.  Every 4-6 hours:  If on nasal continuous positive airway pressure, respiratory therapy assess nares and determine need for appliance change or resting period.  Outcome: Progressing     Problem: Safety - Adult  Goal: Free from fall injury  Outcome: Progressing     Problem: ABCDS Injury Assessment  Goal: Absence of physical injury  Outcome: Progressing     
  Problem: Discharge Planning  Goal: Discharge to home or other facility with appropriate resources  Outcome: Progressing  Flowsheets (Taken 6/26/2024 0030)  Discharge to home or other facility with appropriate resources: Identify barriers to discharge with patient and caregiver     Problem: Pain  Goal: Verbalizes/displays adequate comfort level or baseline comfort level  Outcome: Progressing  Flowsheets (Taken 6/26/2024 0030)  Verbalizes/displays adequate comfort level or baseline comfort level:   Encourage patient to monitor pain and request assistance   Assess pain using appropriate pain scale   Administer analgesics based on type and severity of pain and evaluate response   Implement non-pharmacological measures as appropriate and evaluate response     Problem: Skin/Tissue Integrity  Goal: Absence of new skin breakdown  Description: 1.  Monitor for areas of redness and/or skin breakdown  2.  Assess vascular access sites hourly  3.  Every 4-6 hours minimum:  Change oxygen saturation probe site  4.  Every 4-6 hours:  If on nasal continuous positive airway pressure, respiratory therapy assess nares and determine need for appliance change or resting period.  Outcome: Progressing     Problem: Safety - Adult  Goal: Free from fall injury  Outcome: Progressing  Flowsheets (Taken 6/26/2024 0030)  Free From Fall Injury:   Based on caregiver fall risk screen, instruct family/caregiver to ask for assistance with transferring infant if caregiver noted to have fall risk factors   Instruct family/caregiver on patient safety     Problem: ABCDS Injury Assessment  Goal: Absence of physical injury  Outcome: Progressing  Flowsheets (Taken 6/26/2024 0030)  Absence of Physical Injury: Implement safety measures based on patient assessment     Problem: Chronic Conditions and Co-morbidities  Goal: Patient's chronic conditions and co-morbidity symptoms are monitored and maintained or improved  Outcome: Progressing  Flowsheets (Taken 
  Problem: Discharge Planning  Goal: Discharge to home or other facility with appropriate resources  Outcome: Progressing  Flowsheets (Taken 7/1/2024 0800)  Discharge to home or other facility with appropriate resources:   Identify barriers to discharge with patient and caregiver   Arrange for needed discharge resources and transportation as appropriate   Identify discharge learning needs (meds, wound care, etc)   Refer to discharge planning if patient needs post-hospital services based on physician order or complex needs related to functional status, cognitive ability or social support system     Problem: Pain  Goal: Verbalizes/displays adequate comfort level or baseline comfort level  Outcome: Progressing     Problem: Skin/Tissue Integrity  Goal: Absence of new skin breakdown  Description: 1.  Monitor for areas of redness and/or skin breakdown  2.  Assess vascular access sites hourly  3.  Every 4-6 hours minimum:  Change oxygen saturation probe site  4.  Every 4-6 hours:  If on nasal continuous positive airway pressure, respiratory therapy assess nares and determine need for appliance change or resting period.  Outcome: Progressing     Problem: Safety - Adult  Goal: Free from fall injury  Outcome: Progressing     Problem: ABCDS Injury Assessment  Goal: Absence of physical injury  Outcome: Progressing  Flowsheets (Taken 7/1/2024 1100)  Absence of Physical Injury: Implement safety measures based on patient assessment     Problem: Chronic Conditions and Co-morbidities  Goal: Patient's chronic conditions and co-morbidity symptoms are monitored and maintained or improved  Outcome: Progressing  Flowsheets (Taken 7/1/2024 0800)  Care Plan - Patient's Chronic Conditions and Co-Morbidity Symptoms are Monitored and Maintained or Improved:   Monitor and assess patient's chronic conditions and comorbid symptoms for stability, deterioration, or improvement   Collaborate with multidisciplinary team to address chronic and 
  Problem: Discharge Planning  Goal: Discharge to home or other facility with appropriate resources  Outcome: Progressing  Flowsheets (Taken 7/3/2024 0800)  Discharge to home or other facility with appropriate resources:   Identify barriers to discharge with patient and caregiver   Arrange for needed discharge resources and transportation as appropriate   Identify discharge learning needs (meds, wound care, etc)   Refer to discharge planning if patient needs post-hospital services based on physician order or complex needs related to functional status, cognitive ability or social support system     Problem: Pain  Goal: Verbalizes/displays adequate comfort level or baseline comfort level  Outcome: Progressing     Problem: Skin/Tissue Integrity  Goal: Absence of new skin breakdown  Description: 1.  Monitor for areas of redness and/or skin breakdown  2.  Assess vascular access sites hourly  3.  Every 4-6 hours minimum:  Change oxygen saturation probe site  4.  Every 4-6 hours:  If on nasal continuous positive airway pressure, respiratory therapy assess nares and determine need for appliance change or resting period.  Outcome: Progressing     Problem: Safety - Adult  Goal: Free from fall injury  Outcome: Progressing  Flowsheets (Taken 7/3/2024 1108)  Free From Fall Injury: Instruct family/caregiver on patient safety     Problem: ABCDS Injury Assessment  Goal: Absence of physical injury  Outcome: Progressing  Flowsheets (Taken 7/3/2024 1108)  Absence of Physical Injury: Implement safety measures based on patient assessment     Problem: Chronic Conditions and Co-morbidities  Goal: Patient's chronic conditions and co-morbidity symptoms are monitored and maintained or improved  Outcome: Progressing  Flowsheets (Taken 7/3/2024 0800)  Care Plan - Patient's Chronic Conditions and Co-Morbidity Symptoms are Monitored and Maintained or Improved:   Collaborate with multidisciplinary team to address chronic and comorbid conditions 
  Problem: Discharge Planning  Goal: Discharge to home or other facility with appropriate resources  Outcome: Progressing  Flowsheets (Taken 7/7/2024 0905 by Carla Freeman, RN)  Discharge to home or other facility with appropriate resources:   Identify barriers to discharge with patient and caregiver   Arrange for needed discharge resources and transportation as appropriate   Identify discharge learning needs (meds, wound care, etc)   Refer to discharge planning if patient needs post-hospital services based on physician order or complex needs related to functional status, cognitive ability or social support system     Problem: Pain  Goal: Verbalizes/displays adequate comfort level or baseline comfort level  Outcome: Progressing     Problem: Skin/Tissue Integrity  Goal: Absence of new skin breakdown  Description: 1.  Monitor for areas of redness and/or skin breakdown  2.  Assess vascular access sites hourly  3.  Every 4-6 hours minimum:  Change oxygen saturation probe site  4.  Every 4-6 hours:  If on nasal continuous positive airway pressure, respiratory therapy assess nares and determine need for appliance change or resting period.  Outcome: Progressing     Problem: Safety - Adult  Goal: Free from fall injury  Outcome: Progressing     Problem: ABCDS Injury Assessment  Goal: Absence of physical injury  Outcome: Progressing     Problem: Chronic Conditions and Co-morbidities  Goal: Patient's chronic conditions and co-morbidity symptoms are monitored and maintained or improved  Outcome: Progressing  Flowsheets (Taken 7/7/2024 0905 by Carla Freeman, RN)  Care Plan - Patient's Chronic Conditions and Co-Morbidity Symptoms are Monitored and Maintained or Improved:   Monitor and assess patient's chronic conditions and comorbid symptoms for stability, deterioration, or improvement   Collaborate with multidisciplinary team to address chronic and comorbid conditions and prevent exacerbation or deterioration   Update acute 
  Problem: Pain  Goal: Verbalizes/displays adequate comfort level or baseline comfort level  7/4/2024 2030 by Jessenia Wayne RN  Outcome: Progressing  7/4/2024 1513 by Lolita Arora RN  Outcome: Progressing     Problem: Skin/Tissue Integrity  Goal: Absence of new skin breakdown  Description: 1.  Monitor for areas of redness and/or skin breakdown  2.  Assess vascular access sites hourly  3.  Every 4-6 hours minimum:  Change oxygen saturation probe site  4.  Every 4-6 hours:  If on nasal continuous positive airway pressure, respiratory therapy assess nares and determine need for appliance change or resting period.  7/4/2024 2030 by Jessenia Wayne RN  Outcome: Progressing  7/4/2024 1513 by Lolita Arora RN  Outcome: Progressing     Problem: Safety - Adult  Goal: Free from fall injury  7/4/2024 2030 by Jessenia Wayne RN  Outcome: Progressing  7/4/2024 1513 by Lolita Arora RN  Outcome: Progressing     Problem: ABCDS Injury Assessment  Goal: Absence of physical injury  7/4/2024 2030 by Jessenia Wayne RN  Outcome: Progressing  7/4/2024 1513 by Lolita Arora RN  Outcome: Progressing     
  Problem: Safety - Adult  Goal: Free from fall injury  7/11/2024 1134 by Chaya Razo, RN  Outcome: Progressing     Problem: ABCDS Injury Assessment  Goal: Absence of physical injury  7/11/2024 1134 by Chaya Razo, RN  Outcome: Progressing     Problem: Nutrition Deficit:  Goal: Optimize nutritional status  7/11/2024 1134 by Chaya Razo, RN  Outcome: Progressing     
VSS. NSR on monitor. Patient is alert and oriented. Complains of pain to incision and generalized/ neck pain. Prn medication given with some relief. Patient on regular diet, poor appetite. Refusing turns, patient educated on importance and reasons for turns. Patient complained of some abdominal pain/tenderness. KUB today and increased bowel regimen.  Medication for DVT prophylaxis. Free from any injury. Patient and family updated on plan of care. Will continue plan of care.      Problem: Discharge Planning  Goal: Discharge to home or other facility with appropriate resources  Outcome: Progressing     Problem: Pain  Goal: Verbalizes/displays adequate comfort level or baseline comfort level  Outcome: Progressing     Problem: Skin/Tissue Integrity  Goal: Absence of new skin breakdown  Description: 1.  Monitor for areas of redness and/or skin breakdown  2.  Assess vascular access sites hourly  3.  Every 4-6 hours minimum:  Change oxygen saturation probe site  4.  Every 4-6 hours:  If on nasal continuous positive airway pressure, respiratory therapy assess nares and determine need for appliance change or resting period.  Outcome: Progressing     Problem: Safety - Adult  Goal: Free from fall injury  Outcome: Progressing     Problem: ABCDS Injury Assessment  Goal: Absence of physical injury  Outcome: Progressing     Problem: Chronic Conditions and Co-morbidities  Goal: Patient's chronic conditions and co-morbidity symptoms are monitored and maintained or improved  Outcome: Progressing     Problem: Nutrition Deficit:  Goal: Optimize nutritional status  Outcome: Progressing     
VSS. NSR on monitor. Patient is alert and oriented. Complains of pain to right knee and leg, prn medication given. Patient on regular diet, poor appetite. Afebrile. Patient had HD today, only ran for 30 minutes per nurse due to line therefore vas cath was removed per MD order. Alaniz removed today. Medication for DVT prophylaxis. Free from any injury. Patient and family updated on plan of care. Will continue plan of care.      Problem: Discharge Planning  Goal: Discharge to home or other facility with appropriate resources  7/6/2024 1423 by Lolita Arora, RN  Outcome: Progressing  Flowsheets (Taken 7/6/2024 0935)  Discharge to home or other facility with appropriate resources:   Identify barriers to discharge with patient and caregiver   Arrange for needed discharge resources and transportation as appropriate   Identify discharge learning needs (meds, wound care, etc)  7/6/2024 0045 by Alicia Ortega RN  Outcome: Progressing  Flowsheets (Taken 7/5/2024 2000)  Discharge to home or other facility with appropriate resources:   Identify barriers to discharge with patient and caregiver   Arrange for needed discharge resources and transportation as appropriate   Identify discharge learning needs (meds, wound care, etc)   Arrange for interpreters to assist at discharge as needed   Refer to discharge planning if patient needs post-hospital services based on physician order or complex needs related to functional status, cognitive ability or social support system     Problem: Pain  Goal: Verbalizes/displays adequate comfort level or baseline comfort level  7/6/2024 1423 by Lolita Arora RN  Outcome: Progressing  7/6/2024 0045 by Alicia Ortega RN  Outcome: Progressing  Flowsheets (Taken 7/5/2024 2000)  Verbalizes/displays adequate comfort level or baseline comfort level:   Encourage patient to monitor pain and request assistance   Assess pain using appropriate pain scale   Administer analgesics based on type and severity 
VSS. NSR on monitor. Patient is alert and oriented. Complains of pain to right leg and knee, prn medications given throughout the day. Patient on regular diet, poor appetite. Refusing turns. HD today. Alaniz replaced and urine culture awaiting to be collected when adequate urine output.  Medication for DVT prophylaxis. Free from any injury. Patient and family updated on plan of care. Will continue plan of care.      Problem: Discharge Planning  Goal: Discharge to home or other facility with appropriate resources  Outcome: Progressing     Problem: Pain  Goal: Verbalizes/displays adequate comfort level or baseline comfort level  Outcome: Progressing     Problem: Skin/Tissue Integrity  Goal: Absence of new skin breakdown  Description: 1.  Monitor for areas of redness and/or skin breakdown  2.  Assess vascular access sites hourly  3.  Every 4-6 hours minimum:  Change oxygen saturation probe site  4.  Every 4-6 hours:  If on nasal continuous positive airway pressure, respiratory therapy assess nares and determine need for appliance change or resting period.  Outcome: Progressing     Problem: Safety - Adult  Goal: Free from fall injury  Outcome: Progressing     Problem: ABCDS Injury Assessment  Goal: Absence of physical injury  Outcome: Progressing     Problem: Chronic Conditions and Co-morbidities  Goal: Patient's chronic conditions and co-morbidity symptoms are monitored and maintained or improved  Outcome: Progressing     Problem: Nutrition Deficit:  Goal: Optimize nutritional status  Outcome: Progressing     
[facility] Orthopedic Surgery  Plan of Care Note      Plan:  - patient will remain 20lb WB on right lower extremity for 6-9 weeks (TTWB with therapy). OK for knee ROM.    2:  Continue Deep venous thrombosis prophylaxis- Heparin as inpt. Okay for ASA 81 BID x 30 days outpt from ortho perspective.   3:  Continue Pain Control-  radha, tizanidine PRN  4: PT/OT  5: discharge per primary team when medically stable; expect SNF    CK Du - CNP 7/12/2024 11:31 AM     
needs.  Outcome: Progressing  Flowsheets (Taken 7/5/2024 2000)  No acute changes.  Followed by consultants MDs.    Problem: Nutrition Deficit:  Goal: Optimize nutritional status  Reviewed goals of benefits of optimal nutrition and wound healing  Outcome: Progressing  Flowsheets (Taken 7/5/2024 2000)   Patient reports poor appetite.

## 2024-07-15 NOTE — PROGRESS NOTES
Hospitalist Progress Note    Name:  Yoseph Small    /Age/Sex: 1955  (68 y.o. female)  MRN & CSN:  [id number] & [id number]    PCP: Sariah Teresa    Date of Admission: 2024    Patient Status:  Inpatient     Chief Complaint:   Chief Complaint   Patient presents with    Fall     Came by [facility] from home with c/o falling while going to the bathroom. C/o 10/10 pain to right knee. Unsure if hit head when falling. 50mcg Fentanyl given en route       Hospital Course:   Patient admitted with fall and right distal femur fracture.  Found to have severe BARBARA on admission.  BARBARA likely due to malposition ureteral stent.  Urology consulted.  Nephrology consulted.    S/p ureteral stent replacement on .  Found to have UTI with Citrobacter/Enterococcus.  Patient completed antibiotic course.    Orthopedic surgery consulted for right femur fracture. S/p right distal femur ORIF on .    Patient's kidney function continued to decline and was eventually started on CRRT on .  Eventually switched over to HD on .    Patient did have an episode of A-fib with RVR and chemically cardioverted with IV amiodarone.  Cardiology consulted and recommended 30-day monitor at discharge.    WBC increased on 7/3. Patient was scheduled to have a TDC placed, but cultures were ordered to check for infection.    KUB ordered on  for abdominal pain.    Urinalysis indicative of UTI on 7/3.  Urine culture ordered.  Patient started on vancomycin and cefepime given allergies and recent infection with Enterococcus and Citrobacter.    Subjective:  Today is:  Hospital Day: 16.  Patient seen and examined in ICU-3903/3903-.     Lying in bed.  No longer having abdominal pain.  Having knee pain today.  Receiving dialysis.        Medications:  Reviewed    Infusion Medications    dextrose      sodium chloride       Scheduled Medications    vancomycin  1,250 mg IntraVENous Q24H    meropenem  1,000 mg IntraVENous Once    
      Hospitalist Progress Note    Name:  Yoseph Small    /Age/Sex: 1955  (68 y.o. female)  MRN & CSN:  [id number] & [id number]    PCP: Sariah Teresa    Date of Admission: 2024    Patient Status:  Inpatient     Chief Complaint:   Chief Complaint   Patient presents with    Fall     Came by [facility] from home with c/o falling while going to the bathroom. C/o 10/10 pain to right knee. Unsure if hit head when falling. 50mcg Fentanyl given en route       Hospital Course:   Patient admitted with fall and right distal femur fracture.  Found to have severe BARBARA on admission.  BARBARA likely due to malposition ureteral stent but continued to worsen despite stent placement. eventually started on CRRT on .  Eventually switched over to HD on .Tunneled hemodialysis catheter placement on 2024.  Found to have UTI with Citrobacter/Enterococcus.  Patient completed antibiotic course.   S/p right distal femur ORIF on .  Patient did have an episode of A-fib with RVR and chemically cardioverted with IV amiodarone.  Cardiology consulted and recommended 30-day monitor at discharge.      Subjective:  .  Patient seen and examined in [facility]4471/4471-02.     Lying in bed. Report  rt hip pain.  BP  better.    Medications:  Reviewed      Physical Exam Performed:    BP (!) 96/56   Pulse (!) 114   Temp 98 °F (36.7 °C) (Oral)   Resp 16   Ht 1.651 m (5' 5\")   Wt 113.6 kg (250 lb 7 oz)   SpO2 92%   PF (!) 2 L/min   BMI 41.67 kg/m²     General appearance: Sleepy, somewhat confused   HEENT: Pupils equal, round, and reactive to light. Conjunctivae/corneas clear.  Neck: Supple, with full range of motion. No jugular venous distention. Trachea midline.  Respiratory:  Normal respiratory effort.  Slight rales bilaterally.  Cardiovascular: Regular rate and rhythm with normal S1/S2 without murmurs, rubs or gallops.  1-2 pitting edema bilateral lower extremities..  Abdomen: Soft, non-distended with normal bowel 
      Hospitalist Progress Note    Name:  Yoseph Small    /Age/Sex: 1955  (68 y.o. female)  MRN & CSN:  [id number] & [id number]    PCP: Sariah Teresa    Date of Admission: 2024    Patient Status:  Inpatient     Chief Complaint:   Chief Complaint   Patient presents with    Fall     Came by [facility] from home with c/o falling while going to the bathroom. C/o 10/10 pain to right knee. Unsure if hit head when falling. 50mcg Fentanyl given en route       Hospital Course:   Patient admitted with fall and right distal femur fracture.  Found to have severe BARBARA on admission.  BARBARA likely due to malposition ureteral stent.  Urology consulted.  Nephrology consulted.    S/p ureteral stent replacement on .  Found to have UTI with Citrobacter/Enterococcus.  Patient completed antibiotic course.    Orthopedic surgery consulted for right femur fracture. S/p right distal femur ORIF on .    Patient's kidney function continued to decline and was eventually started on CRRT on .  Eventually switched over to HD on .    Patient did have an episode of A-fib with RVR and chemically cardioverted with IV amiodarone.  Cardiology consulted and recommended 30-day monitor at discharge.    WBC increased on 7/3. Patient was scheduled to have a TDC placed, but cultures were ordered to check for infection.    Subjective:  Today is:  Hospital Day: 14.  Patient seen and examined in ICU-3903/3903-01.     Lying in bed. Having some pain. Eating okay.     at bedside and updated.      Medications:  Reviewed    Infusion Medications    dextrose      sodium chloride       Scheduled Medications    epoetin shree-epbx  10,000 Units IntraVENous Once per day on     heparin (porcine)  5,000 Units SubCUTAneous 3 times per day    metoprolol succinate  100 mg Oral Daily    midodrine  10 mg Oral TID WC    sodium chloride flush  5-40 mL IntraVENous 2 times per day    sodium bicarbonate  650 mg Oral 4x Daily    
      Hospitalist Progress Note    Name:  Yoseph Small    /Age/Sex: 1955  (68 y.o. female)  MRN & CSN:  [id number] & [id number]    PCP: Sariah Teresa    Date of Admission: 2024    Patient Status:  Inpatient     Chief Complaint:   Chief Complaint   Patient presents with    Fall     Came by [facility] from home with c/o falling while going to the bathroom. C/o 10/10 pain to right knee. Unsure if hit head when falling. 50mcg Fentanyl given en route       Hospital Course:   Patient admitted with fall and right distal femur fracture.  Found to have severe BARBARA on admission.  BARBARA likely due to malposition ureteral stent.  Urology consulted.  Nephrology consulted.    S/p ureteral stent replacement on .  Found to have UTI with Citrobacter/Enterococcus.  Patient completed antibiotic course.    Orthopedic surgery consulted for right femur fracture. S/p right distal femur ORIF on .    Patient's kidney function continued to decline and was eventually started on CRRT on .  Eventually switched over to HD on .    Patient did have an episode of A-fib with RVR and chemically cardioverted with IV amiodarone.  Cardiology consulted and recommended 30-day monitor at discharge.      Urinalysis indicative of UTI on 7/3.  Urine culture ordered.  Patient started on vancomycin and cefepime given allergies and recent infection with Enterococcus and Citrobacter.    Tunneled hemodialysis catheter placement on 2024.    Subjective:  .  Patient seen and examined in [facility]4471/4471-02.     Lying in bed. Report  rt hip pain.  BP better today  , going for another HD . Xr kub show large amt of stool  white cell count down to 8.      Medications:  Reviewed      Physical Exam Performed:    BP (!) 96/56   Pulse (!) 114   Temp 98 °F (36.7 °C) (Oral)   Resp 16   Ht 1.651 m (5' 5\")   Wt 113.6 kg (250 lb 7 oz)   SpO2 92%   PF (!) 2 L/min   BMI 41.67 kg/m²     General appearance: Sleepy, somewhat confused 
      Hospitalist Progress Note    Name:  Yoseph Small    /Age/Sex: 1955  (68 y.o. female)  MRN & CSN:  [id number] & [id number]    PCP: Sariah Teresa    Date of Admission: 2024    Patient Status:  Inpatient     Chief Complaint:   Chief Complaint   Patient presents with    Fall     Came by [facility] from home with c/o falling while going to the bathroom. C/o 10/10 pain to right knee. Unsure if hit head when falling. 50mcg Fentanyl given en route       Hospital Course:   Patient admitted with fall and right distal femur fracture.  Found to have severe BARBARA on admission.  BARBARA likely due to malposition ureteral stent but continued to worsen despite stent placement. eventually started on CRRT on .  Eventually switched over to HD on .Tunneled hemodialysis catheter placement on 2024.  Found to have UTI with Citrobacter/Enterococcus.  Patient completed antibiotic course.   S/p right distal femur ORIF on .  Patient did have an episode of A-fib with RVR and chemically cardioverted with IV amiodarone.  Cardiology consulted and recommended 30-day monitor at discharge.      Subjective:  .  Patient seen and examined in [facility]4471/4471-02.     Lying in bed. Report  rt hip pain.  BP  better.    Medications:  Reviewed      Physical Exam Performed:    /69   Pulse (!) 105   Temp 97.7 °F (36.5 °C) (Oral)   Resp 17   Ht 1.651 m (5' 5\")   Wt 108.8 kg (239 lb 13.8 oz)   SpO2 93%   PF (!) 2 L/min   BMI 39.91 kg/m²     General appearance: Sleepy, somewhat confused   HEENT: Pupils equal, round, and reactive to light. Conjunctivae/corneas clear.  Neck: Supple, with full range of motion. No jugular venous distention. Trachea midline.  Respiratory:  Normal respiratory effort.  Slight rales bilaterally.  Cardiovascular: Regular rate and rhythm with normal S1/S2 without murmurs, rubs or gallops.  1-2 pitting edema bilateral lower extremities..  Abdomen: Soft, non-distended with normal bowel 
      Hospitalist Progress Note    Name:  Yoseph Small    /Age/Sex: 1955  (68 y.o. female)  MRN & CSN:  [id number] & [id number]    PCP: Sariah Teresa    Date of Admission: 2024    Patient Status:  Inpatient     Chief Complaint:   Chief Complaint   Patient presents with    Fall     Came by [facility] from home with c/o falling while going to the bathroom. C/o 10/10 pain to right knee. Unsure if hit head when falling. 50mcg Fentanyl given en route       Hospital Course: This 68-year-old female admitted with fall and right distal femur fracture.  Found to have have severe BARBARA and hyperkalemia likely 2/2 malpositioned ureteral stent; s/p stent exchange.  Developed BARBARA likely ATN due to obstructive uropathy/sepsis.  Some improvement in urine output. S/p right distal femur ORIF.  Drain removed yesterday,  PT OT eval ongoing        Subjective:  Today is:  Hospital Day: 10.  Patient seen and examined in ICU-3903/3903-01.     Remains at 10 liters   She was switched to CRRT yesterday due to blood pressure   We are asking to increased the rate a bit today.           Medications:  Reviewed    Infusion Medications    prismaSATE BGK 4/2.5 500 mL/hr at 24 1127    prismaSATE BGK 4/2.5 500 mL/hr at 24 1127    prismaSATE BGK 4/2.5 500 mL/hr at 24 1128    dexmedeTOMIDine 0.2 mcg/kg/hr (24 1630)    sodium chloride 10 mL/hr at 24 2344    sodium chloride Stopped (24 1234)    sodium chloride      dextrose      sodium chloride      sodium chloride       Scheduled Medications    cefepime  1,000 mg IntraVENous Q8H    amiodarone  200 mg Oral BID    metoprolol succinate  100 mg Oral Daily    heparin (porcine)  5,000 Units SubCUTAneous BID    midodrine  10 mg Oral TID WC    sodium chloride flush  5-40 mL IntraVENous 2 times per day    vancomycin (VANCOCIN) intermittent dosing (placeholder)   Other RX Placeholder    sodium bicarbonate  650 mg Oral 4x Daily    sennosides-docusate sodium 
      Hospitalist Progress Note    Name:  Yoseph Small    /Age/Sex: 1955  (68 y.o. female)  MRN & CSN:  [id number] & [id number]    PCP: Sariah Teresa    Date of Admission: 2024    Patient Status:  Inpatient     Chief Complaint:   Chief Complaint   Patient presents with    Fall     Came by [facility] from home with c/o falling while going to the bathroom. C/o 10/10 pain to right knee. Unsure if hit head when falling. 50mcg Fentanyl given en route       Hospital Course:   Patient admitted with fall and right distal femur fracture.  Found to have severe BARBARA on admission.  BARBARA likely due to malposition ureteral stent.  Urology consulted.  Nephrology consulted.    S/p ureteral stent replacement on .  Found to have UTI with Citrobacter/Enterococcus.  Patient completed antibiotic course.    Orthopedic surgery consulted for right femur fracture. S/p right distal femur ORIF on .    Patient's kidney function continued to decline and was eventually started on CRRT on .  Eventually switched over to HD on .    Patient did have an episode of A-fib with RVR and chemically cardioverted with IV amiodarone.  Cardiology consulted and recommended 30-day monitor at discharge.      Urinalysis indicative of UTI on 7/3.  Urine culture ordered.  Patient started on vancomycin and cefepime given allergies and recent infection with Enterococcus and Citrobacter.    Tunneled hemodialysis catheter placement on 2024.    Subjective:  Today is:  Hospital Day: 19.  Patient seen and examined in [facility]4471/4471-02.     Lying in bed.  Appears somewhat confused, complains of discomfort but unable to specify where.  Bicarb slowly improving as well as BUN decreasing with dialysis.  Blood pressure borderline after dialysis , received iv  albumin  white cell count down to 8.      Medications:  Reviewed      Physical Exam Performed:    /63   Pulse 86   Temp 97.5 °F (36.4 °C) (Oral)   Resp 18   Ht 1.651 m 
      Hospitalist Progress Note    Name:  Yoseph Small    /Age/Sex: 1955  (68 y.o. female)  MRN & CSN:  [id number] & [id number]    PCP: Sariah Teresa    Date of Admission: 2024    Patient Status:  Inpatient     Chief Complaint:   Chief Complaint   Patient presents with    Fall     Came by [facility] from home with c/o falling while going to the bathroom. C/o 10/10 pain to right knee. Unsure if hit head when falling. 50mcg Fentanyl given en route       Hospital Course:   Patient admitted with fall and right distal femur fracture.  Found to have severe BARBARA on admission.  BARBARA likely due to malposition ureteral stent but continued to worsen despite stent placement. eventually started on CRRT on .  Eventually switched over to HD on .Tunneled hemodialysis catheter placement on 2024.  Found to have UTI with Citrobacter/Enterococcus.  Patient completed antibiotic course.   S/p right distal femur ORIF on .  Patient did have an episode of A-fib with RVR and chemically cardioverted with IV amiodarone.  Cardiology consulted and recommended 30-day monitor at discharge.      Subjective:  .  Patient seen and examined in [facility]4471/4471-02.     Lying in bed. Report  rt hip pain.  BP  better.  Mental status better, has had bowel movements since yesterday  Medications:  Reviewed      Physical Exam Performed:    /69   Pulse (!) 105   Temp 97.7 °F (36.5 °C) (Oral)   Resp 17   Ht 1.651 m (5' 5\")   Wt 108.8 kg (239 lb 13.8 oz)   SpO2 93%   PF (!) 2 L/min   BMI 39.91 kg/m²     General appearance: Sleepy, somewhat confused   HEENT: Pupils equal, round, and reactive to light. Conjunctivae/corneas clear.  Neck: Supple, with full range of motion. No jugular venous distention. Trachea midline.  Respiratory:  Normal respiratory effort.  Slight rales bilaterally.  Cardiovascular: Regular rate and rhythm with normal S1/S2 without murmurs, rubs or gallops.  1-2 pitting edema bilateral lower 
      Hospitalist Progress Note    Name:  Yoseph Small    /Age/Sex: 1955  (68 y.o. female)  MRN & CSN:  [id number] & [id number]    PCP: Sariah Teresa    Date of Admission: 2024    Patient Status:  Inpatient     Chief Complaint:   Chief Complaint   Patient presents with    Fall     Came by [facility] from home with c/o falling while going to the bathroom. C/o 10/10 pain to right knee. Unsure if hit head when falling. 50mcg Fentanyl given en route       Hospital Course: This 68-year-old female admitted with fall and right distal femur fracture.  Found to have have severe BARBARA and hyperkalemia likely 2/2 malpositioned ureteral stent; s/p stent exchange.  Developed BARBARA likely ATN due to obstructive uropathy/sepsis.  Some improvement in urine output. S/p right distal femur ORIF.  Drain removed yesterday,  PT OT eval ongoing        Subjective:  Today is:  Hospital Day: 9.  Patient seen and examined in ICU-3903/3903-01.     She is needing 10 liters today and she has flipped into a.fib           Medications:  Reviewed    Infusion Medications    prismaSATE BGK 4/2.5 500 mL/hr at 24 1536    prismaSATE BGK 4/2.5 500 mL/hr at 24 1535    prismaSATE BGK 4/2.5 500 mL/hr at 24 1535    amiodarone 0.5 mg/min (24 1800)    dexmedeTOMIDine 0.2 mcg/kg/hr (24 1800)    sodium chloride 10 mL/hr at 24 2344    sodium chloride Stopped (24 0504)    sodium chloride      dextrose      sodium chloride      sodium chloride       Scheduled Medications    cefepime  1,000 mg IntraVENous Q8H    amiodarone  200 mg Oral BID    metoprolol succinate  100 mg Oral Daily    heparin (porcine)  5,000 Units SubCUTAneous BID    midodrine  10 mg Oral TID WC    sodium chloride flush  5-40 mL IntraVENous 2 times per day    vancomycin (VANCOCIN) intermittent dosing (placeholder)   Other RX Placeholder    sodium bicarbonate  650 mg Oral 4x Daily    acetaminophen  650 mg Oral Q6H    sennosides-docusate 
      Hospitalist Progress Note    Name:  Yoseph Small    /Age/Sex: 1955  (68 y.o. female)  MRN & CSN:  [id number] & [id number]    PCP: Sariah Teresa    Date of Admission: 2024    Patient Status:  Inpatient     Chief Complaint:   Chief Complaint   Patient presents with    Fall     Came by [facility] from home with c/o falling while going to the bathroom. C/o 10/10 pain to right knee. Unsure if hit head when falling. 50mcg Fentanyl given en route       Hospital Course:   Patient admitted with fall and right distal femur fracture.  Found to have severe BARBARA on admission.  BARBARA likely due to malposition ureteral stent.  Urology consulted.  Nephrology consulted.    S/p ureteral stent replacement on .  Found to have UTI with Citrobacter/Enterococcus.  Patient completed antibiotic course.    Orthopedic surgery consulted for right femur fracture. S/p right distal femur ORIF on .    Patient's kidney function continued to decline and was eventually started on CRRT on .  Eventually switched over to HD on .    Patient did have an episode of A-fib with RVR and chemically cardioverted with IV amiodarone.  Cardiology consulted and recommended 30-day monitor at discharge.    WBC increased on 7/3. Patient was scheduled to have a TDC placed, but cultures were ordered to check for infection.    KUB ordered on  for abdominal pain.    Urinalysis indicative of UTI on 7/3.  Urine culture ordered.  Patient started on vancomycin and cefepime given allergies and recent infection with Enterococcus and Citrobacter.    Subjective:  Today is:  Hospital Day: 18.  Patient seen and examined in ICU-3903/3903-.     Lying in bed.    White cell count down to 11  No new complaints        Medications:  Reviewed      Physical Exam Performed:    BP (!) 106/48   Pulse 76   Temp 97.4 °F (36.3 °C) (Temporal)   Resp 20   Ht 1.651 m (5' 5\")   Wt 112.5 kg (248 lb 0.3 oz)   SpO2 93%   BMI 41.27 kg/m²     General 
      Hospitalist Progress Note    Name:  Yoseph Small    /Age/Sex: 1955  (68 y.o. female)  MRN & CSN:  [id number] & [id number]    PCP: Sariah Teresa    Date of Admission: 2024    Patient Status:  Inpatient     Chief Complaint:   Chief Complaint   Patient presents with    Fall     Came by [facility] from home with c/o falling while going to the bathroom. C/o 10/10 pain to right knee. Unsure if hit head when falling. 50mcg Fentanyl given en route       Hospital Course:   Patient admitted with fall and right distal femur fracture.  Found to have severe BARBARA on admission.  BARBARA likely due to malposition ureteral stent.  Urology consulted.  Nephrology consulted.    S/p ureteral stent replacement on .  Found to have UTI with Citrobacter/Enterococcus.  Patient completed antibiotic course.    Orthopedic surgery consulted for right femur fracture. S/p right distal femur ORIF on .    Patient's kidney function continued to decline and was eventually started on CRRT on .  Eventually switched over to HD on .    Patient did have an episode of A-fib with RVR and chemically cardioverted with IV amiodarone.  Cardiology consulted and recommended 30-day monitor at discharge.    WBC increased on 7/3. Patient was scheduled to have a TDC placed, but cultures were ordered to check for infection.    KUB ordered on  for abdominal pain.    Urinalysis indicative of UTI on 7/3.  Urine culture ordered.  Patient started on vancomycin and cefepime given allergies and recent infection with Enterococcus and Citrobacter.    Subjective:  Today is:  Hospital Day: 17.  Patient seen and examined in ICU-3903/3903-.     Lying in bed.    Attempted dialysis Nancy only do it in half an hour as the dialysis catheter not working  White cell count slightly better at 14  Patient quite reluctant to get out of bed and sit out in the chair          Medications:  Reviewed      Physical Exam Performed:    /64   Pulse 
     MD Jp Stafford MD Samir Brahmbhatt, MD                                  Office: [phone]                 Fax: [phone]          [employer]                     NEPHROLOGY   INPATIENT PROGRESS NOTE:     PATIENT NAME: Yoseph Small  : 1955  MRN: [id number]  Name:  Yoseph Small Date/Time of Admission: 2024  8:27 PM    CSN: [id number] Attending Provider: Chris Mcdermott MD   Room/Bed: [address] [place], [place] [address]141/4471-02 : 1955 Age: 68 y.o.       Subjective / interval history / nephrology update / medical decision making:   Refer to assessment and plan for more details.     Out of ICU now  Noted resting comfortably in bed  Sleepy but arousable to loud voice and following,   at bedside    With worsening acidosis, agree with getting blood gas, lactic acid check  Hopefully after dialysis today would have some improvement in renal function    Low threshold to transfer to a higher level of care*    Renal labs noted and reviewed    No active shortness of breath  Still remains peripheral edema fluid overload  Hypoxemia needing high flow nasal cannula-Taper off    Past 24 hours-total input output noted  Urine output has been oliguric range  Still noted to have intradialytic solutes rise + anuric   = needs to continue renal replacement therapy support for now, while closely monitoring for renal recovery      CRRT stopped this    -Might needed back if hypotension does not improve with intervention with dialysis with sodium profile, low HD temperature, low flow,    Attempting transited to hemodialysis from 2024     Currently inpatient schedule with  + as needed   Underwent HD Friday -Access work did not work much   Next HD again on Saturday- did not work or even with below  -even after heparin, and Cathflo  - Malfunctioning catheter currently, will need to be exchanged  - have consulted IR is if able to exchange as early as Monday, insert tunneled 
     MD Jp Stafford MD Samir Brahmbhatt, MD                                  Office: [phone]                 Fax: [phone]          [facility]                     NEPHROLOGY   INPATIENT PROGRESS NOTE:     PATIENT NAME: Yoseph Small  : 1955  MRN: [id number]  Name:  Yoseph Small Date/Time of Admission: 2024  8:27 PM    CSN: [id number] Attending Provider: Jason Baca DO   [address]01 : 1955 Age: 68 y.o.       Subjective / interval history / nephrology update / medical decision making:   Refer to assessment and plan for more details.     Patient was seen comfortably sitting up in bed, while on dialysis, tolerating it well  Reported no active complaints/distress,   Renal labs noted.    No active shortness of breath  Still remains well fluid overload  Hypoxemia needing high flow nasal cannula    Past 24 hours-total input output noted  Urine output has been oliguric range  Still noted to have intradialytic solute rise, needs continuous renal replacement therapy support for now      CRRT stopped this      Attempting to transition her to hemodialysis from -     Underwent ultrafiltration, on       Currently inpatient schedule with       So will  start outpatient HD set up by-consulted  now     Consulted IR for TDC -as using temporary HD catheter in right neck   IR prefers to do blood culture, due to leukocytosis, has been ordered, will follow-up,      Again today d/w pt, , nurse, Jason Baca DO , HD team   During this admission multiple times-discussion with patient, , multiple family members including son and daughter-in-law-wh is a nurse at [employer]      ===========================  During this hospital    Likely acute tubular necrosis -multifactorial-  Likely has developed acute tubular necrosis, in the setting of prolonged obstructive uropathy and urinary tract 
     MD Jp Stafford MD Samir Brahmbhatt, MD                                  Office: [phone]                 Fax: [phone]          [facility]                     NEPHROLOGY   INPATIENT PROGRESS NOTE:     PATIENT NAME: Yoseph Small  : 1955  MRN: [id number]  Name:  Yoseph Small Date/Time of Admission: 2024  8:27 PM    CSN: [id number] Attending Provider: Jason Baca DO   Room/Bed: [address]03- : 1955 Age: 68 y.o.       Subjective / interval history / nephrology update / medical decision making:   Refer to assessment and plan for more details.     Had dialysis today morning  Noted resting calmly in bed    Reported no active complaints/distress,   Renal labs noted.    No active shortness of breath  Still remains peripheral edema fluid overload  Hypoxemia needing high flow nasal cannula    Past 24 hours-total input output noted  Urine output has been oliguric range  Still noted to have intradialytic solute rise, needs to continue renal replacement therapy support for now      CRRT stopped this    Attempting to transition her to hemodialysis from 2024       Currently inpatient schedule with  + as needed   Next HD Friday - today   Next HD again on Saturday    Urine output has been decreasing from oliguric range to an uric  On , failed trial of Lasix 60 IV    Increased midodrine 10 to 15 mg 3 times daily  On metoprolol, per primary team, monitor for hypotension,        Discharge plan-started outpatient HD set up by-consulted  now  +  Consulted IR for TDC -as using temporary HD catheter in right neck   IR prefers to do blood culture, due to leukocytosis,-will follow-up,-culture drawn on 7-3-2024        Discussed with patient, , nurse, Jason Baca DO , HD team   During this admission multiple times-discussion with patient, , multiple family members including son and daughter-in-law-melinda is a 
     MD Jp Stafford MD Samir Brahmbhatt, MD                                  Office: [phone]                 Fax: [phone]          [employer]                     NEPHROLOGY   INPATIENT PROGRESS NOTE:     PATIENT NAME: Yoseph Small  : 1955  MRN: [id number]  Name:  Yoseph Small Date/Time of Admission: 2024  8:27 PM    CSN: [id number] Attending Provider: Gabrielle Reyna MD   Room/Bed: [facility]3903/3903-01 : 1955 Age: 68 y.o.          History of Presenting complaint:     Yoseph Small 68 y.o.  Patient admitted to the hospital after fall at home and pain in the right kidney.  She uses a walker to ambulate.   present at the bedside.  She is noted to have a displaced oblique/spiral distal femur fracture with intercondylar extension-    Nephrology consult for evaluation of severe renal failure.    Baseline lowest creatinine recently 0.9, with eGFR 70s as of 2024  On admission on 2024-5 0.2  Now Estimated Creatinine Clearance: 11 mL/min (A) (based on SCr of 6.7 mg/dL (HH)).    On admission BUN is 71 and a creatinine of 5.3.    Sodium is 135-potassium 5.6 [on Lokelma] CO2 17.    Patient has a history of stone disease.  She underwent lithotripsy and also right ureteral stent placement by  A week ago.    Urology consulted.    Patient is going to the OR for placement of ureteral stent.    Poor urine output on admission.    On IV fluids.    Patient received Lokelma for hyperkalemia.        During this hospitalization---  Urology intervention done on , with a dislodged right ureteral stent replaced without complication and cystoscopy ureteroscopy and retrograde pyelogram was otherwise unremarkable as per urology documentation  \" Indwelling ureteral stent double-J type with no string at age on right side    Urologist was Dr. Cavazos  Alaniz catheter per urology team        Subjective / interval history / nephrology update / medical decision making: 
     MD Jp Stafford MD Samir Brahmbhatt, MD                                  Office: [phone]                 Fax: [phone]          [employer]                     NEPHROLOGY   INPATIENT PROGRESS NOTE:     PATIENT NAME: Yoseph Small  : 1955  MRN: [id number]  Name:  Yoseph Small Date/Time of Admission: 2024  8:27 PM    CSN: [id number] Attending Provider: Maxim Mason MD   Room/Bed: ICU-3903/3903-01 : 1955 Age: 68 y.o.         Subjective / interval history / nephrology update / medical decision making:   Refer to assessment and plan for more details.     Remains currently ICU as overflow  Remains resting comfortably in bed    Still with peripheral edema  Active shortness of breath    Urine output is increasing    During this admission  As of -multiple times during this admission have discussed with , son, daughter-in-law,     Likely acute tubular necrosis -multifactorial-  Likely has developed acute tubular necrosis, in the setting of prolonged obstructive uropathy and urinary tract infection, severe sepsis, procalcitonin more than 100      Since no major improvement overall  Initiated dialysis on 2024  Second dialysis today-seen on dialysis, tolerating about 3 L fluid removal    Appreciated assistance by hospitalist Dr. Mason for right IJ nontunneled dialysis catheter placement - on 2024     Discussed about 3 days of low flow, to check how she tolerates and decrease risk of urea disequilibrium syndrome      -Will monitor this weekend, by early next week if no major improvement, then will consider TDC + outpatient placement for dialysis        Hypertension  Still  tachycardia      Renal labs noted no significant improvement currently    Over next few days we will see if she has any improvement after urology intervention    Currently some improvement in urine output slowly    Electrolytes are stable  Acidosis metabolic, high anion 
     MD Jp Stafford MD Samir Brahmbhatt, MD                                  Office: [phone]                 Fax: [phone]          [employer]                     NEPHROLOGY   INPATIENT PROGRESS NOTE:     PATIENT NAME: Yoseph Small  : 1955  MRN: [id number]  Name:  Yoseph Small Date/Time of Admission: 2024  8:27 PM    CSN: [id number] Attending Provider: Gabrielle Reyna MD   Room/Bed: [facility]4479/4479-01 : 1955 Age: 68 y.o.             Reason for Nephrology consult :  Evaluation of patient with worsening renal failure.            History of Presenting complaint:       Yoseph Small 68 y.o.  Patient admitted to the hospital after fall at home and pain in the right kidney.  She uses a walker to ambulate.   present at the bedside.  She is noted to have a displaced oblique/spiral distal femur fracture with intercondylar extension-    Nephrology consult for evaluation of severe renal failure.    Baseline lowest creatinine recently 0.9, with eGFR 70s as of 2024  On admission on 2024-5 0.2  Now Estimated Creatinine Clearance: 12 mL/min (A) (based on SCr of 5.9 mg/dL (HH)).      On admission BUN is 71 and a creatinine of 5.3.    Sodium is 135-potassium 5.6 [on Lokelma] CO2 17.    2 months ago her creatinine levels were 0.9.    Patient has a history of stone disease.  She underwent lithotripsy and also right ureteral stent placement by  A week ago.    Urology consulted.    Patient is going to the OR for placement of ureteral stent.    Poor urine output on admission.    On IV fluids.    Patient received Lokelma for hyperkalemia.        Subjective / interval history / nephrology update / medical decision making:   Refer to assessment and plan for more details.     Urology intervention done on , with a dislodged right ureteral stent replaced without complication and cystoscopy ureteroscopy and retrograde pyelogram was otherwise unremarkable as 
     MD Jp Stafford MD Samir Brahmbhatt, MD                                  Office: [phone]                 Fax: [phone]          [employer]                     NEPHROLOGY   INPATIENT PROGRESS NOTE:     PATIENT NAME: Yoseph Small  : 1955  MRN: [id number]  Name:  Yoseph Small Date/Time of Admission: 2024  8:27 PM    CSN: [id number] Attending Provider: Maxim Mason MD   Room/Bed: [facility]3903/3903-01 : 1955 Age: 68 y.o.         Subjective / interval history / nephrology update / medical decision making:   Refer to assessment and plan for more details.     Resting in bed comfortably  No active shortness of breath  Still remains well fluid overload  Hypoxemia needing high flow nasal cannula    Past 24 hours-total input output noted  Urine output has been oliguric range  Still with intradialytic solute rise, needs continuous renal replacement therapy support for now      CRRT stopped     Will attempt to transition her to hemodialysis today     If she tolerates hemodialysis well, will start outpatient HD set up by consulting  middle of this week   - then will consider TDC -using temporary HD catheter in right neck      Today d/w pt, , nurse, HD team   During this admission multiple times-discussion with patient, , multiple family members including son and daughter-in-law-wh is a nurse at [employer]      ===========================  During this hospital    Likely acute tubular necrosis -multifactorial-  Likely has developed acute tubular necrosis, in the setting of prolonged obstructive uropathy and urinary tract infection, severe sepsis, procalcitonin more than 100     Initiated dialysis on 2024     Appreciated assistance by hospitalist Dr. Mason for right IJ nontunneled dialysis catheter placement - on 2024         Hypertension  Still  tachycardia      Renal labs noted no significant improvement 
     MD Jp Stafford MD Samir Brahmbhatt, MD                                  Office: [phone]                 Fax: [phone]          [employer]                     NEPHROLOGY   INPATIENT PROGRESS NOTE:     PATIENT NAME: Yoseph Small  : 1955  MRN: [id number]  Name:  Yoseph Small Date/Time of Admission: 2024  8:27 PM    CSN: [id number] Attending Provider: Chris Sharma MD   Room/Bed: [facility]3903/3903-01 : 1955 Age: 68 y.o.       Subjective / interval history / nephrology update / medical decision making:   Refer to assessment and plan for more details.     Had dialysis today morning  Noted resting calmly in bed    Reported no active complaints/distress,   Renal labs noted.    No active shortness of breath  Still remains peripheral edema fluid overload  Hypoxemia needing high flow nasal cannula    Past 24 hours-total input output noted  Urine output has been oliguric range  Still noted to have intradialytic solute rise, needs to continue renal replacement therapy support for now      CRRT stopped this    Attempting to transition her to hemodialysis from 2024       Currently inpatient schedule with  + as needed   Underwent HD Friday - today   Next HD again on Saturday-  -Catheter not working, try heparin, and Cathflo  - Malfunctioning catheter currently, will need to be exchanged  - If IR is not able to exchange as early as Monday, will need another temporary dialysis catheter  - Will discuss with the hospital team    Urine output has been decreasing from oliguric range to an uric  On , failed trial of Lasix 60 IV    Will give another trial with albumin  Okay to remove Alaniz catheter    Increased midodrine 10 to 15 mg 3 times daily  On metoprolol, per primary team, monitor for hypotension,        Discharge plan-follow-up Monday, hopefully IR is able to insert a tunneled dialysis line    Consulted IR for TDC -as using temporary 
     MD Jp Stafford MD Samir Brahmbhatt, MD                                  Office: [phone]                 Fax: [phone]          [employer]                     NEPHROLOGY   INPATIENT PROGRESS NOTE:     PATIENT NAME: Yoseph Small  : 1955  MRN: [id number]      Name:  Yoseph Small Date/Time of Admission: 2024  8:27 PM    CSN: [id number] Attending Provider: Mark Galvan MD   Room/Bed: [facility]4479/4479-01 : 1955 Age: 68 y.o.             Reason for Nephrology consult :  Evaluation of patient with worsening renal failure.            History of Presenting complaint:       Yoseph Small 68 y.o.  Patient admitted to the hospital after fall at home and pain in the right kidney.  She uses a walker to ambulate.   present at the bedside.  She is noted to have a displaced oblique/spiral distal femur fracture with intercondylar extension-    Nephrology consult for evaluation of severe renal failure.      On admission BUN is 71 and a creatinine of 5.3.    Sodium is 135-potassium 5.6 [on Lokelma] CO2 17.    2 months ago her creatinine levels were 0.9.    Patient has a history of stone disease.  She underwent lithotripsy and also right ureteral stent placement by  A week ago.    Urology consulted.    Patient is going to the OR for placement of ureteral stent.    Poor urine output on admission.    On IV fluids.    Patient received Lokelma for hyperkalemia.        Subjective / interval history / nephrology update / medical decision making:   Refer to assessment and plan for more details.     Urology intervention done on , with a dislodged right ureteral stent replaced without complication and cystoscopy ureteroscopy and retrograde pyelogram was otherwise unremarkable as per urology documentation  \" Indwelling ureteral stent double-J type with no string at age on right side  Urologist was Dr. Cavazos    Alaniz catheter per urology team    Patient was seen 
     MD Jp Stafford MD Samir Brahmbhatt, MD                                  Office: [phone]                 Fax: [phone]          [employer]                     NEPHROLOGY   INPATIENT PROGRESS NOTE:     PATIENT NAME: Yoseph Small  : 1955  MRN: [id number]  Name:  Yoseph Small Date/Time of Admission: 2024  8:27 PM    CSN: [id number] Attending Provider: Gabrielle Reyna MD   Room/Bed: ICU-3903/3903-01 : 1955 Age: 68 y.o.         Subjective / interval history / nephrology update / medical decision making:   Refer to assessment and plan for more details.     Transferred to ICU as overflow  Remains resting comfortably in bed    Multiple times during this admission have discussed with , son, daughter-in-law,     Likely acute tubular necrosis in the setting of UTI, sepsis, multifactorial      Hypertension  Mild tachycardia  2 L nasal cannula worsened to 4 L nasal cannula  Very low urine output    Renal labs noted no significant improvement currently  Likely has developed acute tubular necrosis, in the setting of prolonged obstructive uropathy and urinary tract infection, severe sepsis, procalcitonin more than 100    Over next few days we will see if she has any improvement after urology intervention    Currently some improvement in urine output slowly    Electrolytes are stable  Acidosis metabolic, high anion gap, slightly improving  Hyponatremia stable chronic  Hyperkalemia mild improving  Hypocalcemia, corrected for hypoalbuminemia-check albumin level    Anemia worsened after urology intervention      Checked iron studies -likely suggestive of iron deficiency  Stopped oral iron, give IV iron    Rechecked renal ultrasound per urologist.-No major issues    Prerenal  Monitor urinary tract infection with culture    Had hypotension-  Stopped metoprolol  Holding antihypertensive medications  Midodrine 5 mg 3 times daily PO   -Increased to 10 mg 3 times 
     MD Jp Stafford MD Samir Brahmbhatt, MD                                  Office: [phone]                 Fax: [phone]          [employer]                     NEPHROLOGY   INPATIENT PROGRESS NOTE:     PATIENT NAME: Yoseph Small  : 1955  MRN: [id number]  Name:  Yoseph Small Date/Time of Admission: 2024  8:27 PM    CSN: [id number] Attending Provider: Jason Baca DO   [address]/Bed: [address]03- : 1955 Age: 68 y.o.       Subjective / interval history / nephrology update / medical decision making:   Refer to assessment and plan for more details.     Patient was seen comfortably sitting up in bed,   Reported no active complaints/distress,   Renal labs noted.    No active shortness of breath  Still remains well fluid overload  Hypoxemia needing high flow nasal cannula    Past 24 hours-total input output noted  Urine output has been oliguric range  Still with intradialytic solute rise, needs continuous renal replacement therapy support for now      CRRT stopped this      Attempting to transition her to hemodialysis from 2024     UF only today   Next HD Wednesday       So will  start outpatient HD set up by consulting  now     Consult IR for TDC -as using temporary HD catheter in right neck       Today d/w pt, , nurse, Jason Baca DO , HD team   During this admission multiple times-discussion with patient, , multiple family members including son and daughter-in-law-wh is a nurse at [employer]      ===========================  During this hospital    Likely acute tubular necrosis -multifactorial-  Likely has developed acute tubular necrosis, in the setting of prolonged obstructive uropathy and urinary tract infection, severe sepsis, procalcitonin more than 100     Initiated dialysis on 2024     Appreciated assistance by hospitalist Dr. Mason for right IJ nontunneled dialysis catheter placement - on 
     MD Jp Stafford MD Samir Brahmbhatt, MD                               Office: [phone]                 Fax: [phone]          [employer]                     NEPHROLOGY INPATIENT PROGRESS NOTE:     PATIENT NAME: Yoseph Small  : 1955  MRN: [id number]  Name:  Yoseph Small Date/Time of Admission: 2024  8:27 PM    CSN: [id number] Attending Provider: Mark Galvan MD   Room/Bed: [facility]4471/4471-02 : 1955 Age: 68 y.o.       Subjective / interval history / nephrology update / medical decision making:   Refer to assessment and plan for more details.     Out of ICU now  Noted resting comfortably in bed  Sleepy but arousable to loud voice and following, - still   at bedside    With worsening acidosis, reviewed blood gas, lactic acid -stable   Hopefully after dialysis today would have some improvement in renal function  Low threshold to transfer to a higher level of care*    Renal labs noted and reviewed    No active shortness of breath  Still remains peripheral edema fluid overload  Hypoxemia needing high flow nasal cannula-Taper off    Past 24 hours-total input output noted  Urine output has been oliguric range  Still noted to have intradialytic solutes rise + anuric   = needs to continue renal replacement therapy support for now, while closely monitoring for renal recovery      CRRT stopped this    -Might needed back if hypotension does not improve with intervention with dialysis with sodium profile, low HD temperature, low flow,  -s/p HD Monday  - next HD Tuesday - needed midodrine , albumin, low HD temp, Na profile  - next HD Thursday     -add NaHCo3 PO pills    -check Ammonia     Attempting transited to hemodialysis from 2024     Currently inpatient schedule with  + as needed   - appreciated IR for insertion on a tunneled dialysis      Urine output has been decreasing from oliguric range to an uric  On , failed trial 
     MD Jp Stafford MD Samir Brahmbhatt, MD                               Office: [phone]                 Fax: [phone]          [employer]                     NEPHROLOGY INPATIENT PROGRESS NOTE:     PATIENT NAME: Yoseph Small  : 1955  MRN: [id number]  Name:  Yoseph Small Date/Time of Admission: 2024  8:27 PM    CSN: [id number] Attending Provider: Mark Galvan MD   Room/Bed: [facility]4471/4471-02 : 1955 Age: 68 y.o.       Subjective  Seen on HD.  Feels tired.  Lower blood flows with HD, heparin given.      Renal labs noted and reviewed       Per Dr. Cameron's notes:  Past 24 hours-total input output noted  Urine output has been oliguric range  Still noted to have intradialytic solutes rise + anuric   = needs to continue renal replacement therapy support for now, while closely monitoring for renal recovery      Initiated dialysis on 2024   CRRT stopped this    On HD- ntervention with dialysis with sodium profile, low HD temperature, low flow,   - next HD Thursday, with slightly lower BP- needed midodrine , albumin, low HD temp, Na profile  - next HD Saturday   - Currently inpatient schedule with [facility]    - appreciated IR for insertion on a tunneled dialysis      Urine output has been decreasing from oliguric range to anuric  On , failed trial of Lasix 60 IV    Will give another trial with albumin  Okay to remove Alaniz catheter    Increased midodrine 10 to 15 mg 3 times daily  On metoprolol, per primary team, monitor for hypotension,  Next can add low dose Florinef      Discharge plan- pending placement,   Have Discussed with patient, , her son multiple times during this admission    And team nurse, Mark Galvan MD , HD team and   nurse at bedside also  During this admission multiple times-discussion with patient, , multiple family members including son and daughter-in-law-melinda is a nurse at [employer] acute 
     MD Jp Stafford MD Samir Brahmbhatt, MD                               Office: [phone]                 Fax: [phone]          [employer]                     NEPHROLOGY INPATIENT PROGRESS NOTE:     PATIENT NAME: Yoseph Small  : 1955  MRN: [id number]  Name:  Yoseph Small Date/Time of Admission: 2024  8:27 PM    CSN: [id number] Attending Provider: Mark Galvan MD   Room/Bed: [address] [place], [place] 481771/4471-02 : 1955 Age: 68 y.o.       Subjective / interval history / nephrology update / medical decision making:   Refer to assessment and plan for more details.     More lethargic   arousable to loud voice and following, - still  Noted resting comfortably in bed    With worsening acidosis  Recheck LA, VBG-likely suggestive of metabolic acidosis    Was changed LR to NaHCo3 infusion       Renal labs noted and reviewed    No active shortness of breath  Still remains peripheral edema fluid overload  Hypoxemia needing high flow nasal cannula-Taper off    Past 24 hours-total input output noted  Urine output has been oliguric range  Still noted to have intradialytic solutes rise + anuric   = needs to continue renal replacement therapy support for now, while closely monitoring for renal recovery      CRRT stopped this    -Might needed back if hypotension does not improve with intervention with dialysis with sodium profile, low HD temperature, low flow,  -s/p HD Monday  - next HD Tuesday - needed midodrine , albumin, low HD temp, Na profile  - next HD Thursday  On dialysis today  Resting comfortably      -added NaHCo3 PO pills for discharge    -checked Ammonia  - WNL    Attempting transited to hemodialysis from -     Currently inpatient schedule with  + as needed   - appreciated IR for insertion on a tunneled dialysis      Urine output has been decreasing from oliguric range to an uric  On , failed trial of Lasix 60 IV    Will give another trial 
    Clinical Pharmacy Note: Pharmacy to Dose Vancomycin    Vancomycin Day: 4  Indication: UTI  Current Dose: dosing by levels - BARBARA  Dosing Method: Dosing by random levels    Random: 14 mcg/mL    Recent Labs     06/25/24  0508 06/26/24  0448   BUN 99* 99*       Recent Labs     06/25/24  0508 06/26/24  0448   CREATININE 6.4* 6.1*       Recent Labs     06/24/24  0544 06/25/24  0508   WBC 5.8 7.0         Intake/Output Summary (Last 24 hours) at 6/26/2024 0746  Last data filed at 6/26/2024 0709  Gross per 24 hour   Intake 611.98 ml   Output 1450 ml   Net -838.02 ml         Ht Readings from Last 1 Encounters:   06/20/24 1.651 m (5' 5\")        Wt Readings from Last 1 Encounters:   06/26/24 (!) 136.2 kg (300 lb 4.8 oz)         Body mass index is 49.97 kg/m².    Estimated Creatinine Clearance: 13 mL/min (A) (based on SCr of 6.1 mg/dL (HH)).      Assessment/Plan:  Vancomycin level is therapeutic.  Will redose vancomycin 1000 mg IV one time today.   A vancomycin random level has been ordered on 6/27 at 0600 for follow-up.  Changes in regimen will be determined based on culture results, renal function, and clinical response.  Pharmacy will continue to monitor and adjust regimen as necessary.    Thank you for the consult,    Angie Sanders, PharmD, BCPS  Clinical Pharmacist  [id number]     
    Clinical Pharmacy Note: Pharmacy to Dose Vancomycin    Vancomycin Day: 5  Indication: UTI  Current Dose: dosing by levels - BARBARA, started HD yesterday   Dosing Method: Dosing by random levels    Random: 19.2 mcg/mL    Recent Labs     06/26/24  0448 06/27/24  0555   BUN 99* 63*       Recent Labs     06/26/24  0448 06/27/24  0555   CREATININE 6.1* 3.9*       Recent Labs     06/25/24  0508 06/27/24  0555   WBC 7.0 8.7         Intake/Output Summary (Last 24 hours) at 6/27/2024 1125  Last data filed at 6/27/2024 0600  Gross per 24 hour   Intake --   Output 1187 ml   Net -1187 ml         Ht Readings from Last 1 Encounters:   06/26/24 1.651 m (5' 5\")        Wt Readings from Last 1 Encounters:   06/27/24 135.2 kg (298 lb 1 oz)         Body mass index is 49.6 kg/m².    Estimated Creatinine Clearance: 20 mL/min (A) (based on SCr of 3.9 mg/dL (H)).      Assessment/Plan:  Vancomycin level is therapeutic.  Will redose vancomycin 750 mg IV one time today.   A vancomycin random level has been ordered on 6/28 at 0600 for follow-up.  Changes in regimen will be determined based on culture results, renal function, and clinical response.  Pharmacy will continue to monitor and adjust regimen as necessary.    Thank you for the consult,    Angie Sanders, PharmD, BCPS  Clinical Pharmacist  [id number]     
    Clinical Pharmacy Note: Pharmacy to Dose Vancomycin    Vancomycin Day: 6  Indication: UTI  Current Dose: dosing by levels - now starting CRRT  Dosing Method: Dosing by random levels    Random: 18.8 mcg/mL    Recent Labs     06/27/24  0555 06/28/24  0423   BUN 63* 52*       Recent Labs     06/27/24  0555 06/28/24  0423   CREATININE 3.9* 3.6*       Recent Labs     06/27/24  0555   WBC 8.7         Intake/Output Summary (Last 24 hours) at 6/28/2024 1157  Last data filed at 6/28/2024 0400  Gross per 24 hour   Intake 2032.77 ml   Output 485 ml   Net 1547.77 ml         Ht Readings from Last 1 Encounters:   06/28/24 1.651 m (5' 5\")        Wt Readings from Last 1 Encounters:   06/28/24 135.1 kg (297 lb 13.5 oz)         Body mass index is 49.56 kg/m².    Estimated Creatinine Clearance: 21 mL/min (A) (based on SCr of 3.6 mg/dL (H)).      Assessment/Plan:  Vancomycin level is therapeutic.  Will redose vancomycin 1000 mg IV one time today.   A vancomycin random level has been ordered on 6/29 at 0600 for follow-up.  Changes in regimen will be determined based on culture results, renal function, and clinical response.  Pharmacy will continue to monitor and adjust regimen as necessary.    Thank you for the consult,    Angie Sanders, PharmD, BCPS  Clinical Pharmacist  [id number]     
    Date of Admission: 6/20/2024. Hospital Day: 3       Assessment/Plan:  [age] female admitted with fall and right distal femur fracture found to have severe BARBARA and hyperkalemia.  Had right ureteral stent done few days ago, found to be malpositioned , but exchanged but renal function continues to worsen, patient is oliguric.  Undergoing fracture surgery today after extensive discussion on risk and benefits    Active Hospital Problems    Diagnosis     Fracture, subtrochanteric, right femur, closed, initial encounter ([facility]) [S72.21XA]      BARBARA/Hyperkalemia/UTI/sepsis  Pt  oliguric ,  had recent  rt renal stent. . Xr  kub showed malpositioned DJ stent  . Stent exchanged 6/21 without recovery of renal fn.    Suspect ATN  at this point from prolonged obstn.  Ur output may be picking up, was anuric yesterday, today ~150 ml.   Cont ivf  d5 bicarb  , start  iv rocephin pending blood  cx , ur cx   Monitor ur out , renal fn closely  High risk of  decompensation, mainly renal and  may need urgent HD   Cont midodrine for hypotension    Acute resp failure  On 4 L was on ra yesterday. Has signs of vol overload  Dec ivf to 50 /hr. D/w dr Krause from nephro        Rt femur fracture  Need surgical fixation but high risk  for periop cardiac and resp events. Had detailed d/w family  High prob of staying intubated following surgery  Ortho and anesthesia following          DVT Prophylaxis:    Diet: Diet NPO  Code Status: Full Code      Dispo - snf    All  follow up labs and imaging personally reviewed      Chief Complaint:   Chief Complaint   Patient presents with    Fall     Came by [facility] from home with c/o falling while going to the bathroom. C/o 10/10 pain to right knee. Unsure if hit head when falling. 50mcg Fentanyl given en route         Interval  History:   Pt seen this am. Sleepy, in some distress from pain   Remain oliguric, ~150 ml ur seen in bag   Cr up to 6.6 ,other electrolytes stable  Medications:  
    Date of Admission: 6/20/2024. Hospital Day: 4       Brief course:This 68-year-old female admitted with fall and right distal femur fracture.  Found to have have severe BARBARA and hyperkalemia likely 2/2 malpositioned ureteral stent; s/p exchanged but renal function continues to worsen, patient is oliguric.      Interval  History:   Pt seen and examined today.  Overnight events noted, interval ancillary notes and labs reviewed.   On 4 L nasal cannula satting around 94%; wean as tolerated keep sat above 92%  Afebrile overnight, WBCs and UA pending.  Blood cultures NGTD.  Creatinine pending.  About 500 cc output past 24 hours  Elevated BG this morning; diet switched to carb controlled.  Continue SSI  Unable to obtain labs today as patient fluid overloaded and lab unable to draw.   midline line ordered  Resting in bed; reported right leg pain but denied any fever, chills, chest pain or SOB        Assessment and plan    BARBARA/Hyperkalemia; likely 2/2 ATN in setting of obstructive uropathy  S/p right ureteral stent.  KUB on admission showed malpositioned DJ stent  Urology consulted; underwent stent exchanged 6/2  some improvement in urine output noted.  Hyperkalemia resolved  Nephrology on board; trial of IV albumin to help with oncotic pressure    Acute resp failure; likely secondary to fluid overload on 4 L NC satting around 93%  IV fluids rate decreased    UTI; urine culture growing Enterococcus faecalis and Citrobacter.  Continue to put awaiting culture and sensitivities    Right femur fracture; Orthopedic surgery consulted; underwent right distal femur ORIF on 6/22/2024  Continue as needed antiemetics.  PT/OT consulted    Hypotension; continue midodrine.  BP medication on hold    Hyperlipidemia; continue statins.    Diabetes mellitus; HgbA1c 7.4 on 4/12/2024.  Continue SSI and monitor BG closely      DVT Prophylaxis:    Diet: ADULT DIET; Regular; Low Sodium (2 gm)  Code Status: Full Code      Dispo 
    Date of Admission: 6/20/2024. Hospital Day: 5       Brief course:This 68-year-old female admitted with fall and right distal femur fracture.  Found to have have severe BARBARA and hyperkalemia likely 2/2 malpositioned ureteral stent; s/p stent exchange.  Developed BARBARA likely ATN due to obstructive uropathy/sepsis.  Some improvement in urine output.  S/p right distal femur ORIF.  Drain removed today.  PT OT consulted        Interval  History:   Pt seen and examined today.  Overnight events noted, interval ancillary notes and labs reviewed.   On 4 L NC satting around 93%; wean as tolerated sat above 92%  Afebrile overnight, WBCs trended down.  Pro-Srinath down to 43.35 blood cultures NGTD.    Hgb 8.8 this a.m.  Iron studies suggestive of deficiency; started on IV iron.  Check occult stool  Urine culture grew Citrobacter and Enterococcus faecalis  Creatinine remained around 6.7.  About 450 cc of urine output in past 24 hours.  Reported itching after receiving cefepime.  Benadryl ordered          Assessment and plan    BARBARA likely 2/2 ATN due to obstructive uropathy/hypotension in setting of sepsis 2/2 UTI  S/p right ureteral stent.  KUB on admission showed malpositioned DJ stent  Urology consulted; underwent stent exchanged 6/21  Nephrology on board; IV fluid DC'd secondary to third spacing.    S/p IV albumin; with some improvement in urine output.  Strict I& O.  Monitor renal function closely    Hyperkalemia; resolved.  Monitor potassium level closely    Acute hypoxia; likely 2/2 fluid overload/atelectasis  IV fluid DC'd.  BNP and CXR.  Wean as tolerated sat above 92%    UTI; urine culture growing Enterococcus faecalis and Citrobacter.   Patient has multiple drug allergies and thrombocytopenia.  Currently on cefepime and Vanco.  Pharmacy monitoring when dosing    Sepsis 2/2 UTI; continue antibiotics    Right femur fracture;   Orthopedic surgery on board; underwent right distal femur ORIF on 6/22/24.  Drain removed 
    Date of Admission: 6/20/2024. Hospital Day: 6       Brief course:This 68-year-old female admitted with fall and right distal femur fracture.  Found to have have severe BARBARA and hyperkalemia likely 2/2 malpositioned ureteral stent; s/p stent exchange.  Developed BARBARA likely ATN due to obstructive uropathy/sepsis.  Some improvement in urine output. S/p right distal femur ORIF.  Drain removed today.  PT OT eval ongoing        Interval  History:   Pt seen and examined today.  Overnight events noted, interval ancillary notes and labs reviewed.   On 4 L NC satting around 94%; wean as tolerated sat above 92%  Afebrile overnight, WBCs WNL, blood cultures NGTD.  Hgb 8.5 this morning.    Creatinine remained elevated; 6.4 this morning  Up in chair; reported right knee, foot and generalized bodyaches and pain.  Denies any fever, chills, numbness, tingling, SOB, nausea, vomiting or abdominal pain        Assessment and plan    BARBARA likely 2/2 ATN due to obstructive uropathy/hypotension in setting of sepsis 2/2 UTI  S/p right ureteral stent.  KUB on admission showed malpositioned DJ stent  Urology consulted; underwent stent exchanged 6/21  Nephrology on board; IV fluid DC'd secondary to third spacing.    S/p IV albumin; with some improvement in urine output.  Strict I& O.  Monitor renal function closely    Hyperkalemia; resolved.  Monitor potassium level closely    Acute hypoxia; likely 2/2 fluid overload/atelectasis  proBNP elevated > 12 K.  CXR with mild cardiomegaly and central pulmonary congestion.  Hazy bibasilar opacities s/o pulmonary edema/atelectasis. IV fluid DC'd.  Incentive spirometry every 2 hours.    Wean O2 as tolerated sat above 92%    UTI; urine culture growing Enterococcus faecalis and Citrobacter.   Patient has multiple drug allergies and thrombocytopenia.  Currently on cefepime and Vanco.  Pharmacy monitoring when dosing    Sepsis 2/2 UTI; continue antibiotics    Right femur fracture; Orthopedic surgery on 
    Infectious Diseases   Progress Note      Admission Date: 6/20/2024  Hospital Day: Hospital Day: 19   Attending: Chris Mcdermott MD  Date of service: 7/8/2024     Chief complaint/ Reason for consult:     Concern for sepsis with hypotension and worsening leukocytosis  Citrobacter and Enterococcus faecalis urinary tract infection on admission  Acute hypoxic respiratory failure on admission, was thought to be secondary to pulm edema and fluid overload  Acute renal failure, requiring hemodialysis, serum creatinine is 5.6 today  Elevated procalcitonin of 43.35 on 6/24/2024  Atrial fibrillation with rapid ventricular response on admission    Microbiology:        I have reviewed allavailable micro lab data and cultures    Blood culture (2/2) - collected on 7/3/2024: Negative    Urine culture  - collected on 7/6/2024: Greater than 100,000 CFU per mL of Candida albicans      Antibiotics and immunizations:       Current antibiotics: All antibiotics and their doses were reviewed by me    Recent Abx Admin        No antibiotic orders with administrations found.                      Immunization History: All immunization history was reviewed by me today.    Immunization History   Administered Date(s) Administered    COVID-19, PFIZER Bivalent, DO NOT Dilute, (age 12y+), IM, 30 mcg/0.3 mL 10/07/2022    COVID-19, PFIZER PURPLE top, DILUTE for use, (age 12 y+), 30mcg/0.3mL 12/06/2021    Influenza Vaccine, unspecified formulation 08/29/2011, 09/18/2012, 09/10/2013, 09/08/2014, 10/07/2015, 10/17/2016, 11/27/2017    Influenza Virus Vaccine 09/26/2018, 09/16/2019    Pneumococcal, PPSV23, PNEUMOVAX 23, (age 2y+), SC/IM, 0.5mL 06/13/2011    TDaP, ADACEL (age 10y-64y), BOOSTRIX (age 10y+), IM, 0.5mL 06/13/2011       Known drug allergies:     All allergies were reviewed and updated    Allergies   Allergen Reactions    Amitriptyline Hcl Shortness Of Breath    Cephalexin Shortness Of Breath    Ciprofloxacin Shortness Of Breath     
    Infectious Diseases   Progress Note      Admission Date: 6/20/2024  Hospital Day: Hospital Day: 22   Attending: Mark Galvan MD  Date of service: 7/11/2024     Chief complaint/ Reason for consult:     Concern for sepsis with hypotension and worsening leukocytosis  Citrobacter and Enterococcus faecalis urinary tract infection on admission  Acute hypoxic respiratory failure on admission, was thought to be secondary to pulm edema and fluid overload  Acute renal failure, requiring hemodialysis, serum creatinine is 5.6 today  Elevated procalcitonin of 43.35 on 6/24/2024  Atrial fibrillation with rapid ventricular response on admission    Microbiology:        I have reviewed allavailable micro lab data and cultures    Blood culture (2/2) - collected on 7/3/2024: Negative    Urine culture  - collected on 7/6/2024: Greater than 100,000 CFU per mL of Candida albicans      Antibiotics and immunizations:       Current antibiotics: All antibiotics and their doses were reviewed by me    Recent Abx Admin                     cefUROXime (CEFTIN) tablet 250 mg (mg) 250 mg Given 07/11/24 2017     250 mg Given  1456    rifAXIMin (XIFAXAN) tablet 550 mg (mg) 550 mg Given 07/11/24 2017    fluconazole (DIFLUCAN) tablet 100 mg (mg) 100 mg Given 07/11/24 1119                      Immunization History: All immunization history was reviewed by me today.    Immunization History   Administered Date(s) Administered    COVID-19, PFIZER Bivalent, DO NOT Dilute, (age 12y+), IM, 30 mcg/0.3 mL 10/07/2022    COVID-19, PFIZER PURPLE top, DILUTE for use, (age 12 y+), 30mcg/0.3mL 12/06/2021    Influenza Vaccine, unspecified formulation 08/29/2011, 09/18/2012, 09/10/2013, 09/08/2014, 10/07/2015, 10/17/2016, 11/27/2017    Influenza Virus Vaccine 09/26/2018, 09/16/2019    Pneumococcal, PPSV23, PNEUMOVAX 23, (age 2y+), SC/IM, 0.5mL 06/13/2011    TDaP, ADACEL (age 10y-64y), BOOSTRIX (age 10y+), IM, 0.5mL 06/13/2011       Known drug allergies:     All 
    PULMONARY AND CRITICAL CARE MEDICINE PROGRESS NOTE    Subjective: Patient is tolerating CRRT with fluid removal.  On 10 L/min FiO2 and saturating 97 to 98%.  Off amiodarone drip.  On low-dose Precedex at 0.4.  Wore BiPAP last night.    REVIEW OF SYSTEMS:   Constitutional symptoms: The patient denies fever, fatigue, night sweats, weight loss or weight gain.   HEENT: No vision changes. No tinnitus, Denies sinus pain. No hoarseness, or dysphagia.   Neck: Patient denies swelling in the neck.   Cardiovascular: Denies chest pain, palpitation, syncope.  Respiratory: Positive for shortness of breath or cough.   Gastrointestinal: Denies nausea, abdominal pain or change in bowel function.  Genitourinary: Denies obstructive symptoms. No history of incontinence.  Skin: No rashes or itching.   Muskuloskeletal: Denies weakness or bone pain.   Neurological: No headaches or seizures.   Psychiatric: Denies mood swings or depression.     MEDICATIONS:     Scheduled Meds:   cefepime  1,000 mg IntraVENous Q8H    amiodarone  200 mg Oral BID    metoprolol succinate  100 mg Oral Daily    heparin (porcine)  5,000 Units SubCUTAneous BID    midodrine  10 mg Oral TID WC    sodium chloride flush  5-40 mL IntraVENous 2 times per day    vancomycin (VANCOCIN) intermittent dosing (placeholder)   Other RX Placeholder    sodium bicarbonate  650 mg Oral 4x Daily    sennosides-docusate sodium  1 tablet Oral BID    atorvastatin  10 mg Oral Daily    ferrous sulfate  324 mg Oral Daily with breakfast    insulin lispro  0-8 Units SubCUTAneous TID WC    insulin lispro  0-4 Units SubCUTAneous Nightly    pantoprazole  40 mg Oral QAM AC    Vitamin D  1,000 Units Oral Daily       Current Infusions:    prismaSATE BGK 4/2.5 500 mL/hr at 06/29/24 1127    prismaSATE BGK 4/2.5 500 mL/hr at 06/29/24 1127    prismaSATE BGK 4/2.5 500 mL/hr at 06/29/24 1128    dexmedeTOMIDine 0.4 mcg/kg/hr (06/29/24 0700)    sodium chloride 10 mL/hr at 06/23/24 6308    sodium 
    PULMONARY AND CRITICAL CARE MEDICINE PROGRESS NOTE    Subjective: Patient remains on CRRT with blood fluid removal.  On 8 L/min FiO2 and saturating well.  Improving creatinine.  On low-dose Precedex    REVIEW OF SYSTEMS:   Constitutional symptoms: The patient denies fever, fatigue, night sweats, weight loss or weight gain.   HEENT: No vision changes. No tinnitus, Denies sinus pain. No hoarseness, or dysphagia.   Neck: Patient denies swelling in the neck.   Cardiovascular: Denies chest pain, palpitation, syncope.  Respiratory: Positive for shortness of breath or cough.   Gastrointestinal: Denies nausea, abdominal pain or change in bowel function.  Genitourinary: Denies obstructive symptoms. No history of incontinence.  Skin: No rashes or itching.   Muskuloskeletal: Denies weakness or bone pain.   Neurological: No headaches or seizures.   Psychiatric: Denies mood swings or depression.     MEDICATIONS:     Scheduled Meds:   metoprolol succinate  100 mg Oral Daily    heparin (porcine)  5,000 Units SubCUTAneous BID    midodrine  10 mg Oral TID WC    sodium chloride flush  5-40 mL IntraVENous 2 times per day    sodium bicarbonate  650 mg Oral 4x Daily    sennosides-docusate sodium  1 tablet Oral BID    atorvastatin  10 mg Oral Daily    ferrous sulfate  324 mg Oral Daily with breakfast    insulin lispro  0-8 Units SubCUTAneous TID WC    insulin lispro  0-4 Units SubCUTAneous Nightly    pantoprazole  40 mg Oral QAM AC    Vitamin D  1,000 Units Oral Daily       Current Infusions:    prismaSATE BGK 4/2.5 500 mL/hr at 06/30/24 0858    prismaSATE BGK 4/2.5 500 mL/hr at 06/30/24 0858    prismaSATE BGK 4/2.5 500 mL/hr at 06/30/24 0858    dexmedeTOMIDine 0.2 mcg/kg/hr (06/30/24 0523)    sodium chloride 10 mL/hr at 06/23/24 2344    sodium chloride Stopped (06/29/24 1234)    sodium chloride      dextrose      sodium chloride      sodium chloride         PRN meds:  potassium chloride, magnesium sulfate, calcium gluconate **OR** 
   06/27/24 0101   NIV Type   $NIV $Daily Charge   Ventilator ID [id number]   NIV Started/Stopped On   Equipment Type V60   Mode Bilevel   Mask Type Full face mask   Mask Size Medium   Assessment   Pulse 83   Respirations 13   SpO2 100 %   Breath Sounds   Respiratory Pattern Regular   Breath Sounds Bilateral Diminished   Settings/Measurements   PIP Observed 11 cm H20   IPAP 12 cmH20   CPAP/EPAP 8 cmH2O   Vt (Measured) 435 mL   Rate Ordered 10   FiO2  40 %   I Time/ I Time % 1 s   Minute Volume (L/min) 6 Liters   Mask Leak (lpm) 37 lpm   Patient's Home Machine No   Alarm Settings   Alarms On Y   Low Pressure (cmH2O) 3 cmH2O   High Pressure (cmH2O) 30 cmH2O   RR Low (bpm) 12   RR High (bpm) 40 br/min   Oxygen Therapy/Pulse Ox   O2 Therapy Oxygen   $Oxygen $Daily Charge   O2 Device PAP (positive airway pressure)   Pulse Oximeter Device Mode Continuous   Pulse Oximeter Device Location Finger   $Pulse Oximeter $Spot check (multiple/continuous)       
   06/30/24 2027   Oxygen Therapy/Pulse Ox   O2 Therapy Oxygen humidified   O2 Device High flow nasal cannula   O2 Flow Rate (L/min) (S)  4 L/min   Pulse 86   Respirations 22   SpO2 95 %       
   06/30/24 2346   NIV Type   $NIV $Daily Charge   Ventilator ID [id number]   NIV Started/Stopped On   Equipment Type V60   Mode Bilevel   Mask Type Nasal mask   Mask Size Medium   Assessment   Pulse (!) 103   Respirations 23   SpO2 100 %   Breath Sounds   Respiratory Pattern Regular;Tachypneic   Breath Sounds Bilateral Diminished   Settings/Measurements   PIP Observed 16 cm H20   IPAP 14 cmH20   CPAP/EPAP 10 cmH2O   Vt (Measured) 576 mL   Rate Ordered 10   FiO2  50 %   I Time/ I Time % 1 s   Minute Volume (L/min) 8.9 Liters   Mask Leak (lpm) 31 lpm   Patient's Home Machine No   Alarm Settings   Alarms On Y   Low Pressure (cmH2O) 3 cmH2O   High Pressure (cmH2O) 30 cmH2O   Apnea (secs) 20 secs   RR Low (bpm) 9   RR High (bpm) 40 br/min   Oxygen Therapy/Pulse Ox   O2 Therapy Oxygen   O2 Device PAP (positive airway pressure)       
   07/01/24 0354   NIV Type   Ventilator ID [id number]   NIV Started/Stopped On   Equipment Type V60   Mode Bilevel   Mask Type Nasal mask   Mask Size Medium   Assessment   Pulse 95   Respirations 15   SpO2 100 %   Breath Sounds   Respiratory Pattern Regular   Breath Sounds Bilateral Diminished   Settings/Measurements   PIP Observed 15 cm H20   IPAP 14 cmH20   CPAP/EPAP 10 cmH2O   Vt (Measured) 512 mL   Rate Ordered 16   FiO2  50 %   Minute Volume (L/min) 8.1 Liters   Mask Leak (lpm) 13 lpm   Patient's Home Machine No   Alarm Settings   Alarms On Y   Low Pressure (cmH2O) 3 cmH2O   High Pressure (cmH2O) 30 cmH2O   RR Low (bpm) 9   RR High (bpm) 40 br/min   Oxygen Therapy/Pulse Ox   O2 Therapy Oxygen   O2 Device PAP (positive airway pressure)       
   07/01/24 1645   NIV Type   $NIV $Daily Charge   Equipment Type Home unit   Mask Type Nasal pillows   Assessment   Pulse 92   Respirations 19   SpO2 95 %   Settings/Measurements   O2 Flow Rate (L/min) 4 L/min   Oxygen Therapy/Pulse Ox   O2 Therapy Oxygen humidified   O2 Device PAP (positive airway pressure)       
   07/08/24 2154   NIV Type   Equipment Type Home unit       
   MD Jp Stafford MD Samir Brahmbhatt, MD                                  Office: [phone]                 Fax: [phone]          [facility]                    NEPHROLOGY  PROGRESS NOTE:     PATIENT NAME: Yoseph Small  : 1955  MRN: [id number]        Indication for Dialysis severe BARBARA      She was not able to complete her entire dialysis treatment due to machine malfunction.    Potassium levels are stable.    Get a chest x-ray to rule out any fluid overload.    Planning to do hemodialysis treatment tomorrow.    Monitor anemia levels-continue EPO      Continue to monitor closely for renal recovery.      Tolerating treatment  Fairly well  Vitals:    24 0845   BP: 139/70   Pulse: (!) 109   Resp: 16   Temp: 97.9 °F (36.6 °C)   SpO2: 95%       Neck. JVD visible  Cardiac No pericardial rub. Flow murmur.  Chest: Bilateral Rales. No rhonchi  Ext :  Edema mild    Labs Reviewed  by me   Labs   Lab Results   Component Value Date    CREATININE 5.0 (H) 2024    BUN 23 (H) 2024     2024    K 4.3 2024    CL 99 2024    CO2 21 2024     Lab Results   Component Value Date    WBC 6.3 2024    HGB 8.1 (L) 2024    HCT 26.6 (L) 2024    MCV 96.2 2024     (L) 2024           
   MD Jp Stafford MD Samir Brahmbhatt, MD                                  Office: [phone]                 Fax: [phone]          [facility]                    NEPHROLOGY  HEMO-DIALYSIS PROGRESS NOTE:     PATIENT NAME: Yoseph Small  : 1955  MRN: [id number]        Indication for Dialysis  ESRD  Patient seen on dialysis treatment.    Machine malfunction noted after 2 hours    Tolerating treatment  Fairly well  Vitals:    24 1100   BP: (!) 110/50   Pulse: (!) 105   Resp: 18   Temp: 98 °F (36.7 °C)   SpO2:        Neck. JVD visible  Cardiac No pericardial rub. Flow murmur.  Chest: Bilateral Rales. No rhonchi  Ext :  Edema mild    Labs Reviewed  by me   Labs   Lab Results   Component Value Date    CREATININE 4.3 (H) 2024    BUN 17 2024     2024    K 3.8 2024     2024    CO2 23 2024     Lab Results   Component Value Date    WBC 9.3 07/10/2024    HGB 8.1 (L) 07/10/2024    HCT 26.9 (L) 07/10/2024    MCV 95.4 07/10/2024     (L) 07/10/2024       Dialysis Treatment and Prescription reviewed          RX:  See dialysis flowsheet for specifics on access, blood flow rate, dialysate baths, duration of dialysis, anticoagulation and other technical information.    COMMENTS:  stable on dialysis.    Continue to Target dry weight and clearance.    Monitor closely for any hypotension    Dialysis treatment plan and dialysis orders discussed with dialysis RN @ bedside    Tolerating dialysis well, with no complications.  Good flow access.  Anemia. Stable.Continue Aransep as per protocol.    Fluid Overload. Stable  Fluid removal as tolerated with dialysis.  Stable from Renal.  Discussed with Patient       
  [facility] - Inpatient Rehabilitation Department   Phone: [phone]    Occupational Therapy    [] Initial Evaluation            [x] Daily Treatment Note         [] Discharge Summary      Patient: Yoseph Small   : 1955   MRN: [id number]   Date of Service:  2024    Admitting Diagnosis:  Fracture, subtrochanteric, right femur, closed, initial encounter ([facility])  Current Admission Summary: This 68-year-old female admitted with fall and right distal femur fracture.  Found to have have severe BARBARA and hyperkalemia likely 2/2 malpositioned ureteral stent; s/p stent exchange.  Developed BARBARA likely ATN due to obstructive uropathy/sepsis.  Some improvement in urine output.  S/p right distal femur ORIF.  Past Medical History:  has a past medical history of Anxiety, Depression, Diabetes mellitus (HCC), Diverticulosis, colon, Hyperlipidemia, Hypertension, Liver cirrhosis (HCC), Obesity, and Sleep apnea.  Past Surgical History:  has a past surgical history that includes Hysterectomy; Incisional hernia repair (2018); Cystoscopy (Right, 2024); Femur Surgery (Right, 2024); and IR TUNNELED CVC PLACE WO SQ PORT/PUMP > 5 YEARS (2024).    Discharge Recommendations: Yoseph Small scored a / on the AM-PAC ADL Inpatient form. Current research shows that an AM-PAC score of 17 or less is typically not associated with a discharge to the patient's home setting. Based on the patient's AM-PAC score and their current ADL deficits, it is recommended that the patient have 3-5 sessions per week of Occupational Therapy at d/c to increase the patient's independence.  Please see assessment section for further patient specific details.    If patient discharges prior to next session this note will serve as a discharge summary.  Please see below for the latest assessment towards goals.      DME Required For Discharge: DME to be determined pending patient progress    Precautions/Restrictions: high fall 
  [facility] - Inpatient Rehabilitation Department   Phone: [phone]    Physical Therapy    [] Initial Evaluation            [x] Daily Treatment Note         [] Discharge Summary      Patient: Yoseph Small   : 1955   MRN: [id number]   Date of Service:  2024  Admitting Diagnosis: Fracture, subtrochanteric, right femur, closed, initial encounter ([facility])    Current Admission Summary: 68-year-old female admitted with fall and right distal femur fracture. Found to have have severe BARBARA and hyperkalemia likely 2/2 malpositioned ureteral stent; s/p stent exchange. Developed BARBARA likely ATN due to obstructive uropathy/sepsis. S/P R distal femur ORIF following fall at home.     Past Medical History:  has a past medical history of Anxiety, Depression, Diabetes mellitus (HCC), Diverticulosis, colon, Hyperlipidemia, Hypertension, Liver cirrhosis (HCC), Obesity, and Sleep apnea.  Past Surgical History:  has a past surgical history that includes Hysterectomy; Incisional hernia repair (2018); Cystoscopy (Right, 2024); and Femur Surgery (Right, 2024).    Discharge Recommendations: Yoseph Small scored a / on the AM-PAC short mobility form. Current research shows that an AM-PAC score of 17 or less is typically not associated with a discharge to the patient's home setting. Based on the patient's AM-PAC score and their current functional mobility deficits, it is recommended that the patient have 3-5 sessions per week of Physical Therapy at d/c to increase the patient's independence.  Please see assessment section for further patient specific details.    If patient discharges prior to next session this note will serve as a discharge summary.  Please see below for the latest assessment towards goals.     DME Required For Discharge: DME to be determined at next level of care  Precautions/Restrictions: high fall risk, R proximal phalanx comminuted fx  Weight Bearing Restrictions:  20# RLE for 
  Department of Orthopedic Surgery  Physician Assistant   Progress Note    Subjective:       Systemic or Specific Complaints: patient seen sitting in bed, RN and  at bedside. Complaining of pain throughout her entire right lower extremity. Denies numbness and tingling, fevers, chills, nausea, cp, SOB.     Objective:     Patient Vitals for the past 24 hrs:   BP Temp Temp src Pulse Resp SpO2   06/23/24 1130 (!) 100/58 -- -- -- -- --   06/23/24 1027 -- -- -- -- 16 --   06/23/24 0900 114/63 98.3 °F (36.8 °C) Oral 84 16 94 %   06/23/24 0409 104/69 98.1 °F (36.7 °C) Oral 85 18 90 %   06/23/24 0206 -- -- -- -- -- 96 %   06/23/24 0115 (!) 134/96 98.1 °F (36.7 °C) Oral 87 18 91 %   06/22/24 2045 97/65 98.3 °F (36.8 °C) Oral 92 18 92 %   06/22/24 2019 122/89 98.1 °F (36.7 °C) Oral 100 20 91 %   06/22/24 2009 -- -- -- -- -- 97 %   06/22/24 1955 -- -- -- 100 -- --   06/22/24 1952 109/67 98.2 °F (36.8 °C) Oral (!) 102 20 90 %   06/22/24 1930 (!) 113/53 -- -- (!) 101 19 92 %   06/22/24 1915 (!) 107/52 97.6 °F (36.4 °C) Temporal 96 17 94 %   06/22/24 1910 (!) 104/50 -- -- 95 19 95 %   06/22/24 1905 (!) 110/50 -- -- 98 20 93 %   06/22/24 1900 (!) 113/51 97.8 °F (36.6 °C) Temporal 100 22 94 %   06/22/24 1516 129/60 97.8 °F (36.6 °C) Temporal (!) 102 20 92 %       General: alert, appears stated age, cooperative, and no distress   Wound: Post op dressing CDI, knee immobilizer in place.    Motion: Not tested.    DVT Exam: No evidence of DVT seen on physical exam.     Additional exam:   Inspection: swelling and ecchymosis noted in the foot, expected after surgery.   Palpation: tender to palpation of quad and calf. Swelling noted. Compartments soft and compressible.   ROM: moving toes appropriately. Minimal ankle ROM due to pain inhibition.   Neurovascular: dorsalis pedis pulse 1+, regular. Sensation intact to light touch and equal bilaterally     Data Review  CBC:   Lab Results   Component Value Date/Time    WBC 7.6 06/22/2024 
  Department of Orthopedic Surgery  Progress Note    Subjective:       Systemic or Specific Complaints: patient seen sitting in the bed on the floor;  at bedside. Complaining of pain right knee.  Denies numbness and tingling, fevers, chills, nausea, cp, SOB.       Objective:     Patient Vitals for the past 24 hrs:   BP Temp Temp src Pulse Resp SpO2 Weight   07/08/24 0945 (!) 102/50 -- -- 96 18 97 % --   07/08/24 0940 (!) 97/50 -- -- 100 16 100 % --   07/08/24 0935 (!) 95/46 -- -- 96 15 100 % --   07/08/24 0930 (!) 98/47 -- -- (!) 102 16 99 % --   07/08/24 0926 (!) 108/58 -- -- 98 18 98 % --   07/08/24 0850 (!) 89/44 97.8 °F (36.6 °C) Oral 84 18 91 % --   07/08/24 0539 110/63 97.5 °F (36.4 °C) Oral 86 18 94 % --   07/08/24 0355 (!) 125/44 97.6 °F (36.4 °C) Temporal 99 19 96 % --   07/08/24 0352 -- -- -- -- -- -- 107.4 kg (236 lb 12.4 oz)   07/08/24 0250 (!) 119/41 -- -- 73 -- -- --   07/08/24 0249 (!) 145/60 -- -- 74 -- -- --   07/08/24 0230 (!) 98/41 -- -- 66 -- -- --   07/08/24 0200 (!) 101/39 -- -- 75 -- 97 % --   07/07/24 2358 (!) 116/46 -- -- 91 -- 95 % --   07/07/24 2144 -- -- -- -- 17 -- --   07/07/24 2110 (!) 119/54 -- -- 99 -- -- --   07/07/24 2001 (!) 116/51 96.9 °F (36.1 °C) Temporal 84 18 95 % --   07/07/24 1800 (!) 121/47 -- -- 93 20 -- --   07/07/24 1600 (!) 102/49 -- -- 87 20 -- --   07/07/24 1514 (!) 109/56 97.1 °F (36.2 °C) Temporal 84 20 -- --   07/07/24 1307 (!) 106/48 97.4 °F (36.3 °C) Temporal 76 20 -- --       General: alert, appears stated age, cooperative, and no distress   Wound: Post op dressing CDI, knee immobilizer in place.    Motion: Not tested.    DVT Exam: No evidence of DVT seen on physical exam.     Additional exam:   Inspection: swelling and ecchymosis noted in the foot, expected after surgery.   Palpation: tender to palpation of quad and calf. Swelling noted. Compartments soft and compressible.   ROM: moving toes appropriately. Minimal ankle ROM due to pain inhibition. 
  Department of Orthopedic Surgery  Progress Note    Subjective:       Systemic or Specific Complaints: patient seen sitting in the chair,  at bedside. Complaining of pain right knee.  Denies numbness and tingling, fevers, chills, nausea, cp, SOB.       Objective:     Patient Vitals for the past 24 hrs:   BP Temp Temp src Pulse Resp SpO2 Weight   06/25/24 1200 (!) 158/60 -- -- (!) 104 19 90 % --   06/25/24 1157 -- -- -- -- 16 -- --   06/25/24 1000 (!) 148/64 -- -- (!) 109 15 91 % --   06/25/24 0945 (!) 156/61 -- -- (!) 107 17 (!) 89 % --   06/25/24 0908 -- -- -- -- 12 -- --   06/25/24 0850 -- -- -- -- 12 -- --   06/25/24 0815 (!) 139/57 -- Axillary 85 12 90 % --   06/25/24 0726 -- -- -- -- 12 -- --   06/25/24 0600 -- -- -- -- -- -- (!) 141.2 kg (311 lb 4.6 oz)   06/25/24 0516 124/70 97.3 °F (36.3 °C) Axillary 91 16 90 % --   06/25/24 0409 -- -- -- -- 13 -- --   06/25/24 0013 -- -- -- -- 14 -- --   06/24/24 2343 -- -- -- -- 13 -- --   06/24/24 2208 -- -- -- -- 14 -- --   06/24/24 2000 (!) 140/58 98.3 °F (36.8 °C) Temporal 81 11 90 % --   06/24/24 1900 -- -- -- 72 10 -- --   06/24/24 1800 -- -- -- 90 18 93 % --   06/24/24 1743 -- -- -- -- 13 -- --   06/24/24 1700 (!) 135/57 -- -- 87 17 93 % --   06/24/24 1600 (!) 111/51 98 °F (36.7 °C) Temporal 64 16 92 % --   06/24/24 1500 (!) 130/56 -- -- 84 13 94 % --   06/24/24 1427 -- -- -- -- 16 -- --   06/24/24 1400 (!) 128/55 -- -- 74 11 93 % --   06/24/24 1300 (!) 126/58 -- -- 86 13 94 % --       General: alert, appears stated age, cooperative, and no distress   Wound: Post op dressing CDI, knee immobilizer in place.    Motion: Not tested.    DVT Exam: No evidence of DVT seen on physical exam.     Additional exam:   Inspection: swelling and ecchymosis noted in the foot, expected after surgery.   Palpation: tender to palpation of quad and calf. Swelling noted. Compartments soft and compressible.   ROM: moving toes appropriately. Minimal ankle ROM due to pain inhibition. 
  [facility] - Inpatient Rehabilitation Department   Phone: [phone]    Physical Therapy    [] Initial Evaluation            [x] Daily Treatment Note         [] Discharge Summary      Patient: Yoseph Small   : 1955   MRN: [id number]   Date of Service:  7/10/2024  Admitting Diagnosis: Fracture, subtrochanteric, right femur, closed, initial encounter ([facility])  Current Admission Summary: 68-year-old female admitted with fall and right distal femur fracture. Found to have have severe BARBARA and hyperkalemia likely 2/2 malpositioned ureteral stent; s/p stent exchange. Developed BARBARA likely ATN due to obstructive uropathy/sepsis. S/P R distal femur ORIF following fall at home.   Past Medical History:  has a past medical history of Anxiety, Depression, Diabetes mellitus (HCC), Diverticulosis, colon, Hyperlipidemia, Hypertension, Liver cirrhosis (HCC), Obesity, and Sleep apnea.  Past Surgical History:  has a past surgical history that includes Hysterectomy; Incisional hernia repair (2018); Cystoscopy (Right, 2024); Femur Surgery (Right, 2024); and IR TUNNELED CVC PLACE WO SQ PORT/PUMP > 5 YEARS (2024).  Discharge Recommendations: Yoseph Small scored a 6/24 on the AM-PAC short mobility form. Current research shows that an AM-PAC score of 17 or less is typically not associated with a discharge to the patient's home setting. Based on the patient's AM-PAC score and their current functional mobility deficits, it is recommended that the patient have 3-5 sessions per week of Physical Therapy at d/c to increase the patient's independence.  Please see assessment section for further patient specific details. If patient discharges prior to next session this note will serve as a discharge summary.  Please see below for the latest assessment towards goals.   DME Required For Discharge: DME to be determined at next level of care  Precautions/Restrictions: high fall risk, R proximal phalanx 
  [facility] - Inpatient Rehabilitation Department   Phone: [phone]    Physical Therapy    [] Initial Evaluation            [x] Daily Treatment Note         [] Discharge Summary      Patient: Yoseph Small   : 1955   MRN: [id number]   Date of Service:  2024  Admitting Diagnosis: Fracture, subtrochanteric, right femur, closed, initial encounter ([facility])    Current Admission Summary: 68-year-old female admitted with fall and right distal femur fracture. Found to have have severe BARBARA and hyperkalemia likely 2/2 malpositioned ureteral stent; s/p stent exchange. Developed BARBARA likely ATN due to obstructive uropathy/sepsis. S/P R distal femur ORIF following fall at home.     Past Medical History:  has a past medical history of Anxiety, Depression, Diabetes mellitus (HCC), Diverticulosis, colon, Hyperlipidemia, Hypertension, Liver cirrhosis (HCC), Obesity, and Sleep apnea.  Past Surgical History:  has a past surgical history that includes Hysterectomy; Incisional hernia repair (2018); Cystoscopy (Right, 2024); and Femur Surgery (Right, 2024).    Discharge Recommendations: Yoseph Small scored a / on the AM-PAC short mobility form. Current research shows that an AM-PAC score of 17 or less is typically not associated with a discharge to the patient's home setting. Based on the patient's AM-PAC score and their current functional mobility deficits, it is recommended that the patient have 3-5 sessions per week of Physical Therapy at d/c to increase the patient's independence.  Please see assessment section for further patient specific details.    If patient discharges prior to next session this note will serve as a discharge summary.  Please see below for the latest assessment towards goals.     DME Required For Discharge: DME to be determined at next level of care  Precautions/Restrictions: high fall risk, R proximal phalanx comminuted fx  Weight Bearing Restrictions:  20# RLE for 
  [facility] - Inpatient Rehabilitation Department   Phone: [phone]    Physical Therapy    [] Initial Evaluation            [x] Daily Treatment Note         [] Discharge Summary      Patient: Yoseph Small   : 1955   MRN: [id number]   Date of Service:  2024  Admitting Diagnosis: Fracture, subtrochanteric, right femur, closed, initial encounter ([facility])    Current Admission Summary: 68-year-old female admitted with fall and right distal femur fracture. Found to have have severe BARBARA and hyperkalemia likely 2/2 malpositioned ureteral stent; s/p stent exchange. Developed BARBARA likely ATN due to obstructive uropathy/sepsis. S/P R distal femur ORIF following fall at home.     Past Medical History:  has a past medical history of Anxiety, Depression, Diabetes mellitus (HCC), Diverticulosis, colon, Hyperlipidemia, Hypertension, Liver cirrhosis (HCC), Obesity, and Sleep apnea.  Past Surgical History:  has a past surgical history that includes Hysterectomy; Incisional hernia repair (2018); Cystoscopy (Right, 2024); and Femur Surgery (Right, 2024).    Discharge Recommendations: Yoseph Small scored a  on the AM-PAC short mobility form. Current research shows that an AM-PAC score of 17 or less is typically not associated with a discharge to the patient's home setting. Based on the patient's AM-PAC score and their current functional mobility deficits, it is recommended that the patient have 3-5 sessions per week of Physical Therapy at d/c to increase the patient's independence.  Please see assessment section for further patient specific details.    If patient discharges prior to next session this note will serve as a discharge summary.  Please see below for the latest assessment towards goals.     DME Required For Discharge: DME to be determined at next level of care  Precautions/Restrictions: high fall risk  Weight Bearing Restrictions:  20# RLE for 6-9 weeks      Required 
  [facility] - Inpatient Rehabilitation Department   Phone: [phone]    Occupational Therapy    [] Initial Evaluation            [x] Daily Treatment Note         [] Discharge Summary      Patient: Yoseph Small   : 1955   MRN: [id number]   Date of Service:  2024    Admitting Diagnosis:  Fracture, subtrochanteric, right femur, closed, initial encounter ([facility])  Current Admission Summary: This 68-year-old female admitted with fall and right distal femur fracture.  Found to have have severe BARBARA and hyperkalemia likely 2/2 malpositioned ureteral stent; s/p stent exchange.  Developed BARBARA likely ATN due to obstructive uropathy/sepsis.  Some improvement in urine output.  S/p right distal femur ORIF.  Past Medical History:  has a past medical history of Anxiety, Depression, Diabetes mellitus (HCC), Diverticulosis, colon, Hyperlipidemia, Hypertension, Liver cirrhosis (HCC), Obesity, and Sleep apnea.  Past Surgical History:  has a past surgical history that includes Hysterectomy; Incisional hernia repair (2018); Cystoscopy (Right, 2024); and Femur Surgery (Right, 2024).    Discharge Recommendations: Yoseph Small scored a 8/24 on the AM-PAC ADL Inpatient form. Current research shows that an AM-PAC score of 17 or less is typically not associated with a discharge to the patient's home setting. Based on the patient's AM-PAC score and their current ADL deficits, it is recommended that the patient have 3-5 sessions per week of Occupational Therapy at d/c to increase the patient's independence.  Please see assessment section for further patient specific details.    If patient discharges prior to next session this note will serve as a discharge summary.  Please see below for the latest assessment towards goals.      DME Required For Discharge: DME to be determined pending patient progress    Precautions/Restrictions: high fall risk, weight bearing  Weight Bearing Restrictions: partial 
  [facility] - Inpatient Rehabilitation Department   Phone: [phone]    Occupational Therapy    [] Initial Evaluation            [x] Daily Treatment Note         [] Discharge Summary      Patient: Yoseph Small   : 1955   MRN: [id number]   Date of Service:  2024    Admitting Diagnosis:  Fracture, subtrochanteric, right femur, closed, initial encounter ([facility])  Current Admission Summary: This 68-year-old female admitted with fall and right distal femur fracture.  Found to have have severe BARBARA and hyperkalemia likely 2/2 malpositioned ureteral stent; s/p stent exchange.  Developed BARBARA likely ATN due to obstructive uropathy/sepsis.  Some improvement in urine output.  S/p right distal femur ORIF.  Past Medical History:  has a past medical history of Anxiety, Depression, Diabetes mellitus (HCC), Diverticulosis, colon, Hyperlipidemia, Hypertension, Liver cirrhosis (HCC), Obesity, and Sleep apnea.  Past Surgical History:  has a past surgical history that includes Hysterectomy; Incisional hernia repair (2018); Cystoscopy (Right, 2024); and Femur Surgery (Right, 2024).    Discharge Recommendations: Yoseph Small scored a 8/24 on the AM-PAC ADL Inpatient form. Current research shows that an AM-PAC score of 17 or less is typically not associated with a discharge to the patient's home setting. Based on the patient's AM-PAC score and their current ADL deficits, it is recommended that the patient have 3-5 sessions per week of Occupational Therapy at d/c to increase the patient's independence.  Please see assessment section for further patient specific details.    If patient discharges prior to next session this note will serve as a discharge summary.  Please see below for the latest assessment towards goals.      DME Required For Discharge: DME to be determined pending patient progress    Precautions/Restrictions: high fall risk, weight bearing  Weight Bearing Restrictions: partial 
  Physician Progress Note      PATIENT:               QIAN DEVLIN  CSN #:                  [id number]  :                       1955  ADMIT DATE:       2024 8:27 PM  DISCH DATE:  RESPONDING  PROVIDER #:        Mark Harris MD          QUERY TEXT:    Pt admitted with right displaced distal femur fracture following a slip and   fall from the same level. Pt noted to have diffuse osteopenia on XRay imaging   of right knee. If possible, please document in progress notes and discharge   summary if you are evaluating and/or treating any of the following:    The medical record reflects the following:  Risk Factors: 68 y.o. female with pmh of recent ureteral stent placement on   , DM 2, hypertension, obstructive sleep apnea on CPAP, depression and liver   cirrhosis presents...after slipping and falling at home landing on her right   knee...trying to use the bathroom\".  Clinical Indicators: Per  Three XR views of the right knee   \"FINDINGS...displaced spiral fracture along the diametaphyseal...distal   femur...bones are osteopenic...Impression...Moderate osteoarthritic changes in   the knee and diffuse osteopenia.\"  Per  CT right femur \"Severe medial compartment degenerative changes.  Mild   lateral and patellofemoral compartment  degenerative changes\"    Treatment: CT without contrast of R knee, 3 view XR of right knee, ORIF of R   knee scheduled for 24, Orthopedic consult, IV Morphine and Dilaudid for   pain control, Vitamin D 1000u po daily    Thank you,    Jessenia MORGAN RN CRCR CCS  CDI Specialist  [email or web address]  Options provided:  -- Pathological right femur fracture due to osteopenia following fall which   would not usually break a normal, healthy bone  -- Traumatic right femur fracture  -- Other - I will add my own diagnosis  -- Disagree - Not applicable / Not valid  -- Disagree - Clinically unable to determine / Unknown  -- Refer to Clinical Documentation 
  Speech Language Pathology  Attempt Note     Name: Yoseph Small  : 1955  Medical Diagnosis: Hyperkalemia [E87.5]  Intractable pain [R52]  Fracture, subtrochanteric, right femur, closed, initial encounter ([facility]) [S72.21XA]  Fall from standing, initial encounter [W19.XXXA]  Acute renal failure, unspecified acute renal failure type ([facility]) [N17.9]  Closed fracture of distal end of right femur, unspecified fracture morphology, initial encounter ([facility]) [S72.401A]      SLP attempted to see pt for dysphagia tx and cognitive-linguistic tx. Treatment unable to be completed due to pt JACOB to HD. SLP to re-attempt as pt's condition and schedule allows. RN aware. No charges.    Thank you,    Laurie Petty MA CCC-SLP #[id number]  Speech Language Pathologist   
  [facility] - Inpatient Rehabilitation Department   Phone: [phone]    Occupational Therapy    [] Initial Evaluation            [x] Daily Treatment Note         [] Discharge Summary      Patient: Yoseph Small   : 1955   MRN: [id number]   Date of Service:  2024    Admitting Diagnosis:  Fracture, subtrochanteric, right femur, closed, initial encounter ([facility])  Current Admission Summary: This 68-year-old female admitted with fall and right distal femur fracture.  Found to have have severe BARBARA and hyperkalemia likely 2/2 malpositioned ureteral stent; s/p stent exchange.  Developed BARBARA likely ATN due to obstructive uropathy/sepsis.  Some improvement in urine output.  S/p right distal femur ORIF.  Past Medical History:  has a past medical history of Anxiety, Depression, Diabetes mellitus (HCC), Diverticulosis, colon, Hyperlipidemia, Hypertension, Liver cirrhosis (HCC), Obesity, and Sleep apnea.  Past Surgical History:  has a past surgical history that includes Hysterectomy; Incisional hernia repair (2018); Cystoscopy (Right, 2024); Femur Surgery (Right, 2024); and IR TUNNELED CVC PLACE WO SQ PORT/PUMP > 5 YEARS (2024).    Discharge Recommendations: Yoseph Small scored a 9/24 on the AM-PAC ADL Inpatient form. Current research shows that an AM-PAC score of 17 or less is typically not associated with a discharge to the patient's home setting. Based on the patient's AM-PAC score and their current ADL deficits, it is recommended that the patient have 3-5 sessions per week of Occupational Therapy at d/c to increase the patient's independence.  Please see assessment section for further patient specific details.    If patient discharges prior to next session this note will serve as a discharge summary.  Please see below for the latest assessment towards goals.      DME Required For Discharge: DME to be determined pending patient progress    Precautions/Restrictions: high fall 
  Urology Progress Note  [facility]     Patient: Yoseph Small MRN: [id number]  Room/Bed: [facility]3903/3903-01   YOB: 1955  Age/Sex: 68 y.o.female  Admission Date: 6/20/2024     Date of Service:  7/2/2024    ASSESSMENT/PLAN     1. Closed fracture of distal end of right femur, unspecified fracture morphology, initial encounter ([facility])    2. Fall from standing, initial encounter    3. Intractable pain    4. Acute renal failure, unspecified acute renal failure type ([facility])    5. Hyperkalemia    6. Paroxysmal atrial fibrillation ([facility])      Retained ureteral stent  Morbid obesity with BMI 45.76  Right knee pain status post fall  Femur fracture  BARBARA -Cr markedly improved to 2.5 from 6.1 from 6.4    Recommendations:  Stent exchange completed on 6/21/2024  Outpatient stent removal per Dr. Cavazos--ordered  Alaniz catheter can be removed as mobility improved and when no longer medically necessary   No further  intervention at this time. Call urology with questions, we will follow peripherally     All patient questions were answered. She understands the plan as listed above.    SUBJECTIVE     Chief Complaint:   Chief Complaint   Patient presents with    Fall     Came by [facility] from home with c/o falling while going to the bathroom. C/o 10/10 pain to right knee. Unsure if hit head when falling. 50mcg Fentanyl given en route       24 Hour Events: No acute  events , patient tolerating catheter well    OBJECTIVE     Hospital Problem List:  Principal Problem:    Fracture, subtrochanteric, right femur, closed, initial encounter ([facility])  Active Problems:    BARBARA (acute kidney injury) ([facility])    Closed fracture of right distal femur ([facility])    PAF (paroxysmal atrial fibrillation) ([facility])    Hepatic cirrhosis (HCC)    Hypotension    Acute respiratory failure with hypoxia (HCC)    Hypervolemia    Obstructive uropathy  Resolved Problems:    * No resolved hospital problems. *      Physical Exam:  Vitals:    07/02/24 
  Urology Progress Note  [facility]     Patient: Yoseph Small MRN: [id number]  Room/Bed: [facility]3903/3903-01   YOB: 1955  Age/Sex: 68 y.o.female  Admission Date: 6/20/2024     Date of Service:  6/29/2024    ASSESSMENT/PLAN     1. Closed fracture of distal end of right femur, unspecified fracture morphology, initial encounter ([facility])    2. Fall from standing, initial encounter    3. Intractable pain    4. Acute renal failure, unspecified acute renal failure type ([facility])    5. Hyperkalemia    6. Paroxysmal atrial fibrillation ([facility])      Retained ureteral stent  Morbid obesity with BMI 45.76  Right knee pain status post fall  Femur fracture  BARBARA -Cr markedly improved to 2.5 from 6.1 from 6.4    Recommendations:  Stent exchange completed on 6/21/2024  Outpatient stent removal per Dr. Cavazos--ordered  Alaniz catheter can be removed as mobility improved and when no longer medically necessary   No further  intervention at this time. Call urology with questions, we will follow peripherally     All patient questions were answered. She understands the plan as listed above.    SUBJECTIVE     Chief Complaint:   Chief Complaint   Patient presents with    Fall     Came by [facility] from home with c/o falling while going to the bathroom. C/o 10/10 pain to right knee. Unsure if hit head when falling. 50mcg Fentanyl given en route       24 Hour Events: No acute  events , patient tolerating catheter well    OBJECTIVE     Hospital Problem List:  Principal Problem:    Fracture, subtrochanteric, right femur, closed, initial encounter ([facility])  Active Problems:    Closed fracture of right distal femur ([facility])    Atrial fibrillation, new onset (HCC)    Hepatic cirrhosis ([facility])    Hypotension    Acute respiratory failure with hypoxia ([facility])  Resolved Problems:    * No resolved hospital problems. *      Physical Exam:  Vitals:    06/29/24 0700   BP: 134/61   Pulse: 62   Resp: 19   Temp:    SpO2: 100% 
  Urology Progress Note  [facility]     Patient: Yoseph Small MRN: [id number]  Room/Bed: [facility]-3903/3903-01   YOB: 1955  Age/Sex: 68 y.o.female  Admission Date: 6/20/2024     Date of Service:  6/26/2024    ASSESSMENT/PLAN     1. Closed fracture of distal end of right femur, unspecified fracture morphology, initial encounter ([facility])    2. Fall from standing, initial encounter    3. Intractable pain    4. Acute renal failure, unspecified acute renal failure type ([facility])    5. Hyperkalemia      Retained ureteral stent  Morbid obesity with BMI 45.76  Right knee pain status post fall  Femur fracture  BARBARA -Cr 6.1 from 6.4    Recommendations:  Stent exchange completed on 6/21/2024  Outpatient stent removal per Dr. Cavazos--ordered  Alaniz catheter can be removed as mobility improved and when no longer medically necessary   No further  intervention at this time. Call urology with questions, we will follow peripherally     All patient questions were answered. She understands the plan as listed above.    SUBJECTIVE     Chief Complaint:   Chief Complaint   Patient presents with    Fall     Came by [facility] from home with c/o falling while going to the bathroom. C/o 10/10 pain to right knee. Unsure if hit head when falling. 50mcg Fentanyl given en route       24 Hour Events: No acute  events     OBJECTIVE     Hospital Problem List:  Principal Problem:    Fracture, subtrochanteric, right femur, closed, initial encounter ([facility])  Active Problems:    Closed fracture of right distal femur ([facility])  Resolved Problems:    * No resolved hospital problems. *      Physical Exam:  Vitals:    06/26/24 0920   BP:    Pulse:    Resp: 16   Temp:    SpO2:      CONSTITUTIONAL: The patient is well nourished/developed, with mild distress noted.   NEUROLOGICAL/PSYCHIATRIC: Oriented to place and time, normal affected noted.   CARDIOVASCULAR: Regular rate and rhythm, no evidence of swelling noted.   RESPIRATORY: Normal 
  [facility] - Inpatient Rehabilitation Department   Phone: [phone]    Occupational Therapy    [] Initial Evaluation            [x] Daily Treatment Note         [] Discharge Summary      Patient: Yoseph Small   : 1955   MRN: [id number]   Date of Service:  2024    Admitting Diagnosis:  Fracture, subtrochanteric, right femur, closed, initial encounter ([facility])  Current Admission Summary: This 68-year-old female admitted with fall and right distal femur fracture.  Found to have have severe BARBARA and hyperkalemia likely 2/2 malpositioned ureteral stent; s/p stent exchange.  Developed BARBARA likely ATN due to obstructive uropathy/sepsis.  Some improvement in urine output.  S/p right distal femur ORIF.  Past Medical History:  has a past medical history of Anxiety, Depression, Diabetes mellitus (HCC), Diverticulosis, colon, Hyperlipidemia, Hypertension, Liver cirrhosis (HCC), Obesity, and Sleep apnea.  Past Surgical History:  has a past surgical history that includes Hysterectomy; Incisional hernia repair (2018); Cystoscopy (Right, 2024); and Femur Surgery (Right, 2024).    Discharge Recommendations: Yoseph Small scored a 8/24 on the AM-PAC ADL Inpatient form. Current research shows that an AM-PAC score of 17 or less is typically not associated with a discharge to the patient's home setting. Based on the patient's AM-PAC score and their current ADL deficits, it is recommended that the patient have 3-5 sessions per week of Occupational Therapy at d/c to increase the patient's independence.  Please see assessment section for further patient specific details.    If patient discharges prior to next session this note will serve as a discharge summary.  Please see below for the latest assessment towards goals.      DME Required For Discharge: DME to be determined pending patient progress    Precautions/Restrictions: high fall risk, weight bearing  Weight Bearing Restrictions: partial 
  [facility] - Inpatient Rehabilitation Department   Phone: [phone]    Occupational Therapy    [] Initial Evaluation            [x] Daily Treatment Note         [] Discharge Summary      Patient: Yoseph Small   : 1955   MRN: [id number]   Date of Service:  7/10/2024    Admitting Diagnosis:  Fracture, subtrochanteric, right femur, closed, initial encounter ([facility])  Current Admission Summary: This 68-year-old female admitted with fall and right distal femur fracture.  Found to have have severe BARBARA and hyperkalemia likely 2/2 malpositioned ureteral stent; s/p stent exchange.  Developed BARBARA likely ATN due to obstructive uropathy/sepsis.  Some improvement in urine output.  S/p right distal femur ORIF.  Past Medical History:  has a past medical history of Anxiety, Depression, Diabetes mellitus (HCC), Diverticulosis, colon, Hyperlipidemia, Hypertension, Liver cirrhosis (HCC), Obesity, and Sleep apnea.  Past Surgical History:  has a past surgical history that includes Hysterectomy; Incisional hernia repair (2018); Cystoscopy (Right, 2024); Femur Surgery (Right, 2024); and IR TUNNELED CVC PLACE WO SQ PORT/PUMP > 5 YEARS (2024).    Discharge Recommendations: Yoseph Small scored a / on the AM-PAC ADL Inpatient form. Current research shows that an AM-PAC score of 17 or less is typically not associated with a discharge to the patient's home setting. Based on the patient's AM-PAC score and their current ADL deficits, it is recommended that the patient have 3-5 sessions per week of Occupational Therapy at d/c to increase the patient's independence.  Please see assessment section for further patient specific details.    If patient discharges prior to next session this note will serve as a discharge summary.  Please see below for the latest assessment towards goals.      DME Required For Discharge: DME to be determined pending patient progress    Precautions/Restrictions: high fall 
 [facility], [facility] [place]   Electrophysiology   Date: 6/29/2024  Reason for Consultation: Atrial fibrillation with RVR    Consult Requesting Physician: Maxim Mason MD     Chief Complaint   Patient presents with    Fall     Came by [facility] from home with c/o falling while going to the bathroom. C/o 10/10 pain to right knee. Unsure if hit head when falling. 50mcg Fentanyl given en route       CC: Fall   HPI: Yoseph Small is a 68 y.o. female with past medical history of hypertension, hyperlipidemia, diabetes, liver cirrhosis, anxiety , depression, obesity and sleep apnea.  She presented to hospital after a fall. Found to have right distal femur fracture. She was also found to have have severe acute tubular necrosis. Thought to be related to prolonged obstructive uropathy an UTI.   S/p right distal femur ORIF.    Started on IV amiodarone.   Converted back to sinus rhythm.     Patient seen and examined. Clinical notes reviewed.   Telemetry reviewed.     On CRRT.   Still has shortness of breath   Remains in sinus rhythm.       Assessment:   Atrial fibrillation with RVR   Aortic valve stenosis  Mitral valve stenosis  Sepsis  Acute hypoxic respiratory failure   BARBARA  History of HFpEF  Liver cirrhosis  LVH  DM  Morbid obesity Body mass index is 47.08 kg/m².     Plan:   New onset Afib with RVR in critically ill patient with multiple co morbidities, BARBARA, respiratory failure   Converted to sinus rhythm after IV amiodarone.   Amiodarone has been discontinued due to risk of toxicity and liver cirrhosis.   High risk [id number] score.     However, she is severely anemic with Hgb of 7.3  therefore will hold off anticoagulation.   She is receiving subcutaneous heparin for DVT prophylaxis.   Do not recommend starting oral anticoagulant now.     On CRRT for fluid removal.   On BIPAP.     On vanc and cefepime for sepsis/UTI.     Echo reported Mild AS and moderate to severe MS.   Morbidly obese and has multiple co 
 [facility], [facility] [place]   Electrophysiology   Date: 6/30/2024  Reason for Consultation: Atrial fibrillation with RVR    Consult Requesting Physician: Maxim Mason MD     Chief Complaint   Patient presents with    Fall     Came by [facility] from home with c/o falling while going to the bathroom. C/o 10/10 pain to right knee. Unsure if hit head when falling. 50mcg Fentanyl given en route       CC: Fall   HPI: Yoseph Small is a 68 y.o. female with past medical history of hypertension, hyperlipidemia, diabetes, liver cirrhosis, anxiety , depression, obesity and sleep apnea.  She presented to hospital after a fall. Found to have right distal femur fracture. She was also found to have have severe acute tubular necrosis. Thought to be related to prolonged obstructive uropathy an UTI.   S/p right distal femur ORIF.    Started on IV amiodarone.   Converted back to sinus rhythm.     Patient seen and examined. Clinical notes reviewed.   Telemetry reviewed.     Continues being on CRRT.   Remains in sinus rhythm.   Reports no chest pain, but has SOB.     Assessment:   Atrial fibrillation with RVR   Aortic valve stenosis  Mitral valve stenosis  Sepsis  Acute hypoxic respiratory failure   BARBARA  History of HFpEF  Liver cirrhosis  LVH  DM  Morbid obesity Body mass index is 46.78 kg/m².     Plan:   Remains in sinus rhythm.     New onset Afib with RVR in critically ill patient with multiple co morbidities, BARBARA, respiratory failure   Converted to sinus rhythm after IV amiodarone. Amiodarone has been discontinued due to risk of toxicity and liver cirrhosis.     High risk [id number] score.  However, she is severely anemic with Hgb of 7.2  therefore will hold off anticoagulation. She is receiving subcutaneous heparin for DVT prophylaxis.     Do not recommend starting oral anticoagulant now due to severe anemia, cirrhosis, critical illness.     On CRRT for fluid removal.     On vanc and cefepime for sepsis/UTI. 
0512: Midodrine administered PO for a BP of 85/51    0626: BP recheck 119/79   
0540: PICC dressing changed to EKTA Wilson RN  
0600: Pt complaining of back pain, positioned patient on her left side with pillow support.     Benita Wilson RN  
1950: Shift assessment completed. VSS. Medications given per MAR. Bedside table and call light within reach. Pt seemed very drowsy, I had to get her attention a few times to ask questions and at times she would just stare off. She is A&Ox4.  at bedside. The care plan and education has been reviewed and mutually agreed upon with the patient.     Benita Wilson RN  
2009: Shift assessment completed. VSS. Medications given per MAR. Bedside table and call light within reach. The care plan and education has been reviewed and mutually agreed upon with the patient.     Benita Wilson RN  
2015: Shift assessment completed. VSS. Medications given per MAR. Bedside table and call light within reach. The care plan and education has been reviewed and mutually agreed upon with the patient.     Benita Wilson RN  
2115:  She is a little drowsy tonight. Her BP is 112/62 Pulse 97. She looks very pale and has delayed response when I talk to her. She can barely hold a cup if she needs to drink. Her sugar was 110, reached out to hospitalist and labs collected as ordered.  
30 day MCOT monitor placed on the patient. Reviewed proper care and instructions with the patient. All questions answered.    
6/24/24 12:48 PM  Ok to get No arm preference From renal point    Message to Sujata, marian gfr 6 and cr 6.7  
ABG results sent to Love Jane NP via perfect serve.   
Assessment and VS complete. See flowsheet. Pt A&O, except could not remember the year. CRRT cycling. Goal is net negative 100mL/hr to remove. Tolerating well. Pt keeps c/o pain \"all over,\" but especially to buttocks, back, neck, right leg and foot. Morphine given per MD order. Will reassess. Pulled up and repositioned for comfort, using pillows for support. Pt is not on any vasopressors. Is on Amiodarone gtt for Afib. Will be stopped later tonight per MD order. Night time meds given per MD order. Son and daughter in law at bedside. POC discussed. Pt denies further needs. Call light in reach.    
Awaiting Line verification to start HD.   
BP while awake 80/51. Patient sweating.  Glucose checked and WNL-190s. Cold rag applied to patient.  Notified NP. Received orders for 250ml bolus. Informed by NP as long as map is above 65, continue to monitor unless patient becomes symptomatic.   
Brief  Note    KUB reviewed with Dr. Cavazos. R ureteral stent malpositioned. In setting of rising cr to 5.6 from baseline of 1, will plan to exchange stent today this afternoon with Dr. Cavazos. Keep NPO status. Call with questions.     Signed:  Charlie Rebolledo PA-C  6/21/24  
CRRT alarming, clots noted in filter, unable to return blood back to the patient. Notified Dr. Ball, discontinued CRRT, patient will have HD tomorrow.   
CRRT removal -1200mL.  
Current BP 79/50 while sleeping. Stated Rn aroused patient. Patient aroused easily. Patient alert and oriented. BP while awake 86/57. Patient asymptomatic. Notified NP.  Informed to hold all narcotics and monitor BP.   
Dr. Mason and nephrologist notified of pt increase in o2 requirments(10L highflow) and new tachycardia. NO new orders received at this time. Will continue to monitor the pt.   
Dr. Sharma and Dr. Cameron to bedside to review patient care. Notified of patient BP and holding patient metoprolol due to BP. Order received from Dr. Sharma to discontinue metoprolol and discontinue IV morphine. Patient was unable to have full HD treatment today due to patient's line. Dr. Peña ordered to just remove HR vas cath as it does not work properly per HD nurse and to remove durand catheter. Patient is making minimal urine output.   Durand and vas cath removed per order.     Lolita Arora RN    
Exchanged durand this afternoon- not enough urine output to collect sample at this time.     Lolita Arora RN    
Facility/Department: [address] ORTHO/NEURO NURSING  SLP Clinical Swallow Evaluation and Speech Language Cognitive Assessment     Patient: Yoseph Small   : 1955   MRN: [id number]      Evaluation Date: 2024      Admitting Dx: Hyperkalemia [E87.5]  Intractable pain [R52]  Fracture, subtrochanteric, right femur, closed, initial encounter ([facility]) [S72.21XA]  Fall from standing, initial encounter [W19.XXXA]  Acute renal failure, unspecified acute renal failure type ([facility]) [N17.9]  Closed fracture of distal end of right femur, unspecified fracture morphology, initial encounter ([facility]) [S72.401A]  Pain: Denies                                  H&P: Patient is a 68 y.o. female with past medical history significant for diabetes mellitus type 2, hypertension, hyperlipidemia, obesity, JUANITA on CPAP who was admitted on 2024 after sustaining a fall at home and sustaining a right femur fracture for she underwent ORIF on 2024.  Prior to that, patient underwent lithotripsy with right ureteral stent placement on  for obstructive uropathy.  On hospital admission, it was noted that ureteral stent was displaced and it was replaced on 2024.  Patient was noted to have BARBARA and sepsis with fluid overload.  She was noted to have Enterococcus and Citrobacter UTI and was initiated on antibiotics.  Patient's fluid overload did not improve with diuretics and she was initiated on hemodialysis on 2024.  So far she has had 2 treatments of HD with fluid removal but without any improvement in hypoxia and chest x-ray.  Patient also has been on BiPAP intermittently with 15 L/min FiO2 in between.  This morning she went into atrial fibrillation with RVR and was initiated on amiodarone drip.  Chest x-ray continues to show bilateral pulmonary edema.     Imaging:  Chest X-ray:   IMPRESSION:  Progressing bilateral airspace disease, favoring developing edema versus  multifocal infection.    Head CT:   IMPRESSION:  No acute 
Facility/Department: [address] ORTHO/NEURO NURSING  Speech Language Pathology   Dysphagia and Speech Language/Cognitive Treatment Note    Patient: Yoesph Small   : 1955   MRN: [id number]      Evaluation Date: 7/10/2024      Admitting Dx: Hyperkalemia [E87.5]  Intractable pain [R52]  Fracture, subtrochanteric, right femur, closed, initial encounter ([facility]) [S72.21XA]  Fall from standing, initial encounter [W19.XXXA]  Acute renal failure, unspecified acute renal failure type ([facility]) [N17.9]  Closed fracture of distal end of right femur, unspecified fracture morphology, initial encounter ([facility]) [S72.401A]  Treatment Diagnosis: Cognitive-Linguistic Deficits, Speech Language Deficits, Oropharyngeal Dysphagia   Pain: Reported pain, RN notified                                                Subjective:  Pt seen upright in chair, alert and agreeable to participate. RN and spouse present in room throughout session.    Dysphagia Treatment:   Diet and Treatment Recommendations 7/10/2024:  Diet Solids Recommendation:  Dysphagia III Soft and bite sized  Liquid Consistency Recommendation:  Thin liquids  Recommended form of Meds: Meds whole with water          Compensatory strategies: Upright as possible with all PO intake, Assist Feed, Small bites/sips, Eat/feed slowly, Remain upright 30-45 min, Aspiration Precautions     Assessment of Texture Tolerance:  Diet level prior to treatment: NPO   Tolerance of Current Diet Level: N/A     Impressions: Pt was positioned upright in chair, awake and alert. Currently on room air. Trials of ice chips, thin liquids, puree, soft solids, and regular solids were provided to assess swallow function. Observed pt taking morning medications, tolerated whole with thin liquid one at a time without difficulty. Oral phase characterized by adequate labial seal, and prolonged mastication of regular solids. Min oral stasis noted on lingual surface which cleared completely with liquid wash. Delayed 
[facility] Pulmonary/CCM Progress note      Admit Date: 6/20/2024    Chief Complaint: Shortness of breath    Subjective:     Interval History: Doing well, remains on 4 L O2.  Using home CPAP at night.  Creatinine 2.1.  Currently undergoing hemodialysis.  Urine output picking up, currently 700 cc.    Scheduled Meds:   epoetin shree-epbx  10,000 Units IntraVENous Once per day on Mon Wed Fri    heparin (porcine)  5,000 Units SubCUTAneous 3 times per day    metoprolol succinate  100 mg Oral Daily    midodrine  10 mg Oral TID WC    sodium chloride flush  5-40 mL IntraVENous 2 times per day    sodium bicarbonate  650 mg Oral 4x Daily    sennosides-docusate sodium  1 tablet Oral BID    atorvastatin  10 mg Oral Daily    ferrous sulfate  324 mg Oral Daily with breakfast    insulin lispro  0-8 Units SubCUTAneous TID WC    insulin lispro  0-4 Units SubCUTAneous Nightly    pantoprazole  40 mg Oral QAM AC    Vitamin D  1,000 Units Oral Daily     Continuous Infusions:   dextrose      sodium chloride       PRN Meds:sodium chloride flush **AND** sodium chloride flush, metoprolol, morphine **OR** [DISCONTINUED] morphine, diphenhydrAMINE, heparin (porcine), sodium chloride flush, oxyCODONE **OR** [DISCONTINUED] oxyCODONE, dextrose bolus **OR** dextrose bolus, glucagon (rDNA), dextrose, sodium chloride, ondansetron **OR** ondansetron, polyethylene glycol, acetaminophen **OR** acetaminophen, hydrALAZINE, tiZANidine    Review of Systems  Constitutional: Fatigue and malaise  Ears, nose, mouth, throat: negative for ear drainage, epistaxis, hoarseness, nasal congestion, sore throat and voice change  Respiratory: negative except for shortness of breath  Cardiovascular: negative for chest pain, chest pressure/discomfort, irregular heart beat, lower extremity edema and palpitations  Gastrointestinal: negative for abdominal pain, constipation, diarrhea, jaundice, melena, odynophagia, reflux symptoms and vomiting  Hematologic/lymphatic: negative for 
IR to place tunneled HD catheter today however patient's wbc elevated this am. Radiologist Dr. Lim would like blood cultures drawn before placing tunneled line. Will follow along with patient.  
Incentive Spirometry education and demonstration completed by Respiratory Therapy Yes      Response to education: Good     Teaching Time: 5 minutes    Minimum Predicted Vital Capacity - 570 mL.  Patient's Actual Vital Capacity - 1000 mL. Turning over to Nursing for routine follow-up Yes.    Comments:     Electronically signed by Ileana Randolph RCP on 7/1/2024 at 8:15 AM   
Initial assessment completed- see doc flowsheet. Alert and oriented x4. Lethargic with precedex at 0.2 mcg/kg/hr. Afib on monitor with amiodarone gtt at 1 mg/min. VSS. Pain on R leg- PRN Morphine administered as ordered. On continuous BiPAP with settings of 14/10 rate of 10 FiO2 of 80%. No drainage noted on wounds upon assessment. Lung sounds diminished. Heart sounds auscultated. Active bowel sounds x4 quadrants. In bed with call light within reach. Care ongoing.  
[facility]   Electrophysiology   Date: 6/28/2024  Reason for Follow up: Atrial Fibrillation   Chief Complaint   Patient presents with    Fall     Came by [facility] from home with c/o falling while going to the bathroom. C/o 10/10 pain to right knee. Unsure if hit head when falling. 50mcg Fentanyl given en route       HPI: Yoseph Small is a 68 y.o. female with past medical history of hypertension, hyperlipidemia, diabetes, liver cirrhosis, anxiety , depression, obesity and sleep apnea.     She presented to hospital after a fall. Found to have right distal femur fracture. She was also found to have have severe acute tubular necrosis. Thought to be related to prolonged obstructive uropathy and UTI.      S/p right distal femur ORIF.       Ep was consulted for new onset atrial fibrillation with RVR      Patient had received HD 6/27- new this admission. After her session was completed, she became tachycardic in the 150's and increase in o2 demand (unclear which happened first). She was placed. Found to be in atrial fibrillation-which was new.     Patient seen this morning. She is off Bipap, on high flow NC at 10 L. She was started on amiodarone gtt last evening, she has responded well. Still in atrial fibrillation but rate is much more controlled. Since she is tolerating PO, will change amiodarone gtt to PO. Continue to monitor liver function as she has known liver disease. Anticoagulation is recommendation. She has a high BIB2RE2-MLQp score. Orthopedic surgery gave the ok to start anticoagulation. However, patient is anemic. Hemoglobin 7.7 today. Source is not clear? Will hold off on start anticoagulation therapy until patients levels stabilize.        [id number] Score for Atrial Fibrillation Stroke Risk   Risk   Factors  Component Value   C CHF No 0   H HTN Yes 1   A2 Age >= 75 No,  (68 y.o.) 0   D DM Yes 1   S2 Prior Stroke/TIA No 0   V Vascular Disease No 0   A Age 65-74 Yes,  (68 y.o.) 1 
[facility] Pulmonary/CCM Progress note      Admit Date: 6/20/2024    Chief Complaint: Shortness of breath    Subjective:     Interval History: Clinically improved, still requiring 4 L O2-using BiPAP intermittently and at nighttime.  Paroxysmal atrial fibrillation with RVR, now in normal sinus rhythm.  Urine output appears to be picking up-300 mL.  Creatinine 2.3.  Hemodynamically stable.  Off CRRT.    Scheduled Meds:   epoetin shree-epbx  10,000 Units IntraVENous Once per day on Mon Wed Fri    metoprolol succinate  100 mg Oral Daily    heparin (porcine)  5,000 Units SubCUTAneous BID    midodrine  10 mg Oral TID WC    sodium chloride flush  5-40 mL IntraVENous 2 times per day    sodium bicarbonate  650 mg Oral 4x Daily    sennosides-docusate sodium  1 tablet Oral BID    atorvastatin  10 mg Oral Daily    ferrous sulfate  324 mg Oral Daily with breakfast    insulin lispro  0-8 Units SubCUTAneous TID WC    insulin lispro  0-4 Units SubCUTAneous Nightly    pantoprazole  40 mg Oral QAM AC    Vitamin D  1,000 Units Oral Daily     Continuous Infusions:   dexmedeTOMIDine Stopped (06/30/24 1633)    sodium chloride 10 mL/hr at 06/23/24 2344    sodium chloride Stopped (06/29/24 1234)    sodium chloride      dextrose      sodium chloride      sodium chloride       PRN Meds:sodium chloride flush **AND** sodium chloride flush, metoprolol, morphine **OR** [DISCONTINUED] morphine, diphenhydrAMINE, heparin (porcine), sodium chloride, sodium chloride flush, sodium chloride, sodium chloride, oxyCODONE **OR** [DISCONTINUED] oxyCODONE, dextrose bolus **OR** dextrose bolus, glucagon (rDNA), dextrose, sodium chloride, ondansetron **OR** ondansetron, polyethylene glycol, acetaminophen **OR** acetaminophen, hydrALAZINE, tiZANidine, sodium chloride    Review of Systems  Constitutional: Fatigue and malaise  Ears, nose, mouth, throat: negative for ear drainage, epistaxis, hoarseness, nasal congestion, sore throat and voice change  Respiratory: negative 
Nephrology consult in progress  Admitted with severe renal failure   Urology consulted   W/u to r/o obstructive uro dorcas  
Nursing assessment completed.  Afebrile 97.3.  NSR 77.  RR 20.  /46 (63).  Reports right leg pain with movement.  Medicated with Oxycodone 5 mg po.  Discussed plan of care.  A&Ox4.  States she mostly wants to sleep.  Right thigh dressing clean dry and intact.  Right leg elevated on one pillow.  Right foot warm and right foot pulses palpable.  HOB up 30 degrees.  Provided fresh fluids.  SR up x2.  Call light in reach.  Bed in lowest position.  Wheels locked.  Bed alarm engaged.    
Nutrition Note    RECOMMENDATIONS  PO Diet: Diet per SLP  ONS: Continue current ONS  Nutrition Support: Consider initiation of nutrition support given poor po intake throughout admission now x 22 days  Nursing: Please document % po intake of meals & ONS     NUTRITION ASSESSMENT   Pt triggered for follow-up. Continues to be nutritionally compromised. On dysphagia soft and bite sized diet per SLP with one documented meal intake of 1-25% since last RD assessment. Magic Cup and Malik ordered each BID. Upon visiting, pt stated appetite and po intake continues to be poor.  present related this to multiple things: nothing is appetizing to pt, quality of food provided (for example, mushy brown bananas observed this morning) and ambassadors not always taking meal orders so pt can order what she wants. Pt stated does not want Malik at times and has not been receiving Magic Cup, continues to deny Ensure as dislikes. PerfectServed Dr. Galvan requesting start of appetite stimulant given inadequate po intake throughout admission now on day 22, stated believes poor intake is d/t constipation; pt with no BM documented throughout admission. RD will continue to monitor.     Nutrition Related Findings: Edema: generalized pitting; +1 BUE, BLE. Receiving bowel regimens.  Wounds: Deep Tissue Injury, Multiple, Surgical Incision (blister x1)  Nutrition Education:  Education not indicated   Nutrition Goals: PO intake 50% or greater, by next RD assessment     MALNUTRITION ASSESSMENT   Acute Illness  Malnutrition Status: Moderate malnutrition (per RD reassessment 7/9)  Findings of the 6 clinical characteristics of malnutrition:  Energy Intake:  50% or less of estimated energy requirements for 5 or more days  Weight Loss:   (61 lb loss since admission; new HD)     Body Fat Loss:  No significant body fat loss     Muscle Mass Loss:  No significant muscle mass loss    Fluid Accumulation:  Mild Extremities   Strength:  Not 
Nutrition Note    RECOMMENDATIONS  PO Diet: Pt would likely benefit from low phos modifier  ONS: Ordered Malik BID (orange)  Nursing: Please document % po intake of meals    NUTRITION ASSESSMENT   Pt triggered for nutrition consult r/t wounds. S/p right distal femur ORIF 6/22. Upon visiting, reported appetite has been somewhat decreased since surgery. Dislikes Ensure but willing to try Malik to promote wound healing. Encouraged protein sources at meals. RD will monitor for adequate po intake.     Nutrition Related Findings: Phos 5.9. LBM pta. Edema: +2 generalized; +2 pitting BUE; +1 pitting BLE.  Wounds: Deep Tissue Injury, Multiple, Surgical Incision (blister x1)  Nutrition Education:  Education not indicated   Nutrition Goals: PO intake 50% or greater, by next RD assessment     MALNUTRITION ASSESSMENT   Acute Illness  Malnutrition Status: No malnutrition    NUTRITION DIAGNOSIS   Increased nutrient needs related to increase demand for energy/nutrients as evidenced by wounds    CURRENT NUTRITION THERAPIES  ADULT DIET; Regular; 4 carb choices (60 gm/meal); Low Sodium (2 gm)     PO Intake: Unable to assess (no documented meal intakes)   PO Supplement Intake:None Ordered    ANTHROPOMETRICS  Current Height: 165.1 cm (5' 5\")  Current Weight - Scale: (!) 136.2 kg (300 lb 4.8 oz) (new bed)    Admission weight: 141.2 kg (311 lb 4.6 oz)  Ideal Body Weight (IBW): 125 lbs  (57 kg)        BMI: 50    COMPARATIVE STANDARDS  Total Energy Requirements (kcals/day): 4514-9829     Protein (g):  114       Fluid (mL/day):  1904    The patient will be monitored per nutrition standards of care. Consult dietitian if additional nutrition interventions are needed prior to RD reassessment.     Missy Correa MS, RD, LD    Contact: [phone]     
Nutrition Note    RECOMMENDATIONS  PO Diet: diet texture per SLP with CCC, DERIAN, low phos modifiers  ONS: Malik BID (orange). Ordered Magic Cup BID (mixed berry)       ASSESSMENT   Nutrition follow up.  Pt was made NPO on 7/8 for placement of TDC.  She remains NPO pending SLP swallow evaluation.  Previous to NPO status pt was not eating well.  Pt is nutritionally compromised AEB having increased protein needs and inadequate oral intake over past 18 days of admission.  If unable to safely advance PO diet, recommend nutrition support.       Malnutrition Status: Moderate malnutrition  Acute Illness  Findings of the 6 clinical characteristics of malnutrition:  Energy Intake:  50% or less of estimated energy requirements for 5 or more days  Weight Loss:   (61 lb loss since admission; new HD)     Body Fat Loss:  No significant body fat loss     Muscle Mass Loss:  No significant muscle mass loss    Fluid Accumulation:  Mild Extremities   Strength:  Not Performed      NUTRITION DIAGNOSIS   Moderate malnutrition, In context of acute illness or injury related to inadequate protein-energy intake as evidenced by Criteria as identified in malnutrition assessment  Increased nutrient needs related to increase demand for energy/nutrients as evidenced by wounds    Goals: PO intake 50% or greater, by next RD assessment     NUTRITION RELATED FINDINGS  Objective: K+ 4.1, BUN 34, Creat 5.5, phos 5.3; +1 edema BUE, BLE  Wounds: Deep Tissue Injury, Multiple, Surgical Incision    CURRENT NUTRITION THERAPIES  Diet NPO Exceptions are: Sips of Water with Meds     PO Intake: NPO   PO Supplement Intake:NPO      ANTHROPOMETRICS  Current Height: 165.1 cm (5' 5\")  Current Weight - Scale: 113.6 kg (250 lb 7.1 oz)    Admission weight: 141.2 kg (311 lb 4.6 oz)  Ideal Body Weight (IBW): 125 lbs  (57 kg)        BMI: 41.7      COMPARATIVE STANDARDS  Total Energy Requirements (kcals/day): 1678 - 2349     Protein (g):  114       Fluid (mL/day):  
Occupational Therapy/Physical Therapy  Yoseph Small    OT/PT orders received and appreciated. Pt currently JACOB to HD. Will hold at this time and follow up for assessment as time/schedule allows.    Thank you,  Ronak Davis, OT    
Occupational Therapy/Physical Therapy  Yoseph Small   Pt not available for therapy session at this time, JACOB for dialysis. Will check back as time permits.  Continue with POC  KITTY Cain/APARNA [id number]   Sally Mallory, PT, DPT CNS [id number]  
PICC line not flushing, infusing or giving blood return. Notified NP. Cathflo ordered and administered.   
Patient admitted to unit, vitals stable,  at bedside, purewick in place.  
Patient alert and oriented x 4 and up with maxi move. Alaniz patent and draining. Patient tolerating diet with no c/o nausea at this time. Patient does c/o pain to RLE, PRN morphine given per order, see MAR. Initial assessment completed, see flowsheet. IV antibiotics infusing per order. Patient's bed is locked and in lowest position, side rails up x 3 with an active bed alarm. Non slip socks applied.     
Patient assisted on bedpan, only passing gas. Patient continues to refuse to turn. Agreed to turn for one hour only during shift. Remains with poor appetite. Encouraged family to bring food patient may eat.      Lolita Arora RN    
Patient getting HD at this time. VS done, PRN morphine given for pain. Morning meds will be given after HD  
Patient has refused repositioning all night.  Stated RN did assess patient's bottom at this time and completed CHG bath.  Luisana care completed and protective barrier cream applied to bottom. Patient has moaned the majority of the night.  When RN tries to talk to patient and inquire what's wrong, patient just continues to moan and states \"I dont know\". Patient does not participate in conversation. Patient encouraged to communicate more and educated on importance of frequent repositioning.   
Patient on home unit CPAP and connected to 7 L oxygen. Per RT, home unit is only compatible with up to 6L of oxygen. RT recommending hospital unit. This RN notified Love Jane NP via perfect serve. See new order for stat CXR and ABG.   
Patient refusing to turn at this time, states she can reposition herself but doesn't appear to be moving much, offered to order patient a specialty bed to prevent skin breakdown but patient refused, patient reminded importance of frequent repositioning  
Patient refusing turning q 2 hours. RN informed patient she has got to move and the risk of bed sores. Patient was maren lifted into the chair, patient tolerated or 1.5 hours. Patient was then returned to bed and turned, only tolerated turn for 1 hour. When trying to educate patient she states \"I know and I don't want to hear it anymore\". Patient  at bedside during conversations about importance of movement. Patient cries in pain when the sheet touches her toe or leg, prn morphine and oxycodone given throughout the day.    Lolita Arora, RN    
Patient resting in bed, antibiotic and albumin infusing, oxycodone given for pain control, repositioned for comfort, vitals stable, 02 94% on 4 liters and cpap, durand care complete, patient encouraged to call with needs   
Patient transferred up to 4T in stable condition. Bedside handoff received from GARCIA Collado. Skin assessment complete. Medicated for pain with small sips of water. The care plan and education has been reviewed and mutually agreed upon with the patient. Call light within reach and bed alarm on.   
Patient's c02 critical at 14. Notified hospitalist. No new orders.  
Patient's map 55. Took manual BP and was better. Notified NP. No new orders; continue to monitor.   
Patient's spouse contacted at this time; informed of unit change.   
Patients chart was reviewed post TDC procedure. No complications were noted post procedure.  
Physical & Occupational Therapy  Patient currently on dialysis.  Plan to attempt post dialysis.    Viral Mccall PT, DPT, ATC-R [id number]  oRbi Houston, OTR/L [id number]        
Physical & Occupational Therapy  Patient eating breakfast this AM.  Will attempt once completed.    Viral Mccall PT, DPT, ATC-R [id number]  Robi Houston, OTR/L [id number]        
Physical & Occupational Therapy  Patient off floor at dialysis.  Plan to attempt this PM.    Viral Mccall PT, DPT, ATC-R [id number]  Vanesa Cruz, OTR/L [id number]        
Physical & Occupational Therapy  Patient off floor for dialysis.  Will continue to follow.    Viral Mccall PT, DPT, ATC-R [id number]  Robi Houston, OTR/L [id number]        
Physical Therapy  Yoseph Small  Pt refused PT this afternoon. Pt recently returned from dialysis and is having a lot of pain. GARCIA Larkin is aware and pt received pain medication. Will follow up as schedule allows.  Thanks, Yajaira Little, [id number]      
Physical/Occupational Therapy  Yoseph S Geovanny    Attempted PT/OT treatment this date. Pt currently on HD. Will re-attempt treatment session as schedule allows.     Sandee Hill PT, DPT [id number]   Vanesa Cruz, OTR/L, MOT, [id number]        
Physical/Occupational Therapy  Yoseph S Geovanny    Attempted PT/OT treatment this date. Pt currently receiving HD. Will re-attempt treatment session as schedule allows.     Sandee Hill PT, DPT [id number]   Vanesa Cruz, OTR/L, MOT, [id number]      
Physical/Occupational Therapy  Yoseph S Geovanny    Attempted PT/OT treatment this date. Pt currently receiving HD. Will re-attempt treatment session tomorrow as schedule allows.     Sandee Hill PT, DPT [id number]   Vanesa Cruz, OTR/L, [facility], [id number]      
Physical/Occupational Therapy  Yoseph Small    Attempted PT/OT treatment this date. Pt currently off floor for HD. Will re-attempt treatment session as schedule allows.     Sandee Hill PT, DPT [id number]    Anitha Munguia, OTR/L [id number]          
Physical/Occupational Therapy  Yoseph Small  Orders received, chart reviewed. Attempted to see pt for PT/OT initial evaluation however on arrival, pt refusing due to pain. Despite maximal education on the benefits of PT/OT and options given for PT/OT evaluation, pt continued to refuse. RN notified. Will follow-up as schedule allows.  Thank you,  Mikaela Ibrahim PT, DPT [id number]  Nicole Serna MSOT, OTR/L [id number]       
Placed patient on tele pack.  Called Patient's Spouse, per patient's request \, to notify that patient will be off the floor in HD.  
Post-op blood sugar 265. Dr. Tobias notified. No new orders. Will monitor.   
[facility] Pulmonary/CCM Progress note      Admit Date: 6/20/2024    Chief Complaint: Shortness of breath    Subjective:     Interval History: Appears the same, remains on 4 L.  Urine output declining to 300 mL.  WBC 12.3, no focus of sepsis    Scheduled Meds:   epoetin shree-epbx  10,000 Units IntraVENous Once per day on Mon Wed Fri    heparin (porcine)  5,000 Units SubCUTAneous 3 times per day    metoprolol succinate  100 mg Oral Daily    midodrine  10 mg Oral TID WC    sodium chloride flush  5-40 mL IntraVENous 2 times per day    sodium bicarbonate  650 mg Oral 4x Daily    sennosides-docusate sodium  1 tablet Oral BID    atorvastatin  10 mg Oral Daily    ferrous sulfate  324 mg Oral Daily with breakfast    insulin lispro  0-8 Units SubCUTAneous TID WC    insulin lispro  0-4 Units SubCUTAneous Nightly    pantoprazole  40 mg Oral QAM AC    Vitamin D  1,000 Units Oral Daily     Continuous Infusions:   dextrose      sodium chloride       PRN Meds:sodium chloride flush **AND** sodium chloride flush, metoprolol, morphine **OR** [DISCONTINUED] morphine, diphenhydrAMINE, heparin (porcine), sodium chloride flush, oxyCODONE **OR** [DISCONTINUED] oxyCODONE, dextrose bolus **OR** dextrose bolus, glucagon (rDNA), dextrose, sodium chloride, ondansetron **OR** ondansetron, polyethylene glycol, acetaminophen **OR** acetaminophen, hydrALAZINE, tiZANidine    Review of Systems  Constitutional: Fatigue and malaise  Ears, nose, mouth, throat: negative for ear drainage, epistaxis, hoarseness, nasal congestion, sore throat and voice change  Respiratory: negative except for shortness of breath  Cardiovascular: negative for chest pain, chest pressure/discomfort, irregular heart beat, lower extremity edema and palpitations  Gastrointestinal: negative for abdominal pain, constipation, diarrhea, jaundice, melena, odynophagia, reflux symptoms and vomiting  Hematologic/lymphatic: negative for bleeding, easy bruising, lymphadenopathy and 
Pt Refused turning or bath. RN made multiple attempts to get pt to turn. Pt A&OX4, pt refused. Care continues  
Pt arrived from OR to PACU, arouses to stimuli. VSS, O2 sats 94% on 10 L simple mask with nasal trumpet in place. Dressing to right hip dry and intact, immobilizer in place. Hemovac drain in place with small amount of sanguinous drainage present. Right foot pedal pulse palpable, foot warm, swollen, +2 non-pitting edema. Alaniz remains in place draining valerie colored urine. Will monitor.   
Pt arrived from OR to PACU, arouses to voice and stimuli. Pt short of breath preoperatively, Dr. Daniels at bedside and decision to place pt on bipap awakening in PACU, 12/6, 40%. O2 sats 94% on 40% FiO2. Alaniz in place draining cloudy yellow urine. Will monitor.    
Pt incontinent of  xlg loose BM. Incontinent care provided.linens changed. Mepilex to coccyx changed. Pt tolerated well. Pt resting in bed, denies further needs at this time, call light in reach, spouse at bedside   
Pt on BiPAP at this time.Taking nap comfortable.   
Pt refused dinner tray, asked this nurse to take it out of room. Pt declines additional/different food states she isnt hungry.   
Pt refused to be turned and refused bath. Pt educated about importance of turning to prevent bed sores and importance of bathing to prevent central line infection. Pt continues to refuse and starting screaming and crying \" I want to go to sleep, leave me alone, I am hurting\". Administered pain medication per order. Pt continues to refuse after multiple attempts.   
Pt reluctantly wearing her bipap. Precedex gtt increased for comfort, per MD order. See eMAR.   
Pt resting quietly in bed on bipap, responds to voice. VSS, O2 sats 99% on 40% FiO2. Alaniz remains in place, small amount of yellow urine draining. Pt seen by anesthesia, phase 1 criteria met. Will transfer pt to .   
Pt resting quietly in bed with eyes closed, awakens to voice, denies pain to surgical site, falls back asleep easily. VSS, O2 sats 94% on 4 L NC. Dressing to right hip remains CDI, immobilizer and hemovac drain in place, small amount of sanguinous drainage. Right foot warm, pedal and posterior tibial pulse palpable. Alainz remains in place. Labs drawn per orders. Pt seen by anesthesia, phase 1 criteria met. Will transfer pt back to .   
Pt seen this am. Has mod/severe pain .pt remains anuric  despite getting ivf . Cr trending  up.         BARBARA/Hyperkalemia  Pt anuric ,  had recent  rt renal stent. D/w urology and nephro about possible stent blockage causing severe acute BARBARA . Xr  kub showed malpositioned DJ stent  .   Unclear why pt  has severe  barbara wtith  one sided obstruction. Unclear why single renal obstrn will cause severe BARBARA. Can't rule out ATN ,  undelying infn with leukocytosis. Ck wnl, less likely rhabdo.  D/w ortho, low suspcicion of compartment syndrome  Cont ivf , start  iv rocephin pending blood  cx , ur cx   Sepsis protocol  D/w uro  about kub finding, plan for urgent  stent exhange today  High risk of  decompensation, mainly renal and  may need urgent HD           Rt femur fracture  Plan for surgery pending improvement in renal fn.        I spent  38 min in managing critical illness, excluding any procedures   
RT notified of stat ABG and x-ray team notified of stat CXR.  
Secure message sent to Dr Mcdermott for MAP <65 x3 consecutive takes. Lactic acid STAT ordered. BP okay as long as SBP is >90. Care ongoing.  
Shift assessment complete, VSS, A&Ox4. Patient rates pain as a 8/10, pain medication administered at this time. Patient's  is at bedside. Patient has CPAP in place. Patient educated on plan of care for today and verbalizes understanding. All questions and concerns answered/addressed. Patient states no further needs at this time. Call light and personal belongings within reach.    8:38 AM  Patient rates pain a 10/10, pain medication administered per MAR. Patient laying supine in bed with CPAP in place. Lokelma administered due to potassium being elevated this morning. All other morning medications held due to patient's NPO status.    9:49 AM  Patient has purewick connected but has no output in canister. Brief checked for possible ineffectiveness of purewick, but brief is dry. MD at bedside at this time and made aware.     12:37 PM  Patient called for pain medication. IV Dilaudid is currently due but patient's BP's are 92/56 and 93/58 when taken a second time. Patient educated on effects of pain medications and risks of giving pain medication when BP is so low. Will recheck patient in 15 minutes.    4:15 PM  Patient taken down to pre-op. Bolus still infusing, unable to hang sodium bicarbonate at this time. Will start infusion when patient returns from surgery.    7:45 PM  Urine collected and sent.   
Shift assessment complete. A&OX4. VSS. Medications administered per MAR, tolerated well. The care plan and patient education has been reviewed with the patient. The patient c/o leg pain, pain medication administered. The patient denies any current needs. All safety precautions are in place.   
Shift assessment complete. A&Ox4. BP 87/40, MD notified. Other VSS. Medications administered per MAR, tolerated well. The care plan and patient education has been reviewed with the patient. The patient c/o hip/leg pain, pain medication administered. The patient denies any current needs. All safety precautions are in place. DESHAUN HILL RN   
Shift assessment complete. A&Ox4. BP low 89/44 & 81/46 (once back on the unit), MD notified. Other VSS. Medications administered per MAR, tolerated well. The care plan and patient education has been reviewed with the patient. The patient c/o generalized pain.The patient denies any current needs. All safety precautions are in place. DESHAUN HILL RN   
Some morning meds held until after dialysis completed.  
Some morning meds held until dialysis until after completed.  
[facility]  Cardiology Note  [phone]      Chief Complaint   Patient presents with    Fall     Came by [facility] from home with c/o falling while going to the bathroom. C/o 10/10 pain to right knee. Unsure if hit head when falling. 50mcg Fentanyl given en route        History of Present Illness:  Yoseph Small is a 68 y.o. patient PMHx  HTN, HLD, DM, cirrhosis, JUANITA who presented to the hospital with fall, ATN and femur fracture s/p ORIF    Brief episode of AF with RVR post-op treated with IV amio leading to cardioversion, stopped without AC due to cirrhosis and anemia    No further episodes of arrhythmia on tele. No cardiac complaints this morning.     Past Medical History:   has a past medical history of Anxiety, Depression, Diabetes mellitus (HCC), Diverticulosis, colon, Hyperlipidemia, Hypertension, Liver cirrhosis (HCC), Obesity, and Sleep apnea.    Surgical History:   has a past surgical history that includes Hysterectomy; Incisional hernia repair (07/25/2018); Cystoscopy (Right, 6/21/2024); and Femur Surgery (Right, 6/22/2024).     Social History:   reports that she has never smoked. She has never used smokeless tobacco. She reports that she does not drink alcohol.     Family History:  Family History   Problem Relation Age of Onset    Diabetes Mother     Heart Disease Neg Hx        Home Medications:  Were reviewed and are listed in nursing record. and/or listed below  Prior to Admission medications    Medication Sig Start Date End Date Taking? Authorizing Provider   lisinopril (PRINIVIL;ZESTRIL) 10 MG tablet Take 1 tablet by mouth daily HOLD this medication until you follow up with PCP... you have NOT required this during your hospital stay 4/14/24   Carisa Mcdonald APRN - CNP   sertraline (ZOLOFT) 50 MG tablet Take 50 mg by mouth daily  Patient not taking: Reported on 4/12/2024    Provider, MD Jose M   atorvastatin (LIPITOR) 10 MG tablet Take 1 tablet by mouth daily    
Teaching / education initiated regarding perioperative experience, expectations, and pain management during stay. Patient verbalized understanding.      Pt is oriented, but states she is unable to sign her consent at this time d/t amount of pain she is in. Pt states okay for  to sign consent. Consent signed, placed in chart.   
This RN spoke to dr. Mcdermott. Dr. Mcdermott gave verbal order to place patient on the v60 if needed d/t increased O2 demand. RT notified.   
Transport at bedside to take patient to HD during bedside shift report. Patient notes to be alert and oriented at this time. On portable monitor. To HD with transport.    Lolita Arora RN    
Treatment time: 1 hour 45 minutes  Net UF: 500 ml     Pre weight: 116.2 kg  Post weight:155.7 kg         Access used: RT Trialysis    Access function: poor with  ml/min     Medications or blood products given: NA     Regular outpatient schedule: TBD     Summary of response to treatment: Patient tolerated treatment well.  System clotted off and the patient did not want to go back on the machine.               
Treatment time: 2 hours    Net UF: 1150 ml    Pre weight: 109.5 kg  Post weight: 108.4 kg  EDW: TBD    Access used: Rt. Neck tunneled CVC  Access function: fair    Medications or blood products given: NA    Regular outpatient schedule: NA    Summary of response to treatment: Patient has completed 2 hour treatment, due to machine malfunction. She tolerated treatment well and was able to remove 1150 ml of fluid. Patient returned to room per hospital bed.     Copy of dialysis treatment record placed in chart, to be scanned into EMR.  
Treatment time: 2.5 hours  Net UF: 1000 ml     Pre weight: 136.1 kg  Post weight:135.1 kg  EDW: TBD      Access used: RCVC    Access function: well with  ml/min     Medications or blood products given: Midodrine 10mg &   Albumin 12g     Regular outpatient schedule: TBD     Summary of response to treatment: Patient had several hypotensive episodes throughout treatment. Patient did remain stable throughout entire treatment.     Report given to Kavya Mcclellan RN and copy of dialysis treatment record placed in chart, to be scanned into EMR.  
Treatment time: 2.5 hours  Net UF: 3000 ml     Pre weight: 138.2 kg  Post weight:135.2 kg  EDW: TBD      Access used: RIJ    Access function: well with  ml/min     Medications or blood products given: Heparin Dwells     Regular outpatient schedule: TBD     Summary of response to treatment: Patient tolerated treatment well and without any complications. Patient remained stable throughout entire treatment.    Report given to Rehana Reveles RN and copy of dialysis treatment record placed in chart, to be scanned into EMR.  
Treatment time: 2.5 hours, 1st HD tx     Net UF: 3 L     Pre weight: 136.2 kg  Post weight: 133.2 kg  EDW: BARBARA     Access used: R neck Vascath   Access function: tolerated 250 BFR     Medications or blood products given: Heparin dwells     Regular outpatient schedule: TBD, BARBARA     Summary of response to treatment: tolerated well. No complications     Copy of dialysis treatment record placed in chart, to be scanned into EMR.  
Treatment time: 3 hours  Net UF: 1400 ml     Pre weight: 114.8 kg  Post weight:113.5 kg    Access used: R IJ catheter    Access function: Positional with  ml/min     Medications or blood products given: Retacrit 20688 units and heparin for catheter dwells     Regular outpatient schedule: MWF     Summary of response to treatment: Treatment terminated early due to clotting, completed only 3 hours as patient refused to continue treatment, nephrologist informed. Pt is stable upon leaving the dialysis suite.     Copy of dialysis treatment record placed in chart, to be scanned into EMR.  
Treatment time: 3 hours  Net UF: 2500 ml     Pre weight: 127.5 kg  Post weight:125.1 kg  EDW: TBD    Access used: R CVC    Access function: Good with  ml/min     Medications or blood products given: Heparin dwells and Retacrit 67031S     Regular outpatient schedule: MWF     Summary of response to treatment: Patient tolerated treatment well and without any complications. Patient remained stable throughout entire treatment and upon the exiting the dialysis suite via transport.     Report given to Schoenig, Angela, RN and copy of dialysis treatment record placed in chart, to be scanned into EMR.  
Treatment time: 3.5 hours  Net UF: 900 ml     Pre weight: 107.4kg  Post weight:106.5 kg  EDW: TBD     Access used: RTDC    Access function: well with  ml/min     Medications or blood products given: Albumin 25g ,Retacrit 10,000 units, RTDC- heparin dwells     Regular outpatient schedule: MWF     Summary of response to treatment: Patient tolerated fair. Episodes of hypotention noted during HD. Dr. Ernst notified- Pt medicated for blood pressure suppor.     Report given to Julissa Villegas RN and copy of dialysis treatment record placed in chart, to be scanned into EMR.  
Treatment time:2 hrs 30 mins  Net UF: 1200 ml    Pre weight: 109.9 kg  Post weight: 108.7 kg  EDW: TBD    Access used: Rt CW TDC  Access function: Poorly tolerated, lines reversed only able to achieve 250 BFR with frequent alarm.    Medications or blood products given: Cathflo dwells.    Regular outpatient schedule: BARBARA, T, Th, S while in hospital    Summary of response to treatment: Pt tolerated fair, system clotted with 30 mins remaining of treatment, activase to dwell overnight until treatment tomorrow per Dr. Campos, catheter lumen marked do not flush. Pt stable the entire treatment and upon exiting hemodialysis suite.Pt report given to Julissa Villegas RN  .      Copy of dialysis treatment record placed in chart, to be scanned into EMR.  
Unable to place tunneled dialysis catheter today, 7/5/24, per JOSE ANTONIO ESPINOZA d/t elevated WBC and urinalysis  
Urology Progress Note  [facility]    Provider: Juan Manuel Cavazos MD  Patient ID:  Admission Date: 2024 Name: Yoseph Small  OR Date: 2024 MRN: [id number]   Patient Location: [id number] : 1955  Attending: Gabrielle Reyna MD Date of Service: 2024  PCP: Sariah Teresa     Diagnoses:  Retained ureteral stent  Morbid obesity with BMI 45.76  Right knee pain status post fall  Femur fracture  BARBARA -Cr 5.9 from >6     Assessment/Plan:  Stent exchange completed on 2024  Outpatient stent removal per Dr. Cavazos--ordered  Alaniz catheter can be removed as mobility improved  Call urology with questions    The patient had a chance to ask questions which were answered. she understands the above plan.    Subjective:   Yoseph Small is a 68 y.o. female. She was seen and examined this morning. Today we discussed her Alaniz catheter, and stent management.  Fluids were encouraged.    Objective:   Vitals:  Vitals:    24 0900   BP: 114/63   Pulse: 84   Resp: 16   Temp: 98.3 °F (36.8 °C)   SpO2: 94%       Intake/Output Summary (Last 24 hours) at 2024 0941  Last data filed at 2024 0551  Gross per 24 hour   Intake 2778.2 ml   Output 615 ml   Net 2163.2 ml       Physical Exam:  Gen: Alert and oriented x3, no acute distress  CV: Regular rate   Resp: unlabored respirations  Abd: Soft, non-distended, non-tender, no masses  : Alaniz catheter in place with clear urine.    Labs:  Lab Results   Component Value Date    WBC 7.6 2024    HGB 9.5 (L) 2024    HCT 29.6 (L) 2024    MCV 86.3 2024    PLT 98 (L) 2024     Lab Results   Component Value Date    CREATININE 5.9 (HH) 2024    BUN 75 (H) 2024     (L) 2024    K 5.1 2024    CL 99 2024    CO2 19 (L) 2024       Juan Manuel Cavazos MD   2024          
Urology Progress Note  [facility]    Provider: Shiv Esposito MD  Patient ID:  Admission Date: 2024 Name: Yoseph Small  OR Date: 2024 MRN: [id number]   Patient Location: [id number] : 1955  Attending: Mark Galvan MD Date of Service: 2024  PCP: Sariah Teresa     Diagnoses:  Retained ureteral stent  Morbid obesity with BMI 45.76  Right knee pain status post fall  Femur fracture    Assessment/Plan:  Stent exchange completed on 2024  Outpatient stent removal per Dr. Cavazos  Alaniz catheter can be removed as mobility improved  Call urology with questions    The patient had a chance to ask questions which were answered. she understands the above plan.    Subjective:   Yoseph Small is a 68 y.o. female. She was seen and examined this morning. Today we discussed her Alaniz catheter, and stent management.  Fluids were encouraged.    Objective:   Vitals:  Vitals:    24 1126   BP: 101/62   Pulse: 96   Resp: 17   Temp: 99 °F (37.2 °C)   SpO2: 94%       Intake/Output Summary (Last 24 hours) at 2024 1130  Last data filed at 2024 0510  Gross per 24 hour   Intake 3741.62 ml   Output 100 ml   Net 3641.62 ml     Physical Exam:  Gen: Alert and oriented x3, no acute distress  CV: Regular rate   Resp: unlabored respirations  Abd: Soft, non-distended, non-tender, no masses  : Alaniz catheter in place with clear urine.    Labs:  Lab Results   Component Value Date    WBC 6.5 2024    HGB 8.7 (L) 2024    HCT 26.2 (L) 2024    MCV 86.4 2024    PLT 89 (L) 2024     Lab Results   Component Value Date    CREATININE 6.8 (HH) 2024    BUN 80 (H) 2024     (L) 2024    K 4.8 2024     2024    CO2 18 (L) 2024       Shiv Esposito MD   2024          
Urology Progress Note  [facility]    Provider: CK Espana CNP  Patient ID:  Admission Date: 2024 Name: Yoseph Small  OR Date: 2024 MRN: [id number]   Patient Location: [id number] : 1955  Attending: Gabrielle Reyna MD Date of Service: 2024  PCP: Sariah Teresa     Diagnoses:  Retained ureteral stent  Morbid obesity with BMI 45.76  Right knee pain status post fall  Femur fracture  BARBARA -Cr 5.9 from >6     Assessment/Plan:  Stent exchange completed on 2024  Outpatient stent removal per Dr. Cavazos--ordered  Alaniz catheter can be removed as mobility improved  Call urology with questions    The patient had a chance to ask questions which were answered. she understands the above plan.    Subjective:   Yoseph Small is a 68 y.o. female. She was seen and examined this morning. Today we discussed her Alaniz catheter, and stent management.  Fluids were encouraged.    Objective:   Vitals:  Vitals:    24 0832   BP:    Pulse:    Resp: 18   Temp:    SpO2:        Intake/Output Summary (Last 24 hours) at 2024 0854  Last data filed at 2024 0607  Gross per 24 hour   Intake 60 ml   Output 620 ml   Net -560 ml       Physical Exam:  Gen: Alert and oriented x3, no acute distress  CV: Regular rate   Resp: unlabored respirations  Abd: Soft, non-distended, non-tender, no masses  : Alaniz catheter in place with clear urine.    Labs:  Lab Results   Component Value Date    WBC 5.8 2024    HGB 8.8 (L) 2024    HCT 26.3 (L) 2024    MCV 85.3 2024     (L) 2024     Lab Results   Component Value Date    CREATININE 6.7 (HH) 2024    BUN 97 (HH) 2024     2024    K 4.9 2024     2024    CO2 19 (L) 2024       CK Espana CNP   2024          
Water added to high flow oxygen setup.  
   insulin lispro  0-4 Units SubCUTAneous Nightly    pantoprazole  40 mg Oral QAM AC    Vitamin D  1,000 Units Oral Daily     PRN Meds: sodium chloride flush **AND** sodium chloride flush, metoprolol, morphine **OR** [DISCONTINUED] morphine, diphenhydrAMINE, heparin (porcine), sodium chloride, sodium chloride flush, sodium chloride, sodium chloride, oxyCODONE **OR** [DISCONTINUED] oxyCODONE, dextrose bolus **OR** dextrose bolus, glucagon (rDNA), dextrose, sodium chloride, ondansetron **OR** ondansetron, polyethylene glycol, acetaminophen **OR** acetaminophen, hydrALAZINE, tiZANidine, sodium chloride      Intake/Output Summary (Last 24 hours) at 7/1/2024 2043  Last data filed at 7/1/2024 1800  Gross per 24 hour   Intake 10 ml   Output 600 ml   Net -590 ml         Physical Exam Performed:    BP (!) 161/69   Pulse 91   Temp 97.7 °F (36.5 °C) (Temporal)   Resp 23   Ht 1.651 m (5' 5\")   Wt 125.1 kg (275 lb 12.7 oz)   SpO2 96%   BMI 45.89 kg/m²     General appearance: mild distress, obese, still edematous but improving.  HEENT: Pupils equal, round, and reactive to light. Conjunctivae/corneas clear.  Neck: Supple, with full range of motion. No jugular venous distention. Trachea midline.  Respiratory:  Normal respiratory effort. Rales bilaterally.   Cardiovascular: tachycardic  rate and rhythm with normal S1/S2 without murmurs, rubs or gallops. She has 1-2 + edema   Abdomen: Soft, non-tender, non-distended with normal bowel sounds.  : No CVA tenderness.  Musculoskeletal: No clubbing or cyanosis.  Full range of motion without deformity.  Skin: Skin color, texture, turgor normal.  No rashes or lesions.  Neurologic:  Neurovascularly intact without any focal sensory/motor deficits. Cranial nerves: II-XII intact, grossly non-focal.  Psychiatric: Alert and oriented, thought content appropriate, normal insight  Capillary Refill: Brisk, 3 seconds, normal   Peripheral Pulses: +2 palpable, equal bilaterally       Labs: 
   sodium bicarbonate  650 mg Oral 4x Daily    sennosides-docusate sodium  1 tablet Oral BID    atorvastatin  10 mg Oral Daily    ferrous sulfate  324 mg Oral Daily with breakfast    insulin lispro  0-8 Units SubCUTAneous TID WC    insulin lispro  0-4 Units SubCUTAneous Nightly    pantoprazole  40 mg Oral QAM AC    Vitamin D  1,000 Units Oral Daily     PRN Meds: sodium chloride flush **AND** sodium chloride flush, metoprolol, morphine **OR** [DISCONTINUED] morphine, diphenhydrAMINE, heparin (porcine), sodium chloride flush, oxyCODONE **OR** [DISCONTINUED] oxyCODONE, dextrose bolus **OR** dextrose bolus, glucagon (rDNA), dextrose, sodium chloride, ondansetron **OR** ondansetron, polyethylene glycol, acetaminophen **OR** acetaminophen, hydrALAZINE, tiZANidine      Intake/Output Summary (Last 24 hours) at 7/4/2024 0996  Last data filed at 7/4/2024 0556  Gross per 24 hour   Intake --   Output 70 ml   Net -70 ml         Physical Exam Performed:    BP (!) 100/52   Pulse 71   Temp 97.2 °F (36.2 °C) (Temporal)   Resp 20   Ht 1.651 m (5' 5\")   Wt 116.1 kg (255 lb 14.4 oz)   SpO2 97%   BMI 42.58 kg/m²     General appearance: No apparent distress, appears stated age and cooperative.  Obese.  HEENT: Pupils equal, round, and reactive to light. Conjunctivae/corneas clear.  Neck: Supple, with full range of motion. No jugular venous distention. Trachea midline.  Respiratory:  Normal respiratory effort.  Slight rales bilaterally.  Cardiovascular: Regular rate and rhythm with normal S1/S2 without murmurs, rubs or gallops.  1-2 pitting edema bilateral lower extremities..  Abdomen: Soft, non-distended with normal bowel sounds. Mildly TTP diffusely.  : No CVA tenderness.  Musculoskeletal: No clubbing or cyanosis.  Full range of motion without deformity.  Skin: Skin color, texture, turgor normal.  No rashes or lesions.  Neurologic:  Neurovascularly intact without any focal sensory/motor deficits. Cranial nerves: II-XII intact, 
4.8 06/23/2024 03:10 PM     06/24/2024 05:44 AM    CO2 19 06/24/2024 05:44 AM    BUN 97 06/24/2024 05:44 AM    CREATININE 6.7 06/24/2024 05:44 AM    GLUCOSE 271 06/24/2024 05:44 AM    CALCIUM 7.6 06/24/2024 05:44 AM            Assessment:      right distal femur ORIF POD 2, Dr You.     Plan:     Drain removed today without issue.    1:  patient will remain 20lb WB on right lower extremity for 6-9 weeks. Keep knee immobilizer in place with no Knee ROM for 2 weeks. Judicious use of ice for  swelling and pain control.   2:  Continue Deep venous thrombosis prophylaxis- Okay for ASA 81 BID from ortho perspective. Defer to attending for DVT Ppx choice.   3:  Continue Pain Control- morphine, radha, tizanidine PRN  4: Begin PT/OT. Appreciate recs.   5: discharge per primary team when medically stable; expect SNF    CK Du - CK Tsang - CNP  
Daily with breakfast    insulin lispro  0-8 Units SubCUTAneous TID WC    insulin lispro  0-4 Units SubCUTAneous Nightly    pantoprazole  40 mg Oral QAM AC    Vitamin D  1,000 Units Oral Daily     PRN Meds: sodium chloride flush **AND** sodium chloride flush, metoprolol, morphine **OR** [DISCONTINUED] morphine, diphenhydrAMINE, heparin (porcine), sodium chloride flush, oxyCODONE **OR** [DISCONTINUED] oxyCODONE, dextrose bolus **OR** dextrose bolus, glucagon (rDNA), dextrose, sodium chloride, ondansetron **OR** ondansetron, polyethylene glycol, acetaminophen **OR** acetaminophen, hydrALAZINE, tiZANidine      Intake/Output Summary (Last 24 hours) at 7/2/2024 1105  Last data filed at 7/2/2024 0804  Gross per 24 hour   Intake 250 ml   Output 575 ml   Net -325 ml       Physical Exam Performed:    BP (!) 153/61   Pulse 84   Temp 97.8 °F (36.6 °C)   Resp 17   Ht 1.651 m (5' 5\")   Wt 125.1 kg (275 lb 12.7 oz)   SpO2 100%   BMI 45.89 kg/m²     General appearance: No apparent distress, appears stated age and cooperative.  Obese.  HEENT: Pupils equal, round, and reactive to light. Conjunctivae/corneas clear.  Neck: Supple, with full range of motion. No jugular venous distention. Trachea midline.  Respiratory:  Normal respiratory effort.  Slight rales bilaterally.  Cardiovascular: Regular rate and rhythm with normal S1/S2 without murmurs, rubs or gallops.  1-2 pitting edema bilateral lower extremities..  Abdomen: Soft, non-tender, non-distended with normal bowel sounds.  : No CVA tenderness.  Musculoskeletal: No clubbing or cyanosis.  Full range of motion without deformity.  Skin: Skin color, texture, turgor normal.  No rashes or lesions.  Neurologic:  Neurovascularly intact without any focal sensory/motor deficits. Cranial nerves: II-XII intact, grossly non-focal.  Psychiatric: Alert and oriented, thought content appropriate, normal insight  Capillary Refill: Brisk, 3 seconds, normal   Peripheral Pulses: +2 palpable, 
comminuted fx  Weight Bearing Restrictions:  20# RLE for 6-9 weeks      Required Braces/Orthotics: knee immobilizer   [x] Right  [] Left  Positional Restrictions: Per ortho noted, \"Ok for knee ROM\"    Pre-Admission Information   Lives With: spouse                  Type of Home: house  Home Layout: two level, bi level with 7 up and 8 down (primarily stays on main level downstairs and sleeps in a recliner and has one rail on both flighs  Home Access: few steps with singular rail depending on front entrance vs. Garage.  Bathroom Layout: half bath on level that is currently living on and wipes down   Bathroom Equipment: grab bars in shower, but that is in the upstairs tub/shower unit, which also has a hand held shower wand.  Toilet Height: elevated height  Home Equipment: rollator - 4 wheeled walker, single point cane, has a wide base  uses cane or WC in the community. Sometimes it's a cane up to the patio and then patio to garage and then it is level (but 5 steps until then)  Transfer Assistance: Independent without use of device  Ambulation Assistance: modified independent with use of 4WW, barely goes out d/t SOB and difficulty climbing steps d/t arthritis  ADL Assistance: independent with all ADL's, does bathing in an adapted way and is able to LB dress  IADL Assistance:  more or less independent, but does tasks adpated and seated, with multiple breaks  Active :        [x] Yes                 [] No doesn't really do much driving  Hand Dominance: [] Left                 [x] Right  Current Employment: retired.    Recent Falls: One fall, which led to current admission.     Examination   Vision:   Vision Corrective Device: wears glasses at all times  Hearing:   WFL  Observation:   General Observation:  Pt on RA on arrival. R LE operative dressings are clean, dry, and intact.        Subjective  General: Pt semi-fowlers in bed on arrival, agreeable to participate in PT/OT treatment session with maximal encouragement 
dietitian if additional nutrition interventions are needed prior to RD reassessment.     Missy Correa MS, RD, LD    Contact: [phone]     
weight bearing - 20 lbs (6-9 weeks)  [] Right Upper Extremity  [] Left Upper Extremity [x] Right Lower Extremity  [] Left Lower Extremity     Required Braces/Orthotics: knee immobilizer (no knee ROM for 2 weeks)   [] Right  [] Left  Positional Restrictions:No Straight Leg Raise    Pre-Admission Information   Lives With: spouse    Type of Home: house  Home Layout: two level, bi level with 7 up and 8 down (primarily stays on main level downstairs and sleeps in a recliner and has one rail on both flighs  Home Access:  few steps with singular rail depending on front entrance vs. Garage.  Bathroom Layout:  half bath on level that is currently living on and wipes down.   Bathroom Equipment: grab bars in shower, but that is in the upstairs tub/shower unit, which also has a hand held shower wand.  Toilet Height: elevated height  Home Equipment: rollator - 4 wheeled walker, single point cane, has a wide base  uses cane or WC in the community. Sometimes it's a cane up to the patio and then patio to garage and then it is level (but 5 steps until then  Transfer Assistance: Independent without use of device  Ambulation Assistance:modified independent with use of 4WW, barely goes out d/t SOB and difficulty climbing steps d/t arthritis  ADL Assistance: independent with all ADL's, does bathing in an adapted way and is able to LB dress  IADL Assistance:  more or less independent, but does tasks adpated and seated, with multiple breaks  Active :        [x] Yes  [] No doesn't really do much driving  Hand Dominance: [] Left  [x] Right  Current Employment: retired.  Occupation:    Hobbies:   Recent Falls: just the one that led to the injury    Examination   Vision:   Vision Corrective Device: wears glasses at all times  Hearing:   WFL  Perception:   WFL  Observation:   Palpation:  palpated B LE (dorsal/plantar surfaces) upon pt report of numbness. Denied loss of sensation but winced when touching plantar 
Continue Pain Control- morphine, radha, tizanidine PRN  4: Begin PT/OT. Appreciate recs.   5: discharge per primary team when medically stable; expect SNF    CK Du - CNP      
  WFL    Education  Barriers To Learning: emotional and physical  Patient Education: patient educated on PT role and benefits, precautions, general safety, disease specific education, current functional decline, progression of PT services  Learning Assessment: patient verbalizes understanding, would benefit from continued reinforcement    Assessment  Activity Tolerance: limited by pain, weakness; self-limiting  Impairments Requiring Therapeutic Intervention: decreased functional mobility, decreased ROM, decreased strength, decreased endurance, decreased balance, increased pain  Prognosis: good  Clinical Assessment: Pt continues with self-limiting behaviors. Pt transferred from bed to chair via maxi-move and refused to attempt a STS transfer. Pt is limited by pain and generalized weakness. Pt will benefit from continued skilled PT to facilitate return to PLOF and to promote independence. POC decreased (to 5-7x per week) due to pts lack of progress in PT and self-limiting behavior.  Safety Interventions: patient left in chair, chair alarm in place, call light within reach, nurse notified, and family/caregiver present    Plan  Frequency: 7 x/week  Current Treatment Recommendations: strengthening, balance training, functional mobility training, transfer training, gait training, endurance training, neuromuscular re-education, wheelchair mobility training, modalities, patient/caregiver education, pain management, home exercise program, safety education, equipment evaluation/education, and positioning    Goals  Patient Goals: Pts 's goal for her to be able to transfer to/from a w/c  Short Term Goals:  Time Frame: upon d/c  Short term goal 1: Pt will perform rolling with Max Ax1  Short term goal 2: Pt will perform supine<>sit with Max Ax1  Short term goal 3: Pt will perform transfers with LRAD and Max Ax1  Short term goal 4: Pt will ambulate 5' with LRAD and Max Ax1  Short term goal 5: Pt will propel self in w/c 25' 
for instruction and performing of tasks.   Orientation:    alert and oriented x 4  Command Following:   WFL    Education  Barriers To Learning: emotional and physical  Patient Education: patient educated on goals, PT role and benefits, plan of care, precautions, weight-bearing education, general safety, functional mobility training, proper use of assistive device/equipment, family education, transfer training, discharge recommendations  Learning Assessment:  patient verbalizes understanding, would benefit from continued reinforcement    Assessment  Activity Tolerance: Poor, due to high levels of pain this date. BP monitored throughout session:    Supine in bed: 139/59  O2 94% on 4L O2   S/p maxi-maren transfer to recliner: 56/61  O2 88% on 4L O2   S/p ~10-12 minutes in recliner: 148/64  O2 90% ON 4LO2  Impairments Requiring Therapeutic Intervention: decreased functional mobility, decreased ROM, decreased strength, decreased endurance, decreased balance, increased pain  Prognosis: fair and guarded  Clinical Assessment: Pt presents with the above deficits impairing her ability to perform functional mobility safely and independently. Pt with PLOF of MOD I with rollator and currently requires Dx3 for rolling in bed and use of maxi-maren transfer sheet for bed>chair transfer due to high pain levels. Pt would benefit from acute PT services to address deficits and facilitate return to PLOF.    Safety Interventions: patient left in chair, chair alarm in place, call light within reach, gait belt, patient at risk for falls, nurse notified, and family/caregiver present    Plan  Frequency: 7 x/week  Current Treatment Recommendations: strengthening, balance training, functional mobility training, transfer training, gait training, endurance training, neuromuscular re-education, wheelchair mobility training, modalities, patient/caregiver education, pain management, home exercise program, safety education, equipment 
inferior aspect of the   stent visualized inferior to the urinary bladder as described above.         CT FEMUR RIGHT WO CONTRAST   Final Result   1. Acute moderately comminuted and displaced oblique fracture extending from   the distal femoral diaphysis into the trochlear groove and intercondylar   notch.   2. Small right knee lipohemarthrosis with intra-articular gas in the   suprapatellar recess.   3. Partially visualized right ureteral stent with the distal pigtail formed   either within or distal to the urethra.         CT LUMBAR SPINE WO CONTRAST   Final Result   Moderate degenerative disc changes throughout with no acute abnormality seen.      Moderate central disc bulges at L2-3 and L4-5.      Moderate osteoarthritic changes facets throughout causing diffuse mild spinal   stenosis         CT THORACIC SPINE WO CONTRAST   Final Result   1. No acute osseous abnormality of the thoracic spine.   2. Chronic kyphosis of the upper thoracic spine.   3. DISH.   4. Small bilateral pleural effusions.         CT CERVICAL SPINE WO CONTRAST   Final Result   Diffuse osteopenia and obesity limiting the exam.      Moderate degenerative disc changes throughout the lower cervical spine with   no obvious acute abnormality seen      Moderate disc osteophyte complexes from C4 through C6.  Suggest right MRI   follow-up, if the patient is persistently symptomatic.      RECOMMENDATIONS:         CT HEAD WO CONTRAST   Final Result   No acute intracranial abnormality.         XR KNEE RIGHT (3 VIEWS)   Final Result   Mildly displaced spiral fracture along the diametaphyseal region of the   distal femur with prominent posterior displacement of the distal fragment.      Moderate osteoarthritic changes in the knee and diffuse osteopenia.         XR FEMUR RIGHT (MIN 2 VIEWS)    (Results Pending)   XR ABDOMEN (KUB) (SINGLE AP VIEW)    (Results Pending)           Assessment/Plan:    Active Hospital Problems    Diagnosis     Diabetes education, 
made  Insights: not aware of deficits  Initiation: requires cues for all  Sequencing: requires cues for all  Orientation:    oriented to person, disoriented to place, disoriented to time , and disoriented to situation  Command Following:   accurately follows one step commands    Education  Barriers To Learning: cognition, emotional, and physical  Patient Education: patient educated on PT role and benefits, precautions, general safety, disease specific education, current functional decline, progression of PT services  Learning Assessment: patient verbalizes understanding, would benefit from continued reinforcement    Assessment  Activity Tolerance: Poor; Patient very self-limiting, limited by pain and cognition. Vitals stable throughout session  Impairments Requiring Therapeutic Intervention: decreased functional mobility, decreased ROM, decreased strength, decreased endurance, decreased balance, increased pain  Prognosis: fair  Clinical Assessment: Patient presents below her baseline in functional mobility. Prior to admission, patient was modified independent with 4WW for ambulation and independent for transfers. Today, patient required maren-lift for adjusting in recliner. Patient continues with self-limiting behaviors however, is now allowed to complete ROM without restrictions and performed exercises with B LE. Patient would benefit from additional skilled PT upon discharge to promote independence and safety with functional mobility.   Safety Interventions: patient left in chair, chair alarm in place, call light within reach, nurse notified, and family/caregiver present    Plan  Frequency: 3-5 x/per week--plan downgraded due to lack of progress  Current Treatment Recommendations: strengthening, balance training, functional mobility training, transfer training, gait training, endurance training, neuromuscular re-education, wheelchair mobility training, modalities, patient/caregiver education, pain management, home 
moderately comminuted and displaced oblique fracture extending from   the distal femoral diaphysis into the trochlear groove and intercondylar   notch.   2. Small right knee lipohemarthrosis with intra-articular gas in the   suprapatellar recess.   3. Partially visualized right ureteral stent with the distal pigtail formed   either within or distal to the urethra.         CT LUMBAR SPINE WO CONTRAST   Final Result   Moderate degenerative disc changes throughout with no acute abnormality seen.      Moderate central disc bulges at L2-3 and L4-5.      Moderate osteoarthritic changes facets throughout causing diffuse mild spinal   stenosis         CT THORACIC SPINE WO CONTRAST   Final Result   1. No acute osseous abnormality of the thoracic spine.   2. Chronic kyphosis of the upper thoracic spine.   3. DISH.   4. Small bilateral pleural effusions.         CT CERVICAL SPINE WO CONTRAST   Final Result   Diffuse osteopenia and obesity limiting the exam.      Moderate degenerative disc changes throughout the lower cervical spine with   no obvious acute abnormality seen      Moderate disc osteophyte complexes from C4 through C6.  Suggest right MRI   follow-up, if the patient is persistently symptomatic.      RECOMMENDATIONS:         CT HEAD WO CONTRAST   Final Result   No acute intracranial abnormality.         XR KNEE RIGHT (3 VIEWS)   Final Result   Mildly displaced spiral fracture along the diametaphyseal region of the   distal femur with prominent posterior displacement of the distal fragment.      Moderate osteoarthritic changes in the knee and diffuse osteopenia.                 Assessment/Plan:    Active Hospital Problems    Diagnosis     Moderate malnutrition (HCC) [E44.0]     Diabetes education, encounter for [Z71.89]     Elevated procalcitonin [R79.89]     Fall from standing [W19.XXXA]     Hyperkalemia [E87.5]     Intractable pain [R52]     Hypervolemia [E87.70]     Obstructive uropathy [N13.9]     Paroxysmal 
Max Ax1  Short term goal 3: Pt will perform transfers with LRAD and Max Ax1  Short term goal 4: Pt will ambulate 5' with LRAD and Max Ax1  Short term goal 5: Pt will propel self in w/c 25' with Mod Ax1  **No goals met in full this date, 6/29  Above goals reviewed on 6/29/2024.  All goals are ongoing at this time unless indicated above.      Therapy Session Time      Individual Group Co-treatment   Time In     1158   Time Out     1228   Minutes     30     Timed Code Treatment Minutes:  30 Minutes  Total Treatment Minutes:  30 minutes       Electronically Signed By: BEV LAWS, [id number]    
Recommendations: strengthening, ROM, balance training, functional mobility training, transfer training, gait training, stair training, cognitive/perceptual training, and pain management    Goals  Patient Goals: to go home ; 's goal is for pt to be able to transfer to a w/c for dialysis  Short Term Goals:  Time Frame: d/c  Patient will complete upper body ADL at minimal assistance   Patient will complete lower body ADL at minimal assistance   Patient will complete toileting at minimal assistance   Patient will complete grooming at minimal assistance   Patient will increase functional sitting balance to min to modA for improved ADL completion - goal met 7/4 but recommend con't targeting for consistency as pt ranging CGA-maxA this date; pt declined on 7/7  Patient will increase [facility] ADL score = to or > than 18/24   Patient will complete bed mobility at moderate assistance    Above goals reviewed on 7/7/2024.  All goals are ongoing at this time unless indicated above.       Therapy Session Time     Individual Group Co-treatment   Time In    1342   Time Out    1436   Minutes    54        Timed Code Treatment Minutes: 54 minutes  Total Treatment Minutes: 54 minutes       Electronically Signed By:    JENNIFER Samuel Ed., OTR/L, [id number]                   
Units, 5,000 Units, SubCUTAneous, 3 times per day  sodium chloride flush 0.9 % injection 1.1-1.9 mL, 1.1-1.9 mL, IntraCATHeter, PRN **AND** sodium chloride flush 0.9 % injection 1.1-1.9 mL, 1.1-1.9 mL, IntraCATHeter, PRN  metoprolol succinate (TOPROL XL) extended release tablet 100 mg, 100 mg, Oral, Daily  metoprolol (LOPRESSOR) injection 10 mg, 10 mg, IntraVENous, Q6H PRN  morphine (PF) injection 2 mg, 2 mg, IntraVENous, Q3H PRN **OR** [DISCONTINUED] morphine injection 4 mg, 4 mg, IntraVENous, Q4H PRN  diphenhydrAMINE (BENADRYL) tablet 12.5 mg, 12.5 mg, Oral, Q12H PRN  heparin (porcine) injection 2,300 Units, 2,300 Units, IntraCATHeter, PRN  midodrine (PROAMATINE) tablet 10 mg, 10 mg, Oral, TID WC  sodium chloride flush 0.9 % injection 5-40 mL, 5-40 mL, IntraVENous, 2 times per day  sodium chloride flush 0.9 % injection 5-40 mL, 5-40 mL, IntraVENous, PRN  sodium bicarbonate tablet 650 mg, 650 mg, Oral, 4x Daily  oxyCODONE (ROXICODONE) immediate release tablet 5 mg, 5 mg, Oral, Q4H PRN **OR** [DISCONTINUED] oxyCODONE (ROXICODONE) immediate release tablet 10 mg, 10 mg, Oral, Q4H PRN  sennosides-docusate sodium (SENOKOT-S) 8.6-50 MG tablet 1 tablet, 1 tablet, Oral, BID  atorvastatin (LIPITOR) tablet 10 mg, 10 mg, Oral, Daily  ferrous sulfate (IRON 325) tablet 324 mg, 324 mg, Oral, Daily with breakfast  insulin lispro (HUMALOG,ADMELOG) injection vial 0-8 Units, 0-8 Units, SubCUTAneous, TID WC  insulin lispro (HUMALOG,ADMELOG) injection vial 0-4 Units, 0-4 Units, SubCUTAneous, Nightly  dextrose bolus 10% 125 mL, 125 mL, IntraVENous, PRN **OR** dextrose bolus 10% 250 mL, 250 mL, IntraVENous, PRN  glucagon injection 1 mg, 1 mg, SubCUTAneous, PRN  dextrose 10 % infusion, , IntraVENous, Continuous PRN  pantoprazole (PROTONIX) tablet 40 mg, 40 mg, Oral, QAM AC  Vitamin D (CHOLECALCIFEROL) tablet 1,000 Units, 1,000 Units, Oral, Daily  0.9 % sodium chloride infusion, , IntraVENous, PRN  ondansetron (ZOFRAN-ODT) disintegrating 
[E87.70]     Obstructive uropathy [N13.9]     PAF (paroxysmal atrial fibrillation) ([facility]) [I48.0]     Hepatic cirrhosis ([facility]) [K74.60]     Hypotension [I95.9]     Acute respiratory failure with hypoxia ([facility]) [J96.01]     Closed fracture of right distal femur ([facility]) [S72.401A]     Fracture, subtrochanteric, right femur, closed, initial encounter ([facility]) [S72.21XA]     Sepsis ([facility]) [A41.9]     Morbid obesity due to excess calories ([facility]) [E66.01]     BARBARA (acute kidney injury) ([facility]) [N17.9]          Hospital Day: 19    This is a 68 y.o. female who presented to [facility] on 6/20/2024 and is being treated for:    BARBARA suspected ATN now on hemodialysis  -Likely 2/2 ATN due to obstructive uropathy  -Creatinine 5.2 on admission; baseline approximately 1.0  -KUB on admission showed malpositioned ureteral stent  -s/p right ureteral stent replacement 6/21  -Avoid nephrotoxins  -Continue HD per nephrology  -Will need TDC, but WBC increased on 7/3; will postpone TDC   -KUB ordered on 7/4 for abdominal pain, non acute; no need for CT abdomen/pelvis at this time  Tunneled dialysis catheter delayed as having some leukocytosis  Blood cultures are negative  Right IJ dialysis catheter not functioning  Since white cell count is coming down a tunneled hemodialysis catheter can be placed tomorrow    Suspected UTI  -UA indicative of UTI  -Urine culture pending  -Recent UTI of Enterococcus and Citrobacter  -Vancomycin and cefepime started due to allergies and recent infection  Infectious disease consulted   Urine cultures grew out Candida  Infectious disease on board    Metabolic acidosis:  Bicarb 14.  Hemodialysis this morning  Nephrology consulted  Check VBG    Right femur fracture -resolved  -Seen on imaging  -s/p right femur ORIF on 6/22  -Pain control  -Orthopedic surgery following    PAF  -Hold off on further amiodarone use  -30-day monitor at [place] for recurrence  -Cardiology consulted; signed off    Type 2 diabetes  -SSI      Since 
posterior displacement of the distal fragment.      Moderate osteoarthritic changes in the knee and diffuse osteopenia.                 Assessment/Plan:    Active Hospital Problems    Diagnosis     Closed fracture of right distal femur ([facility]) [S72.401A]     Fracture, subtrochanteric, right femur, closed, initial encounter ([facility]) [S72.21XA]          Hospital Day: 8    This is a 68 y.o. female who presented to [facility] on 6/20/2024 and is being treated for:    BARBARA likely 2/2 ATN due to obstructive uropathy/hypotension in setting of sepsis 2/2 UTI  S/p right ureteral stent.  KUB on admission showed malpositioned DJ stent  Urology consulted; underwent stent exchanged 6/21  Nephrology on board; IV fluid DC'd secondary to third spacing.    S/p IV albumin; with some improvement in urine output.  Strict I& O.  Monitor renal function closely  Cr rising. Vascath placed and she has been dialyzed.  Removed 3 liters today and      Hyperkalemia; resolved.  Monitor potassium level closely     Acute hypoxia; likely 2/2 fluid overload/atelectasis  proBNP elevated > 12 K.  CXR with mild cardiomegaly and central pulmonary congestion.  Hazy bibasilar opacities s/o pulmonary edema/atelectasis. IV fluid DC'd.  Incentive spirometry every 2 hours.    Increasing oxygen needs   Wean O2 as tolerated sat above 92%     UTI; urine culture growing Enterococcus faecalis and Citrobacter.   Patient has multiple drug allergies and thrombocytopenia.  Currently on cefepime and Vanco.  Pharmacy monitoring when dosing     Sepsis 2/2 UTI; continue antibiotics     Right femur fracture; Orthopedic surgery on board; underwent right distal femur ORIF on 6/22/24.  Drain removed to  Continue aspirin 81 mg twice daily.  Continue as needed pain meds.  PT/OT following     Diabetes mellitus; HgbA1c 7.4 on 4/12/2024.  Continue SSI and monitor BG closely     Hypotension; continue midodrine.  BP medication on hold     Normocytic anemia; iron studies suggestive of 
prominent posterior displacement of the distal fragment.      Moderate osteoarthritic changes in the knee and diffuse osteopenia.                 Assessment/Plan:    Active Hospital Problems    Diagnosis     Closed fracture of right distal femur ([facility]) [S72.401A]     Fracture, subtrochanteric, right femur, closed, initial encounter ([facility]) [S72.21XA]          Hospital Day: 7    This is a 68 y.o. female who presented to [facility] on 6/20/2024 and is being treated for:    BARBARA likely 2/2 ATN due to obstructive uropathy/hypotension in setting of sepsis 2/2 UTI  S/p right ureteral stent.  KUB on admission showed malpositioned DJ stent  Urology consulted; underwent stent exchanged 6/21  Nephrology on board; IV fluid DC'd secondary to third spacing.    S/p IV albumin; with some improvement in urine output.  Strict I& O.  Monitor renal function closely  Cr rising. Vascath placed today and will dialyze for 2-3 liters of fluid removal     Hyperkalemia; resolved.  Monitor potassium level closely     Acute hypoxia; likely 2/2 fluid overload/atelectasis  proBNP elevated > 12 K.  CXR with mild cardiomegaly and central pulmonary congestion.  Hazy bibasilar opacities s/o pulmonary edema/atelectasis. IV fluid DC'd.  Incentive spirometry every 2 hours.    Increasing oxygen needs   Wean O2 as tolerated sat above 92%     UTI; urine culture growing Enterococcus faecalis and Citrobacter.   Patient has multiple drug allergies and thrombocytopenia.  Currently on cefepime and Vanco.  Pharmacy monitoring when dosing     Sepsis 2/2 UTI; continue antibiotics     Right femur fracture; Orthopedic surgery on board; underwent right distal femur ORIF on 6/22/24.  Drain removed to  Continue aspirin 81 mg twice daily.  Continue as needed pain meds.  PT/OT following     Diabetes mellitus; HgbA1c 7.4 on 4/12/2024.  Continue SSI and monitor BG closely     Hypotension; continue midodrine.  BP medication on hold     Normocytic anemia; iron studies 
and approve the above documentation. I provided supervision throughout session and guidance in clinical decision making as needed.)         
underwent right distal femur ORIF on 6/22/24.  Drain removed to  Continue aspirin 81 mg twice daily.  Continue as needed pain meds.  PT/OT following     Diabetes mellitus; HgbA1c 7.4 on 4/12/2024.  Continue SSI and monitor BG closely     Hypotension; continue midodrine.  BP medication on hold     Normocytic anemia; iron studies suggestive of iron deficiency; started on supplements     Hyperlipidemia; continue statins.            Discussed management of the case with nephrology who recommended dialysis    Drugs that require monitoring for toxicity include: Vancomycin and the method of monitoring was/is Trough    DVT ppx: Heparin  GI ppx: PPI  Diet: ADULT DIET; Regular; 4 carb choices (60 gm/meal); Low Sodium (2 gm)  ADULT ORAL NUTRITION SUPPLEMENT; Breakfast, Dinner; Wound Healing Oral Supplement  Code Status: Full Code    PT/OT Eval Status: eval and treat     Disposition:  Patient is n the ICU   Now on crrt    Total critical care time was 31 minutes, excluding separately reportable procedures. Services included in critical care time were chart data review, documentation time, obtaining information from patient, review of nursing notes, and vital sign assessment. There was a high probability of clinically significant life-threatening deterioration in the patient's condition, which required my urgent intervention.            Maxim Mason MD  6/30/2024  6:05 PM  
phalanx of the 1st digit.         XR CHEST PORTABLE   Final Result   Status post left PICC line placement as above.      Mild cardiomegaly and mild central pulmonary congestion which is more   apparent.      Hazy bibasilar opacities which may be related to pulmonary edema vs   atelectasis or early infiltrates.         XR FEMUR RIGHT (MIN 2 VIEWS)   Final Result      US RENAL COMPLETE   Final Result   1. No evidence of hydronephrosis.   2. Bilateral renal calculi.   3. 1.4 cm lesion in the lower pole of the right kidney, likely angiomyolipoma.         FL RETROGRADE PYELOGRAM RIGHT   Final Result   Intraprocedural fluoroscopic spot images as above.  See separate procedure   report for more information.         XR ABDOMEN (KUB) (SINGLE AP VIEW)   Final Result   Malposition of right double-J ureteral stent with inferior aspect of the   stent visualized inferior to the urinary bladder as described above.         CT FEMUR RIGHT WO CONTRAST   Final Result   1. Acute moderately comminuted and displaced oblique fracture extending from   the distal femoral diaphysis into the trochlear groove and intercondylar   notch.   2. Small right knee lipohemarthrosis with intra-articular gas in the   suprapatellar recess.   3. Partially visualized right ureteral stent with the distal pigtail formed   either within or distal to the urethra.         CT LUMBAR SPINE WO CONTRAST   Final Result   Moderate degenerative disc changes throughout with no acute abnormality seen.      Moderate central disc bulges at L2-3 and L4-5.      Moderate osteoarthritic changes facets throughout causing diffuse mild spinal   stenosis         CT THORACIC SPINE WO CONTRAST   Final Result   1. No acute osseous abnormality of the thoracic spine.   2. Chronic kyphosis of the upper thoracic spine.   3. DISH.   4. Small bilateral pleural effusions.         CT CERVICAL SPINE WO CONTRAST   Final Result   Diffuse osteopenia and obesity limiting the exam.      Moderate 
Unsure if hit head when falling. 50mcg Fentanyl given en route) FINDINGS: BRAIN/VENTRICLES: There is no acute intracranial hemorrhage, mass effect or midline shift.  No abnormal extra-axial fluid collection.  The gray-white differentiation is maintained without evidence of an acute infarct.  There is no evidence of hydrocephalus. ORBITS: The visualized portion of the orbits demonstrate no acute abnormality. SINUSES: The visualized paranasal sinuses and mastoid air cells demonstrate no acute abnormality. SOFT TISSUES/SKULL:  No acute abnormality of the visualized skull or soft tissues.     No acute intracranial abnormality.     XR KNEE RIGHT (3 VIEWS)    Result Date: 6/20/2024  EXAMINATION: THREE XRAY VIEWS OF THE RIGHT KNEE 6/20/2024 9:16 pm COMPARISON: None. HISTORY: ORDERING SYSTEM PROVIDED HISTORY: injury TECHNOLOGIST PROVIDED HISTORY: Reason for exam:->injury Reason for Exam: R/Knee pain from fall FINDINGS: There is a mildly displaced spiral fracture along the diametaphyseal region of the distal femur with mild posterior displacement of the distal fragment. The joint space is intact with no dislocation.  The patella is intact.  The bones are osteopenic.     Mildly displaced spiral fracture along the diametaphyseal region of the distal femur with prominent posterior displacement of the distal fragment. Moderate osteoarthritic changes in the knee and diffuse osteopenia.     XR ABDOMEN (KUB) (SINGLE AP VIEW)    Result Date: 5/30/2024  EXAMINATION: ONE SUPINE XRAY VIEW(S) OF THE ABDOMEN 5/28/2024 1:39 pm COMPARISON: 04/29/2024 HISTORY: ORDERING SYSTEM PROVIDED HISTORY: Calculus of kidney TECHNOLOGIST PROVIDED HISTORY: Reason for Exam: Calculus of kidney FINDINGS: Gas seen in nondilated bowel loops.  Right renal calcifications appear unchanged.  No significant stool burden.     Unchanged right renal calcification            Electronically signed by: Juan Manuel Cavazos MD, 7/6/2024 8:27 AM  [facility]  Office 
errors in transcription may have occurred inadvertently. If you may need any clarification, please do not hesitate to contact me through EPIC or at the phone number provided below with my electronic signature.  Any pictures or media included in this note were obtained after taking informed verbal consent from the patient and with their approval to include those in the patient's medical record.        Markie Anthony MD, MPH, FACP, FIDSA  7/10/2024, 10:36 PM  Central Office Phone: [phone]  Central Office Fax: [phone]    [facility]   [address], [address] ([address])  [place] [address]  [facility] days:  Tuesday & Thursday AM    [facility]  [address] , [address] (Medical office Building)  [place], [place], [address]  [facility] days: Wednesday AM             
exam:->distal femur fracture Decision Support Exception - unselect if not a suspected or confirmed emergency medical condition->Emergency Medical Condition (MA) Reason for Exam: distal femur fracture Relevant Medical/Surgical History: Fall (Came by [facility] from home with c/o falling while going to the bathroom. C/o 10/10 pain to right knee. Unsure if hit head when falling. 50mcg Fentanyl given en route) FINDINGS: Bones: There is an acute moderately comminuted and displaced oblique fracture extending from the distal femoral diaphysis into the trochlear groove and intercondylar notch.  No other fracture identified.  No dislocation.  No suspicious lytic or blastic osseous lesion. Soft Tissue: A partially visualized right ureteral stent is seen with the distal pigtail formed either within or distal to the urethra.  Status post hysterectomy.  Sigmoid colon diverticulosis.  No inguinal lymphadenopathy. Soft tissue swelling about the distal femoral fracture.  No foreign body identified. Joint: Mild right hip degenerative changes.  The pubic symphysis is intact. Small right knee lipohemarthrosis with intra-articular gas in the suprapatellar recess.  No popliteal cyst.  Severe medial compartment degenerative changes.  Mild lateral and patellofemoral compartment degenerative changes.     1. Acute moderately comminuted and displaced oblique fracture extending from the distal femoral diaphysis into the trochlear groove and intercondylar notch. 2. Small right knee lipohemarthrosis with intra-articular gas in the suprapatellar recess. 3. Partially visualized right ureteral stent with the distal pigtail formed either within or distal to the urethra.     CT HEAD WO CONTRAST    Result Date: 6/20/2024  EXAMINATION: CT OF THE HEAD WITHOUT CONTRAST  6/20/2024 10:13 pm TECHNIQUE: CT of the head was performed without the administration of intravenous contrast. Automated exposure control, iterative reconstruction, and/or weight based 
(Came by [facility] from home with c/o falling while going to the bathroom. C/o 10/10 pain to right knee. Unsure if hit head when falling. 50mcg Fentanyl given en route) FINDINGS: BRAIN/VENTRICLES: There is no acute intracranial hemorrhage, mass effect or midline shift.  No abnormal extra-axial fluid collection.  The gray-white differentiation is maintained without evidence of an acute infarct.  There is no evidence of hydrocephalus. ORBITS: The visualized portion of the orbits demonstrate no acute abnormality. SINUSES: The visualized paranasal sinuses and mastoid air cells demonstrate no acute abnormality. SOFT TISSUES/SKULL:  No acute abnormality of the visualized skull or soft tissues.     No acute intracranial abnormality.     XR KNEE RIGHT (3 VIEWS)    Result Date: 6/20/2024  EXAMINATION: THREE XRAY VIEWS OF THE RIGHT KNEE 6/20/2024 9:16 pm COMPARISON: None. HISTORY: ORDERING SYSTEM PROVIDED HISTORY: injury TECHNOLOGIST PROVIDED HISTORY: Reason for exam:->injury Reason for Exam: R/Knee pain from fall FINDINGS: There is a mildly displaced spiral fracture along the diametaphyseal region of the distal femur with mild posterior displacement of the distal fragment. The joint space is intact with no dislocation.  The patella is intact.  The bones are osteopenic.     Mildly displaced spiral fracture along the diametaphyseal region of the distal femur with prominent posterior displacement of the distal fragment. Moderate osteoarthritic changes in the knee and diffuse osteopenia.     XR ABDOMEN (KUB) (SINGLE AP VIEW)    Result Date: 5/30/2024  EXAMINATION: ONE SUPINE XRAY VIEW(S) OF THE ABDOMEN 5/28/2024 1:39 pm COMPARISON: 04/29/2024 HISTORY: ORDERING SYSTEM PROVIDED HISTORY: Calculus of kidney TECHNOLOGIST PROVIDED HISTORY: Reason for Exam: Calculus of kidney FINDINGS: Gas seen in nondilated bowel loops.  Right renal calcifications appear unchanged.  No significant stool burden.     Unchanged right renal 
distal femur fracture Relevant Medical/Surgical History: Fall (Came by [facility] from home with c/o falling while going to the bathroom. C/o 10/10 pain to right knee. Unsure if hit head when falling. 50mcg Fentanyl given en route) FINDINGS: Bones: There is an acute moderately comminuted and displaced oblique fracture extending from the distal femoral diaphysis into the trochlear groove and intercondylar notch.  No other fracture identified.  No dislocation.  No suspicious lytic or blastic osseous lesion. Soft Tissue: A partially visualized right ureteral stent is seen with the distal pigtail formed either within or distal to the urethra.  Status post hysterectomy.  Sigmoid colon diverticulosis.  No inguinal lymphadenopathy. Soft tissue swelling about the distal femoral fracture.  No foreign body identified. Joint: Mild right hip degenerative changes.  The pubic symphysis is intact. Small right knee lipohemarthrosis with intra-articular gas in the suprapatellar recess.  No popliteal cyst.  Severe medial compartment degenerative changes.  Mild lateral and patellofemoral compartment degenerative changes.     1. Acute moderately comminuted and displaced oblique fracture extending from the distal femoral diaphysis into the trochlear groove and intercondylar notch. 2. Small right knee lipohemarthrosis with intra-articular gas in the suprapatellar recess. 3. Partially visualized right ureteral stent with the distal pigtail formed either within or distal to the urethra.     CT HEAD WO CONTRAST    Result Date: 6/20/2024  EXAMINATION: CT OF THE HEAD WITHOUT CONTRAST  6/20/2024 10:13 pm TECHNIQUE: CT of the head was performed without the administration of intravenous contrast. Automated exposure control, iterative reconstruction, and/or weight based adjustment of the mA/kV was utilized to reduce the radiation dose to as low as reasonably achievable. COMPARISON: None. HISTORY: ORDERING SYSTEM PROVIDED HISTORY: fall, injury 
notch. 2. Small right knee lipohemarthrosis with intra-articular gas in the suprapatellar recess. 3. Partially visualized right ureteral stent with the distal pigtail formed either within or distal to the urethra.     CT HEAD WO CONTRAST    Result Date: 6/20/2024  EXAMINATION: CT OF THE HEAD WITHOUT CONTRAST  6/20/2024 10:13 pm TECHNIQUE: CT of the head was performed without the administration of intravenous contrast. Automated exposure control, iterative reconstruction, and/or weight based adjustment of the mA/kV was utilized to reduce the radiation dose to as low as reasonably achievable. COMPARISON: None. HISTORY: ORDERING SYSTEM PROVIDED HISTORY: fall, injury TECHNOLOGIST PROVIDED HISTORY: If patient is on cardiac monitor and/or pulse ox, they may be taken off cardiac monitor and pulse ox, left on O2 if currently on. All monitors reattached when patient returns to room. Has a \"code stroke\" or \"stroke alert\" been called?->No Reason for exam:->fall, injury Decision Support Exception - unselect if not a suspected or confirmed emergency medical condition->Emergency Medical Condition (MA) Reason for Exam: fall, injury Relevant Medical/Surgical History: Fall (Came by [facility] from home with c/o falling while going to the bathroom. C/o 10/10 pain to right knee. Unsure if hit head when falling. 50mcg Fentanyl given en route) FINDINGS: BRAIN/VENTRICLES: There is no acute intracranial hemorrhage, mass effect or midline shift.  No abnormal extra-axial fluid collection.  The gray-white differentiation is maintained without evidence of an acute infarct.  There is no evidence of hydrocephalus. ORBITS: The visualized portion of the orbits demonstrate no acute abnormality. SINUSES: The visualized paranasal sinuses and mastoid air cells demonstrate no acute abnormality. SOFT TISSUES/SKULL:  No acute abnormality of the visualized skull or soft tissues.     No acute intracranial abnormality.     XR KNEE RIGHT (3 
cardiac monitor and/or pulse ox, they may be taken off cardiac monitor and pulse ox, left on O2 if currently on. All monitors reattached when patient returns to room. Has a \"code stroke\" or \"stroke alert\" been called?->No Reason for exam:->fall, injury Decision Support Exception - unselect if not a suspected or confirmed emergency medical condition->Emergency Medical Condition (MA) Reason for Exam: fall, injury Relevant Medical/Surgical History: Fall (Came by [facility] from home with c/o falling while going to the bathroom. C/o 10/10 pain to right knee. Unsure if hit head when falling. 50mcg Fentanyl given en route) FINDINGS: BRAIN/VENTRICLES: There is no acute intracranial hemorrhage, mass effect or midline shift.  No abnormal extra-axial fluid collection.  The gray-white differentiation is maintained without evidence of an acute infarct.  There is no evidence of hydrocephalus. ORBITS: The visualized portion of the orbits demonstrate no acute abnormality. SINUSES: The visualized paranasal sinuses and mastoid air cells demonstrate no acute abnormality. SOFT TISSUES/SKULL:  No acute abnormality of the visualized skull or soft tissues.     No acute intracranial abnormality.     XR KNEE RIGHT (3 VIEWS)    Result Date: 6/20/2024  EXAMINATION: THREE XRAY VIEWS OF THE RIGHT KNEE 6/20/2024 9:16 pm COMPARISON: None. HISTORY: ORDERING SYSTEM PROVIDED HISTORY: injury TECHNOLOGIST PROVIDED HISTORY: Reason for exam:->injury Reason for Exam: R/Knee pain from fall FINDINGS: There is a mildly displaced spiral fracture along the diametaphyseal region of the distal femur with mild posterior displacement of the distal fragment. The joint space is intact with no dislocation.  The patella is intact.  The bones are osteopenic.     Mildly displaced spiral fracture along the diametaphyseal region of the distal femur with prominent posterior displacement of the distal fragment. Moderate osteoarthritic changes in the knee and diffuse 
falling. 50mcg Fentanyl given en route) FINDINGS: BRAIN/VENTRICLES: There is no acute intracranial hemorrhage, mass effect or midline shift.  No abnormal extra-axial fluid collection.  The gray-white differentiation is maintained without evidence of an acute infarct.  There is no evidence of hydrocephalus. ORBITS: The visualized portion of the orbits demonstrate no acute abnormality. SINUSES: The visualized paranasal sinuses and mastoid air cells demonstrate no acute abnormality. SOFT TISSUES/SKULL:  No acute abnormality of the visualized skull or soft tissues.     No acute intracranial abnormality.     XR KNEE RIGHT (3 VIEWS)    Result Date: 6/20/2024  EXAMINATION: THREE XRAY VIEWS OF THE RIGHT KNEE 6/20/2024 9:16 pm COMPARISON: None. HISTORY: ORDERING SYSTEM PROVIDED HISTORY: injury TECHNOLOGIST PROVIDED HISTORY: Reason for exam:->injury Reason for Exam: R/Knee pain from fall FINDINGS: There is a mildly displaced spiral fracture along the diametaphyseal region of the distal femur with mild posterior displacement of the distal fragment. The joint space is intact with no dislocation.  The patella is intact.  The bones are osteopenic.     Mildly displaced spiral fracture along the diametaphyseal region of the distal femur with prominent posterior displacement of the distal fragment. Moderate osteoarthritic changes in the knee and diffuse osteopenia.     XR ABDOMEN (KUB) (SINGLE AP VIEW)    Result Date: 5/30/2024  EXAMINATION: ONE SUPINE XRAY VIEW(S) OF THE ABDOMEN 5/28/2024 1:39 pm COMPARISON: 04/29/2024 HISTORY: ORDERING SYSTEM PROVIDED HISTORY: Calculus of kidney TECHNOLOGIST PROVIDED HISTORY: Reason for Exam: Calculus of kidney FINDINGS: Gas seen in nondilated bowel loops.  Right renal calcifications appear unchanged.  No significant stool burden.     Unchanged right renal calcification       Imaging Results.  Chest X Ray reviwed by me          Electronically Signed:  Ang Cameron MD 7/10/2024   
SUPINE XRAY VIEW(S) OF THE ABDOMEN 5/28/2024 1:39 pm COMPARISON: 04/29/2024 HISTORY: ORDERING SYSTEM PROVIDED HISTORY: Calculus of kidney TECHNOLOGIST PROVIDED HISTORY: Reason for Exam: Calculus of kidney FINDINGS: Gas seen in nondilated bowel loops.  Right renal calcifications appear unchanged.  No significant stool burden.     Unchanged right renal calcification       Imaging Results.  Chest X Ray reviwed by me          Electronically Signed:  Preet Ball MD 6/30/2024

## 2024-07-17 NOTE — DISCHARGE SUMMARY
MG extended release tablet  Commonly known as: TOPROL XL     traMADol 50 MG tablet  Commonly known as: ULTRAM     zonisamide 25 MG capsule  Commonly known as: ZONEGRAN            ASK your doctor about these medications      cefUROXime 250 MG tablet  Commonly known as: CEFTIN  Take 1 tablet by mouth every 12 hours for 8 doses  Ask about: Should I take this medication?     oxyCODONE 5 MG immediate release tablet  Commonly known as: ROXICODONE  Take 1 tablet by mouth every 4 hours as needed for Pain for up to 3 days. Max Daily Amount: 30 mg  Ask about: Should I take this medication?     sertraline 50 MG tablet  Commonly known as: ZOLOFT               Where to Get Your Medications        These medications were sent to [id number] - [place], [place] - [address] - P [phone] - F [phone]  [facility] [address] [place] [address]      Phone: [phone]   cefUROXime 250 MG tablet  insulin lispro 100 UNIT/ML Soln injection vial  lactulose 10 GM/15ML solution  midodrine 5 MG tablet  naloxegol 12.5 MG Tabs tablet  senna 8.6 MG tablet  sodium bicarbonate 325 MG tablet       You can get these medications from any pharmacy    Bring a paper prescription for each of these medications  oxyCODONE 5 MG immediate release tablet         48nMinutes spent on patient evaluation, counseling and discharge planning.     Signed:  Mark Galvan MD  7/16/2024, 11:04 PM **

## 2024-08-13 ENCOUNTER — TELEPHONE (OUTPATIENT)
Dept: ORTHOPEDIC SURGERY | Age: 69
End: 2024-08-13

## 2024-08-13 NOTE — TELEPHONE ENCOUNTER
General Question     Subject: QUESTION-TRANSPORTATION  Patient and /or Facility Request: QIAN DEVLIN  Contact Number: [phone]    ADRIEL WITH [facility] CALLED TO INQUIRE TRANSPORTATION-SCHEDULED FU    SHE IS CURRENTLY IN [place] COMING TO [place] FOR FU. SHE WOULD BE COMING TO OFFICE VIA [facility] AND [facility]. ADRIEL STATED THAT A NURSE AIDE COULD BE PROVIDED IF NEEDED.     SHE IS INQUIRING IF THIS WOULD BE ACCEPTABLE-ESPECIALLY IF IMAGING IS NEEDED.     PLEASE ADVISE

## 2024-08-14 NOTE — TELEPHONE ENCOUNTER
L/m on v/m for Jessenia Abebe.  Nurse-aide advised to come with patient as x-rays are needed and help would be warranted.  They were asked to call back with further questions.

## (undated) DEVICE — DRESSING,GAUZE,XEROFORM,CURAD,1"X8",ST: Brand: CURAD

## (undated) DEVICE — MAJOR SET UP PK

## (undated) DEVICE — GLOVE SURG SZ 65 THK91MIL LTX FREE SYN POLYISOPRENE

## (undated) DEVICE — BIT DRL L300MM DIA4.3MM QUIK CPL PERC CALIB W/O STP REUSE

## (undated) DEVICE — CYSTO: Brand: MEDLINE INDUSTRIES, INC.

## (undated) DEVICE — PEEL-AWAY, INTRODUCER: Brand: PEEL-AWAY

## (undated) DEVICE — ELECTRODE PT RET AD L9FT HI MOIST COND ADH HYDRGEL CORDED

## (undated) DEVICE — GLOVE ORANGE PI 8   MSG9080

## (undated) DEVICE — STRIP,CLOSURE,WOUND,MEDI-STRIP,1/2X4: Brand: MEDLINE

## (undated) DEVICE — BAG,DRAINAGE,4L,A/R TOWER,LL,SLIDE TAP: Brand: MEDLINE

## (undated) DEVICE — BANDAGE,ELASTIC,ESMARK,STERILE,6"X9',LF: Brand: MEDLINE

## (undated) DEVICE — SUTURE VICRYL + SZ 0 L18IN ABSRB UD L36MM CT-1 1/2 CIR VCP840D

## (undated) DEVICE — GLOVE ORTHO 8   MSG9480

## (undated) DEVICE — DRAPE,U/ SHT,SPLIT,PLAS,STERIL: Brand: MEDLINE

## (undated) DEVICE — SOLUTION IRRIG 500ML 0.9% SOD CHLO USP POUR PLAS BTL

## (undated) DEVICE — SUTURE VICRYL + SZ 2-0 L18IN ABSRB UD CT1 L36MM 1/2 CIR VCP839D

## (undated) DEVICE — STOCKINETTE,IMPERVIOUS,12X48,STERILE: Brand: MEDLINE

## (undated) DEVICE — PADDING CAST W4INXL4YD ST COT RAYON MICROPLEATED HIGHLY

## (undated) DEVICE — BANDAGE COMPR W6INXL10YD ST M E WHITE/BEIGE

## (undated) DEVICE — SPONGE LAP W18XL18IN WHT COT 4 PLY FLD STRUNG RADPQ DISP ST 2 PER PACK

## (undated) DEVICE — PADDING CAST W6INXL4YD ST COT RAYON MICROPLEATED HIGHLY

## (undated) DEVICE — SHEET,DRAPE,40X58,STERILE: Brand: MEDLINE

## (undated) DEVICE — KIT EVAC 400CC DIA3/16IN H PAT 12.5IN 3 SPR RND SHP PVC DRN

## (undated) DEVICE — 3M™ STERI-DRAPE™ U-DRAPE 1015: Brand: STERI-DRAPE™

## (undated) DEVICE — GUIDEWIRE ENDOSCP L150CM DIA0.035IN TIP 3CM PTFE NIT

## (undated) DEVICE — SPLINT KNEE UNIV FOR LESS THAN 36IN L20IN FOAM LAM E CNTCT

## (undated) DEVICE — BIT DRL L145MM DIA4.5MM QUIK CPL W/O STP REUSE

## (undated) DEVICE — SYRINGE MEDICAL 3ML CLEAR PLASTIC STANDARD NON CONTROL LUERLOCK TIP DISPOSABLE

## (undated) DEVICE — SNAPSECURE FOLEY DEVICE: Brand: MEDLINE

## (undated) DEVICE — SOLUTION IRRIGATION STRL H2O 1000 ML UROMATIC CONTAINER

## (undated) DEVICE — C-ARMOR C-ARM EQUIPMENT COVERS CLEAR STERILE UNIVERSAL FIT 12 PER CASE: Brand: C-ARMOR

## (undated) DEVICE — BAG DRAINAGE NS

## (undated) DEVICE — GLOVE ORANGE PI 7   MSG9070

## (undated) DEVICE — SHEET,DRAPE,53X77,STERILE: Brand: MEDLINE

## (undated) DEVICE — SUTURE NONABSORBABLE MONOFILAMENT 3-0 PS-1 18 IN BLK ETHILON 1663H

## (undated) DEVICE — SCREW BNE L34MM DIA4.5MM PROX CORT TIB S STL ST LOK FULL: Type: IMPLANTABLE DEVICE | Site: FEMUR | Status: NON-FUNCTIONAL

## (undated) DEVICE — DRESSING CNTCT 4X12IN IS THERABOND 3D

## (undated) DEVICE — GLOVE SURG SZ 65 L12IN THK75MIL DK GRN LTX FREE

## (undated) DEVICE — 3M™ COBAN™ NL STERILE NON-LATEX SELF-ADHERENT WRAP, 2084S, 4 IN X 5 YD (10 CM X 4,5 M), 18 ROLLS/CASE: Brand: 3M™ COBAN™

## (undated) DEVICE — GUIDEWIRE ORTH DIA2.5MM LOK SMOOTH DRL TIP FLX THRD FOR

## (undated) DEVICE — WET SKIN PREP TRAY: Brand: MEDLINE INDUSTRIES, INC.

## (undated) DEVICE — GUIDEWIRE ORTH L300MM DIA2.5MM CO CHROM DRL TIP FOR LCP

## (undated) DEVICE — ELECTRODE ELECSURG L 10.2 CM PTFE COAT MONOPOLAR BLADE OPN

## (undated) DEVICE — SYRINGE, LUER LOCK, 10ML: Brand: MEDLINE

## (undated) DEVICE — SOLUTION IRRIG 1000ML STRL H2O USP PLAS POUR BTL

## (undated) DEVICE — NEEDLE SPNL 20GA L3.5IN YEL HUB S STL REG WALL FIT STYL

## (undated) DEVICE — DRAPE,HIP,W/POUCHES,STERILE: Brand: MEDLINE

## (undated) DEVICE — TOWEL,OR,DSP,ST,BLUE,STD,4/PK,20PK/CS: Brand: MEDLINE

## (undated) DEVICE — GOWN SIRUS NONREIN LG W/TWL: Brand: MEDLINE INDUSTRIES, INC.

## (undated) DEVICE — BIT DRL L145MM DIA3.2MM QUIK CPL W/O STP REUSE

## (undated) DEVICE — C-ARM: Brand: UNBRANDED

## (undated) DEVICE — CATHETER URET 6FR L70CM SFT PERCFLX SGL LUMN OPN END TAPR

## (undated) DEVICE — INTENDED FOR TISSUE SEPARATION, AND OTHER PROCEDURES THAT REQUIRE A SHARP SURGICAL BLADE TO PUNCTURE OR CUT.: Brand: BARD-PARKER ® STAINLESS STEEL BLADES

## (undated) DEVICE — APPLICATOR MEDICATED 26 CC SOLUTION HI LT ORNG CHLORAPREP

## (undated) DEVICE — STAPLER SKIN H3.9MM WIRE DIA0.58MM CRWN 6.9MM 35 STPL ROT

## (undated) DEVICE — GAUZE,SPONGE,4"X4",8PLY,STRL,LF,10/TRAY: Brand: MEDLINE

## (undated) DEVICE — CATHETER F BLLN 5CC 16FR 2 W HYDRGEL COAT LESS TRAUM LUB

## (undated) DEVICE — MASTISOL ADHESIVE LIQ 2/3ML